# Patient Record
Sex: FEMALE | Race: WHITE | Employment: OTHER | URBAN - NONMETROPOLITAN AREA
[De-identification: names, ages, dates, MRNs, and addresses within clinical notes are randomized per-mention and may not be internally consistent; named-entity substitution may affect disease eponyms.]

---

## 2017-07-05 ENCOUNTER — DOCTOR'S OFFICE (OUTPATIENT)
Dept: URBAN - NONMETROPOLITAN AREA CLINIC 1 | Facility: CLINIC | Age: 77
Setting detail: OPHTHALMOLOGY
End: 2017-07-05
Payer: COMMERCIAL

## 2017-07-05 DIAGNOSIS — F17.200: ICD-10-CM

## 2017-07-05 DIAGNOSIS — H35.033: ICD-10-CM

## 2017-07-05 DIAGNOSIS — H25.13: ICD-10-CM

## 2017-07-05 DIAGNOSIS — E11.9: ICD-10-CM

## 2017-07-05 DIAGNOSIS — H25.042: ICD-10-CM

## 2017-07-05 DIAGNOSIS — H34.8122: ICD-10-CM

## 2017-07-05 PROCEDURE — 67028 INJECTION EYE DRUG: CPT | Performed by: OPHTHALMOLOGY

## 2017-07-05 PROCEDURE — 92134 CPTRZ OPH DX IMG PST SGM RTA: CPT | Performed by: OPHTHALMOLOGY

## 2017-07-05 PROCEDURE — 92014 COMPRE OPH EXAM EST PT 1/>: CPT | Performed by: OPHTHALMOLOGY

## 2017-07-05 ASSESSMENT — REFRACTION_MANIFEST
OS_VA2: 20/
OD_VA1: 20/
OS_VA3: 20/
OU_VA: 20/
OS_VA3: 20/
OD_VA3: 20/
OD_VA1: 20/
OD_VA3: 20/
OS_VA1: 20/
OS_VA2: 20/
OU_VA: 20/
OS_VA2: 20/
OD_VA2: 20/
OD_VA2: 20/
OS_VA3: 20/
OD_VA3: 20/
OS_VA1: 20/
OU_VA: 20/
OS_VA1: 20/
OD_VA1: 20/
OD_VA2: 20/

## 2017-07-05 ASSESSMENT — CONFRONTATIONAL VISUAL FIELD TEST (CVF)
OS_FINDINGS: FULL
OD_FINDINGS: FULL

## 2017-07-05 ASSESSMENT — LID EXAM ASSESSMENTS: OD_BLEPHARITIS: 1+

## 2017-07-05 ASSESSMENT — VISUAL ACUITY: OS_BCVA: 20/70+2

## 2017-07-05 ASSESSMENT — REFRACTION_CURRENTRX
OS_OVR_VA: 20/
OD_OVR_VA: 20/
OS_OVR_VA: 20/
OS_OVR_VA: 20/
OD_OVR_VA: 20/
OD_OVR_VA: 20/

## 2017-08-01 ENCOUNTER — DOCTOR'S OFFICE (OUTPATIENT)
Dept: URBAN - NONMETROPOLITAN AREA CLINIC 1 | Facility: CLINIC | Age: 77
Setting detail: OPHTHALMOLOGY
End: 2017-08-01
Payer: COMMERCIAL

## 2017-08-01 DIAGNOSIS — H35.033: ICD-10-CM

## 2017-08-01 DIAGNOSIS — H34.8122: ICD-10-CM

## 2017-08-01 DIAGNOSIS — F17.200: ICD-10-CM

## 2017-08-01 DIAGNOSIS — H01.002: ICD-10-CM

## 2017-08-01 DIAGNOSIS — H25.13: ICD-10-CM

## 2017-08-01 DIAGNOSIS — H01.001: ICD-10-CM

## 2017-08-01 DIAGNOSIS — H25.043: ICD-10-CM

## 2017-08-01 DIAGNOSIS — H01.004: ICD-10-CM

## 2017-08-01 DIAGNOSIS — H01.005: ICD-10-CM

## 2017-08-01 DIAGNOSIS — E11.9: ICD-10-CM

## 2017-08-01 DIAGNOSIS — H04.122: ICD-10-CM

## 2017-08-01 DIAGNOSIS — H04.121: ICD-10-CM

## 2017-08-01 PROCEDURE — 92250 FUNDUS PHOTOGRAPHY W/I&R: CPT | Performed by: OPHTHALMOLOGY

## 2017-08-01 PROCEDURE — 92014 COMPRE OPH EXAM EST PT 1/>: CPT | Performed by: OPHTHALMOLOGY

## 2017-08-01 ASSESSMENT — REFRACTION_AUTOREFRACTION
OD_CYLINDER: -1.50
OD_SPHERE: +1.75
OD_AXIS: 106

## 2017-08-01 ASSESSMENT — REFRACTION_MANIFEST
OS_VA3: 20/
OD_VA2: 20/
OU_VA: 20/
OD_VA2: 20/
OD_VA1: 20/
OD_VA1: 20/
OS_VA1: 20/
OU_VA: 20/
OD_VA2: 20/
OD_VA3: 20/
OS_VA2: 20/
OS_VA2: 20/
OS_VA3: 20/
OU_VA: 20/
OS_VA1: 20/
OS_VA3: 20/
OS_VA1: 20/
OD_VA3: 20/
OS_VA2: 20/
OD_VA3: 20/
OD_VA1: 20/

## 2017-08-01 ASSESSMENT — CONFRONTATIONAL VISUAL FIELD TEST (CVF)
OS_FINDINGS: FULL
OD_FINDINGS: FULL

## 2017-08-01 ASSESSMENT — REFRACTION_CURRENTRX
OD_OVR_VA: 20/
OD_SPHERE: +3.00
OD_AXIS: 093
OS_OVR_VA: 20/
OS_AXIS: 097
OS_VPRISM_DIRECTION: PROGS
OS_OVR_VA: 20/
OD_VPRISM_DIRECTION: PROGS
OD_OVR_VA: 20/
OS_CYLINDER: -1.75
OS_SPHERE: +2.50
OS_ADD: +2.50
OD_CYLINDER: -1.75
OD_ADD: +2.50
OD_OVR_VA: 20/
OS_OVR_VA: 20/

## 2017-08-01 ASSESSMENT — DRY EYES - PHYSICIAN NOTES
OD_GENERALCOMMENTS: SEVERE K SICCA
OS_GENERALCOMMENTS: SEVERE K SICCA

## 2017-08-01 ASSESSMENT — VISUAL ACUITY: OS_BCVA: 20/70+2

## 2017-08-01 ASSESSMENT — SPHEQUIV_DERIVED: OD_SPHEQUIV: 1

## 2017-08-01 ASSESSMENT — LID EXAM ASSESSMENTS: OD_BLEPHARITIS: 1+

## 2019-09-13 ENCOUNTER — DOCTOR'S OFFICE (OUTPATIENT)
Dept: URBAN - NONMETROPOLITAN AREA CLINIC 1 | Facility: CLINIC | Age: 79
Setting detail: OPHTHALMOLOGY
End: 2019-09-13
Payer: COMMERCIAL

## 2019-09-13 DIAGNOSIS — H35.033: ICD-10-CM

## 2019-09-13 DIAGNOSIS — H34.8120: ICD-10-CM

## 2019-09-13 DIAGNOSIS — E11.3413: ICD-10-CM

## 2019-09-13 DIAGNOSIS — H25.13: ICD-10-CM

## 2019-09-13 DIAGNOSIS — H25.043: ICD-10-CM

## 2019-09-13 DIAGNOSIS — H25.22: ICD-10-CM

## 2019-09-13 PROBLEM — H11.32 SUBCONJUNCTIVAL HEMORRHAGE ; LEFT EYE: Status: RESOLVED | Noted: 2017-07-05 | Resolved: 2019-09-13

## 2019-09-13 PROCEDURE — 92014 COMPRE OPH EXAM EST PT 1/>: CPT | Performed by: OPHTHALMOLOGY

## 2019-09-13 PROCEDURE — 92134 CPTRZ OPH DX IMG PST SGM RTA: CPT | Performed by: OPHTHALMOLOGY

## 2019-09-13 PROCEDURE — 76512 OPH US DX B-SCAN: CPT | Performed by: OPHTHALMOLOGY

## 2019-09-13 ASSESSMENT — REFRACTION_MANIFEST
OD_VA3: 20/
OS_VA1: 20/
OU_VA: 20/
OD_VA2: 20/
OS_VA3: 20/
OS_VA1: 20/
OU_VA: 20/
OD_VA3: 20/
OS_VA2: 20/
OS_VA2: 20/
OD_VA1: 20/
OD_VA2: 20/
OS_VA3: 20/
OD_VA1: 20/

## 2019-09-13 ASSESSMENT — REFRACTION_CURRENTRX
OS_AXIS: 085
OS_VPRISM_DIRECTION: PROGS
OD_VPRISM_DIRECTION: PROGS
OS_CYLINDER: -1.75
OS_OVR_VA: 20/
OS_OVR_VA: 20/
OD_SPHERE: +2.75
OS_ADD: +2.50
OD_OVR_VA: 20/
OS_OVR_VA: 20/
OD_ADD: +2.50
OD_OVR_VA: 20/
OD_OVR_VA: 20/
OD_CYLINDER: -1.50
OD_AXIS: 090
OS_SPHERE: +2.50

## 2019-09-13 ASSESSMENT — DRY EYES - PHYSICIAN NOTES
OS_GENERALCOMMENTS: SEVERE K SICCA
OD_GENERALCOMMENTS: SEVERE K SICCA

## 2019-09-13 ASSESSMENT — CONFRONTATIONAL VISUAL FIELD TEST (CVF)
OS_FINDINGS: FULL
OD_FINDINGS: FULL

## 2019-09-13 ASSESSMENT — REFRACTION_AUTOREFRACTION
OD_AXIS: 070
OS_SPHERE: ERROR
OD_CYLINDER: -2.50
OD_SPHERE: +2.00

## 2019-09-13 ASSESSMENT — SPHEQUIV_DERIVED: OD_SPHEQUIV: 0.75

## 2019-09-13 ASSESSMENT — VISUAL ACUITY: OS_BCVA: 20/400

## 2019-09-13 ASSESSMENT — LID EXAM ASSESSMENTS: OD_BLEPHARITIS: 1+

## 2019-09-17 ENCOUNTER — DOCTOR'S OFFICE (OUTPATIENT)
Dept: URBAN - NONMETROPOLITAN AREA CLINIC 1 | Facility: CLINIC | Age: 79
Setting detail: OPHTHALMOLOGY
End: 2019-09-17
Payer: COMMERCIAL

## 2019-09-17 DIAGNOSIS — E11.3411: ICD-10-CM

## 2019-09-17 PROCEDURE — 67028 INJECTION EYE DRUG: CPT | Performed by: OPHTHALMOLOGY

## 2019-09-19 ENCOUNTER — DOCTOR'S OFFICE (OUTPATIENT)
Dept: URBAN - NONMETROPOLITAN AREA CLINIC 1 | Facility: CLINIC | Age: 79
Setting detail: OPHTHALMOLOGY
End: 2019-09-19
Payer: COMMERCIAL

## 2019-09-19 DIAGNOSIS — E11.3412: ICD-10-CM

## 2019-09-19 PROCEDURE — 67028 INJECTION EYE DRUG: CPT | Performed by: OPHTHALMOLOGY

## 2019-09-20 ASSESSMENT — REFRACTION_CURRENTRX
OS_SPHERE: +2.50
OD_VPRISM_DIRECTION: PROGS
OD_ADD: +2.50
OS_OVR_VA: 20/
OS_OVR_VA: 20/
OD_SPHERE: +2.75
OS_CYLINDER: -1.75
OS_OVR_VA: 20/
OD_OVR_VA: 20/
OS_VPRISM_DIRECTION: PROGS
OD_CYLINDER: -1.50
OD_OVR_VA: 20/
OD_AXIS: 090
OD_OVR_VA: 20/
OS_ADD: +2.50
OS_AXIS: 085

## 2019-09-20 ASSESSMENT — REFRACTION_MANIFEST
OD_VA1: 20/
OU_VA: 20/
OD_VA2: 20/
OD_VA3: 20/
OS_VA2: 20/
OS_VA1: 20/
OD_VA1: 20/
OD_VA2: 20/
OS_VA1: 20/
OS_VA3: 20/
OS_VA3: 20/
OU_VA: 20/
OS_VA2: 20/
OD_VA3: 20/

## 2019-09-20 ASSESSMENT — REFRACTION_AUTOREFRACTION
OD_AXIS: 070
OD_SPHERE: +2.00
OD_CYLINDER: -2.50
OS_SPHERE: ERROR

## 2019-09-20 ASSESSMENT — VISUAL ACUITY: OS_BCVA: 20/400

## 2019-09-20 ASSESSMENT — SPHEQUIV_DERIVED: OD_SPHEQUIV: 0.75

## 2019-09-24 ENCOUNTER — RX ONLY (RX ONLY)
Age: 79
End: 2019-09-24

## 2019-09-24 ASSESSMENT — SPHEQUIV_DERIVED: OD_SPHEQUIV: 0.75

## 2019-09-24 ASSESSMENT — REFRACTION_CURRENTRX
OD_VPRISM_DIRECTION: PROGS
OS_OVR_VA: 20/
OD_OVR_VA: 20/
OS_VPRISM_DIRECTION: PROGS
OS_ADD: +2.50
OD_OVR_VA: 20/
OS_CYLINDER: -1.75
OS_AXIS: 085
OD_CYLINDER: -1.50
OD_AXIS: 090
OD_OVR_VA: 20/
OS_OVR_VA: 20/
OS_OVR_VA: 20/
OD_ADD: +2.50
OD_SPHERE: +2.75
OS_SPHERE: +2.50

## 2019-09-24 ASSESSMENT — REFRACTION_MANIFEST
OU_VA: 20/
OS_VA3: 20/
OS_VA1: 20/
OD_VA2: 20/
OD_VA2: 20/
OU_VA: 20/
OD_VA1: 20/
OD_VA1: 20/
OS_VA3: 20/
OS_VA2: 20/
OD_VA3: 20/
OS_VA2: 20/
OS_VA1: 20/
OD_VA3: 20/

## 2019-09-24 ASSESSMENT — VISUAL ACUITY: OS_BCVA: 20/400

## 2019-09-24 ASSESSMENT — REFRACTION_AUTOREFRACTION
OD_SPHERE: +2.00
OD_AXIS: 070
OD_CYLINDER: -2.50
OS_SPHERE: ERROR

## 2019-10-09 ENCOUNTER — RX ONLY (RX ONLY)
Age: 79
End: 2019-10-09

## 2019-10-09 ENCOUNTER — DOCTOR'S OFFICE (OUTPATIENT)
Dept: URBAN - NONMETROPOLITAN AREA CLINIC 1 | Facility: CLINIC | Age: 79
Setting detail: OPHTHALMOLOGY
End: 2019-10-09
Payer: COMMERCIAL

## 2019-10-09 DIAGNOSIS — H25.043: ICD-10-CM

## 2019-10-09 DIAGNOSIS — H25.13: ICD-10-CM

## 2019-10-09 DIAGNOSIS — E11.3413: ICD-10-CM

## 2019-10-09 DIAGNOSIS — H25.22: ICD-10-CM

## 2019-10-09 DIAGNOSIS — H34.8120: ICD-10-CM

## 2019-10-09 PROCEDURE — 92134 CPTRZ OPH DX IMG PST SGM RTA: CPT | Performed by: OPHTHALMOLOGY

## 2019-10-09 PROCEDURE — 92014 COMPRE OPH EXAM EST PT 1/>: CPT | Performed by: OPHTHALMOLOGY

## 2019-10-09 ASSESSMENT — REFRACTION_CURRENTRX
OD_OVR_VA: 20/
OD_OVR_VA: 20/
OD_AXIS: 090
OS_CYLINDER: -1.75
OD_CYLINDER: -1.50
OD_ADD: +2.50
OS_OVR_VA: 20/
OD_OVR_VA: 20/
OD_VPRISM_DIRECTION: PROGS
OS_OVR_VA: 20/
OS_VPRISM_DIRECTION: PROGS
OS_AXIS: 085
OS_ADD: +2.50
OS_OVR_VA: 20/
OD_SPHERE: +2.75
OS_SPHERE: +2.50

## 2019-10-09 ASSESSMENT — REFRACTION_MANIFEST
OU_VA: 20/
OS_VA1: 20/
OS_VA1: 20/
OD_VA1: 20/
OS_VA2: 20/
OS_VA2: 20/
OU_VA: 20/
OD_VA3: 20/
OS_VA3: 20/
OD_VA3: 20/
OD_VA2: 20/
OS_VA3: 20/
OD_VA2: 20/
OD_VA1: 20/

## 2019-10-09 ASSESSMENT — SPHEQUIV_DERIVED: OD_SPHEQUIV: 0.75

## 2019-10-09 ASSESSMENT — LID EXAM ASSESSMENTS: OD_BLEPHARITIS: 1+

## 2019-10-09 ASSESSMENT — REFRACTION_AUTOREFRACTION
OD_CYLINDER: -2.50
OD_AXIS: 070
OD_SPHERE: +2.00
OS_SPHERE: ERROR

## 2019-10-09 ASSESSMENT — VISUAL ACUITY: OS_BCVA: 20/400

## 2019-10-09 ASSESSMENT — CONFRONTATIONAL VISUAL FIELD TEST (CVF)
OD_FINDINGS: FULL
OS_FINDINGS: FULL

## 2019-10-09 ASSESSMENT — DRY EYES - PHYSICIAN NOTES
OS_GENERALCOMMENTS: SEVERE K SICCA
OD_GENERALCOMMENTS: SEVERE K SICCA

## 2019-11-04 ENCOUNTER — DOCTOR'S OFFICE (OUTPATIENT)
Dept: URBAN - NONMETROPOLITAN AREA CLINIC 1 | Facility: CLINIC | Age: 79
Setting detail: OPHTHALMOLOGY
End: 2019-11-04
Payer: COMMERCIAL

## 2019-11-04 DIAGNOSIS — E11.3411: ICD-10-CM

## 2019-11-04 PROCEDURE — 67028 INJECTION EYE DRUG: CPT | Performed by: OPHTHALMOLOGY

## 2019-11-05 ENCOUNTER — RX ONLY (RX ONLY)
Age: 79
End: 2019-11-05

## 2019-11-05 ASSESSMENT — VISUAL ACUITY: OS_BCVA: 20/400

## 2019-11-05 ASSESSMENT — REFRACTION_MANIFEST
OS_VA3: 20/
OS_VA1: 20/
OD_VA3: 20/
OD_VA2: 20/
OS_VA3: 20/
OU_VA: 20/
OS_VA2: 20/
OS_VA2: 20/
OU_VA: 20/
OS_VA1: 20/
OD_VA1: 20/
OD_VA2: 20/
OD_VA1: 20/
OD_VA3: 20/

## 2019-11-05 ASSESSMENT — REFRACTION_CURRENTRX
OD_ADD: +2.50
OD_OVR_VA: 20/
OS_SPHERE: +2.50
OD_OVR_VA: 20/
OD_CYLINDER: -1.50
OS_OVR_VA: 20/
OS_OVR_VA: 20/
OD_OVR_VA: 20/
OS_AXIS: 085
OS_ADD: +2.50
OD_AXIS: 090
OS_OVR_VA: 20/
OD_VPRISM_DIRECTION: PROGS
OS_CYLINDER: -1.75
OD_SPHERE: +2.75
OS_VPRISM_DIRECTION: PROGS

## 2019-11-05 ASSESSMENT — SPHEQUIV_DERIVED: OD_SPHEQUIV: 0.75

## 2019-11-05 ASSESSMENT — REFRACTION_AUTOREFRACTION
OS_SPHERE: ERROR
OD_CYLINDER: -2.50
OD_AXIS: 070
OD_SPHERE: +2.00

## 2019-11-07 ENCOUNTER — DOCTOR'S OFFICE (OUTPATIENT)
Dept: URBAN - NONMETROPOLITAN AREA CLINIC 1 | Facility: CLINIC | Age: 79
Setting detail: OPHTHALMOLOGY
End: 2019-11-07
Payer: COMMERCIAL

## 2019-11-07 DIAGNOSIS — E11.3412: ICD-10-CM

## 2019-11-07 PROCEDURE — 67028 INJECTION EYE DRUG: CPT | Performed by: OPHTHALMOLOGY

## 2019-11-08 ENCOUNTER — RX ONLY (RX ONLY)
Age: 79
End: 2019-11-08

## 2019-11-08 ASSESSMENT — REFRACTION_MANIFEST
OS_VA2: 20/
OS_VA1: 20/
OD_VA1: 20/
OS_VA2: 20/
OU_VA: 20/
OS_VA3: 20/
OD_VA2: 20/
OU_VA: 20/
OS_VA1: 20/
OD_VA2: 20/
OD_VA3: 20/
OS_VA3: 20/
OD_VA3: 20/
OD_VA1: 20/

## 2019-11-08 ASSESSMENT — REFRACTION_CURRENTRX
OS_CYLINDER: -1.75
OD_CYLINDER: -1.50
OS_SPHERE: +2.50
OD_OVR_VA: 20/
OD_VPRISM_DIRECTION: PROGS
OD_AXIS: 090
OD_ADD: +2.50
OS_OVR_VA: 20/
OS_OVR_VA: 20/
OS_AXIS: 085
OD_SPHERE: +2.75
OD_OVR_VA: 20/
OD_OVR_VA: 20/
OS_VPRISM_DIRECTION: PROGS
OS_ADD: +2.50
OS_OVR_VA: 20/

## 2019-11-08 ASSESSMENT — SPHEQUIV_DERIVED: OD_SPHEQUIV: 0.75

## 2019-11-08 ASSESSMENT — VISUAL ACUITY: OS_BCVA: 20/400

## 2019-11-08 ASSESSMENT — REFRACTION_AUTOREFRACTION
OD_AXIS: 070
OS_SPHERE: ERROR
OD_CYLINDER: -2.50
OD_SPHERE: +2.00

## 2019-11-13 ENCOUNTER — DOCTOR'S OFFICE (OUTPATIENT)
Dept: URBAN - NONMETROPOLITAN AREA CLINIC 1 | Facility: CLINIC | Age: 79
Setting detail: OPHTHALMOLOGY
End: 2019-11-13
Payer: COMMERCIAL

## 2019-11-13 DIAGNOSIS — H25.11: ICD-10-CM

## 2019-11-13 PROCEDURE — 92136 OPHTHALMIC BIOMETRY: CPT | Performed by: OPHTHALMOLOGY

## 2020-01-15 ENCOUNTER — DOCTOR'S OFFICE (OUTPATIENT)
Dept: URBAN - NONMETROPOLITAN AREA CLINIC 1 | Facility: CLINIC | Age: 80
Setting detail: OPHTHALMOLOGY
End: 2020-01-15
Payer: COMMERCIAL

## 2020-01-15 DIAGNOSIS — H34.8120: ICD-10-CM

## 2020-01-15 DIAGNOSIS — H25.13: ICD-10-CM

## 2020-01-15 DIAGNOSIS — E11.3413: ICD-10-CM

## 2020-01-15 DIAGNOSIS — H25.043: ICD-10-CM

## 2020-01-15 PROCEDURE — 92134 CPTRZ OPH DX IMG PST SGM RTA: CPT | Performed by: OPHTHALMOLOGY

## 2020-01-15 PROCEDURE — 76512 OPH US DX B-SCAN: CPT | Performed by: OPHTHALMOLOGY

## 2020-01-15 PROCEDURE — 92014 COMPRE OPH EXAM EST PT 1/>: CPT | Performed by: OPHTHALMOLOGY

## 2020-01-15 ASSESSMENT — REFRACTION_AUTOREFRACTION
OS_SPHERE: ERROR
OD_AXIS: 070
OD_CYLINDER: -2.50
OD_SPHERE: +2.00

## 2020-01-15 ASSESSMENT — SPHEQUIV_DERIVED: OD_SPHEQUIV: 0.75

## 2020-01-15 ASSESSMENT — CONFRONTATIONAL VISUAL FIELD TEST (CVF)
OS_FINDINGS: FULL
OD_FINDINGS: FULL

## 2020-01-15 ASSESSMENT — REFRACTION_CURRENTRX
OS_ADD: +2.50
OD_CYLINDER: -1.50
OS_CYLINDER: -1.75
OD_OVR_VA: 20/
OS_VPRISM_DIRECTION: PROGS
OS_SPHERE: +2.50
OS_OVR_VA: 20/
OD_SPHERE: +2.75
OD_ADD: +2.50
OS_AXIS: 085
OD_VPRISM_DIRECTION: PROGS
OD_AXIS: 090

## 2020-01-15 ASSESSMENT — VISUAL ACUITY
OD_BCVA: 20/CF@FACE
OS_BCVA: 20/400

## 2020-01-15 ASSESSMENT — DRY EYES - PHYSICIAN NOTES
OD_GENERALCOMMENTS: SEVERE K SICCA
OS_GENERALCOMMENTS: SEVERE K SICCA

## 2020-01-15 ASSESSMENT — LID EXAM ASSESSMENTS: OD_BLEPHARITIS: 1+

## 2020-01-16 ENCOUNTER — DOCTOR'S OFFICE (OUTPATIENT)
Dept: URBAN - NONMETROPOLITAN AREA CLINIC 1 | Facility: CLINIC | Age: 80
Setting detail: OPHTHALMOLOGY
End: 2020-01-16
Payer: COMMERCIAL

## 2020-01-16 DIAGNOSIS — H25.12: ICD-10-CM

## 2020-01-16 DIAGNOSIS — H25.042: ICD-10-CM

## 2020-01-16 DIAGNOSIS — H25.22: ICD-10-CM

## 2020-01-16 PROCEDURE — 92136 OPHTHALMIC BIOMETRY: CPT | Performed by: OPHTHALMOLOGY

## 2020-02-13 ENCOUNTER — DOCTOR'S OFFICE (OUTPATIENT)
Dept: URBAN - NONMETROPOLITAN AREA CLINIC 1 | Facility: CLINIC | Age: 80
Setting detail: OPHTHALMOLOGY
End: 2020-02-13
Payer: COMMERCIAL

## 2020-02-13 DIAGNOSIS — H34.8120: ICD-10-CM

## 2020-02-13 PROCEDURE — 67028 INJECTION EYE DRUG: CPT | Performed by: OPHTHALMOLOGY

## 2020-02-18 ENCOUNTER — RX ONLY (RX ONLY)
Age: 80
End: 2020-02-18

## 2020-02-18 PROBLEM — H34.8120: Status: ACTIVE | Noted: 2019-09-13

## 2020-02-18 PROBLEM — H01.001 BLEPHARITIS; RIGHT UPPER LID, RIGHT LOWER LID, LEFT UPPER LID, LEFT LOWER LID: Status: ACTIVE | Noted: 2017-08-01

## 2020-02-18 PROBLEM — H01.005 BLEPHARITIS; RIGHT UPPER LID, RIGHT LOWER LID, LEFT UPPER LID, LEFT LOWER LID: Status: ACTIVE | Noted: 2017-08-01

## 2020-02-18 PROBLEM — H35.033 HYPERTENSIVE RETINOPATHY ; BOTH EYES: Status: ACTIVE | Noted: 2017-07-05

## 2020-02-18 PROBLEM — F17.200 TOBACCO USE DISORDER: Status: ACTIVE | Noted: 2017-07-05

## 2020-02-18 PROBLEM — H01.002 BLEPHARITIS; RIGHT UPPER LID, RIGHT LOWER LID, LEFT UPPER LID, LEFT LOWER LID: Status: ACTIVE | Noted: 2017-08-01

## 2020-02-18 PROBLEM — E11.3413 DM TYPE 2; BOTH SEVERE WITH ME: Status: ACTIVE | Noted: 2019-10-09

## 2020-02-18 PROBLEM — H01.004 BLEPHARITIS; RIGHT UPPER LID, RIGHT LOWER LID, LEFT UPPER LID, LEFT LOWER LID: Status: ACTIVE | Noted: 2017-08-01

## 2020-02-18 PROBLEM — H25.043: Status: ACTIVE | Noted: 2017-08-01

## 2020-02-18 PROBLEM — H25.13 CATARACT NUCLEAR SCLEROSIS ; BOTH EYES: Status: ACTIVE | Noted: 2017-07-05

## 2020-02-18 ASSESSMENT — REFRACTION_CURRENTRX
OS_VPRISM_DIRECTION: PROGS
OS_OVR_VA: 20/
OS_CYLINDER: -1.75
OD_OVR_VA: 20/
OD_ADD: +2.50
OS_AXIS: 085
OD_CYLINDER: -1.50
OD_VPRISM_DIRECTION: PROGS
OS_SPHERE: +2.50
OD_AXIS: 090
OS_ADD: +2.50
OD_SPHERE: +2.75

## 2020-02-18 ASSESSMENT — REFRACTION_AUTOREFRACTION
OD_AXIS: 070
OS_SPHERE: ERROR
OD_SPHERE: +2.00
OD_CYLINDER: -2.50

## 2020-02-18 ASSESSMENT — VISUAL ACUITY
OS_BCVA: 20/400
OD_BCVA: 20/CF@FACE

## 2020-02-18 ASSESSMENT — SPHEQUIV_DERIVED: OD_SPHEQUIV: 0.75

## 2020-03-10 ENCOUNTER — APPOINTMENT (EMERGENCY)
Dept: CT IMAGING | Facility: HOSPITAL | Age: 80
End: 2020-03-10
Payer: COMMERCIAL

## 2020-03-10 ENCOUNTER — APPOINTMENT (EMERGENCY)
Dept: RADIOLOGY | Facility: HOSPITAL | Age: 80
End: 2020-03-10
Payer: COMMERCIAL

## 2020-03-10 ENCOUNTER — HOSPITAL ENCOUNTER (EMERGENCY)
Facility: HOSPITAL | Age: 80
Discharge: HOME | End: 2020-03-11
Attending: EMERGENCY MEDICINE | Admitting: EMERGENCY MEDICINE
Payer: COMMERCIAL

## 2020-03-10 DIAGNOSIS — R07.89 ATYPICAL CHEST PAIN: Primary | ICD-10-CM

## 2020-03-10 LAB
ALBUMIN SERPL BCP-MCNC: 3 G/DL (ref 3.5–5)
ALP SERPL-CCNC: 104 U/L (ref 46–116)
ALT SERPL W P-5'-P-CCNC: 14 U/L (ref 12–78)
ANION GAP SERPL CALCULATED.3IONS-SCNC: 4 MMOL/L (ref 4–13)
AST SERPL W P-5'-P-CCNC: 9 U/L (ref 5–45)
BASOPHILS # BLD AUTO: 0.08 THOUSANDS/ΜL (ref 0–0.1)
BASOPHILS NFR BLD AUTO: 1 % (ref 0–1)
BILIRUB SERPL-MCNC: 0.12 MG/DL (ref 0.2–1)
BUN SERPL-MCNC: 14 MG/DL (ref 5–25)
CALCIUM SERPL-MCNC: 8.1 MG/DL (ref 8.3–10.1)
CHLORIDE SERPL-SCNC: 102 MMOL/L (ref 100–108)
CO2 SERPL-SCNC: 37 MMOL/L (ref 21–32)
CREAT SERPL-MCNC: 1.39 MG/DL (ref 0.6–1.3)
D DIMER PPP FEU-MCNC: 0.54 UG/ML FEU
EOSINOPHIL # BLD AUTO: 0.33 THOUSAND/ΜL (ref 0–0.61)
EOSINOPHIL NFR BLD AUTO: 3 % (ref 0–6)
ERYTHROCYTE [DISTWIDTH] IN BLOOD BY AUTOMATED COUNT: 15.5 % (ref 11.6–15.1)
GFR SERPL CREATININE-BSD FRML MDRD: 37 ML/MIN/1.73SQ M
GLUCOSE SERPL-MCNC: 79 MG/DL (ref 65–140)
HCT VFR BLD AUTO: 39.9 % (ref 34.8–46.1)
HGB BLD-MCNC: 11.9 G/DL (ref 11.5–15.4)
IMM GRANULOCYTES # BLD AUTO: 0.06 THOUSAND/UL (ref 0–0.2)
IMM GRANULOCYTES NFR BLD AUTO: 1 % (ref 0–2)
LYMPHOCYTES # BLD AUTO: 3.87 THOUSANDS/ΜL (ref 0.6–4.47)
LYMPHOCYTES NFR BLD AUTO: 31 % (ref 14–44)
MCH RBC QN AUTO: 24.9 PG (ref 26.8–34.3)
MCHC RBC AUTO-ENTMCNC: 29.8 G/DL (ref 31.4–37.4)
MCV RBC AUTO: 84 FL (ref 82–98)
MONOCYTES # BLD AUTO: 0.71 THOUSAND/ΜL (ref 0.17–1.22)
MONOCYTES NFR BLD AUTO: 6 % (ref 4–12)
NEUTROPHILS # BLD AUTO: 7.3 THOUSANDS/ΜL (ref 1.85–7.62)
NEUTS SEG NFR BLD AUTO: 58 % (ref 43–75)
NRBC BLD AUTO-RTO: 0 /100 WBCS
PLATELET # BLD AUTO: 362 THOUSANDS/UL (ref 149–390)
PMV BLD AUTO: 9.8 FL (ref 8.9–12.7)
POTASSIUM SERPL-SCNC: 3.4 MMOL/L (ref 3.5–5.3)
PROT SERPL-MCNC: 6.3 G/DL (ref 6.4–8.2)
RBC # BLD AUTO: 4.77 MILLION/UL (ref 3.81–5.12)
SODIUM SERPL-SCNC: 143 MMOL/L (ref 136–145)
TROPONIN I SERPL-MCNC: 0.02 NG/ML
WBC # BLD AUTO: 12.35 THOUSAND/UL (ref 4.31–10.16)

## 2020-03-10 PROCEDURE — 71046 X-RAY EXAM CHEST 2 VIEWS: CPT

## 2020-03-10 PROCEDURE — 99285 EMERGENCY DEPT VISIT HI MDM: CPT

## 2020-03-10 PROCEDURE — 71275 CT ANGIOGRAPHY CHEST: CPT

## 2020-03-10 PROCEDURE — 93005 ELECTROCARDIOGRAM TRACING: CPT

## 2020-03-10 PROCEDURE — 99285 EMERGENCY DEPT VISIT HI MDM: CPT | Performed by: EMERGENCY MEDICINE

## 2020-03-10 PROCEDURE — 85025 COMPLETE CBC W/AUTO DIFF WBC: CPT

## 2020-03-10 PROCEDURE — 36415 COLL VENOUS BLD VENIPUNCTURE: CPT

## 2020-03-10 PROCEDURE — 80053 COMPREHEN METABOLIC PANEL: CPT

## 2020-03-10 PROCEDURE — C9113 INJ PANTOPRAZOLE SODIUM, VIA: HCPCS | Performed by: EMERGENCY MEDICINE

## 2020-03-10 PROCEDURE — 84484 ASSAY OF TROPONIN QUANT: CPT

## 2020-03-10 PROCEDURE — 85379 FIBRIN DEGRADATION QUANT: CPT | Performed by: EMERGENCY MEDICINE

## 2020-03-10 PROCEDURE — 96374 THER/PROPH/DIAG INJ IV PUSH: CPT

## 2020-03-10 PROCEDURE — 96375 TX/PRO/DX INJ NEW DRUG ADDON: CPT

## 2020-03-10 RX ORDER — MIRTAZAPINE 30 MG/1
30 TABLET, FILM COATED ORAL
COMMUNITY

## 2020-03-10 RX ORDER — ROPINIROLE 0.25 MG/1
0.25 TABLET, FILM COATED ORAL
COMMUNITY

## 2020-03-10 RX ORDER — FUROSEMIDE 40 MG/1
40 TABLET ORAL DAILY
COMMUNITY

## 2020-03-10 RX ORDER — INSULIN GLARGINE 100 [IU]/ML
45 INJECTION, SOLUTION SUBCUTANEOUS
COMMUNITY

## 2020-03-10 RX ORDER — LOSARTAN POTASSIUM 25 MG/1
25 TABLET ORAL DAILY
COMMUNITY

## 2020-03-10 RX ORDER — LACTULOSE 10 G/15ML
200 SOLUTION ORAL; RECTAL AS NEEDED
COMMUNITY

## 2020-03-10 RX ORDER — METOLAZONE 5 MG/1
5 TABLET ORAL AS NEEDED
COMMUNITY

## 2020-03-10 RX ORDER — DULOXETIN HYDROCHLORIDE 60 MG/1
90 CAPSULE, DELAYED RELEASE ORAL DAILY
COMMUNITY

## 2020-03-10 RX ORDER — DOCUSATE SODIUM 100 MG/1
200 CAPSULE, LIQUID FILLED ORAL DAILY
COMMUNITY

## 2020-03-10 RX ORDER — METOPROLOL TARTRATE 50 MG/1
50 TABLET, FILM COATED ORAL EVERY 12 HOURS SCHEDULED
COMMUNITY

## 2020-03-10 RX ORDER — TRAZODONE HYDROCHLORIDE 100 MG/1
100 TABLET ORAL
COMMUNITY

## 2020-03-10 RX ORDER — POTASSIUM CHLORIDE 750 MG/1
10 CAPSULE, EXTENDED RELEASE ORAL DAILY
COMMUNITY

## 2020-03-10 RX ORDER — ALBUTEROL SULFATE 0.63 MG/3ML
1 SOLUTION RESPIRATORY (INHALATION) 4 TIMES DAILY PRN
COMMUNITY

## 2020-03-10 RX ORDER — SUCRALFATE 1 G/1
1 TABLET ORAL
COMMUNITY

## 2020-03-10 RX ORDER — PANTOPRAZOLE SODIUM 40 MG/1
40 TABLET, DELAYED RELEASE ORAL DAILY
COMMUNITY

## 2020-03-10 RX ORDER — ASPIRIN 81 MG/1
81 TABLET, CHEWABLE ORAL DAILY
COMMUNITY

## 2020-03-10 RX ORDER — PANTOPRAZOLE SODIUM 40 MG/1
40 INJECTION, POWDER, FOR SOLUTION INTRAVENOUS ONCE
Status: COMPLETED | OUTPATIENT
Start: 2020-03-10 | End: 2020-03-10

## 2020-03-10 RX ORDER — ATORVASTATIN CALCIUM 10 MG/1
10 TABLET, FILM COATED ORAL DAILY
COMMUNITY

## 2020-03-10 RX ORDER — GABAPENTIN 400 MG/1
400 CAPSULE ORAL 3 TIMES DAILY
COMMUNITY

## 2020-03-10 RX ORDER — NITROGLYCERIN 0.4 MG/1
0.4 TABLET SUBLINGUAL
COMMUNITY

## 2020-03-10 RX ORDER — SACCHAROMYCES BOULARDII 250 MG
250 CAPSULE ORAL DAILY
COMMUNITY

## 2020-03-10 RX ADMIN — PANTOPRAZOLE SODIUM 40 MG: 40 INJECTION, POWDER, FOR SOLUTION INTRAVENOUS at 21:51

## 2020-03-10 RX ADMIN — MORPHINE SULFATE 2 MG: 2 INJECTION, SOLUTION INTRAMUSCULAR; INTRAVENOUS at 21:51

## 2020-03-10 RX ADMIN — IOHEXOL 85 ML: 350 INJECTION, SOLUTION INTRAVENOUS at 22:58

## 2020-03-11 VITALS
HEIGHT: 66 IN | BODY MASS INDEX: 32.53 KG/M2 | OXYGEN SATURATION: 92 % | SYSTOLIC BLOOD PRESSURE: 147 MMHG | TEMPERATURE: 98.9 F | RESPIRATION RATE: 21 BRPM | WEIGHT: 202.38 LBS | DIASTOLIC BLOOD PRESSURE: 68 MMHG | HEART RATE: 60 BPM

## 2020-03-11 LAB
ATRIAL RATE: 82 BPM
ATRIAL RATE: 83 BPM
P AXIS: 15 DEGREES
P AXIS: 18 DEGREES
PR INTERVAL: 188 MS
PR INTERVAL: 194 MS
QRS AXIS: 37 DEGREES
QRS AXIS: 41 DEGREES
QRSD INTERVAL: 88 MS
QRSD INTERVAL: 90 MS
QT INTERVAL: 462 MS
QT INTERVAL: 470 MS
QTC INTERVAL: 539 MS
QTC INTERVAL: 552 MS
T WAVE AXIS: 48 DEGREES
T WAVE AXIS: 49 DEGREES
VENTRICULAR RATE: 82 BPM
VENTRICULAR RATE: 83 BPM

## 2020-03-11 NOTE — ED NOTES
Patient complaining of a muscle cramp in her right calf, walked patient to try and relieve cramp  Patient currently sitting in a chair trying to find a comfortable position       Ronda Lesches  03/10/20 2961

## 2020-03-11 NOTE — ED PROVIDER NOTES
History  Chief Complaint   Patient presents with    Chest Pain     Patient reports chest pressure beginning around 2000 while at rest  Patient was given 1 SL nitro by family who report a brief syncopal episode after  History provided by:  Relative and patient  Chest Pain   Pain location:  Substernal area  Pain quality: aching and burning    Pain radiates to:  L shoulder  Pain radiates to the back: no    Pain severity:  Moderate  Date/time of last known well:  3/10/2020 8:00 PM  Timing:  Constant  Progression:  Waxing and waning  Chronicity:  New  Context: not breathing and no movement    Worsened by:  Nothing tried  Ineffective treatments:  Aspirin and nitroglycerin  Associated symptoms: diaphoresis    Associated symptoms: no abdominal pain, no back pain, no claudication, no cough, no dizziness, no fever, no headache, no lower extremity edema, no nausea, no palpitations, no shortness of breath, not vomiting and no weakness    Risk factors: coronary artery disease, diabetes mellitus, hypertension, obesity, prior DVT/PE and smoking    Risk factors: no high cholesterol        Prior to Admission Medications   Prescriptions Last Dose Informant Patient Reported? Taking?    DM-APAP-CPM 7 5-160-1 MG/5ML SYRP   Yes Yes   Sig: Take by mouth as needed (For cough)   DULoxetine (CYMBALTA) 60 mg delayed release capsule 3/9/2020 at Unknown time  Yes Yes   Sig: Take 90 mg by mouth daily   HYDROXYZINE HCL PO   Yes Yes   Sig: Take by mouth as needed (Itching and anxiety)   Ipratropium-Albuterol (DUONEB IN)   Yes Yes   Sig: Inhale 2 5 mg every 4 (four) hours   MECLIZINE HCL PO   Yes Yes   Sig: Take by mouth as needed (Up to TID for dizziness)   Mirabegron (MYRBETRIQ PO) 3/10/2020 at Unknown time  Yes Yes   Sig: Take 25 mg by mouth daily   NYSTATIN PO   Yes Yes   Sig: Take 5 mL by mouth as needed (THRUSH)   OXYCODONE ER PO   Yes Yes   Sig: Take 10 mg by mouth 3 (three) times a day as needed   PROCHLORPERAZINE MALEATE PO   Yes Yes Sig: Take 5 mg by mouth every 6 (six) hours as needed   Tiotropium Bromide Monohydrate (Arthlola Helms IN)   Yes Yes   Sig: Inhale 1 puff daily at bedtime as needed   albuterol (ACCUNEB) 0 63 MG/3ML nebulizer solution   Yes Yes   Sig: Take 1 ampule by nebulization 4 (four) times a day as needed for wheezing   apixaban (Eliquis) 5 mg 3/10/2020 at Unknown time  Yes Yes   Sig: Take 5 mg by mouth 2 (two) times a day   aspirin 81 mg chewable tablet 3/10/2020 at Unknown time  Yes Yes   Sig: Chew 81 mg daily   atorvastatin (LIPITOR) 10 mg tablet 3/10/2020 at Unknown time  Yes Yes   Sig: Take 10 mg by mouth daily   docusate sodium (COLACE) 100 mg capsule 3/10/2020 at Unknown time  Yes Yes   Sig: Take 200 mg by mouth daily   fluticasone-salmeterol (ADVAIR, WIXELA) 500-50 mcg/dose inhaler   Yes Yes   Sig: Inhale 1 puff 2 (two) times a day Rinse mouth after use     furosemide (LASIX) 40 mg tablet 3/10/2020 at Unknown time  Yes Yes   Sig: Take 40 mg by mouth 2 (two) times a day   gabapentin (NEURONTIN) 400 mg capsule 3/10/2020 at Unknown time  Yes Yes   Sig: Take 400 mg by mouth 3 (three) times a day   insulin aspart (NovoLOG) 100 units/mL injection   Yes Yes   Sig: Inject 24 Units under the skin 3 (three) times a day before meals   insulin glargine (LANTUS) 100 units/mL subcutaneous injection   Yes Yes   Sig: Inject 45 Units under the skin daily at bedtime   lactulose (Enulose) 10 g/15 mL   Yes Yes   Sig: Insert 200 g into the rectum as needed   losartan (COZAAR) 25 mg tablet 3/10/2020 at Unknown time  Yes Yes   Sig: Take 25 mg by mouth daily   metolazone (ZAROXOLYN) 5 mg tablet   Yes Yes   Sig: Take 5 mg by mouth as needed   metoprolol tartrate (LOPRESSOR) 50 mg tablet 3/10/2020 at Unknown time  Yes Yes   Sig: Take 50 mg by mouth every 12 (twelve) hours   mirtazapine (REMERON) 30 mg tablet 3/9/2020 at Unknown time  Yes Yes   Sig: Take 30 mg by mouth daily at bedtime   nitroglycerin (NITROSTAT) 0 4 mg SL tablet 3/10/2020 at Unknown time  Yes Yes   Sig: Place 0 4 mg under the tongue every 5 (five) minutes as needed for chest pain   pantoprazole (PROTONIX) 40 mg tablet 3/10/2020 at Unknown time  Yes Yes   Sig: Take 40 mg by mouth daily   potassium chloride (MICRO-K) 10 MEQ CR capsule 3/10/2020 at Unknown time  Yes Yes   Sig: Take 10 mEq by mouth daily   rOPINIRole (REQUIP) 0 25 mg tablet 3/9/2020 at Unknown time  Yes Yes   Sig: Take 0 25 mg by mouth daily at bedtime   saccharomyces boulardii (FLORASTOR) 250 mg capsule 3/10/2020 at Unknown time  Yes Yes   Sig: Take 250 mg by mouth daily   sucralfate (CARAFATE) 1 g tablet 3/10/2020 at Unknown time  Yes Yes   Sig: Take 1 g by mouth 2 (two) times a day before meals   traZODone (DESYREL) 100 mg tablet 3/9/2020 at Unknown time  Yes Yes   Sig: Take 100 mg by mouth daily at bedtime      Facility-Administered Medications: None       Past Medical History:   Diagnosis Date    Asthma     [facility]     [facility]     Diabetes mellitus ([facility]     DVT of lower limb, acute ([place] [address] )     Hypertension     Renal disorder        Past Surgical History:   Procedure Laterality Date     SECTION      HYSTERECTOMY         History reviewed  No pertinent family history  I have reviewed and agree with the history as documented  E-Cigarette/Vaping    E-Cigarette Use Never User      E-Cigarette/Vaping Substances     Social History     Tobacco Use    Smoking status: Current Every Day Smoker     Packs/day: 0 50     Types: Cigarettes    Smokeless tobacco: Never Used   Substance Use Topics    Alcohol use: Not Currently    Drug use: Never       Review of Systems   Constitutional: Positive for diaphoresis  Negative for fever  HENT: Negative for congestion, ear pain, rhinorrhea, sinus pressure, sinus pain, sore throat and tinnitus  Eyes: Negative for discharge, itching and visual disturbance     Respiratory: Negative for cough, chest tightness, shortness of breath and wheezing  Cardiovascular: Positive for chest pain  Negative for palpitations, claudication and leg swelling  Gastrointestinal: Negative for abdominal pain, blood in stool, constipation, diarrhea, nausea and vomiting  Endocrine: Negative for polydipsia and polyuria  Genitourinary: Negative for decreased urine volume, dysuria and flank pain  Musculoskeletal: Negative for arthralgias and back pain  Skin: Negative for pallor and rash  Neurological: Negative for dizziness, weakness and headaches  Hematological: Does not bruise/bleed easily  Psychiatric/Behavioral: Negative for sleep disturbance  The patient is not nervous/anxious  All other systems reviewed and are negative  Physical Exam  Physical Exam   Constitutional: She is oriented to person, place, and time  She appears well-developed and well-nourished  HENT:   Head: Normocephalic and atraumatic  Eyes: Pupils are equal, round, and reactive to light  EOM are normal    Neck: Normal range of motion  Neck supple  Cardiovascular: Normal rate, regular rhythm and normal heart sounds  No murmur heard  Pulmonary/Chest: Effort normal and breath sounds normal  No stridor  No respiratory distress  She has no wheezes  She has no rales  She exhibits no tenderness  Abdominal: Soft  Bowel sounds are normal  She exhibits no distension  There is no tenderness  There is no rebound and no guarding  Musculoskeletal: Normal range of motion  She exhibits no edema or deformity  Neurological: She is alert and oriented to person, place, and time  No cranial nerve deficit  Skin: Skin is warm and dry  No rash noted  No pallor  Psychiatric: She has a normal mood and affect  Her behavior is normal    Nursing note and vitals reviewed        Vital Signs  ED Triage Vitals [03/10/20 2120]   Temperature Pulse Respirations Blood Pressure SpO2   98 9 °F (37 2 °C) 80 20 134/63 95 %      Temp Source Heart Rate Source Patient Position - Orthostatic VS BP Location FiO2 (%)   Temporal Monitor Lying Right arm --      Pain Score       No Pain           Vitals:    03/10/20 2200 03/10/20 2305 03/10/20 2330 03/11/20 0000   BP: 132/63 148/61 (!) 176/73 147/68   Pulse: 61 63 60 60   Patient Position - Orthostatic VS: Sitting Lying Lying Lying         Visual Acuity      ED Medications  Medications   morphine injection 2 mg (2 mg Intravenous Given 3/10/20 2151)   pantoprazole (PROTONIX) injection 40 mg (40 mg Intravenous Given 3/10/20 2151)   iohexol (OMNIPAQUE) 350 MG/ML injection (SINGLE-DOSE) 85 mL (85 mL Intravenous Given 3/10/20 2258)       Diagnostic Studies  Results Reviewed     Procedure Component Value Units Date/Time    D-dimer, quantitative [[id number]]  (Abnormal) Collected:  03/10/20 2122    Lab Status:  Final result Specimen:  Blood from Arm, Left Updated:  03/10/20 2219     D-Dimer, Quant 0 54 ug/ml FEU     Troponin I [[id number]]  (Normal) Collected:  03/10/20 2122    Lab Status:  Final result Specimen:  Blood from Arm, Left Updated:  03/10/20 2151     Troponin I 0 02 ng/mL     Comprehensive metabolic panel [[id number]]  (Abnormal) Collected:  03/10/20 2122    Lab Status:  Final result Specimen:  Blood from Arm, Left Updated:  03/10/20 2146     Sodium 143 mmol/L      Potassium 3 4 mmol/L      Chloride 102 mmol/L      CO2 37 mmol/L      ANION GAP 4 mmol/L      BUN 14 mg/dL      Creatinine 1 39 mg/dL      Glucose 79 mg/dL      Calcium 8 1 mg/dL      AST 9 U/L      ALT 14 U/L      Alkaline Phosphatase 104 U/L      Total Protein 6 3 g/dL      Albumin 3 0 g/dL      Total Bilirubin 0 12 mg/dL      eGFR 37 ml/min/1 73sq m     Narrative:       Raman guidelines for Chronic Kidney Disease (CKD):     Stage 1 with normal or high GFR (GFR > 90 mL/min/1 73 square meters)    Stage 2 Mild CKD (GFR = 60-89 mL/min/1 73 square meters)    Stage 3A Moderate CKD (GFR = 45-59 mL/min/1 73 square meters)    Stage 3B Moderate CKD (GFR = 30-44 mL/min/1 73 square meters)    Stage 4 Severe CKD (GFR = 15-29 mL/min/1 73 square meters)    Stage 5 End Stage CKD (GFR <15 mL/min/1 73 square meters)  Note: GFR calculation is accurate only with a steady state creatinine    CBC and differential [[id number]]  (Abnormal) Collected:  03/10/20 2122    Lab Status:  Final result Specimen:  Blood from Arm, Left Updated:  03/10/20 2129     WBC 12 35 Thousand/uL      RBC 4 77 Million/uL      Hemoglobin 11 9 g/dL      Hematocrit 39 9 %      MCV 84 fL      MCH 24 9 pg      MCHC 29 8 g/dL      RDW 15 5 %      MPV 9 8 fL      Platelets 541 Thousands/uL      nRBC 0 /100 WBCs      Neutrophils Relative 58 %      Immat GRANS % 1 %      Lymphocytes Relative 31 %      Monocytes Relative 6 %      Eosinophils Relative 3 %      Basophils Relative 1 %      Neutrophils Absolute 7 30 Thousands/µL      Immature Grans Absolute 0 06 Thousand/uL      Lymphocytes Absolute 3 87 Thousands/µL      Monocytes Absolute 0 71 Thousand/µL      Eosinophils Absolute 0 33 Thousand/µL      Basophils Absolute 0 08 Thousands/µL                  CTA ED chest PE Study   Final Result by Kanwal Moseley MD (03/10 7546)         1  No evidence of acute pulmonary embolus, thoracic aortic aneurysm or dissection  2   Mild emphysematous changes  No acute cardiopulmonary process  Workstation performed: [id number]         XR chest 2 views   ED Interpretation by Sarah Hope DO (03/11 0020)   Atelectasis  No acute disease  Cardiomegaly  Chronic changes                   Procedures  ECG 12 Lead Documentation Only  Date/Time: 3/10/2020 10:19 PM  Performed by: Sarah Hope DO  Authorized by: Sarah Hope DO     ECG reviewed by me, the ED Provider: yes    Patient location:  ED  Previous ECG:     Previous ECG:  Unavailable    Comparison to cardiac monitor: Yes    Interpretation:     Interpretation: normal    Rate:     ECG rate assessment: normal    Rhythm: Rhythm: sinus rhythm    Ectopy:     Ectopy: none    QRS:     QRS axis:  Normal  ST segments:     ST segments:  Non-specific             ED Course  ED Course as of Mar 11 0020   Tue Mar 10, 2020   2217 Troponin I: 0 02 2238 D-Dimer, Quant(!): 0 54   Wed Mar 11, 2020   0001 No acute findings on CT imaging  Troponin negative and EKG negative  Atypical chest pain  Stable for discharge  0007 Blood Pressure: 147/68                         Wells' Criteria for PE      Most Recent Value   Wells' Criteria for PE   Clinical signs and symptoms of DVT  0 Filed at: 03/10/2020 2242   PE is primary diagnosis or equally likely  3 Filed at: 03/10/2020 2242   HR >100  0 Filed at: 03/10/2020 2242   Immobilization at least 3 days or Surgery in the previous 4 weeks  0 Filed at: 03/10/2020 2242   Previous, objectively diagnosed PE or DVT  1 5 Filed at: 03/10/2020 2242   Hemoptysis  0 Filed at: 03/10/2020 2242   Malignancy with treatment within 6 months or palliative  0 Filed at: 03/10/2020 2242   Wells' Criteria Total  4 5 Filed at: 03/10/2020 2242            MDM      Disposition  Final diagnoses:   Atypical chest pain     Time reflects when diagnosis was documented in both MDM as applicable and the Disposition within this note     Time User Action Codes Description Comment    3/11/2020 12:09 AM Temo Bras Add [R07 89] Atypical chest pain       ED Disposition     ED Disposition Condition Date/Time Comment    Discharge Stable Wed Mar 11, 2020 12:08 AM Heriberto Yadav discharge to home/self care  Follow-up Information     Follow up With Specialties Details Why Rosadi 89, DO Family Medicine In 3 days As needed Lily[address]franco[address] ARZOLA [facility]  [phone]            Patient's Medications   Discharge Prescriptions    No medications on file     No discharge procedures on file      PDMP Review     None          ED Provider  Electronically Signed by           Truman Ybarra DO  03/11/20 [address] [place] [address]  03/11/20 0020

## 2020-03-11 NOTE — DISCHARGE INSTRUCTIONS
Thank you for choosing the emergency department at [facility]  We appreciated the opportunity and privilege to address your healthcare needs  We remain available to you should you require additional evaluation or assistance  We value your feedback and would appreciate the opportunity to address anything you identified as an opportunity to improve or where we excelled  If there are colleagues who deserve special recognition, please let us know! We hope you are feeling better soon!

## 2020-07-11 ENCOUNTER — APPOINTMENT (EMERGENCY)
Dept: CT IMAGING | Facility: HOSPITAL | Age: 80
End: 2020-07-11
Payer: COMMERCIAL

## 2020-07-11 ENCOUNTER — HOSPITAL ENCOUNTER (EMERGENCY)
Facility: HOSPITAL | Age: 80
Discharge: HOME | End: 2020-07-11
Attending: EMERGENCY MEDICINE | Admitting: EMERGENCY MEDICINE
Payer: COMMERCIAL

## 2020-07-11 VITALS
OXYGEN SATURATION: 94 % | HEART RATE: 73 BPM | SYSTOLIC BLOOD PRESSURE: 148 MMHG | TEMPERATURE: 97.8 F | DIASTOLIC BLOOD PRESSURE: 69 MMHG | BODY MASS INDEX: 30.49 KG/M2 | WEIGHT: 188.93 LBS | RESPIRATION RATE: 23 BRPM

## 2020-07-11 DIAGNOSIS — R10.9 ABDOMINAL PAIN: Primary | ICD-10-CM

## 2020-07-11 LAB
ALBUMIN SERPL BCP-MCNC: 3.3 G/DL (ref 3.5–5)
ALP SERPL-CCNC: 125 U/L (ref 46–116)
ALT SERPL W P-5'-P-CCNC: 15 U/L (ref 12–78)
ANION GAP SERPL CALCULATED.3IONS-SCNC: 4 MMOL/L (ref 4–13)
AST SERPL W P-5'-P-CCNC: 8 U/L (ref 5–45)
BACTERIA UR QL AUTO: ABNORMAL /HPF
BASOPHILS # BLD AUTO: 0.06 THOUSANDS/ΜL (ref 0–0.1)
BASOPHILS NFR BLD AUTO: 1 % (ref 0–1)
BILIRUB SERPL-MCNC: 0.3 MG/DL (ref 0.2–1)
BILIRUB UR QL STRIP: NEGATIVE
BUN SERPL-MCNC: 15 MG/DL (ref 5–25)
CALCIUM SERPL-MCNC: 8.4 MG/DL (ref 8.3–10.1)
CHLORIDE SERPL-SCNC: 102 MMOL/L (ref 100–108)
CLARITY UR: CLEAR
CO2 SERPL-SCNC: 33 MMOL/L (ref 21–32)
COLOR UR: YELLOW
CREAT SERPL-MCNC: 1.37 MG/DL (ref 0.6–1.3)
EOSINOPHIL # BLD AUTO: 0.27 THOUSAND/ΜL (ref 0–0.61)
EOSINOPHIL NFR BLD AUTO: 3 % (ref 0–6)
ERYTHROCYTE [DISTWIDTH] IN BLOOD BY AUTOMATED COUNT: 15.5 % (ref 11.6–15.1)
GFR SERPL CREATININE-BSD FRML MDRD: 37 ML/MIN/1.73SQ M
GLUCOSE SERPL-MCNC: 249 MG/DL (ref 65–140)
GLUCOSE UR STRIP-MCNC: NEGATIVE MG/DL
HCT VFR BLD AUTO: 40 % (ref 34.8–46.1)
HGB BLD-MCNC: 12.1 G/DL (ref 11.5–15.4)
HGB UR QL STRIP.AUTO: NEGATIVE
HYALINE CASTS #/AREA URNS LPF: ABNORMAL /LPF
IMM GRANULOCYTES # BLD AUTO: 0.04 THOUSAND/UL (ref 0–0.2)
IMM GRANULOCYTES NFR BLD AUTO: 0 % (ref 0–2)
KETONES UR STRIP-MCNC: NEGATIVE MG/DL
LACTATE SERPL-SCNC: 1.7 MMOL/L (ref 0.5–2)
LEUKOCYTE ESTERASE UR QL STRIP: NEGATIVE
LYMPHOCYTES # BLD AUTO: 2.49 THOUSANDS/ΜL (ref 0.6–4.47)
LYMPHOCYTES NFR BLD AUTO: 27 % (ref 14–44)
MCH RBC QN AUTO: 24.3 PG (ref 26.8–34.3)
MCHC RBC AUTO-ENTMCNC: 30.3 G/DL (ref 31.4–37.4)
MCV RBC AUTO: 80 FL (ref 82–98)
MONOCYTES # BLD AUTO: 0.55 THOUSAND/ΜL (ref 0.17–1.22)
MONOCYTES NFR BLD AUTO: 6 % (ref 4–12)
NEUTROPHILS # BLD AUTO: 5.9 THOUSANDS/ΜL (ref 1.85–7.62)
NEUTS SEG NFR BLD AUTO: 63 % (ref 43–75)
NITRITE UR QL STRIP: NEGATIVE
NON-SQ EPI CELLS URNS QL MICRO: ABNORMAL /HPF
NRBC BLD AUTO-RTO: 0 /100 WBCS
NT-PROBNP SERPL-MCNC: 3778 PG/ML
OTHER STN SPEC: ABNORMAL
PH UR STRIP.AUTO: 7 [PH]
PLATELET # BLD AUTO: 332 THOUSANDS/UL (ref 149–390)
PMV BLD AUTO: 11.1 FL (ref 8.9–12.7)
POTASSIUM SERPL-SCNC: 3.5 MMOL/L (ref 3.5–5.3)
PROT SERPL-MCNC: 7 G/DL (ref 6.4–8.2)
PROT UR STRIP-MCNC: ABNORMAL MG/DL
RBC # BLD AUTO: 4.98 MILLION/UL (ref 3.81–5.12)
RBC #/AREA URNS AUTO: ABNORMAL /HPF
SARS-COV-2 RNA RESP QL NAA+PROBE: NEGATIVE
SODIUM SERPL-SCNC: 139 MMOL/L (ref 136–145)
SP GR UR STRIP.AUTO: 1.01 (ref 1–1.03)
TROPONIN I SERPL-MCNC: 0.02 NG/ML
URINE COMMENT: ABNORMAL
UROBILINOGEN UR QL STRIP.AUTO: 0.2 E.U./DL
WBC # BLD AUTO: 9.31 THOUSAND/UL (ref 4.31–10.16)
WBC #/AREA URNS AUTO: ABNORMAL /HPF

## 2020-07-11 PROCEDURE — 87040 BLOOD CULTURE FOR BACTERIA: CPT | Performed by: EMERGENCY MEDICINE

## 2020-07-11 PROCEDURE — 87635 SARS-COV-2 COVID-19 AMP PRB: CPT | Performed by: EMERGENCY MEDICINE

## 2020-07-11 PROCEDURE — 81001 URINALYSIS AUTO W/SCOPE: CPT | Performed by: EMERGENCY MEDICINE

## 2020-07-11 PROCEDURE — 96361 HYDRATE IV INFUSION ADD-ON: CPT

## 2020-07-11 PROCEDURE — 84484 ASSAY OF TROPONIN QUANT: CPT | Performed by: EMERGENCY MEDICINE

## 2020-07-11 PROCEDURE — 93005 ELECTROCARDIOGRAM TRACING: CPT

## 2020-07-11 PROCEDURE — 99285 EMERGENCY DEPT VISIT HI MDM: CPT

## 2020-07-11 PROCEDURE — 96375 TX/PRO/DX INJ NEW DRUG ADDON: CPT

## 2020-07-11 PROCEDURE — 96374 THER/PROPH/DIAG INJ IV PUSH: CPT

## 2020-07-11 PROCEDURE — 85025 COMPLETE CBC W/AUTO DIFF WBC: CPT | Performed by: EMERGENCY MEDICINE

## 2020-07-11 PROCEDURE — 74177 CT ABD & PELVIS W/CONTRAST: CPT

## 2020-07-11 PROCEDURE — 99285 EMERGENCY DEPT VISIT HI MDM: CPT | Performed by: EMERGENCY MEDICINE

## 2020-07-11 PROCEDURE — 83605 ASSAY OF LACTIC ACID: CPT | Performed by: EMERGENCY MEDICINE

## 2020-07-11 PROCEDURE — 71260 CT THORAX DX C+: CPT

## 2020-07-11 PROCEDURE — 80053 COMPREHEN METABOLIC PANEL: CPT | Performed by: EMERGENCY MEDICINE

## 2020-07-11 PROCEDURE — 36415 COLL VENOUS BLD VENIPUNCTURE: CPT | Performed by: EMERGENCY MEDICINE

## 2020-07-11 PROCEDURE — 83880 ASSAY OF NATRIURETIC PEPTIDE: CPT | Performed by: EMERGENCY MEDICINE

## 2020-07-11 RX ORDER — SODIUM CHLORIDE 9 MG/ML
125 INJECTION, SOLUTION INTRAVENOUS CONTINUOUS
Status: DISCONTINUED | OUTPATIENT
Start: 2020-07-11 | End: 2020-07-11 | Stop reason: HOSPADM

## 2020-07-11 RX ORDER — ONDANSETRON 2 MG/ML
4 INJECTION INTRAMUSCULAR; INTRAVENOUS ONCE
Status: COMPLETED | OUTPATIENT
Start: 2020-07-11 | End: 2020-07-11

## 2020-07-11 RX ORDER — FENTANYL CITRATE 50 UG/ML
50 INJECTION, SOLUTION INTRAMUSCULAR; INTRAVENOUS ONCE
Status: COMPLETED | OUTPATIENT
Start: 2020-07-11 | End: 2020-07-11

## 2020-07-11 RX ADMIN — FENTANYL CITRATE 50 MCG: 50 INJECTION INTRAMUSCULAR; INTRAVENOUS at 18:37

## 2020-07-11 RX ADMIN — SODIUM CHLORIDE 125 ML/HR: 0.9 INJECTION, SOLUTION INTRAVENOUS at 18:34

## 2020-07-11 RX ADMIN — IOHEXOL 100 ML: 350 INJECTION, SOLUTION INTRAVENOUS at 19:44

## 2020-07-11 RX ADMIN — ONDANSETRON 4 MG: 2 INJECTION INTRAMUSCULAR; INTRAVENOUS at 18:37

## 2020-07-11 NOTE — ED PROVIDER NOTES
History  Chief Complaint   Patient presents with    Abdominal Pain     Abdominal pain for 1 month  Patient has diarrhea and nausea worse over last 2 days     Worsening lower abdominal pain like and gnawing over the past month, notes past 2 days having nonbloody diarrhea, few times daily, daughter present and notes usually constipated, unable to smoke today, next the abdominal pain worse      Abdominal Pain   Pain location:  Periumbilical and suprapubic  Pain quality: aching    Pain radiates to:  Does not radiate  Pain severity:  Severe  Onset quality:  Gradual  Timing:  Constant  Progression:  Worsening  Chronicity:  New  Context: not recent illness and not trauma    Associated symptoms: anorexia, diarrhea and shortness of breath    Associated symptoms: no chest pain, no chills, no constipation, no dysuria, no fever, no hematuria and no vomiting    Risk factors: multiple surgeries and obesity        Prior to Admission Medications   Prescriptions Last Dose Informant Patient Reported? Taking?    DM-APAP-CPM 7 5-160-1 MG/5ML SYRP Not Taking at Unknown time  Yes No   Sig: Take by mouth as needed (For cough)   DULoxetine (CYMBALTA) 60 mg delayed release capsule 7/11/2020 at Unknown time  Yes Yes   Sig: Take 90 mg by mouth daily   HYDROXYZINE HCL PO Past Week at Unknown time  Yes Yes   Sig: Take by mouth as needed (Itching and anxiety)   Ipratropium-Albuterol (DUONEB IN) Not Taking at Unknown time  Yes No   Sig: Inhale 2 5 mg every 4 (four) hours   MECLIZINE HCL PO 7/11/2020 at Unknown time  Yes Yes   Sig: Take by mouth as needed (Up to TID for dizziness)   Mirabegron (MYRBETRIQ PO) 7/11/2020 at Unknown time  Yes Yes   Sig: Take 25 mg by mouth daily   NYSTATIN PO Past Month at Unknown time  Yes Yes   Sig: Take 5 mL by mouth as needed (THRUSH)   OXYCODONE ER PO Past Month at Unknown time  Yes Yes   Sig: Take 10 mg by mouth 3 (three) times a day as needed   PROCHLORPERAZINE MALEATE PO Past Month at Unknown time  Yes Yes Sig: Take 5 mg by mouth every 6 (six) hours as needed   Tiotropium Bromide Monohydrate ([address] [employer]) 7/10/2020 at Unknown time  Yes Yes   Sig: Inhale 1 puff daily at bedtime as needed   albuterol (ACCUNEB) 0 63 MG/3ML nebulizer solution 7/10/2020 at Unknown time  Yes Yes   Sig: Take 1 ampule by nebulization 4 (four) times a day as needed for wheezing   apixaban (Eliquis) 5 mg 7/11/2020 at Unknown time  Yes Yes   Sig: Take 5 mg by mouth 2 (two) times a day   aspirin 81 mg chewable tablet 7/11/2020 at Unknown time  Yes Yes   Sig: Chew 81 mg daily   atorvastatin (LIPITOR) 10 mg tablet 7/10/2020 at Unknown time  Yes Yes   Sig: Take 10 mg by mouth daily   docusate sodium (COLACE) 100 mg capsule 7/11/2020 at Unknown time  Yes Yes   Sig: Take 200 mg by mouth daily   fluticasone-salmeterol (ADVAIR, WIXELA) 500-50 mcg/dose inhaler Not Taking at Unknown time  Yes No   Sig: Inhale 1 puff 2 (two) times a day Rinse mouth after use     furosemide (LASIX) 40 mg tablet 7/11/2020 at Unknown time  Yes Yes   Sig: Take 40 mg by mouth daily    gabapentin (NEURONTIN) 400 mg capsule 7/11/2020 at Unknown time  Yes Yes   Sig: Take 400 mg by mouth 3 (three) times a day   insulin aspart (NovoLOG) 100 units/mL injection 7/10/2020 at Unknown time  Yes Yes   Sig: Inject 24 Units under the skin 3 (three) times a day before meals   insulin glargine (LANTUS) 100 units/mL subcutaneous injection 7/10/2020 at Unknown time  Yes Yes   Sig: Inject 45 Units under the skin daily at bedtime   lactulose (Enulose) 10 g/15 mL Past Month at Unknown time  Yes Yes   Sig: Insert 200 g into the rectum as needed   losartan (COZAAR) 25 mg tablet 7/11/2020 at Unknown time  Yes Yes   Sig: Take 25 mg by mouth daily   metolazone (ZAROXOLYN) 5 mg tablet Past Week at Unknown time  Yes Yes   Sig: Take 5 mg by mouth as needed   metoprolol tartrate (LOPRESSOR) 50 mg tablet 7/11/2020 at Unknown time  Yes Yes   Sig: Take 50 mg by mouth every 12 (twelve) hours mirtazapine (REMERON) 30 mg tablet 7/10/2020 at Unknown time  Yes Yes   Sig: Take 30 mg by mouth daily at bedtime   nitroglycerin (NITROSTAT) 0 4 mg SL tablet Past Month at Unknown time  Yes Yes   Sig: Place 0 4 mg under the tongue every 5 (five) minutes as needed for chest pain   pantoprazole (PROTONIX) 40 mg tablet 2020 at Unknown time  Yes Yes   Sig: Take 40 mg by mouth daily   potassium chloride (MICRO-K) 10 MEQ CR capsule Not Taking at Unknown time  Yes No   Sig: Take 10 mEq by mouth daily   rOPINIRole (REQUIP) 0 25 mg tablet 7/10/2020 at Unknown time  Yes Yes   Sig: Take 0 25 mg by mouth daily at bedtime   saccharomyces boulardii (FLORASTOR) 250 mg capsule Not Taking at Unknown time  Yes No   Sig: Take 250 mg by mouth daily   sucralfate (CARAFATE) 1 g tablet 2020 at Unknown time  Yes Yes   Sig: Take 1 g by mouth 2 (two) times a day before meals   traZODone (DESYREL) 100 mg tablet 2020 at Unknown time  Yes Yes   Sig: Take 100 mg by mouth daily at bedtime      Facility-Administered Medications: None       Past Medical History:   Diagnosis Date    Asthma     [facility]     COPD (chronic obstructive pulmonary disease) ([place] [address] )     Diabetes mellitus ([place] [address] )     DVT of lower limb, acute ([place] [address] )     Hypertension     Renal disorder        Past Surgical History:   Procedure Laterality Date    APPENDECTOMY       SECTION      HYSTERECTOMY         History reviewed  No pertinent family history  I have reviewed and agree with the history as documented  E-Cigarette/Vaping    E-Cigarette Use Never User      E-Cigarette/Vaping Substances     Social History     Tobacco Use    Smoking status: Current Every Day Smoker     Packs/day: 0 50     Types: Cigarettes    Smokeless tobacco: Never Used   Substance Use Topics    Alcohol use: Not Currently    Drug use: Never       Review of Systems   Constitutional: Negative for chills and fever     Respiratory: Positive for shortness of breath  Cardiovascular: Negative for chest pain  Gastrointestinal: Positive for abdominal pain, anorexia and diarrhea  Negative for constipation and vomiting  Genitourinary: Negative for dysuria and hematuria  All other systems reviewed and are negative  Physical Exam  Physical Exam   Constitutional: She is oriented to person, place, and time  Pleasant, uncomfortable-appearing, conversational, rubbing lower abdomen with both hands, daughter present   HENT:   Head: Normocephalic and atraumatic  Mouth/Throat: Oropharynx is clear and moist    Eyes: Pupils are equal, round, and reactive to light  Conjunctivae and EOM are normal    Neck: Neck supple  Cardiovascular: Normal rate, regular rhythm and normal heart sounds  Pulmonary/Chest: Effort normal  No respiratory distress  Decreased breath sounds bilaterally   Abdominal: Soft  Bowel sounds are normal  There is tenderness  Obese, distended abdomen tender symmetrically mid to lower abdomen without rebound or guarding   Musculoskeletal: Normal range of motion  Neurological: She is alert and oriented to person, place, and time  No cranial nerve deficit  Coordination normal    Skin: Skin is warm and dry  Psychiatric: She has a normal mood and affect  Her behavior is normal  Judgment and thought content normal    Nursing note and vitals reviewed        Vital Signs  ED Triage Vitals [07/11/20 1803]   Temperature Pulse Respirations Blood Pressure SpO2   97 8 °F (36 6 °C) 82 18 148/78 98 %      Temp Source Heart Rate Source Patient Position - Orthostatic VS BP Location FiO2 (%)   Temporal Monitor Lying Right arm --      Pain Score       5           Vitals:    07/11/20 1803 07/11/20 1830 07/11/20 1900 07/11/20 2025   BP: 148/78 153/72 143/88 150/70   Pulse: 82 84 84    Patient Position - Orthostatic VS: Lying   Lying         Visual Acuity      ED Medications  Medications   sodium chloride 0 9 % infusion (0 mL/hr Intravenous Hold 7/11/20 1944)   fentanyl citrate (PF) 100 MCG/2ML 50 mcg (50 mcg Intravenous Given 7/11/20 1837)   ondansetron (ZOFRAN) injection 4 mg (4 mg Intravenous Given 7/11/20 1837)   iohexol (OMNIPAQUE) 350 MG/ML injection (MULTI-DOSE) 100 mL (100 mL Intravenous Given 7/11/20 1944)       Diagnostic Studies  Results Reviewed     Procedure Component Value Units Date/Time    Urine Microscopic [[id number]]  (Abnormal) Collected:  07/11/20 2025    Lab Status:  Final result Specimen:  Urine, Clean Catch Updated:  07/11/20 2043     RBC, UA None Seen /hpf      WBC, UA 0-5 /hpf      Epithelial Cells Occasional /hpf      Bacteria, UA Occasional /hpf      Hyaline Casts, UA 1-2 /lpf      OTHER OBSERVATIONS Yeast Cells Present     URINE COMMENT Yeast 1+    UA w Reflex to Microscopic w Reflex to Culture [[id number]]  (Abnormal) Collected:  07/11/20 2025    Lab Status:  Final result Specimen:  Urine, Clean Catch Updated:  07/11/20 2033     Color, UA Yellow     Clarity, UA Clear     Specific Gravity, UA 1 015     pH, UA 7 0     Leukocytes, UA Negative     Nitrite, UA Negative     Protein,  (2+) mg/dl      Glucose, UA Negative mg/dl      Ketones, UA Negative mg/dl      Urobilinogen, UA 0 2 E U /dl      Bilirubin, UA Negative     Blood, UA Negative    Novel Coronavirus [facility] [place] [[id number]]  (Normal) Collected:  07/11/20 1815    Lab Status:  Final result Specimen:  Throat Updated:  07/11/20 1920     SARS-CoV-2 Negative    Narrative: The specimen collection materials, transport medium, and/or testing methodology utilized in the production of these test results have been proven to be reliable in a limited validation with an abbreviated program under the Emergency Utilization Authorization provided by the FDA  Testing reported as "Presumptive positive" will be confirmed with secondary testing with a reference laboratory to ensure result accuracy    Clinical caution and judgement should be used with the interpretation of these results with consideration of the clinical impression and other laboratory testing  Testing reported as "Positive" or "Negative" has been proven to be accurate according to standard laboratory validation requirements  All testing is performed with control materials showing appropriate reactivity at standard intervals  Lactic acid [[id number]]  (Normal) Collected:  07/11/20 1816    Lab Status:  Final result Specimen:  Blood from Arm, Left Updated:  07/11/20 1852     LACTIC ACID 1 7 mmol/L     Narrative:       Result may be elevated if tourniquet was used during collection      Comprehensive metabolic panel [[id number]]  (Abnormal) Collected:  07/11/20 1816    Lab Status:  Final result Specimen:  Blood from Arm, Left Updated:  07/11/20 1850     Sodium 139 mmol/L      Potassium 3 5 mmol/L      Chloride 102 mmol/L      CO2 33 mmol/L      ANION GAP 4 mmol/L      BUN 15 mg/dL      Creatinine 1 37 mg/dL      Glucose 249 mg/dL      Calcium 8 4 mg/dL      AST 8 U/L      ALT 15 U/L      Alkaline Phosphatase 125 U/L      Total Protein 7 0 g/dL      Albumin 3 3 g/dL      Total Bilirubin 0 30 mg/dL      eGFR 37 ml/min/1 73sq m     Narrative:       [place] guidelines for Chronic Kidney Disease (CKD):     Stage 1 with normal or high GFR (GFR > 90 mL/min/1 73 square meters)    Stage 2 Mild CKD (GFR = 60-89 mL/min/1 73 square meters)    Stage 3A Moderate CKD (GFR = 45-59 mL/min/1 73 square meters)    Stage 3B Moderate CKD (GFR = 30-44 mL/min/1 73 square meters)    Stage 4 Severe CKD (GFR = 15-29 mL/min/1 73 square meters)    Stage 5 End Stage CKD (GFR <15 mL/min/1 73 square meters)  Note: GFR calculation is accurate only with a steady state creatinine    NT-BNP PRO [[id number]]  (Abnormal) Collected:  07/11/20 1816    Lab Status:  Final result Specimen:  Blood from Arm, Left Updated:  07/11/20 1850     NT-proBNP 3,778 pg/mL     Troponin I [[id number]]  (Normal) Collected:  07/11/20 1816    Lab Status:  Final result Specimen:  Blood from Arm, Left Updated:  07/11/20 1845     Troponin I 0 02 ng/mL     CBC and differential [[id number]]  (Abnormal) Collected:  07/11/20 1816    Lab Status:  Final result Specimen:  Blood from Arm, Left Updated:  07/11/20 1827     WBC 9 31 Thousand/uL      RBC 4 98 Million/uL      Hemoglobin 12 1 g/dL      Hematocrit 40 0 %      MCV 80 fL      MCH 24 3 pg      MCHC 30 3 g/dL      RDW 15 5 %      MPV 11 1 fL      Platelets 686 Thousands/uL      nRBC 0 /100 WBCs      Neutrophils Relative 63 %      Immat GRANS % 0 %      Lymphocytes Relative 27 %      Monocytes Relative 6 %      Eosinophils Relative 3 %      Basophils Relative 1 %      Neutrophils Absolute 5 90 Thousands/µL      Immature Grans Absolute 0 04 Thousand/uL      Lymphocytes Absolute 2 49 Thousands/µL      Monocytes Absolute 0 55 Thousand/µL      Eosinophils Absolute 0 27 Thousand/µL      Basophils Absolute 0 06 Thousands/µL     Blood culture #2 [[id number]] Collected:  07/11/20 1820    Lab Status: In process Specimen:  Blood from Arm, Right Updated:  07/11/20 1826    Blood culture #1 [[id number]] Collected:  07/11/20 1816    Lab Status: In process Specimen:  Blood from Arm, Left Updated:  07/11/20 1826                 CT chest abdomen pelvis w contrast   Final Result by Kiara Perera MD (07/11 2013)      No acute abnormality within the chest, abdomen, or pelvis  Workstation performed: [id number]                    Procedures  Procedures         ED Course  ED Course as of Jul 11 2047   Sat Jul 11, 2020 1821 EKG 6:06 p m   Normal sinus rate 85 normal axis normal intervals T-wave inversion V1 lead 3 age indeterminate inferior wall Q-waves, also laterally interpreted by me      2045 Feeling better, abdomen benign, we discussed results, daughter bedside, states part of [facility] at home, at house yesterday and arranging gastroenterology evaluation          US AUDIT      Most Recent Value   Initial Alcohol Screen: US AUDIT-C    1  How often do you have a drink containing alcohol?  0 Filed at: 07/11/2020 1805   2  How many drinks containing alcohol do you have on a typical day you are drinking? 0 Filed at: 07/11/2020 1805   3b  FEMALE Any Age, or MALE 65+: How often do you have 4 or more drinks on one occassion? 0 Filed at: 07/11/2020 1805   Audit-C Score  0 Filed at: 07/11/2020 1805                  LUIS/DAST-10      Most Recent Value   How many times in the past year have you    Used an illegal drug or used a prescription medication for non-medical reasons? Never Filed at: 07/11/2020 1805                                MDM      Disposition  Final diagnoses:   Abdominal pain     Time reflects when diagnosis was documented in both MDM as applicable and the Disposition within this note     Time User Action Codes Description Comment    7/11/2020  8:46 PM Fly Becerra Add [R10 9] Abdominal pain       ED Disposition     ED Disposition Condition Date/Time Comment    Discharge Stable Sat Jul 11, 2020  8:46 PM Malina Yadav discharge to home/self care  Follow-up Information     Follow up With Specialties Details Why Wallie Alpers, MD Gastroenterology Schedule an appointment as soon as possible for a visit in 2 days  [facility]  [phone]            Patient's Medications   Discharge Prescriptions    No medications on file     No discharge procedures on file      PDMP Review     None          ED Provider  Electronically Signed by           Jason Bear DO  07/11/20 2049

## 2020-07-12 NOTE — DISCHARGE INSTRUCTIONS
Return immediately if worse or any new symptoms  Please call gastroenterologist Monday to arrange appointment as soon as possible

## 2020-07-13 LAB
ATRIAL RATE: 85 BPM
P AXIS: -2 DEGREES
PR INTERVAL: 174 MS
QRS AXIS: 66 DEGREES
QRSD INTERVAL: 88 MS
QT INTERVAL: 436 MS
QTC INTERVAL: 518 MS
T WAVE AXIS: 23 DEGREES
VENTRICULAR RATE: 85 BPM

## 2020-07-16 LAB
BACTERIA BLD CULT: NORMAL
BACTERIA BLD CULT: NORMAL

## 2021-09-20 ENCOUNTER — TELEPHONE (OUTPATIENT)
Dept: UROLOGY | Facility: MEDICAL CENTER | Age: 81
End: 2021-09-20

## 2022-01-28 DIAGNOSIS — R25.1 TREMOR, UNSPECIFIED: ICD-10-CM

## 2023-01-01 ENCOUNTER — HOME CARE VISIT (OUTPATIENT)
Dept: HOME HOSPICE | Facility: HOSPICE | Age: 83
End: 2023-01-01
Payer: MEDICARE

## 2023-01-01 ENCOUNTER — HOME CARE VISIT (OUTPATIENT)
Dept: HOME HEALTH SERVICES | Facility: HOME HEALTHCARE | Age: 83
End: 2023-01-01
Payer: MEDICARE

## 2023-01-01 VITALS — RESPIRATION RATE: 12 BRPM | DIASTOLIC BLOOD PRESSURE: 43 MMHG | SYSTOLIC BLOOD PRESSURE: 87 MMHG | HEART RATE: 67 BPM

## 2023-01-01 PROCEDURE — G0299 HHS/HOSPICE OF RN EA 15 MIN: HCPCS

## 2023-01-01 PROCEDURE — G0156 HHCP-SVS OF AIDE,EA 15 MIN: HCPCS

## 2023-01-24 ENCOUNTER — APPOINTMENT (EMERGENCY)
Dept: RADIOLOGY | Facility: HOSPITAL | Age: 83
End: 2023-01-24

## 2023-01-24 ENCOUNTER — HOSPITAL ENCOUNTER (INPATIENT)
Facility: HOSPITAL | Age: 83
LOS: 5 days | End: 2023-01-29
Attending: EMERGENCY MEDICINE | Admitting: FAMILY MEDICINE

## 2023-01-24 DIAGNOSIS — L03.90 CELLULITIS: Primary | ICD-10-CM

## 2023-01-24 DIAGNOSIS — S91.102A OPEN WOUND OF LEFT GREAT TOE, INITIAL ENCOUNTER: ICD-10-CM

## 2023-01-24 DIAGNOSIS — R73.9 HYPERGLYCEMIA: ICD-10-CM

## 2023-01-24 DIAGNOSIS — L08.9 WOUND INFECTION: ICD-10-CM

## 2023-01-24 DIAGNOSIS — N18.30 STAGE 3 CHRONIC KIDNEY DISEASE, UNSPECIFIED WHETHER STAGE 3A OR 3B CKD (HCC): ICD-10-CM

## 2023-01-24 DIAGNOSIS — T14.8XXA WOUND INFECTION: ICD-10-CM

## 2023-01-24 DIAGNOSIS — J44.9 COPD (CHRONIC OBSTRUCTIVE PULMONARY DISEASE) (HCC): ICD-10-CM

## 2023-01-24 PROBLEM — E11.628 TYPE 2 DIABETES MELLITUS WITH SKIN COMPLICATION, WITH LONG-TERM CURRENT USE OF INSULIN (HCC): Status: ACTIVE | Noted: 2023-01-24

## 2023-01-24 PROBLEM — Z79.4 TYPE 2 DIABETES MELLITUS WITH SKIN COMPLICATION, WITH LONG-TERM CURRENT USE OF INSULIN (HCC): Status: ACTIVE | Noted: 2023-01-24

## 2023-01-24 PROBLEM — J96.12 CHRONIC RESPIRATORY FAILURE WITH HYPOXIA AND HYPERCAPNIA (HCC): Status: ACTIVE | Noted: 2023-01-24

## 2023-01-24 PROBLEM — F11.90 CHRONIC, CONTINUOUS USE OF OPIOIDS: Status: ACTIVE | Noted: 2023-01-24

## 2023-01-24 PROBLEM — J96.11 CHRONIC RESPIRATORY FAILURE WITH HYPOXIA AND HYPERCAPNIA (HCC): Status: ACTIVE | Noted: 2023-01-24

## 2023-01-24 PROBLEM — J41.8 MIXED SIMPLE AND MUCOPURULENT CHRONIC BRONCHITIS (HCC): Status: ACTIVE | Noted: 2023-01-24

## 2023-01-24 LAB
ALBUMIN SERPL BCP-MCNC: 4 G/DL (ref 3.5–5)
ALP SERPL-CCNC: 59 U/L (ref 34–104)
ALT SERPL W P-5'-P-CCNC: 7 U/L (ref 7–52)
ANION GAP SERPL CALCULATED.3IONS-SCNC: 8 MMOL/L (ref 4–13)
APTT PPP: 36 SECONDS (ref 23–37)
AST SERPL W P-5'-P-CCNC: 9 U/L (ref 13–39)
BASOPHILS # BLD AUTO: 0.08 THOUSANDS/ÂΜL (ref 0–0.1)
BASOPHILS NFR BLD AUTO: 1 % (ref 0–1)
BILIRUB SERPL-MCNC: 0.23 MG/DL (ref 0.2–1)
BUN SERPL-MCNC: 30 MG/DL (ref 5–25)
CALCIUM SERPL-MCNC: 10.3 MG/DL (ref 8.4–10.2)
CHLORIDE SERPL-SCNC: 96 MMOL/L (ref 96–108)
CO2 SERPL-SCNC: 36 MMOL/L (ref 21–32)
CREAT SERPL-MCNC: 1.28 MG/DL (ref 0.6–1.3)
EOSINOPHIL # BLD AUTO: 0.4 THOUSAND/ÂΜL (ref 0–0.61)
EOSINOPHIL NFR BLD AUTO: 4 % (ref 0–6)
ERYTHROCYTE [DISTWIDTH] IN BLOOD BY AUTOMATED COUNT: 15.9 % (ref 11.6–15.1)
GFR SERPL CREATININE-BSD FRML MDRD: 39 ML/MIN/1.73SQ M
GLUCOSE SERPL-MCNC: 132 MG/DL (ref 65–140)
GLUCOSE SERPL-MCNC: 176 MG/DL (ref 65–140)
GLUCOSE SERPL-MCNC: 180 MG/DL (ref 65–140)
HCT VFR BLD AUTO: 35 % (ref 34.8–46.1)
HGB BLD-MCNC: 10.5 G/DL (ref 11.5–15.4)
IMM GRANULOCYTES # BLD AUTO: 0.07 THOUSAND/UL (ref 0–0.2)
IMM GRANULOCYTES NFR BLD AUTO: 1 % (ref 0–2)
INR PPP: 1.22 (ref 0.84–1.19)
LACTATE SERPL-SCNC: 1.1 MMOL/L (ref 0.5–2)
LYMPHOCYTES # BLD AUTO: 1.75 THOUSANDS/ÂΜL (ref 0.6–4.47)
LYMPHOCYTES NFR BLD AUTO: 16 % (ref 14–44)
MCH RBC QN AUTO: 25.5 PG (ref 26.8–34.3)
MCHC RBC AUTO-ENTMCNC: 30 G/DL (ref 31.4–37.4)
MCV RBC AUTO: 85 FL (ref 82–98)
MONOCYTES # BLD AUTO: 0.65 THOUSAND/ÂΜL (ref 0.17–1.22)
MONOCYTES NFR BLD AUTO: 6 % (ref 4–12)
NEUTROPHILS # BLD AUTO: 7.92 THOUSANDS/ÂΜL (ref 1.85–7.62)
NEUTS SEG NFR BLD AUTO: 72 % (ref 43–75)
NRBC BLD AUTO-RTO: 0 /100 WBCS
PLATELET # BLD AUTO: 297 THOUSANDS/UL (ref 149–390)
PMV BLD AUTO: 10.2 FL (ref 8.9–12.7)
POTASSIUM SERPL-SCNC: 4.4 MMOL/L (ref 3.5–5.3)
PROT SERPL-MCNC: 6.3 G/DL (ref 6.4–8.4)
PROTHROMBIN TIME: 15.5 SECONDS (ref 11.6–14.5)
RBC # BLD AUTO: 4.11 MILLION/UL (ref 3.81–5.12)
SODIUM SERPL-SCNC: 140 MMOL/L (ref 135–147)
WBC # BLD AUTO: 10.87 THOUSAND/UL (ref 4.31–10.16)

## 2023-01-24 RX ORDER — AMLODIPINE BESYLATE 5 MG/1
5 TABLET ORAL DAILY
Status: DISCONTINUED | OUTPATIENT
Start: 2023-01-25 | End: 2023-01-29 | Stop reason: HOSPADM

## 2023-01-24 RX ORDER — ACETAMINOPHEN 325 MG/1
650 TABLET ORAL EVERY 6 HOURS PRN
Status: DISCONTINUED | OUTPATIENT
Start: 2023-01-24 | End: 2023-01-29 | Stop reason: HOSPADM

## 2023-01-24 RX ORDER — SUCRALFATE 1 G/1
1 TABLET ORAL
Status: DISCONTINUED | OUTPATIENT
Start: 2023-01-24 | End: 2023-01-29 | Stop reason: HOSPADM

## 2023-01-24 RX ORDER — DOCUSATE SODIUM 100 MG/1
100 CAPSULE, LIQUID FILLED ORAL 2 TIMES DAILY
Status: DISCONTINUED | OUTPATIENT
Start: 2023-01-24 | End: 2023-01-29 | Stop reason: HOSPADM

## 2023-01-24 RX ORDER — CALCIUM CARBONATE 200(500)MG
1000 TABLET,CHEWABLE ORAL DAILY PRN
Status: DISCONTINUED | OUTPATIENT
Start: 2023-01-24 | End: 2023-01-29 | Stop reason: HOSPADM

## 2023-01-24 RX ORDER — SACCHAROMYCES BOULARDII 250 MG
250 CAPSULE ORAL DAILY
Status: DISCONTINUED | OUTPATIENT
Start: 2023-01-25 | End: 2023-01-29 | Stop reason: HOSPADM

## 2023-01-24 RX ORDER — CEFEPIME HYDROCHLORIDE 2 G/50ML
2000 INJECTION, SOLUTION INTRAVENOUS ONCE
Status: COMPLETED | OUTPATIENT
Start: 2023-01-24 | End: 2023-01-24

## 2023-01-24 RX ORDER — ALLOPURINOL 100 MG/1
50 TABLET ORAL DAILY
Status: DISCONTINUED | OUTPATIENT
Start: 2023-01-25 | End: 2023-01-29 | Stop reason: HOSPADM

## 2023-01-24 RX ORDER — FUROSEMIDE 40 MG/1
40 TABLET ORAL DAILY
Status: DISCONTINUED | OUTPATIENT
Start: 2023-01-25 | End: 2023-01-25

## 2023-01-24 RX ORDER — INSULIN LISPRO 100 [IU]/ML
2-12 INJECTION, SOLUTION INTRAVENOUS; SUBCUTANEOUS
Status: DISCONTINUED | OUTPATIENT
Start: 2023-01-24 | End: 2023-01-29 | Stop reason: HOSPADM

## 2023-01-24 RX ORDER — TRAZODONE HYDROCHLORIDE 50 MG/1
50 TABLET ORAL
Status: DISCONTINUED | OUTPATIENT
Start: 2023-01-24 | End: 2023-01-29 | Stop reason: HOSPADM

## 2023-01-24 RX ORDER — GABAPENTIN 300 MG/1
300 CAPSULE ORAL 3 TIMES DAILY
Status: DISCONTINUED | OUTPATIENT
Start: 2023-01-24 | End: 2023-01-29 | Stop reason: HOSPADM

## 2023-01-24 RX ORDER — PANTOPRAZOLE SODIUM 40 MG/1
40 TABLET, DELAYED RELEASE ORAL DAILY
Status: DISCONTINUED | OUTPATIENT
Start: 2023-01-25 | End: 2023-01-29 | Stop reason: HOSPADM

## 2023-01-24 RX ORDER — PREDNISONE 2.5 MG
2.5 TABLET ORAL DAILY
COMMUNITY

## 2023-01-24 RX ORDER — MIRTAZAPINE 15 MG/1
30 TABLET, FILM COATED ORAL
Status: DISCONTINUED | OUTPATIENT
Start: 2023-01-24 | End: 2023-01-29 | Stop reason: HOSPADM

## 2023-01-24 RX ORDER — INSULIN GLARGINE 100 [IU]/ML
12 INJECTION, SOLUTION SUBCUTANEOUS
Status: DISCONTINUED | OUTPATIENT
Start: 2023-01-24 | End: 2023-01-29 | Stop reason: HOSPADM

## 2023-01-24 RX ORDER — OXYCODONE HCL 10 MG/1
10 TABLET, FILM COATED, EXTENDED RELEASE ORAL EVERY 6 HOURS PRN
Status: DISCONTINUED | OUTPATIENT
Start: 2023-01-24 | End: 2023-01-27 | Stop reason: DRUGHIGH

## 2023-01-24 RX ORDER — NALOXEGOL OXALATE 25 MG/1
25 TABLET, FILM COATED ORAL DAILY
COMMUNITY
End: 2023-02-04

## 2023-01-24 RX ORDER — METOPROLOL TARTRATE 50 MG/1
50 TABLET, FILM COATED ORAL EVERY 12 HOURS SCHEDULED
Status: DISCONTINUED | OUTPATIENT
Start: 2023-01-24 | End: 2023-01-29 | Stop reason: HOSPADM

## 2023-01-24 RX ORDER — AMLODIPINE BESYLATE 5 MG/1
5 TABLET ORAL DAILY
COMMUNITY

## 2023-01-24 RX ORDER — CEFEPIME HYDROCHLORIDE 2 G/50ML
2000 INJECTION, SOLUTION INTRAVENOUS EVERY 12 HOURS
Status: DISCONTINUED | OUTPATIENT
Start: 2023-01-25 | End: 2023-01-29 | Stop reason: HOSPADM

## 2023-01-24 RX ORDER — FLUTICASONE FUROATE AND VILANTEROL 100; 25 UG/1; UG/1
1 POWDER RESPIRATORY (INHALATION) DAILY
Status: DISCONTINUED | OUTPATIENT
Start: 2023-01-25 | End: 2023-01-29 | Stop reason: HOSPADM

## 2023-01-24 RX ORDER — ASPIRIN 81 MG/1
81 TABLET, CHEWABLE ORAL DAILY
Status: DISCONTINUED | OUTPATIENT
Start: 2023-01-25 | End: 2023-01-29 | Stop reason: HOSPADM

## 2023-01-24 RX ORDER — OXYCODONE HCL 10 MG/1
10 TABLET, FILM COATED, EXTENDED RELEASE ORAL EVERY 6 HOURS PRN
COMMUNITY

## 2023-01-24 RX ORDER — INSULIN LISPRO 100 [IU]/ML
1-6 INJECTION, SOLUTION INTRAVENOUS; SUBCUTANEOUS
Status: DISCONTINUED | OUTPATIENT
Start: 2023-01-24 | End: 2023-01-29 | Stop reason: HOSPADM

## 2023-01-24 RX ORDER — POTASSIUM CHLORIDE 20MEQ/15ML
20 LIQUID (ML) ORAL DAILY
Status: DISCONTINUED | OUTPATIENT
Start: 2023-01-25 | End: 2023-01-25

## 2023-01-24 RX ORDER — DOXYCYCLINE HYCLATE 100 MG/1
100 CAPSULE ORAL EVERY 12 HOURS SCHEDULED
COMMUNITY
End: 2023-02-04

## 2023-01-24 RX ORDER — ONDANSETRON 2 MG/ML
4 INJECTION INTRAMUSCULAR; INTRAVENOUS EVERY 8 HOURS PRN
Status: DISCONTINUED | OUTPATIENT
Start: 2023-01-24 | End: 2023-01-29 | Stop reason: HOSPADM

## 2023-01-24 RX ORDER — ALLOPURINOL 100 MG/1
50 TABLET ORAL DAILY
COMMUNITY

## 2023-01-24 RX ORDER — SENNOSIDES 8.6 MG
1 TABLET ORAL DAILY
Status: DISCONTINUED | OUTPATIENT
Start: 2023-01-25 | End: 2023-01-29 | Stop reason: HOSPADM

## 2023-01-24 RX ORDER — PREDNISONE 1 MG/1
2.5 TABLET ORAL DAILY
Status: DISCONTINUED | OUTPATIENT
Start: 2023-01-25 | End: 2023-01-29 | Stop reason: HOSPADM

## 2023-01-24 RX ORDER — CEPHALEXIN 500 MG/1
500 CAPSULE ORAL EVERY 6 HOURS SCHEDULED
COMMUNITY
End: 2023-02-04

## 2023-01-24 RX ORDER — VANCOMYCIN HYDROCHLORIDE 1 G/200ML
1000 INJECTION, SOLUTION INTRAVENOUS EVERY 24 HOURS
Status: DISCONTINUED | OUTPATIENT
Start: 2023-01-25 | End: 2023-01-27 | Stop reason: DRUGHIGH

## 2023-01-24 RX ORDER — ATORVASTATIN CALCIUM 10 MG/1
10 TABLET, FILM COATED ORAL DAILY
Status: DISCONTINUED | OUTPATIENT
Start: 2023-01-25 | End: 2023-01-29 | Stop reason: HOSPADM

## 2023-01-24 RX ORDER — ALBUTEROL SULFATE 2.5 MG/3ML
0.63 SOLUTION RESPIRATORY (INHALATION) 4 TIMES DAILY PRN
Status: DISCONTINUED | OUTPATIENT
Start: 2023-01-24 | End: 2023-01-29 | Stop reason: HOSPADM

## 2023-01-24 RX ORDER — ROPINIROLE 0.25 MG/1
0.5 TABLET, FILM COATED ORAL
Status: DISCONTINUED | OUTPATIENT
Start: 2023-01-24 | End: 2023-01-29 | Stop reason: HOSPADM

## 2023-01-24 RX ADMIN — INSULIN LISPRO 2 UNITS: 100 INJECTION, SOLUTION INTRAVENOUS; SUBCUTANEOUS at 18:22

## 2023-01-24 RX ADMIN — METOPROLOL TARTRATE 50 MG: 50 TABLET, FILM COATED ORAL at 21:46

## 2023-01-24 RX ADMIN — DOCUSATE SODIUM 100 MG: 100 CAPSULE ORAL at 17:57

## 2023-01-24 RX ADMIN — ROPINIROLE 0.5 MG: 0.25 TABLET, FILM COATED ORAL at 21:46

## 2023-01-24 RX ADMIN — CEFEPIME HYDROCHLORIDE 2000 MG: 2 INJECTION, SOLUTION INTRAVENOUS at 16:43

## 2023-01-24 RX ADMIN — INSULIN GLARGINE 12 UNITS: 100 INJECTION, SOLUTION SUBCUTANEOUS at 21:57

## 2023-01-24 RX ADMIN — GABAPENTIN 300 MG: 300 CAPSULE ORAL at 21:46

## 2023-01-24 RX ADMIN — OXYCODONE HYDROCHLORIDE 10 MG: 10 TABLET, FILM COATED, EXTENDED RELEASE ORAL at 20:25

## 2023-01-24 RX ADMIN — MIRTAZAPINE 30 MG: 15 TABLET, FILM COATED ORAL at 21:46

## 2023-01-24 RX ADMIN — TRAZODONE HYDROCHLORIDE 50 MG: 50 TABLET ORAL at 21:46

## 2023-01-24 RX ADMIN — VANCOMYCIN HYDROCHLORIDE 1250 MG: 1 INJECTION, POWDER, LYOPHILIZED, FOR SOLUTION INTRAVENOUS at 17:58

## 2023-01-24 RX ADMIN — ONDANSETRON 4 MG: 2 INJECTION INTRAMUSCULAR; INTRAVENOUS at 21:56

## 2023-01-24 RX ADMIN — SERTRALINE HYDROCHLORIDE 50 MG: 50 TABLET, FILM COATED ORAL at 21:46

## 2023-01-24 RX ADMIN — GABAPENTIN 300 MG: 300 CAPSULE ORAL at 17:57

## 2023-01-24 NOTE — ASSESSMENT & PLAN NOTE
Patient noted to have blisters on the left great toe which popped open into wounds  Prescribed antibiotics outpatient with Keflex and doxycycline however wound continued to worsen and sent to the ED to be evaluated  xray left foot negative for osteo-   consult podiatry for local care  Placed on IV Vanco and cefepime as patient is diabetic  We will also order arterial Doppler lateral lower extremity to assess circulation    H/o dvt in the past hold eliquis temporarily

## 2023-01-24 NOTE — ED PROVIDER NOTES
History  Chief Complaint   Patient presents with   • Wound Check     Pt has wound to L great toe  Complaining of pain and discoloration to the toe  Patient is an 72-year-old female brought to the emergency department by EMS on recommendation from [place] [facility] home [facility] for worsening wound to her left great toe, patient reports its been worsening over the past week, she denies any fevers, no chest pain or shortness of breath, no nausea, vomiting or diarrhea, denies any active drainage from the wound, patient has a history of COPD, is on oxygen 2 L 24 hours a day, she continues to smoke about a half a pack a day, EMS reports noted hypoxia at 84% while patient was wearing approximately 40 feet of oxygen tubing at her home, when placed on a shorter tube and 2 L she is 97%          Prior to Admission Medications   Prescriptions Last Dose Informant Patient Reported? Taking?    DM-APAP-CPM 7 5-160-1 MG/5ML SYRP More than a month  Yes No   Sig: Take by mouth as needed (For cough)   HYDROXYZINE HCL PO Unknown  Yes No   Sig: Take by mouth as needed (Itching and anxiety)   Ipratropium-Albuterol (DUONEB IN) 1/24/2023  Yes Yes   Sig: Inhale 2 5 mg every 4 (four) hours   MECLIZINE HCL PO More than a month  Yes No   Sig: Take by mouth as needed (Up to TID for dizziness)   Mirabegron (MYRBETRIQ PO) 1/24/2023  Yes Yes   Sig: Take 25 mg by mouth daily   NYSTATIN PO 1/24/2023  Yes Yes   Sig: Take 5 mL by mouth as needed (THRUSH)   PROCHLORPERAZINE MALEATE PO Not Taking  Yes No   Sig: Take 5 mg by mouth every 6 (six) hours as needed   Patient not taking: Reported on 1/24/2023   Tiotropium Bromide Monohydrate (SPIRIVA HANDIHALER IN) Not Taking  Yes No   Sig: Inhale 1 puff daily at bedtime as needed   Patient not taking: Reported on 1/24/2023   albuterol (ACCUNEB) 0 63 MG/3ML nebulizer solution 1/24/2023  Yes Yes   Sig: Take 1 ampule by nebulization 4 (four) times a day as needed for wheezing   allopurinol (ZYLOPRIM) 100 mg tablet 1/24/2023  Yes Yes   Sig: Take 50 mg by mouth daily   amLODIPine (NORVASC) 5 mg tablet 1/24/2023  Yes Yes   Sig: Take 5 mg by mouth daily   apixaban (ELIQUIS) 5 mg 1/24/2023  Yes Yes   Sig: Take 5 mg by mouth 2 (two) times a day   aspirin 81 mg chewable tablet 1/24/2023  Yes Yes   Sig: Chew 81 mg daily   atorvastatin (LIPITOR) 10 mg tablet 1/24/2023  Yes Yes   Sig: Take 10 mg by mouth daily   cephalexin (KEFLEX) 500 mg capsule 1/24/2023  Yes Yes   Sig: Take 500 mg by mouth every 6 (six) hours Antibiotic for left foot   docusate sodium (COLACE) 100 mg capsule 1/24/2023  Yes Yes   Sig: Take 200 mg by mouth daily   doxycycline hyclate (VIBRAMYCIN) 100 mg capsule 1/24/2023  Yes Yes   Sig: Take 100 mg by mouth every 12 (twelve) hours Left foot   fluticasone-salmeterol (ADVAIR, WIXELA) 500-50 mcg/dose inhaler More than a month  Yes No   Sig: Inhale 1 puff 2 (two) times a day Rinse mouth after use     furosemide (LASIX) 40 mg tablet 1/24/2023  Yes Yes   Sig: Take 40 mg by mouth daily    gabapentin (NEURONTIN) 300 mg capsule 1/24/2023  Yes Yes   Sig: Take 300 mg by mouth 3 (three) times a day   insulin glargine (LANTUS) 100 units/mL subcutaneous injection 1/23/2023  Yes Yes   Sig: Inject 12 Units under the skin daily at bedtime   metoprolol tartrate (LOPRESSOR) 50 mg tablet 1/24/2023  Yes Yes   Sig: Take 50 mg by mouth every 12 (twelve) hours   mirtazapine (REMERON) 30 mg tablet 1/23/2023  Yes Yes   Sig: Take 30 mg by mouth daily at bedtime   naloxegol oxalate (Movantik) 25 MG tablet 1/24/2023  Yes Yes   Sig: Take 25 mg by mouth in the morning   nitroglycerin (NITROSTAT) 0 4 mg SL tablet More than a month  Yes No   Sig: Place 0 4 mg under the tongue every 5 (five) minutes as needed for chest pain   oxyCODONE (OxyCONTIN) 10 mg 12 hr tablet 1/24/2023  Yes Yes   Sig: Take 10 mg by mouth every 6 (six) hours as needed   pantoprazole (PROTONIX) 40 mg tablet Not Taking  Yes No   Sig: Take 40 mg by mouth daily   Patient not taking: Reported on 2023   potassium chloride (MICRO-K) 10 MEQ CR capsule 2023  Yes Yes   Sig: Take 10 mEq by mouth daily   predniSONE 2 5 mg tablet 2023  Yes Yes   Sig: Take 2 5 mg by mouth daily   rOPINIRole (REQUIP) 0 25 mg tablet 2023  Yes Yes   Sig: Take 0 5 mg by mouth daily at bedtime   saccharomyces boulardii (FLORASTOR) 250 mg capsule Not Taking  Yes No   Sig: Take 250 mg by mouth daily   Patient not taking: Reported on 2023   sertraline (ZOLOFT) 50 mg tablet 2023  Yes Yes   Sig: Take 50 mg by mouth daily at bedtime   sucralfate (CARAFATE) 1 g tablet 2023  Yes Yes   Sig: Take 1 g by mouth 2 (two) times a day before meals   traZODone (DESYREL) 50 mg tablet 2023  Yes Yes   Sig: Take 50 mg by mouth daily at bedtime      Facility-Administered Medications: None       Past Medical History:   Diagnosis Date   • Asthma    • CH[facility]congestive heart failure) ([facility])    • COPD (chronic obstructive pulmonary disease) [facility] [address] )    • [facility] )    • DVT of lower limb, acute ([facility] )    • Hypertension    • Renal disorder        Past Surgical History:   Procedure Laterality Date   • APPENDECTOMY     •  SECTION     • HYSTERECTOMY         Family History   Problem Relation Age of Onset   • Arthritis Father      I have reviewed and agree with the history as documented  E-Cigarette/Vaping   • E-Cigarette Use Never User      E-Cigarette/Vaping Substances     Social History     Tobacco Use   • Smoking status: Every Day     Packs/day: 0 50     Types: Cigarettes   • Smokeless tobacco: Never   Vaping Use   • Vaping Use: Never used   Substance Use Topics   • Alcohol use: Not Currently   • Drug use: Never       Review of Systems   Constitutional: Negative  HENT: Negative  Eyes: Negative  Respiratory: Negative  Cardiovascular: Negative  Gastrointestinal: Negative  Endocrine: Negative  Genitourinary: Negative  Musculoskeletal: Negative      Skin: Positive for wound  Allergic/Immunologic: Negative  Neurological: Negative  Hematological: Negative  Psychiatric/Behavioral: Negative  Physical Exam  Physical Exam  Constitutional:       Appearance: She is well-developed  She is ill-appearing  HENT:      Head: Normocephalic and atraumatic  Mouth/Throat:      Mouth: Mucous membranes are dry  Eyes:      Conjunctiva/sclera: Conjunctivae normal       Pupils: Pupils are equal, round, and reactive to light  Cardiovascular:      Rate and Rhythm: Normal rate  Pulmonary:      Effort: Pulmonary effort is normal    Abdominal:      Palpations: Abdomen is soft  Musculoskeletal:         General: Normal range of motion  Cervical back: Normal range of motion and neck supple  Feet:    Feet:      Comments: Wounds to left great toe, see attached photos  Skin:     General: Skin is warm and dry  Coloration: Skin is pale  Comments: Tobacco stains on fingers and lip   Neurological:      Mental Status: She is alert and oriented to person, place, and time                   Vital Signs  ED Triage Vitals   Temperature Pulse Respirations Blood Pressure SpO2   01/24/23 1524 01/24/23 1524 01/24/23 1524 01/24/23 1524 01/24/23 1524   (!) 97 3 °F (36 3 °C) 63 16 139/63 97 %      Temp Source Heart Rate Source Patient Position - Orthostatic VS BP Location FiO2 (%)   01/24/23 1524 01/24/23 1524 01/24/23 1524 01/24/23 1524 --   Temporal Monitor Sitting Right arm       Pain Score       01/24/23 1947       7           Vitals:    01/24/23 1930 01/24/23 1957 01/24/23 2146 01/24/23 2146   BP: 141/70 133/87 140/87 140/87   Pulse: 72 71 74 76   Patient Position - Orthostatic VS: Lying            Visual Acuity      ED Medications  Medications   albuterol inhalation solution 0 63 mg (has no administration in time range)   allopurinol (ZYLOPRIM) tablet 50 mg (has no administration in time range)   amLODIPine (NORVASC) tablet 5 mg (has no administration in time range) aspirin chewable tablet 81 mg (has no administration in time range)   atorvastatin (LIPITOR) tablet 10 mg (has no administration in time range)   Fluticasone Furoate-Vilanterol 100-25 mcg/actuation 1 puff (has no administration in time range)   furosemide (LASIX) tablet 40 mg (has no administration in time range)   gabapentin (NEURONTIN) capsule 300 mg (300 mg Oral Given 1/24/23 2146)   insulin glargine (LANTUS) subcutaneous injection 12 Units 0 12 mL (12 Units Subcutaneous Given 1/24/23 2157)   metoprolol tartrate (LOPRESSOR) tablet 50 mg (50 mg Oral Given 1/24/23 2146)   mirtazapine (REMERON) tablet 30 mg (30 mg Oral Given 1/24/23 2146)   Mirabegron ER TB24 25 mg (has no administration in time range)   oxyCODONE (OxyCONTIN) 12 hr tablet 10 mg (10 mg Oral Given 1/24/23 2025)   pantoprazole (PROTONIX) EC tablet 40 mg (has no administration in time range)   potassium chloride oral solution 20 mEq (has no administration in time range)   predniSONE tablet 2 5 mg (has no administration in time range)   rOPINIRole (REQUIP) tablet 0 5 mg (0 5 mg Oral Given 1/24/23 2146)   saccharomyces boulardii (FLORASTOR) capsule 250 mg (has no administration in time range)   sertraline (ZOLOFT) tablet 50 mg (50 mg Oral Given 1/24/23 2146)   sucralfate (CARAFATE) tablet 1 g (1 g Oral Not Given 1/24/23 1753)   tiotropium (SPIRIVA) capsule for inhaler 18 mcg (has no administration in time range)   traZODone (DESYREL) tablet 50 mg (50 mg Oral Given 1/24/23 2146)   acetaminophen (TYLENOL) tablet 650 mg (has no administration in time range)   senna (SENOKOT) tablet 8 6 mg (has no administration in time range)   docusate sodium (COLACE) capsule 100 mg (100 mg Oral Given 1/24/23 1757)   calcium carbonate (TUMS) chewable tablet 1,000 mg (has no administration in time range)   nicotine (NICODERM CQ) 7 mg/24hr TD 24 hr patch 1 patch (has no administration in time range)   insulin lispro (HumaLOG) 100 units/mL subcutaneous injection 2-12 Units (2 Units Subcutaneous Given 1/24/23 1822)   insulin lispro (HumaLOG) 100 units/mL subcutaneous injection 1-6 Units (1 Units Subcutaneous Not Given 1/24/23 2146)   cefepime (MAXIPIME) IVPB (premix in dextrose) 2,000 mg 50 mL (has no administration in time range)   vancomycin (VANCOCIN) IVPB (premix in dextrose) 1,000 mg 200 mL (has no administration in time range)   ondansetron (ZOFRAN) injection 4 mg (4 mg Intravenous Given 1/24/23 2156)   vancomycin (VANCOCIN) 1250 mg in sodium chloride 0 9% 250 mL IVPB (1,250 mg Intravenous New Bag 1/24/23 1758)   cefepime (MAXIPIME) IVPB (premix in dextrose) 2,000 mg 50 mL (0 mg Intravenous Stopped 1/24/23 1713)       Diagnostic Studies  Results Reviewed     Procedure Component Value Units Date/Time    Fingerstick Glucose (POCT) [[id number]]  (Abnormal) Collected: 01/24/23 1806    Lab Status: Final result Updated: 01/24/23 1807     POC Glucose 176 mg/dl     Wound culture and Gram stain [[id number]]     Lab Status: No result Specimen: Wound from Toe, Left     Blood culture #1 [[id number]] Collected: 01/24/23 1640    Lab Status: In process Specimen: Blood from Arm, Left Updated: 01/24/23 1647    Lactic acid [[id number]]  (Normal) Collected: 01/24/23 1528    Lab Status: Final result Specimen: Blood from Arm, Left Updated: 01/24/23 1558     LACTIC ACID 1 1 mmol/L     Narrative:      Result may be elevated if tourniquet was used during collection      Comprehensive metabolic panel [[id number]]  (Abnormal) Collected: 01/24/23 1528    Lab Status: Final result Specimen: Blood from Arm, Left Updated: 01/24/23 1558     Sodium 140 mmol/L      Potassium 4 4 mmol/L      Chloride 96 mmol/L      CO2 36 mmol/L      ANION GAP 8 mmol/L      BUN 30 mg/dL      Creatinine 1 28 mg/dL      Glucose 180 mg/dL      Calcium 10 3 mg/dL      AST 9 U/L      ALT 7 U/L      Alkaline Phosphatase 59 U/L      Total Protein 6 3 g/dL      Albumin 4 0 g/dL      Total Bilirubin 0 23 mg/dL      eGFR 39 ml/min/1 73sq m Narrative:      National Kid[employer] Foundation guidelines for Chronic Kidney Disease (CKD):   •  Stage 1 with normal or high GFR (GFR > 90 mL/min/1 73 square meters)  •  Stage 2 Mild CKD (GFR = 60-89 mL/min/1 73 square meters)  •  Stage 3A Moderate CKD (GFR = 45-59 mL/min/1 73 square meters)  •  Stage 3B Moderate CKD (GFR = 30-44 mL/min/1 73 square meters)  •  Stage 4 Severe CKD (GFR = 15-29 mL/min/1 73 square meters)  •  Stage 5 End Stage CKD (GFR <15 mL/min/1 73 square meters)  Note: GFR calculation is accurate only with a steady state creatinine    Protime-INR [[id number]]  (Abnormal) Collected: 01/24/23 1528    Lab Status: Final result Specimen: Blood from Arm, Left Updated: 01/24/23 1548     Protime 15 5 seconds      INR 1 22    APTT [[id number]]  (Normal) Collected: 01/24/23 1528    Lab Status: Final result Specimen: Blood from Arm, Left Updated: 01/24/23 1548     PTT 36 seconds     CBC and differential [[id number]]  (Abnormal) Collected: 01/24/23 1528    Lab Status: Final result Specimen: Blood from Arm, Left Updated: 01/24/23 1534     WBC 10 87 Thousand/uL      RBC 4 11 Million/uL      Hemoglobin 10 5 g/dL      Hematocrit 35 0 %      MCV 85 fL      MCH 25 5 pg      MCHC 30 0 g/dL      RDW 15 9 %      MPV 10 2 fL      Platelets 837 Thousands/uL      nRBC 0 /100 WBCs      Neutrophils Relative 72 %      Immat GRANS % 1 %      Lymphocytes Relative 16 %      Monocytes Relative 6 %      Eosinophils Relative 4 %      Basophils Relative 1 %      Neutrophils Absolute 7 92 Thousands/µL      Immature Grans Absolute 0 07 Thousand/uL      Lymphocytes Absolute 1 75 Thousands/µL      Monocytes Absolute 0 65 Thousand/µL      Eosinophils Absolute 0 40 Thousand/µL      Basophils Absolute 0 08 Thousands/µL     Blood culture #2 [[id number]] Collected: 01/24/23 1528    Lab Status:  In process Specimen: Blood from Arm, Left Updated: 01/24/23 1531                 XR toe great min 2 views LEFT    (Results Pending)   VAS lower limb arterial duplex, complete bilateral    (Results Pending)              Procedures  Procedures         ED Course  ED Course as of 01/25/23 0037   Tue Jan 24, 2023   1613 TT To HELEN [place], confirms that [facility] at home has been attempting to provide treatment for ulcerations to toe for several weeks now, she is on her third course of antibiotic but wound is worsening, reports that she did get an x-ray on 1/18 that was negative for osteomyelitis                                             Medical Decision Making  Patient is an 46-year-old female brought by EMS on recommendation from [facility] secondary to worsening infection to her left great toe despite 2-3 rounds of oral antibiotics at home, patient with mild leukocytosis, imaging findings reveal no obvious osteomyelitis, admission advised for nonhealing diabetic ulcers, patient in agreement, hospitalist in agreement as well    Cellulitis: acute illness or injury  COPD [facility]chronic obstructive pulmonary disease) ([facility] ): acute illness or injury  Hyperglycemia: acute illness or injury  Wound infection: acute illness or injury  Amount and/or Complexity of Data Reviewed  Independent Historian: EMS     Details: [facility] home services  External Data Reviewed: labs, radiology and notes  Labs: ordered  Radiology: ordered and independent interpretation performed  Discussion of management or test interpretation with external provider(s): Tiger text to [facility] Stella CERVANTES, confirmed patient currently on doxycycline and Keflex, has had 2-3 courses of antibiotics but symptoms worsening, seen by podiatry on Sunday who advised hospitalization, x-ray was performed on 118 which showed no osteomyelitis    Risk  Prescription drug management  Decision regarding hospitalization            Disposition  Final diagnoses:   Cellulitis   Wound infection   Hyperglycemia   COPD (chronic obstructive pulmonary disease) ([facility] )     Time reflects when diagnosis was documented in both MDM as applicable and the Disposition within this note     Time User Action Codes Description Comment    1/24/2023  4:15 PM Scot Gravel Add [L03 90] Cellulitis     1/24/2023  4:15 PM Scot Gravel Add [T14  8XXA,  L08 9] Wound infection     1/24/2023  4:15 PM AashishMiguelangel styles Nipper Add [R73 9] Hyperglycemia     1/24/2023  4:15 PM Polgabe Miguelangel Nipper Add [J44 9] COPD (chronic obstructive pulmonary disease) ([facility] [address] )     1/24/2023  4:57 PM Bárbara Stains Add [S91 102A] Open wound of left great toe, initial encounter       ED Disposition     ED Disposition   Admit    Condition   Stable    Date/Time   Tue Jan 24, 2023  4:15 PM    Comment   Case was discussed with Dr Levi Jackson and the patient's admission status was agreed to be Admission Status: inpatient status to the service of Dr Levi Jackson              Follow-up Information    None         Current Discharge Medication List      CONTINUE these medications which have NOT CHANGED    Details   albuterol (ACCUNEB) 0 63 MG/3ML nebulizer solution Take 1 ampule by nebulization 4 (four) times a day as needed for wheezing      allopurinol (ZYLOPRIM) 100 mg tablet Take 50 mg by mouth daily      amLODIPine (NORVASC) 5 mg tablet Take 5 mg by mouth daily      apixaban (ELIQUIS) 5 mg Take 5 mg by mouth 2 (two) times a day      aspirin 81 mg chewable tablet Chew 81 mg daily      atorvastatin (LIPITOR) 10 mg tablet Take 10 mg by mouth daily      cephalexin (KEFLEX) 500 mg capsule Take 500 mg by mouth every 6 (six) hours Antibiotic for left foot      docusate sodium (COLACE) 100 mg capsule Take 200 mg by mouth daily      doxycycline hyclate (VIBRAMYCIN) 100 mg capsule Take 100 mg by mouth every 12 (twelve) hours Left foot      furosemide (LASIX) 40 mg tablet Take 40 mg by mouth daily       gabapentin (NEURONTIN) 300 mg capsule Take 300 mg by mouth 3 (three) times a day      insulin glargine (LANTUS) 100 units/mL subcutaneous injection Inject 12 Units under the skin daily at bedtime      Ipratropium-Albuterol (DUONEB IN) Inhale 2 5 mg every 4 (four) hours      metoprolol tartrate (LOPRESSOR) 50 mg tablet Take 50 mg by mouth every 12 (twelve) hours      Mirabegron (MYRBETRIQ PO) Take 25 mg by mouth daily      mirtazapine (REMERON) 30 mg tablet Take 30 mg by mouth daily at bedtime      naloxegol oxalate (Movantik) 25 MG tablet Take 25 mg by mouth in the morning      NYSTATIN PO Take 5 mL by mouth as needed (THRUSH)      oxyCODONE (OxyCONTIN) 10 mg 12 hr tablet Take 10 mg by mouth every 6 (six) hours as needed      potassium chloride (MICRO-K) 10 MEQ CR capsule Take 10 mEq by mouth daily      predniSONE 2 5 mg tablet Take 2 5 mg by mouth daily      rOPINIRole (REQUIP) 0 25 mg tablet Take 0 5 mg by mouth daily at bedtime      sertraline (ZOLOFT) 50 mg tablet Take 50 mg by mouth daily at bedtime      sucralfate (CARAFATE) 1 g tablet Take 1 g by mouth 2 (two) times a day before meals      traZODone (DESYREL) 50 mg tablet Take 50 mg by mouth daily at bedtime      DM-APAP-CPM 7 5-160-1 MG/5ML SYRP Take by mouth as needed (For cough)      fluticasone-salmeterol (ADVAIR, WIXELA) 500-50 mcg/dose inhaler Inhale 1 puff 2 (two) times a day Rinse mouth after use  Comments: Substitution to a formulary equivalent within the same pharmaceutical class is authorized        HYDROXYZINE HCL PO Take by mouth as needed (Itching and anxiety)      MECLIZINE HCL PO Take by mouth as needed (Up to TID for dizziness)      nitroglycerin (NITROSTAT) 0 4 mg SL tablet Place 0 4 mg under the tongue every 5 (five) minutes as needed for chest pain      pantoprazole (PROTONIX) 40 mg tablet Take 40 mg by mouth daily      PROCHLORPERAZINE MALEATE PO Take 5 mg by mouth every 6 (six) hours as needed      saccharomyces boulardii (FLORASTOR) 250 mg capsule Take 250 mg by mouth daily      Tiotropium Bromide Monohydrate (SPIRIVA HANDIHALER IN) Inhale 1 puff daily at bedtime as needed             No discharge procedures on file     PDMP Review     None          ED Provider  Electronically Signed by           French Guillory DO  01/25/23 [id number]

## 2023-01-24 NOTE — ASSESSMENT & PLAN NOTE
History of chronic COPD with chronic respiratory failure requiring 3 L of oxygen at baseline  Currently at baseline  Counseled on smoking cessation and placed on nicotine replacement therapy    Continue home regimen of Advair substituted to Breo, Spiriva and albuterol inhaler/neb prednisone 2 5 mg daily as per home regimen

## 2023-01-24 NOTE — H&P
[address] [facility] 1940, [de-identified] y o  female MRN: [id number]  Unit/Bed#: ED 12 Encounter: [id number]  Primary Care Provider: Abran Garcia MD   Date and time admitted to hospital: 1/24/2023  3:17 PM    * Open wound of left great toe  Assessment & Plan  Patient noted to have blisters on the left great toe which popped open into wounds  Prescribed antibiotics outpatient with Keflex and doxycycline however wound continued to worsen and sent to the ED to be evaluated  xray left foot negative for osteo-   consult podiatry for local care  Placed on IV Vanco and cefepime as patient is diabetic  We will also order arterial Doppler lateral lower extremity to assess circulation  H/o dvt in the past hold eliquis temporarily        Chronic, continuous use of opioids  Assessment & Plan  Cont oxycodone and liquid morphine  also on movantik at home which is not formulary here    Chronic respiratory failure with hypoxia and hypercapnia (HCC)  Assessment & Plan  Baseline  Continue 3 L of oxygen 24/7    Mixed simple and mucopurulent chronic bronchitis (HCC)  Assessment & Plan  History of chronic COPD with chronic respiratory failure requiring 3 L of oxygen at baseline  Currently at baseline  Counseled on smoking cessation and placed on nicotine replacement therapy  Continue home regimen of Advair substituted to Breo, Spiriva and albuterol inhaler/neb prednisone 2 5 mg daily as per home regimen    Type 2 diabetes mellitus with skin complication, with long-term current use of insulin (HCC)  Assessment & Plan  Lab Results   Component Value Date    HGBA1C 7 5 (H) 01/23/2023       No results for input(s): POCGLU in the last 72 hours  Blood Sugar Average: Last 72 hrs:  cont Lantus 12 units at at bedtime and insulin sliding scale for now and observe    Patient's home regimen is Lantus 12 units at at bedtime with no mealtime coverage      VTE Prophylaxis: Apixaban (Eliquis)  / sequential compression device   Code Status: full code  POLST: There is no POLST form on file for this patient (pre-hospital)  Discussion with family: discussed with daughter    Anticipated Length of Stay:  Patient will be admitted on an Inpatient basis with an anticipated length of stay of  More than 2 midnights  Justification for Hospital Stay: left foot infected wound    Total Time for Visit, including Counseling / Coordination of Care: 60 minutes  Greater than 50% of this total time spent on direct patient counseling and coordination of care  Chief Complaint:   Left foot infected wound    History of Present Illness:    Red Rg is a [de-identified] y o  female who presents with left foot infected wounds  Patient initially had blisters that popped off and now she is noticed to have 2 wounds with some discoloration noted of the left first toe and weak pulses noted  Patient complains of some pain and discomfort in her foot  Denies any trauma or previous wounds  Will start on antibiotics outpatient with Keflex and doxycycline by [facility] however continued to worsen and sent to the ED to be evaluated  Denies any fevers or chills  Review of Systems:    Review of Systems   Constitutional: Negative for appetite change, chills, fatigue and fever  HENT: Negative for hearing loss, sore throat and trouble swallowing  Eyes: Negative for photophobia, discharge and visual disturbance  Respiratory: Negative for chest tightness and shortness of breath  Cardiovascular: Negative for chest pain and palpitations  Gastrointestinal: Negative for abdominal pain, blood in stool and vomiting  Endocrine: Negative for polydipsia and polyuria  Genitourinary: Negative for difficulty urinating, dysuria, flank pain and hematuria  Musculoskeletal: Positive for arthralgias  Negative for back pain and gait problem  Skin: Positive for wound  Negative for rash     Allergic/Immunologic: Negative for environmental allergies and food allergies  Neurological: Negative for dizziness, seizures, syncope and headaches  Hematological: Does not bruise/bleed easily  Psychiatric/Behavioral: Negative for behavioral problems  All other systems reviewed and are negative  Past Medical and Surgical History:     Past Medical History:   Diagnosis Date   • Asthma    • CHF (congestive heart failure) ([facility] )    • COPD (chronic obstructive pulmonary disease) ([facility])    • Diabetes mellitus ([facility] )    • DVT of lower limb, acute ([place] [address] )    • Hypertension    • Renal disorder        Past Surgical History:   Procedure Laterality Date   • APPENDECTOMY     •  SECTION     • HYSTERECTOMY         Meds/Allergies:    Prior to Admission medications    Medication Sig Start Date End Date Taking?  Authorizing Provider   allopurinol (ZYLOPRIM) 100 mg tablet Take 50 mg by mouth daily   Yes Historical Provider, MD   amLODIPine (NORVASC) 5 mg tablet Take 5 mg by mouth daily   Yes Historical Provider, MD   naloxegol oxalate (Movantik) 25 MG tablet Take 25 mg by mouth in the morning   Yes Historical Provider, MD   oxyCODONE (OxyCONTIN) 10 mg 12 hr tablet Take 10 mg by mouth every 6 (six) hours as needed   Yes Historical Provider, MD   predniSONE 2 5 mg tablet Take 2 5 mg by mouth daily   Yes Historical Provider, MD   sertraline (ZOLOFT) 50 mg tablet Take 50 mg by mouth daily at bedtime   Yes Historical Provider, MD   albuterol (ACCUNEB) 0 63 MG/3ML nebulizer solution Take 1 ampule by nebulization 4 (four) times a day as needed for wheezing    Historical Provider, MD   apixaban (Eliquis) 5 mg Take 5 mg by mouth 2 (two) times a day    Historical Provider, MD   aspirin 81 mg chewable tablet Chew 81 mg daily    Historical Provider, MD   atorvastatin (LIPITOR) 10 mg tablet Take 10 mg by mouth daily    Historical Provider, MD   DM-APAP-CPM 7 5-160-1 MG/5ML SYRP Take by mouth as needed (For cough)    Historical Provider, MD   docusate sodium (COLACE) 100 mg capsule Take 200 mg by mouth daily    Historical Provider, MD   fluticasone-salmeterol (ADVAIR, WIXELA) 500-50 mcg/dose inhaler Inhale 1 puff 2 (two) times a day Rinse mouth after use      Historical Provider, MD   furosemide (LASIX) 40 mg tablet Take 40 mg by mouth daily     Historical Provider, MD   gabapentin (NEURONTIN) 300 mg capsule Take 300 mg by mouth 3 (three) times a day    Historical Provider, MD   HYDROXYZINE HCL PO Take by mouth as needed (Itching and anxiety)    Historical Provider, MD   insulin glargine (LANTUS) 100 units/mL subcutaneous injection Inject 12 Units under the skin daily at bedtime    Historical Provider, MD   Ipratropium-Albuterol (DUONEB IN) Inhale 2 5 mg every 4 (four) hours    Historical Provider, MD   MECLIZINE HCL PO Take by mouth as needed (Up to TID for dizziness)    Historical Provider, MD   metoprolol tartrate (LOPRESSOR) 50 mg tablet Take 50 mg by mouth every 12 (twelve) hours    Historical Provider, MD   Mirabegron (MYRBETRIQ PO) Take 25 mg by mouth daily    Historical Provider, MD   mirtazapine (REMERON) 30 mg tablet Take 30 mg by mouth daily at bedtime    Historical Provider, MD   nitroglycerin (NITROSTAT) 0 4 mg SL tablet Place 0 4 mg under the tongue every 5 (five) minutes as needed for chest pain    Historical Provider, MD   NYSTATIN PO Take 5 mL by mouth as needed (THRUSH)    Historical Provider, MD   pantoprazole (PROTONIX) 40 mg tablet Take 40 mg by mouth daily    Historical Provider, MD   potassium chloride (MICRO-K) 10 MEQ CR capsule Take 10 mEq by mouth daily    Historical Provider, MD   PROCHLORPERAZINE MALEATE PO Take 5 mg by mouth every 6 (six) hours as needed    Historical Provider, MD   rOPINIRole (REQUIP) 0 25 mg tablet Take 0 5 mg by mouth daily at bedtime    Historical Provider, MD   saccharomyces boulardii (FLORASTOR) 250 mg capsule Take 250 mg by mouth daily    Historical Provider, MD   sucralfate (CARAFATE) 1 g tablet Take 1 g by mouth 2 (two) times a day before meals    Historical Provider, MD   Tiotropium Bromide Monohydrate (SPIRIVA HANDIHALER IN) Inhale 1 puff daily at bedtime as needed    Historical Provider, MD   traZODone (DESYREL) 50 mg tablet Take 50 mg by mouth daily at bedtime    Historical Provider, MD   DULoxetine (CYMBALTA) 60 mg delayed release capsule Take 90 mg by mouth daily  1/24/23  Historical Provider, MD   insulin aspart (NovoLOG) 100 units/mL injection Inject 24 Units under the skin 3 (three) times a day before meals  1/24/23  Historical Provider, MD   lactulose (Enulose) 10 g/15 mL Insert 200 g into the rectum as needed  1/24/23  Historical Provider, MD   losartan (COZAAR) 25 mg tablet Take 25 mg by mouth daily  1/24/23  Historical Provider, MD   metolazone (ZAROXOLYN) 5 mg tablet Take 5 mg by mouth as needed  1/24/23  Historical Provider, MD   OXYCODONE ER PO Take 10 mg by mouth 3 (three) times a day as needed  1/24/23  Historical Provider, MD     I have reviewed home medications with patient personally  Allergies: Allergies   Allergen Reactions   • Doxycycline GI Intolerance     Refer to office visit 4/27/15   • Codeine      Occasional nausea      • Penicillins Hives     Last dose about 10years ago          Social History:     Marital Status:       Social History     Substance and Sexual Activity   Alcohol Use Not Currently     Social History     Tobacco Use   Smoking Status Every Day   • Packs/day: 0 50   • Types: Cigarettes   Smokeless Tobacco Never     Social History     Substance and Sexual Activity   Drug Use Never       Family History:    Family History   Problem Relation Age of Onset   • Arthritis Father        Physical Exam:     Vitals:   Blood Pressure: 139/63 (01/24/23 1524)  Pulse: 63 (01/24/23 1524)  Temperature: (!) 97 3 °F (36 3 °C) (01/24/23 1524)  Temp Source: Temporal (01/24/23 1524)  Respirations: 16 (01/24/23 1524)  Height: 5' 6" (167 6 cm) (01/24/23 1524)  Weight - Scale: 79 kg (174 lb 2 6 oz) (01/24/23 1524)  SpO2: 97 % (01/24/23 1524)    Physical Exam  Vitals and nursing note reviewed  Constitutional:       Appearance: She is ill-appearing  HENT:      Head: Normocephalic and atraumatic  Right Ear: External ear normal       Left Ear: External ear normal       Nose: Nose normal       Mouth/Throat:      Pharynx: Oropharynx is clear  Eyes:      Pupils: Pupils are equal, round, and reactive to light  Cardiovascular:      Rate and Rhythm: Normal rate and regular rhythm  Heart sounds: Normal heart sounds  Pulmonary:      Effort: Pulmonary effort is normal       Comments: Mod Air entry bilaterally with mild decreased breath sounds bilateral bases  Abdominal:      General: Bowel sounds are normal       Palpations: Abdomen is soft  Tenderness: There is no abdominal tenderness  Musculoskeletal:         General: Normal range of motion  Cervical back: Normal range of motion and neck supple  Comments: First toe to infected ulcers noted with discoloration noted on the first and second toes  Weak pulses noted on the dorsalis pedis and posterior tibial    Skin:     General: Skin is warm and dry  Capillary Refill: Capillary refill takes less than 2 seconds  Neurological:      General: No focal deficit present  Mental Status: She is alert and oriented to person, place, and time  Psychiatric:         Mood and Affect: Mood normal            Additional Data:     Lab Results: I have personally reviewed pertinent reports        Results from last 7 days   Lab Units 01/24/23  1528   WBC Thousand/uL 10 87*   HEMOGLOBIN g/dL 10 5*   HEMATOCRIT % 35 0   PLATELETS Thousands/uL 297   NEUTROS PCT % 72   LYMPHS PCT % 16   MONOS PCT % 6   EOS PCT % 4     Results from last 7 days   Lab Units 01/24/23  1528   SODIUM mmol/L 140   POTASSIUM mmol/L 4 4   CHLORIDE mmol/L 96   CO2 mmol/L 36*   BUN mg/dL 30*   CREATININE mg/dL 1 28   ANION GAP mmol/L 8   CALCIUM mg/dL 10 3*   ALBUMIN g/dL 4 0   TOTAL BILIRUBIN mg/dL 0 23   ALK PHOS U/L 59   ALT U/L 7   AST U/L 9*   GLUCOSE RANDOM mg/dL 180*     Results from last 7 days   Lab Units 01/24/23  1528   INR  1 22*         Results from last 7 days   Lab Units 01/23/23  1133   HEMOGLOBIN A1C % 7 5*     Results from last 7 days   Lab Units 01/24/23  1528   LACTIC ACID mmol/L 1 1       Imaging: I have personally reviewed pertinent reports  XR toe great min 2 views LEFT    (Results Pending)   VAS lower limb arterial duplex, complete bilateral    (Results Pending)       EKG, Pathology, and Other Studies Reviewed on Admission:   · EKG: Reviewed    Allscripts / Epic Records Reviewed: Yes     ** Please Note: This note has been constructed using a voice recognition system   **

## 2023-01-24 NOTE — ASSESSMENT & PLAN NOTE
Lab Results   Component Value Date    HGBA1C 7 5 (H) 01/23/2023       No results for input(s): POCGLU in the last 72 hours  Blood Sugar Average: Last 72 hrs:  cont Lantus 12 units at at bedtime and insulin sliding scale for now and observe    Patient's home regimen is Lantus 12 units at at bedtime with no mealtime coverage

## 2023-01-25 ENCOUNTER — APPOINTMENT (INPATIENT)
Dept: MRI IMAGING | Facility: HOSPITAL | Age: 83
End: 2023-01-25

## 2023-01-25 ENCOUNTER — APPOINTMENT (INPATIENT)
Dept: NON INVASIVE DIAGNOSTICS | Facility: HOSPITAL | Age: 83
End: 2023-01-25

## 2023-01-25 ENCOUNTER — TELEPHONE (OUTPATIENT)
Dept: VASCULAR SURGERY | Facility: CLINIC | Age: 83
End: 2023-01-25

## 2023-01-25 LAB
ANION GAP SERPL CALCULATED.3IONS-SCNC: 5 MMOL/L (ref 4–13)
ATRIAL RATE: 73 BPM
BUN SERPL-MCNC: 33 MG/DL (ref 5–25)
CALCIUM SERPL-MCNC: 9.5 MG/DL (ref 8.4–10.2)
CHLORIDE SERPL-SCNC: 100 MMOL/L (ref 96–108)
CO2 SERPL-SCNC: 36 MMOL/L (ref 21–32)
CREAT SERPL-MCNC: 1.42 MG/DL (ref 0.6–1.3)
ERYTHROCYTE [DISTWIDTH] IN BLOOD BY AUTOMATED COUNT: 16 % (ref 11.6–15.1)
GFR SERPL CREATININE-BSD FRML MDRD: 34 ML/MIN/1.73SQ M
GLUCOSE SERPL-MCNC: 102 MG/DL (ref 65–140)
GLUCOSE SERPL-MCNC: 110 MG/DL (ref 65–140)
GLUCOSE SERPL-MCNC: 116 MG/DL (ref 65–140)
GLUCOSE SERPL-MCNC: 134 MG/DL (ref 65–140)
GLUCOSE SERPL-MCNC: 139 MG/DL (ref 65–140)
HCT VFR BLD AUTO: 30.8 % (ref 34.8–46.1)
HGB BLD-MCNC: 9.4 G/DL (ref 11.5–15.4)
MCH RBC QN AUTO: 26.3 PG (ref 26.8–34.3)
MCHC RBC AUTO-ENTMCNC: 30.5 G/DL (ref 31.4–37.4)
MCV RBC AUTO: 86 FL (ref 82–98)
P AXIS: -8 DEGREES
PLATELET # BLD AUTO: 241 THOUSANDS/UL (ref 149–390)
PMV BLD AUTO: 9.8 FL (ref 8.9–12.7)
POTASSIUM SERPL-SCNC: 4.7 MMOL/L (ref 3.5–5.3)
PR INTERVAL: 170 MS
QRS AXIS: 45 DEGREES
QRSD INTERVAL: 82 MS
QT INTERVAL: 414 MS
QTC INTERVAL: 456 MS
RBC # BLD AUTO: 3.57 MILLION/UL (ref 3.81–5.12)
SODIUM SERPL-SCNC: 141 MMOL/L (ref 135–147)
T WAVE AXIS: 26 DEGREES
TSH SERPL DL<=0.05 MIU/L-ACNC: 3.27 UIU/ML (ref 0.45–4.5)
VENTRICULAR RATE: 73 BPM
WBC # BLD AUTO: 9.18 THOUSAND/UL (ref 4.31–10.16)

## 2023-01-25 RX ORDER — SODIUM CHLORIDE 9 MG/ML
50 INJECTION, SOLUTION INTRAVENOUS CONTINUOUS
Status: DISCONTINUED | OUTPATIENT
Start: 2023-01-25 | End: 2023-01-26

## 2023-01-25 RX ADMIN — GABAPENTIN 300 MG: 300 CAPSULE ORAL at 16:31

## 2023-01-25 RX ADMIN — ATORVASTATIN CALCIUM 10 MG: 10 TABLET, FILM COATED ORAL at 09:01

## 2023-01-25 RX ADMIN — FUROSEMIDE 40 MG: 40 TABLET ORAL at 09:00

## 2023-01-25 RX ADMIN — FLUTICASONE FUROATE AND VILANTEROL TRIFENATATE 1 PUFF: 100; 25 POWDER RESPIRATORY (INHALATION) at 09:05

## 2023-01-25 RX ADMIN — Medication 250 MG: at 09:00

## 2023-01-25 RX ADMIN — GABAPENTIN 300 MG: 300 CAPSULE ORAL at 21:43

## 2023-01-25 RX ADMIN — SERTRALINE HYDROCHLORIDE 50 MG: 50 TABLET, FILM COATED ORAL at 21:42

## 2023-01-25 RX ADMIN — TIOTROPIUM BROMIDE 18 MCG: 18 CAPSULE ORAL; RESPIRATORY (INHALATION) at 09:04

## 2023-01-25 RX ADMIN — PREDNISONE 2.5 MG: 1 TABLET ORAL at 09:01

## 2023-01-25 RX ADMIN — AMLODIPINE BESYLATE 5 MG: 5 TABLET ORAL at 09:00

## 2023-01-25 RX ADMIN — METOPROLOL TARTRATE 50 MG: 50 TABLET, FILM COATED ORAL at 21:43

## 2023-01-25 RX ADMIN — OXYCODONE HYDROCHLORIDE 10 MG: 10 TABLET, FILM COATED, EXTENDED RELEASE ORAL at 09:14

## 2023-01-25 RX ADMIN — INSULIN GLARGINE 12 UNITS: 100 INJECTION, SOLUTION SUBCUTANEOUS at 21:48

## 2023-01-25 RX ADMIN — ASPIRIN 81 MG CHEWABLE TABLET 81 MG: 81 TABLET CHEWABLE at 09:01

## 2023-01-25 RX ADMIN — TRAZODONE HYDROCHLORIDE 50 MG: 50 TABLET ORAL at 21:42

## 2023-01-25 RX ADMIN — OXYCODONE HYDROCHLORIDE 10 MG: 10 TABLET, FILM COATED, EXTENDED RELEASE ORAL at 16:31

## 2023-01-25 RX ADMIN — METOPROLOL TARTRATE 50 MG: 50 TABLET, FILM COATED ORAL at 09:01

## 2023-01-25 RX ADMIN — NICOTINE 1 PATCH: 7 PATCH, EXTENDED RELEASE TRANSDERMAL at 09:00

## 2023-01-25 RX ADMIN — VANCOMYCIN HYDROCHLORIDE 1000 MG: 1 INJECTION, SOLUTION INTRAVENOUS at 17:57

## 2023-01-25 RX ADMIN — MIRTAZAPINE 30 MG: 15 TABLET, FILM COATED ORAL at 21:42

## 2023-01-25 RX ADMIN — OXYCODONE HYDROCHLORIDE 10 MG: 10 TABLET, FILM COATED, EXTENDED RELEASE ORAL at 23:05

## 2023-01-25 RX ADMIN — ALLOPURINOL 50 MG: 100 TABLET ORAL at 09:01

## 2023-01-25 RX ADMIN — GABAPENTIN 300 MG: 300 CAPSULE ORAL at 09:01

## 2023-01-25 RX ADMIN — POTASSIUM CHLORIDE 20 MEQ: 1.5 SOLUTION ORAL at 09:02

## 2023-01-25 RX ADMIN — CEFEPIME HYDROCHLORIDE 2000 MG: 2 INJECTION, SOLUTION INTRAVENOUS at 05:02

## 2023-01-25 RX ADMIN — DOCUSATE SODIUM 100 MG: 100 CAPSULE ORAL at 09:00

## 2023-01-25 RX ADMIN — DOCUSATE SODIUM 100 MG: 100 CAPSULE ORAL at 17:25

## 2023-01-25 RX ADMIN — ROPINIROLE 0.5 MG: 0.25 TABLET, FILM COATED ORAL at 21:42

## 2023-01-25 RX ADMIN — PANTOPRAZOLE SODIUM 40 MG: 40 TABLET, DELAYED RELEASE ORAL at 05:02

## 2023-01-25 RX ADMIN — CEFEPIME HYDROCHLORIDE 2000 MG: 2 INJECTION, SOLUTION INTRAVENOUS at 17:25

## 2023-01-25 RX ADMIN — SODIUM CHLORIDE 50 ML/HR: 0.9 INJECTION, SOLUTION INTRAVENOUS at 14:46

## 2023-01-25 RX ADMIN — SUCRALFATE 1 G: 1 TABLET ORAL at 16:30

## 2023-01-25 RX ADMIN — STANDARDIZED SENNA CONCENTRATE 8.6 MG: 8.6 TABLET ORAL at 09:01

## 2023-01-25 RX ADMIN — SUCRALFATE 1 G: 1 TABLET ORAL at 09:00

## 2023-01-25 NOTE — PROGRESS NOTES
Vancomycin Assessment    Red Rg is a [de-identified] y o  female who is currently receiving vancomycin 1000 mg Q24h for Soft tissue (goal -600, trough >10)     Relevant clinical data and objective history reviewed:  Creatinine   Date Value Ref Range Status   01/25/2023 1 42 (H) 0 60 - 1 30 mg/dL Final     Comment:     Standardized to IDMS reference method   01/24/2023 1 28 0 60 - 1 30 mg/dL Final     Comment:     Standardized to IDMS reference method   07/11/2020 1 37 (H) 0 60 - 1 30 mg/dL Final     Comment:     Standardized to IDMS reference method     /87   Pulse 76   Temp 98 1 °F (36 7 °C)   Resp 16   Ht 5' 6" (1 676 m)   Wt 79 kg (174 lb 2 6 oz)   SpO2 96%   BMI 28 11 kg/m²   I/O last 3 completed shifts:  In: -   Out: 500 [Urine:500]  Lab Results   Component Value Date/Time    BUN 33 (H) 01/25/2023 04:57 AM    WBC 9 18 01/25/2023 04:57 AM    HGB 9 4 (L) 01/25/2023 04:57 AM    HCT 30 8 (L) 01/25/2023 04:57 AM    MCV 86 01/25/2023 04:57 AM     01/25/2023 04:57 AM     Temp Readings from Last 3 Encounters:   01/24/23 98 1 °F (36 7 °C)   07/11/20 97 8 °F (36 6 °C) (Temporal)   03/10/20 98 9 °F (37 2 °C) (Temporal)     Vancomycin Days of Therapy: 2    Assessment/Plan  The patient is currently on vancomycin utilizing scheduled dosing  Baseline risks associated with therapy include: pre-existing renal impairment, advanced age, and dehydration  The patient is receiving 1000 mg Q24h based on a goal of -600, trough >10 ; therefore, is clinically appropriate and dose will be continued   Pharmacy will continue to follow closely for s/sx of nephrotoxicity, infusion reactions, and appropriateness of therapy  BMP and CBC will be ordered per protocol  Plan for trough as patient approaches steady state, prior to the 4th  dose at approximately 1/27/23 at 0600  Pharmacy will continue to follow the patient’s culture results and clinical progress daily      [facility]

## 2023-01-25 NOTE — UTILIZATION REVIEW
NOTIFICATION OF INPATIENT ADMISSION   AUTHORIZATION REQUEST   SERVICING FACILITY:   [address] [place] [address]  Mela Aguilar [address] [place] [address], [address] Anika Johnson  Tax ID: [id number]  NPI: [id number] ATTENDING PROVIDER:  Attending Name and NPI#: Addi Rodriguez Md [[id number]]  Address: [facility]  Mela Aguilar [address] [place] [address], [facility] Anika Johnson  Phone: [phone]   ADMISSION INFORMATION:  Place of Service: [place] [address]  Place of Service Code: 21  Inpatient Admission Date/Time: 1/24/23  4:16 PM  Discharge Date/Time: No discharge date for patient encounter  Admitting Diagnosis Code/Description:  COPD (chronic obstructive pulmonary disease) ([facility] ) [J44 9]  Cellulitis [L03 90]  Hyperglycemia [R73 9]  Wound infection [T14  8XXA, L08 9]  Visit for wound check [Z51 89]  Open wound of left great toe, initial encounter [S91 102A]     UTILIZATION REVIEW CONTACT:  Rob Beck Utilization   Network Utilization Review Department  Phone: [phone]  Fax [phone]  Email: [email or web address]  Contact for approvals/pending authorizations, clinical reviews, and discharge  PHYSICIAN ADVISORY SERVICES:  Medical Necessity Denial & Qtnz-qq-Oskh Review  Phone: [phone]  Fax: [phone]  Email: [email or web address]

## 2023-01-25 NOTE — ASSESSMENT & PLAN NOTE
Patient noted to have blisters on the left great toe which popped open into wounds  Prescribed antibiotics outpatient with Keflex and doxycycline however wound continued to worsen and sent to the ED to be evaluated  xray left foot negative for osteo-   consult podiatry for local care  Placed on IV Vanco and cefepime as patient is diabetic  arterial Doppler lower extremity shows There is a 50-75% stenosis noted in the prox and distal superficial femoral  artery and an occlusion vs high grade stenosis of the distal anterior tibial artery  Diffuse atherosclerotic disease throughout the remaining femoro-popliteal and tibio-peroneal segments    Ankle/Brachial index:  0 39  which is in the ischemic disease category  consult placed to vascular surgery  H/o dvt in the past hold eliquis temporarily is case any intervention planned with podiatry

## 2023-01-25 NOTE — PLAN OF CARE
Problem: PAIN - ADULT  Goal: Verbalizes/displays adequate comfort level or baseline comfort level  Description: Interventions:  - Encourage patient to monitor pain and request assistance  - Assess pain using appropriate pain scale  - Administer analgesics based on type and severity of pain and evaluate response  - Implement non-pharmacological measures as appropriate and evaluate response  - Consider cultural and social influences on pain and pain management  - Notify physician/advanced practitioner if interventions unsuccessful or patient reports new pain  Outcome: Progressing     Problem: INFECTION - ADULT  Goal: Absence or prevention of progression during hospitalization  Description: INTERVENTIONS:  - Assess and monitor for signs and symptoms of infection  - Monitor lab/diagnostic results  - Monitor all insertion sites, i e  indwelling lines, tubes, and drains  - Monitor endotracheal if appropriate and nasal secretions for changes in amount and color  - [place] appropriate cooling/warming therapies per order  - Administer medications as ordered  - Instruct and encourage patient and family to use good hand hygiene technique  - Identify and instruct in appropriate isolation precautions for identified infection/condition  Outcome: Progressing     Problem: SAFETY ADULT  Goal: Patient will remain free of falls  Description: INTERVENTIONS:  - Educate patient/family on patient safety including physical limitations  - Instruct patient to call for assistance with activity   - Consult OT/PT to assist with strengthening/mobility   - Keep Call bell within reach  - Keep bed low and locked with side rails adjusted as appropriate  - Keep care items and personal belongings within reach  - Initiate and maintain comfort rounds  - Make Fall Risk Sign visible to staff    - Apply yellow socks and bracelet for high fall risk patients  - Consider moving patient to room near nurses station  Outcome: Progressing  Goal: Maintain or return to baseline ADL function  Description: INTERVENTIONS:  -  Assess patient's ability to carry out ADLs; assess patient's baseline for ADL function and identify physical deficits which impact ability to perform ADLs (bathing, care of mouth/teeth, toileting, grooming, dressing, etc )  - Assess/evaluate cause of self-care deficits   - Assess range of motion  - Assess patient's mobility; develop plan if impaired  - Assess patient's need for assistive devices and provide as appropriate  - Encourage maximum independence but intervene and supervise when necessary  - Involve family in performance of ADLs  - Assess for home care needs following discharge   - Consider OT consult to assist with ADL evaluation and planning for discharge  - Provide patient education as appropriate  Outcome: Progressing  Goal: Maintains/Returns to pre admission functional level  Description: INTERVENTIONS:  - Perform BMAT or MOVE assessment daily    - Set and communicate daily mobility goal to care team and patient/family/caregiver     - Collaborate with rehabilitation services on mobility goals if consulted    - Out of bed for toileting  - Record patient progress and toleration of activity level   Outcome: Progressing     Problem: DISCHARGE PLANNING  Goal: Discharge to home or other facility with appropriate resources  Description: INTERVENTIONS:  - Identify barriers to discharge w/patient and caregiver  - Arrange for needed discharge resources and transportation as appropriate  - Identify discharge learning needs (meds, wound care, etc )  - Arrange for interpretive services to assist at discharge as needed  - Refer to Case Management Department for coordinating discharge planning if the patient needs post-hospital services based on physician/advanced practitioner order or complex needs related to functional status, cognitive ability, or social support system  Outcome: Progressing     Problem: Knowledge Deficit  Goal: Patient/family/caregiver demonstrates understanding of disease process, treatment plan, medications, and discharge instructions  Description: Complete learning assessment and assess knowledge base    Interventions:  - Provide teaching at level of understanding  - Provide teaching via preferred learning methods  Outcome: Progressing

## 2023-01-25 NOTE — PROGRESS NOTES
Eulogio Alegre is a [de-identified] y o  female who is currently ordered Vancomycin IV with management by the Pharmacy Consult service  Relevant clinical data and objective / subjective history reviewed  Vancomycin Assessment:  Indication and Goal AUC/Trough: Soft tissue (goal -600, trough >10)  Clinical Status: New Start  Micro:     Renal Function:  SCr: 1 28 mg/dL  CrCl: 36 9 mL/min  Renal replacement: Not on dialysis  Days of Therapy: 1  Current Dose: 1250 mg IV once  Vancomycin Plan:  New Dosin mg IV q 24 hrs  Estimated AUC: 483 mcg*hr/mL  Estimated Trough: 15 4 mcg/mL  Next Level:  at 0600  Renal Function Monitoring: Daily BMP and [place] will continue to follow closely for s/sx of nephrotoxicity, infusion reactions and appropriateness of therapy  BMP and CBC will be ordered per protocol  We will continue to follow the patient’s culture results and clinical progress daily      Natasha Jeffery, Pharmacist

## 2023-01-25 NOTE — UTILIZATION REVIEW
Initial Clinical Review    Admission: Date/Time/Statement:   Admission Orders (From admission, onward)     Ordered        01/24/23 1616  INPATIENT ADMISSION  Once                      Orders Placed This Encounter   Procedures   • INPATIENT ADMISSION     Standing Status:   Standing     Number of Occurrences:   1     Order Specific Question:   Level of Care     Answer:   Med Surg [16]     Order Specific Question:   Estimated length of stay     Answer:   More than 2 Midnights     Order Specific Question:   Certification     Answer:   I certify that inpatient services are medically necessary for this patient for a duration of greater than two midnights  See H&P and MD Progress Notes for additional information about the patient's course of treatment  ED Arrival Information     Expected   -    Arrival   1/24/2023 15:17    Acuity   Urgent            Means of arrival   Ambulance    Escorted by   [employer]    Admission type   Emergency            Arrival complaint   -           Chief Complaint   Patient presents with   • Wound Check     Pt has wound to L great toe  Complaining of pain and discoloration to the toe  Initial Presentation: [de-identified] y o  female  With PMH of asthma, CHF, COPD with baseline O2 3L NC use, DM, DVT, HTN who presents with left foot infected wounds  Patient initially had blisters that popped off and now she is noticed to have 2 wounds with some discoloration noted of the left first toe and weak pulses noted  Patient complains of some pain and discomfort in her foot  Denies any trauma or previous wounds  Was started on antibiotics outpatient with Keflex and doxycycline by [facility] however continued to worsen and sent to the ED to be evaluated   Plan: Inpatient admission for evaluation and treatment of open wound of L great toe, chronic resp failure, chronic bronchitis, DM: Consult Podiatry, IV vanco and cefepime, arterial doppler, hold Eliquis, continue home regimen of Advair substituted to Breo, Spiriva and albuterol inhaler/neb prednisone 2 5 mg daily, continue Lantus 12 units HS and SSI  Date: 1/25   Day 2:   Internal medicine: Placed on IV Vanco and cefepime as patient is diabetic  Arterial Doppler lower extremity shows There is a 50-75% stenosis noted in the prox and distal superficial femoral artery and an occlusion vs high grade stenosis of the distal anterior tibial artery  Diffuse atherosclerotic disease throughout the remaining femoro-popliteal and tibio-peroneal segments  Consult placed to Vascular Surgery  Hold Eliquis in case any intervention planned by Podiatry  Continue home oxygen at 3 L NC  Continue SSI and Lantus 12 untis at HS  Podiatry consult: Due to the apparent chronicity of the ulceration, we will obtain MRI to rule out underlying bony changes, if there is a positive MRI she will need amputation of the infected bone  She will need vascular consult and intervention prior to podiatry being able to intervene on her foot  Wound care written for Betadine, DSD to the left foot  Vascular surgery consult: Patient would likely benefit from LLE angiogram for arterial optimization for wound healing prior to podiatric intervention  Unfortunately this cannot be performed at [address] [place] [address], therefore patient will require transfer to another facility for vascular intervention, likely at [facility] or [facility]  Renal function will need to improve before intervention is performed  Consider nephrology consult  Continue ASA, statin therapy       ED Triage Vitals   Temperature Pulse Respirations Blood Pressure SpO2   01/24/23 1524 01/24/23 1524 01/24/23 1524 01/24/23 1524 01/24/23 1524   (!) 97 3 °F (36 3 °C) 63 16 139/63 97 %      Temp Source Heart Rate Source Patient Position - Orthostatic VS BP Location FiO2 (%)   01/24/23 1524 01/24/23 1524 01/24/23 1524 01/24/23 1524 --   Temporal Monitor Sitting Right arm       Pain Score       01/24/23 7561 7          Wt Readings from Last 1 Encounters:   01/24/23 79 kg (174 lb 2 6 oz)     Additional Vital Signs:     Date/Time Temp Pulse Resp BP MAP (mmHg) SpO2 Calculated FIO2 (%) - Nasal Cannula Nasal Cannula O2 Flow Rate (L/min) O2 Device   01/25/23 0914 -- -- -- -- -- -- 32 3 L/min Nasal cannula   01/25/23 07:40:33 97 3 °F (36 3 °C) Abnormal  73 14 155/61 92 95 % -- -- --   01/24/23 21:46:29 98 1 °F (36 7 °C) 76 16 140/87 105 96 % -- -- --   01/24/23 2146 -- 74 -- 140/87 -- -- -- -- --   01/24/23 2100 -- -- -- -- -- 97 % 32 3 L/min Nasal cannula   01/24/23 19:57:47 98 1 °F (36 7 °C) 71 17 133/87 102 97 % -- -- --   01/24/23 1930 -- 72 16 141/70 -- 98 % 28 2 L/min Nasal cannula   01/24/23 1828 -- -- -- -- -- -- -- -- None (Room air)     Pertinent Labs/Diagnostic Test Results:   XR toe great min 2 views LEFT   Final Result by Rodolfo Sanchez MD (01/25 1434)      No radiographic evidence of osteomyelitis              Workstation performed: [id number]         VAS lower limb arterial duplex, complete bilateral    (Results Pending)     1/24 EKG:  Normal sinus rhythm  Inferior infarct (cited on or before 10-MAR-2020)  Abnormal ECG  When compared with ECG of 11-JUL-2020 18:06,  T wave amplitude has increased in Anterolateral leads  QT has shortened      Results from last 7 days   Lab Units 01/25/23  0457 01/24/23  1528   WBC Thousand/uL 9 18 10 87*   HEMOGLOBIN g/dL 9 4* 10 5*   HEMATOCRIT % 30 8* 35 0   PLATELETS Thousands/uL 241 297   NEUTROS ABS Thousands/µL  --  7 92*         Results from last 7 days   Lab Units 01/25/23  0457 01/24/23  1528   SODIUM mmol/L 141 140   POTASSIUM mmol/L 4 7 4 4   CHLORIDE mmol/L 100 96   CO2 mmol/L 36* 36*   ANION GAP mmol/L 5 8   BUN mg/dL 33* 30*   CREATININE mg/dL 1 42* 1 28   EGFR ml/min/1 73sq m 34 39   CALCIUM mg/dL 9 5 10 3*     Results from last 7 days   Lab Units 01/24/23  1528   AST U/L 9*   ALT U/L 7   ALK PHOS U/L 59   TOTAL PROTEIN g/dL 6 3*   ALBUMIN g/dL 4 0 TOTAL BILIRUBIN mg/dL 0 23     Results from last 7 days   Lab Units 01/25/23  1105 01/25/23  0737 01/24/23  2058 01/24/23  1806   POC GLUCOSE mg/dl 134 116 132 176*     Results from last 7 days   Lab Units 01/25/23  0457 01/24/23  1528   GLUCOSE RANDOM mg/dL 102 180*         Results from last 7 days   Lab Units 01/23/23  1133   HEMOGLOBIN A1C % 7 5*   EAG mg/dL 169*         Results from last 7 days   Lab Units 01/24/23  1528   PROTIME seconds 15 5*   INR  1 22*   PTT seconds 36     Results from last 7 days   Lab Units 01/25/23  0457   TSH 3RD GENERATON uIU/mL 3 272         Results from last 7 days   Lab Units 01/24/23  1528   LACTIC ACID mmol/L 1 1           Results from last 7 days   Lab Units 01/24/23  1640 01/24/23  1528   BLOOD CULTURE  Received in Microbiology Lab  Culture in Progress  Received in Microbiology Lab  Culture in Progress           ED Treatment:   Medication Administration from 01/24/2023 1516 to 01/24/2023 1955       Date/Time Order Dose Route Action     01/24/2023 1758 EST vancomycin (VANCOCIN) 1250 mg in sodium chloride 0 9% 250 mL IVPB 1,250 mg Intravenous New Bag     01/24/2023 1643 EST cefepime (MAXIPIME) IVPB (premix in dextrose) 2,000 mg 50 mL 2,000 mg Intravenous New Bag     01/24/2023 1757 EST gabapentin (NEURONTIN) capsule 300 mg 300 mg Oral Given     01/24/2023 1757 EST docusate sodium (COLACE) capsule 100 mg 100 mg Oral Given     01/24/2023 1822 EST insulin lispro (HumaLOG) 100 units/mL subcutaneous injection 2-12 Units 2 Units Subcutaneous Given        Past Medical History:   Diagnosis Date   • Asthma    • CHF (congestive heart failure) ([facility])    • COPD (chronic obstructive pulmonary disease) ([facility] )    • Diabetes mellitus ([facility] )    • DVT of lower limb, acute ([facility] )    • Hypertension    • Renal disorder      Present on Admission:  **None**      Admitting Diagnosis: COPD (chronic obstructive pulmonary disease) ([facility] ) [J44 9]  Cellulitis [L03 90]  Hyperglycemia [R73 9]  Wound infection [B59 2RPB, L08 9]  Visit for wound check [Z51 89]  Open wound of left great toe, initial encounter [S91 102A]  Age/Sex: [de-identified] y o  female  Admission Orders:  Scheduled Medications:  allopurinol, 50 mg, Oral, Daily  amLODIPine, 5 mg, Oral, Daily  aspirin, 81 mg, Oral, Daily  atorvastatin, 10 mg, Oral, Daily  cefepime, 2,000 mg, Intravenous, Q12H  docusate sodium, 100 mg, Oral, BID  Fluticasone Furoate-Vilanterol, 1 puff, Inhalation, Daily  furosemide, 40 mg, Oral, Daily  gabapentin, 300 mg, Oral, TID  insulin glargine, 12 Units, Subcutaneous, HS  insulin lispro, 1-6 Units, Subcutaneous, HS  insulin lispro, 2-12 Units, Subcutaneous, TID AC  metoprolol tartrate, 50 mg, Oral, Q12H SOULEYMANE  Mirabegron ER, 25 mg, Oral, Daily  mirtazapine, 30 mg, Oral, HS  nicotine, 1 patch, Transdermal, Daily  pantoprazole, 40 mg, Oral, Daily  potassium chloride, 20 mEq, Oral, Daily  predniSONE, 2 5 mg, Oral, Daily  rOPINIRole, 0 5 mg, Oral, HS  saccharomyces boulardii, 250 mg, Oral, Daily  senna, 1 tablet, Oral, Daily  sertraline, 50 mg, Oral, HS  sucralfate, 1 g, Oral, BID AC  tiotropium, 18 mcg, Inhalation, Daily  traZODone, 50 mg, Oral, HS  vancomycin, 1,000 mg, Intravenous, Q24H      Continuous IV Infusions:     PRN Meds:  acetaminophen, 650 mg, Oral, Q6H PRN  albuterol, 0 63 mg, Nebulization, 4x Daily PRN  calcium carbonate, 1,000 mg, Oral, Daily PRN  ondansetron, 4 mg, Intravenous, Q8H PRN  oxyCODONE, 10 mg, Oral, Q6H PRN        IP CONSULT TO PODIATRY  IP CONSULT TO PHARMACY    Network Utilization Review Department  ATTENTION: Please call with any questions or concerns to [phone] and carefully listen to the prompts so that you are directed to the right person  All voicemails are confidential   Ramya Shine all requests for admission clinical reviews, approved or denied determinations and any other requests to dedicated fax number below belonging to the campus where the patient is receiving treatment   List of dedicated fax numbers for the Facilities:  FACILITY NAME UR FAX NUMBER   ADMISSION DENIALS (Administrative/Medical Necessity) [phone]   [address]  (Maternity/NICU/Pediatrics) [phone]   [address] [phone]   [id number] [place] [address] [address] [facility] [address] [phone]

## 2023-01-25 NOTE — TELEPHONE ENCOUNTER
Per phone call from Oliver Spurling patient will need to be transferred from [facility] for LLE angiogram w/possible intervention  She is waiting to hear back from Dr Emeli Byers and will let us know if this is definite  If he is agreeable pt will be transferred to [facility] for him to do on Monday 1/30/23 in [facility]

## 2023-01-25 NOTE — ASSESSMENT & PLAN NOTE
Lab Results   Component Value Date    HGBA1C 7 5 (H) 01/23/2023       Recent Labs     01/24/23  1806 01/24/23 2058 01/25/23  0737 01/25/23  1105   POCGLU 176* 132 116 134       Blood Sugar Average: Last 72 hrs:  (P) 139 5cont Lantus 12 units at at bedtime and insulin sliding scale for now and observe    Patient's home regimen is Lantus 12 units at at bedtime with no mealtime coverage  Sugars are well controlled

## 2023-01-25 NOTE — PLAN OF CARE
Problem: PAIN - ADULT  Goal: Verbalizes/displays adequate comfort level or baseline comfort level  Description: Interventions:  - Encourage patient to monitor pain and request assistance  - Assess pain using appropriate pain scale  - Administer analgesics based on type and severity of pain and evaluate response  - Implement non-pharmacological measures as appropriate and evaluate response  - Consider cultural and social influences on pain and pain management  - Notify physician/advanced practitioner if interventions unsuccessful or patient reports new pain  Outcome: Progressing     Problem: INFECTION - ADULT  Goal: Absence or prevention of progression during hospitalization  Description: INTERVENTIONS:  - Assess and monitor for signs and symptoms of infection  - Monitor lab/diagnostic results  - Monitor all insertion sites, i e  indwelling lines, tubes, and drains  - Monitor endotracheal if appropriate and nasal secretions for changes in amount and color  - Shaw appropriate cooling/warming therapies per order  - Administer medications as ordered  - Instruct and encourage patient and family to use good hand hygiene technique  - Identify and instruct in appropriate isolation precautions for identified infection/condition  Outcome: Progressing     Problem: SAFETY ADULT  Goal: Patient will remain free of falls  Description: INTERVENTIONS:  - Educate patient/family on patient safety including physical limitations  - Instruct patient to call for assistance with activity   - Consult OT/PT to assist with strengthening/mobility   - Keep Call bell within reach  - Keep bed low and locked with side rails adjusted as appropriate  - Keep care items and personal belongings within reach  - Initiate and maintain comfort rounds  - Make Fall Risk Sign visible to staff  - Offer Toileting every 2 Hours, in advance of need  - Initiate/Maintain bed alarm  - Obtain necessary fall risk management equipment: walker, BSC  - Apply yellow socks and bracelet for high fall risk patients  - Consider moving patient to room near nurses station  Outcome: Progressing  Goal: Maintain or return to baseline ADL function  Description: INTERVENTIONS:  -  Assess patient's ability to carry out ADLs; assess patient's baseline for ADL function and identify physical deficits which impact ability to perform ADLs (bathing, care of mouth/teeth, toileting, grooming, dressing, etc )  - Assess/evaluate cause of self-care deficits   - Assess range of motion  - Assess patient's mobility; develop plan if impaired  - Assess patient's need for assistive devices and provide as appropriate  - Encourage maximum independence but intervene and supervise when necessary  - Involve family in performance of ADLs  - Assess for home care needs following discharge   - Consider OT consult to assist with ADL evaluation and planning for discharge  - Provide patient education as appropriate  Outcome: Progressing  Goal: Maintains/Returns to pre admission functional level  Description: INTERVENTIONS:  - Perform BMAT or MOVE assessment daily    - Set and communicate daily mobility goal to care team and patient/family/caregiver  - Collaborate with rehabilitation services on mobility goals if consulted  - Perform Range of Motion 3 times a day    - Dangle patient 3 times a day  - Stand patient 3 times a day  - Ambulate patient 3 times a day  - Out of bed to chair 3 times a day   - Out of bed for meals 3 times a day  - Out of bed for toileting  - Record patient progress and toleration of activity level   Outcome: Progressing     Problem: DISCHARGE PLANNING  Goal: Discharge to home or other facility with appropriate resources  Description: INTERVENTIONS:  - Identify barriers to discharge w/patient and caregiver  - Arrange for needed discharge resources and transportation as appropriate  - Identify discharge learning needs (meds, wound care, etc )  - Arrange for interpretive services to assist at discharge as needed  - Refer to Case Management Department for coordinating discharge planning if the patient needs post-hospital services based on physician/advanced practitioner order or complex needs related to functional status, cognitive ability, or social support system  Outcome: Progressing     Problem: Knowledge Deficit  Goal: Patient/family/caregiver demonstrates understanding of disease process, treatment plan, medications, and discharge instructions  Description: Complete learning assessment and assess knowledge base  Interventions:  - Provide teaching at level of understanding  - Provide teaching via preferred learning methods  Outcome: Progressing     Problem: MOBILITY - ADULT  Goal: Maintain or return to baseline ADL function  Description: INTERVENTIONS:  -  Assess patient's ability to carry out ADLs; assess patient's baseline for ADL function and identify physical deficits which impact ability to perform ADLs (bathing, care of mouth/teeth, toileting, grooming, dressing, etc )  - Assess/evaluate cause of self-care deficits   - Assess range of motion  - Assess patient's mobility; develop plan if impaired  - Assess patient's need for assistive devices and provide as appropriate  - Encourage maximum independence but intervene and supervise when necessary  - Involve family in performance of ADLs  - Assess for home care needs following discharge   - Consider OT consult to assist with ADL evaluation and planning for discharge  - Provide patient education as appropriate  Outcome: Progressing  Goal: Maintains/Returns to pre admission functional level  Description: INTERVENTIONS:  - Perform BMAT or MOVE assessment daily    - Set and communicate daily mobility goal to care team and patient/family/caregiver  - Collaborate with rehabilitation services on mobility goals if consulted  - Perform Range of Motion 3 times a day  - Reposition patient every 2 hours    - Dangle patient 3 times a day  - Stand patient 3 times a day  - Ambulate patient 3 times a day  - Out of bed to chair 3 times a day   - Out of bed for meals 3 times a day  - Out of bed for toileting  - Record patient progress and toleration of activity level   Outcome: Progressing

## 2023-01-25 NOTE — CONSULTS
[address] [facility] - Consultation    Patient Information:   Randa Cunningham [de-identified] y o  female MRN: [id number]  Unit/Bed#: -01 Encounter: [id number]  PCP: Paris Prasad MD  Date of Admission:  1/24/2023  Date of Consultation: 01/25/23  Requesting Physician: Janeth Viera MD      ASSESSMENT:    Randa Cunningham is a [de-identified] y o  female with:    1  Peripheral arterial disease with b/l stenosis  2  Ulceration of the left foot with fat layer exposed  PLAN:    · X-ray of the left foot reviewed and does show no underlying Bony changes, no acute evidence of osteomyelitis  · Her arterial studies reviewed and do show stenosis bilateral lower extremities with healing potential below healing  · Due to the apparent chronicity of the ulceration, we will obtain MRI to rule out underlying bony changes, if there is a positive MRI she will need amputation of the infected bone  · She will need vascular consult and intervention prior to podiatry being able to intervene on her foot, if vascular is unavailable at [facility] I would recommend to transfer to the appropriate campus  · Wound care written for Betadine, DSD to the left foot  · Rest of care per primary team       SUBJECTIVE    History of Present Illness:    Randa Cunningham is a [de-identified] y o  female with past medical history of diabetes type II who presents with wound offirst  medial aspect of the left 1st MTPJ and hallux which she states has been present for about a week  Does have DM and hx of smoking  States that her left foot hurts very badly  Review of Systems:    Constitutional: Negative  HENT: Negative  Eyes: Negative  Respiratory: Negative  Cardiovascular: Negative  Gastrointestinal: Negative  Musculoskeletal: neg   Skin:neg   Neurological: neg   Psych: Negative       Past Medical and Surgical History:     Past Medical History:   Diagnosis Date   • Asthma    • CHF (congestive heart failure) ([facility] [address] )    • COPD (chronic obstructive pulmonary disease) (H[facility])    • Diabetes mellitus ([facility] [address] )    • DVT of lower limb, acute ([facility] [address] )    • Hypertension    • Renal disorder        Past Surgical History:   Procedure Laterality Date   • APPENDECTOMY     •  SECTION     • HYSTERECTOMY         Meds/Allergies:    Medications Prior to Admission   Medication   • albuterol (ACCUNEB) 0 63 MG/3ML nebulizer solution   • allopurinol (ZYLOPRIM) 100 mg tablet   • amLODIPine (NORVASC) 5 mg tablet   • apixaban (ELIQUIS) 5 mg   • aspirin 81 mg chewable tablet   • atorvastatin (LIPITOR) 10 mg tablet   • cephalexin (KEFLEX) 500 mg capsule   • docusate sodium (COLACE) 100 mg capsule   • doxycycline hyclate (VIBRAMYCIN) 100 mg capsule   • furosemide (LASIX) 40 mg tablet   • gabapentin (NEURONTIN) 300 mg capsule   • insulin glargine (LANTUS) 100 units/mL subcutaneous injection   • Ipratropium-Albuterol (DUONEB IN)   • metoprolol tartrate (LOPRESSOR) 50 mg tablet   • Mirabegron (MYRBETRIQ PO)   • mirtazapine (REMERON) 30 mg tablet   • naloxegol oxalate (Movantik) 25 MG tablet   • NYSTATIN PO   • oxyCODONE (OxyCONTIN) 10 mg 12 hr tablet   • potassium chloride (MICRO-K) 10 MEQ CR capsule   • predniSONE 2 5 mg tablet   • rOPINIRole (REQUIP) 0 25 mg tablet   • sertraline (ZOLOFT) 50 mg tablet   • sucralfate (CARAFATE) 1 g tablet   • traZODone (DESYREL) 50 mg tablet   • DM-APAP-CPM 7 5-160-1 MG/5ML SYRP   • fluticasone-salmeterol (ADVAIR, WIXELA) 500-50 mcg/dose inhaler   • HYDROXYZINE HCL PO   • MECLIZINE HCL PO   • nitroglycerin (NITROSTAT) 0 4 mg SL tablet   • pantoprazole (PROTONIX) 40 mg tablet   • PROCHLORPERAZINE MALEATE PO   • saccharomyces boulardii (FLORASTOR) 250 mg capsule   • Tiotropium Bromide Monohydrate (SPIRIVA HANDIHALER IN)       Allergies   Allergen Reactions   • Doxycycline GI Intolerance     Refer to office visit 4/27/15   • Codeine      Occasional nausea      • Penicillins Hives     Last dose about 10years ago          Social History:     Marital Status:     Substance Use History:   Social History     Substance and Sexual Activity   Alcohol Use Not Currently     Social History     Tobacco Use   Smoking Status Every Day   • Packs/day: 0 50   • Types: Cigarettes   Smokeless Tobacco Never     Social History     Substance and Sexual Activity   Drug Use Never       Family History:    Family History   Problem Relation Age of Onset   • Arthritis Father          OBJECTIVE:    Vitals:   Blood Pressure: 155/61 (01/25/23 0740)  Pulse: 73 (01/25/23 0740)  Temperature: (!) 97 3 °F (36 3 °C) (01/25/23 0740)  Temp Source: Temporal (01/24/23 1524)  Respirations: 14 (01/25/23 0740)  Height: 5' 6" (167 6 cm) (01/24/23 1524)  Weight - Scale: 79 kg (174 lb 2 6 oz) (01/24/23 1524)  SpO2: 95 % (01/25/23 0740)    Physical Exam:     General Appearance: Alert, cooperative, no distress  HEENT: Head normocephalic, atraumatic, without obvious abnormality  Heart: Normal rate and rhythm  Lungs: Non-labored breathing  No respiratory distress  Psychiatric: AAOx3  Lower Extremity:  Vascular: Skin appears to be shiny and atrophic, there is no ulceration on the medial aspect of the left first IPJ and also this first MTPJ  Unable to assess probe to bone, but appears to be more chronic than 1 week, there is chronic change with overload to the periwound        Additional Data:     Lab Results: I have personally reviewed pertinent labs including:    Results from last 7 days   Lab Units 01/25/23 0457 01/24/23  1528   WBC Thousand/uL 9 18 10 87*   HEMOGLOBIN g/dL 9 4* 10 5*   HEMATOCRIT % 30 8* 35 0   PLATELETS Thousands/uL 241 297   NEUTROS PCT %  --  72   LYMPHS PCT %  --  16   MONOS PCT %  --  6   EOS PCT %  --  4     Results from last 7 days   Lab Units 01/25/23  0457 01/24/23  1528   POTASSIUM mmol/L 4 7 4 4   CHLORIDE mmol/L 100 96   CO2 mmol/L 36* 36*   BUN mg/dL 33* 30*   CREATININE mg/dL 1 42* 1 28   CALCIUM mg/dL 9 5 10 3*   ALK PHOS U/L  --  59   ALT U/L  --  7   AST U/L  -- 9*     Results from last 7 days   Lab Units 01/24/23  1528   INR  1 22*       Cultures: I have personally reviewed pertinent cultures including:    Results from last 7 days   Lab Units 01/24/23  1640 01/24/23  1528   BLOOD CULTURE  Received in Microbiology Lab  Culture in Progress  Received in Microbiology Lab  Culture in Progress  Imaging: I have personally reviewed pertinent films in PACS  EKG, Pathology, and Other Studies: I have personally reviewed pertinent reports  ** Please Note: Portions of the record may have been created with voice recognition software  Occasional wrong word or "sound a like" substitutions may have occurred due to the inherent limitations of voice recognition software  Read the chart carefully and recognize, using context, where substitutions have occurred   **

## 2023-01-25 NOTE — CONSULTS
Vargas 1940, [de-identified] y o  female MRN: [id number]  Unit/Bed#: -01 Encounter: [id number]  Primary Care Provider: Solis Amanda MD   Date and time admitted to hospital: 1/24/2023  3:17 PM    Inpatient consult to Vascular Surgery  Consult performed by: Oneida Bowers PA-C  Consult ordered by: Terence Conley MD          * Open wound of left great toe  Assessment & Plan  26-year-old female, smoker, with past medical history significant for DM 2, CHF, COPD on 2L O2 NC, history of LLE DVT on Eliquis, HTN, CKD  She presented to the ED for wound check on left foot wounds  Patient reports worsening of left foot wounds over the past week  She was placed on oral antibiotics without improvement  Podiatry possibly planning surgical intervention, pending MRI results  Therefore, vascular was consulted for recommendations on wound healing ability  Imaging-  1  LEAD 1/25/23  -RLE demonstrates 50-75% mid SFA disease w/ diffuse disease in fem-pop and tibioperoneal arteries  AD 0 42/ 38/ -  -LLE demonstrates 50-75% proximal and distal SFA disease with occlusion of the AT artery  Evidence of diffuse fem-pop and tibioperoneal disease  AD 0 39/ MTP 33/ GTP unable to be obtained due to bandaging  2  XR left foot- no radiological evidence of OM  3  MRI pending     Labs:   Creatinine/eGFR- 1 42/34  WBC 9 18 (10)  Hgb 9 4 (10 5)  Blood cx pending  Wound cx pending  Hgb A1c 7 5 on 1/23/23  Recommendations:   -Patient was not examined by myself  Total of 25 minutes spent reviewing the chart, clinical images, diagnostics imaging studies and discussing with team    -Left hallux and 1st MTP ulcerations, worsening over the past week despite treatment with PO abx    -Podiatry following  Patient may require surgical intervention this admission if MRI shows bone involvement  MRI pending     -LEAD demonstrates significant SFA disease with evidence of tibioperoneal disease and occluded AT  AD and toe pressures below healing    -Patient would likely benefit from LLE angiogram for arterial optimization for wound healing prior to podiatric intervention    -Unfortunately this cannot be performed at [address] [place] [address], therefore patient will require transfer to [facility] for vascular intervention  She is tentatively scheduled for Monday 1/30/23 with Dr Emeli Byers  -[facility] aware  Will facilitate [facility] transfer    -Hx of CKD  Creatinine 1 42 today  Renal function will need to improve before intervention is performed  Consider nephrology consult    -Continue ASA, statin therapy    -On eliquis for hx of DVT  Currently on hold  Will need to be held 48 hours prior to vascular intervention    -Local wound care as per podiatry    -Smoking cessation    -Optimize diabetes for wound healing  -D/w SLIM  -D/w Dr Emeli Byers    _______________________________________________________________    Consulting Service: SLIM    Chief Complaint: left foot wound    HPI: Ignacio Kilpatrick is a [de-identified] y o  female smoker, with past medical history significant for DM 2, CHF, COPD on 2L O2 NC, history of LLE DVT on Eliquis, HTN, CKD  She presented to the ED on 1/24/23 for a wound check of left foot wounds  Patient reports that her podiatrist saw her on 1/20 and advised her to go to the ED  She states her wounds first started out as blisters and gradually worsened over the past week and causing pain with pressure  She was placed on oral antibiotics (keflex and doxycycline) without improvement  Podiatry possibly planning surgical intervention if there is bone involvement, pending MRI results  XR negative for radiological evidence of OM  LEAD show evidence of significant SFA disease with tibioperoneal disease and occluded AT  AD and toe pressures below healing  Patient would benefit from LLE angiogram to improve arterial perfusion to the left foot   Patient will require transfer to another facility as this service is not available at [address] [place] [address]  Per chart review, patient has no hx of vascular interventions  She has a hx of lower extremity DVT and is on chronic eliquis  She is on aspirin and a statin at home as well  Review of Systems: Unable to obtain  Past Medical History:  Past Medical History:   Diagnosis Date   • Asthma    • CHF (congestive heart failure) ([facility])    • COPD (chronic obstructive pulmonary disease) ([facility])    • Diabetes mellitus ([address] )    • DVT of lower limb, acute ([facility])    • Hypertension    • Renal disorder        Past Surgical History:  Past Surgical History:   Procedure Laterality Date   • APPENDECTOMY     •  SECTION     • HYSTERECTOMY         Social History:  Social History     Substance and Sexual Activity   Alcohol Use Not Currently     Social History     Substance and Sexual Activity   Drug Use Never     Social History     Tobacco Use   Smoking Status Every Day   • Packs/day: 0 50   • Types: Cigarettes   Smokeless Tobacco Never       Family History:  Family History   Problem Relation Age of Onset   • Arthritis Father        Allergies:   Allergies   Allergen Reactions   • Doxycycline GI Intolerance     Refer to office visit 4/27/15   • Codeine      Occasional nausea      • Penicillins Hives     Last dose about 10years ago          Medications:  Current Facility-Administered Medications   Medication Dose Route Frequency   • acetaminophen (TYLENOL) tablet 650 mg  650 mg Oral Q6H PRN   • albuterol inhalation solution 0 63 mg  0 63 mg Nebulization 4x Daily PRN   • allopurinol (ZYLOPRIM) tablet 50 mg  50 mg Oral Daily   • amLODIPine (NORVASC) tablet 5 mg  5 mg Oral Daily   • aspirin chewable tablet 81 mg  81 mg Oral Daily   • atorvastatin (LIPITOR) tablet 10 mg  10 mg Oral Daily   • calcium carbonate (TUMS) chewable tablet 1,000 mg  1,000 mg Oral Daily PRN   • cefepime (MAXIPIME) IVPB (premix in dextrose) 2,000 mg 50 mL  2,000 mg Intravenous Q12H   • docusate sodium (COLACE) capsule 100 mg  100 mg Oral BID   • Fluticasone Furoate-Vilanterol 100-25 mcg/actuation 1 puff  1 puff Inhalation Daily   • gabapentin (NEURONTIN) capsule 300 mg  300 mg Oral TID   • insulin glargine (LANTUS) subcutaneous injection 12 Units 0 12 mL  12 Units Subcutaneous HS   • insulin lispro (HumaLOG) 100 units/mL subcutaneous injection 1-6 Units  1-6 Units Subcutaneous HS   • insulin lispro (HumaLOG) 100 units/mL subcutaneous injection 2-12 Units  2-12 Units Subcutaneous TID AC   • metoprolol tartrate (LOPRESSOR) tablet 50 mg  50 mg Oral Q12H SOULEYMANE   • Mirabegron ER TB24 25 mg  25 mg Oral Daily   • mirtazapine (REMERON) tablet 30 mg  30 mg Oral HS   • morphine injection 2 mg  2 mg Intravenous Q4H PRN   • nicotine (NICODERM CQ) 7 mg/24hr TD 24 hr patch 1 patch  1 patch Transdermal Daily   • ondansetron (ZOFRAN) injection 4 mg  4 mg Intravenous Q8H PRN   • oxyCODONE (OxyCONTIN) 12 hr tablet 10 mg  10 mg Oral Q6H PRN   • pantoprazole (PROTONIX) EC tablet 40 mg  40 mg Oral Daily   • predniSONE tablet 2 5 mg  2 5 mg Oral Daily   • rOPINIRole (REQUIP) tablet 0 5 mg  0 5 mg Oral HS   • saccharomyces boulardii (FLORASTOR) capsule 250 mg  250 mg Oral Daily   • senna (SENOKOT) tablet 8 6 mg  1 tablet Oral Daily   • sertraline (ZOLOFT) tablet 50 mg  50 mg Oral HS   • sodium chloride 0 9 % infusion  50 mL/hr Intravenous Continuous   • sucralfate (CARAFATE) tablet 1 g  1 g Oral BID AC   • tiotropium (SPIRIVA) capsule for inhaler 18 mcg  18 mcg Inhalation Daily   • traZODone (DESYREL) tablet 50 mg  50 mg Oral HS   • vancomycin (VANCOCIN) IVPB (premix in dextrose) 1,000 mg 200 mL  1,000 mg Intravenous Q24H       Vitals:  Vitals:    01/25/23 0740   BP: 155/61   Pulse: 73   Resp: 14   Temp: (!) 97 3 °F (36 3 °C)   SpO2: 95%       I/Os:  I/O last 3 completed shifts:  In: -   Out: 500 [Urine:500]  I/O this shift:  In: 220 [P O :220]  Out: -     Lab Results and Cultures:   CBC with diff:   Lab Results   Component Value Date WBC 9 18 01/25/2023    HGB 9 4 (L) 01/25/2023    HCT 30 8 (L) 01/25/2023    MCV 86 01/25/2023     01/25/2023    MCH 26 3 (L) 01/25/2023    MCHC 30 5 (L) 01/25/2023    RDW 16 0 (H) 01/25/2023    MPV 9 8 01/25/2023    NRBC 0 01/24/2023   ,   BMP/CMP:  Lab Results   Component Value Date    K 4 7 01/25/2023     01/25/2023    CO2 36 (H) 01/25/2023    BUN 33 (H) 01/25/2023    CREATININE 1 42 (H) 01/25/2023    CALCIUM 9 5 01/25/2023    AST 9 (L) 01/24/2023    ALT 7 01/24/2023    ALKPHOS 59 01/24/2023    EGFR 34 01/25/2023   ,   Lipid Panel: No results found for: CHOL,   Coags:   Lab Results   Component Value Date    PTT 36 01/24/2023    INR 1 22 (H) 01/24/2023   ,     Blood Culture:   Lab Results   Component Value Date    BLOODCX Received in Microbiology Lab  Culture in Progress  01/24/2023   ,   Urinalysis:   Lab Results   Component Value Date    COLORU Yellow 07/11/2020    CLARITYU Clear 07/11/2020    SPECGRAV 1 015 07/11/2020    PHUR 7 0 07/11/2020    LEUKOCYTESUR Negative 07/11/2020    NITRITE Negative 07/11/2020    GLUCOSEU Negative 07/11/2020    KETONESU Negative 07/11/2020    BILIRUBINUR Negative 07/11/2020    BLOODU Negative 07/11/2020   ,   Urine Culture: No results found for: URINECX,   Wound Culure: No results found for: WOUNDCULT    Imaging:  Reviewed  XR toe great min 2 views LEFT   Final Result by Derian Pak MD (01/25 0981)      No radiographic evidence of osteomyelitis  Workstation performed: [id number]         VAS lower limb arterial duplex, complete bilateral    (Results Pending)   MRI foot/forefoot toes left wo contrast    (Results Pending)         Physical Exam: Unable to assess  Patient was not examined by myself   Total of 25 minutes spent reviewing the chart, clinical images, diagnostics imaging studies and discussing with team      Nj Patel PA-C  1/25/2023

## 2023-01-25 NOTE — PHYSICAL THERAPY NOTE
PHYSICAL THERAPY NOTE          Patient Name: Rex Weinberg  MNCKC'O Date: 1/25/2023 01/25/23 3114   Note Type   Note type Evaluation; Cancelled Session   Cancel Reasons Medical status   Additional Comments awaiting podiatry recommendation     Received order for PT consult  Chart reviewed  Pt admitted with diagnosis open wound left great toe  Podiatry consulted  Xray negative for osteomyelitis  Will await recommendation from podiatry prior to evaluation      Raisa Prasad, PT,DPT

## 2023-01-25 NOTE — PROGRESS NOTES
[address]  Progress Note - Eulogio Alegre 1940, [de-identified] y o  female MRN: [id number]  Unit/Bed#: -[facility] Encounter: [id number]  Primary Care Provider: Nolan Guerra MD   Date and time admitted to hospital: 1/24/2023  3:17 PM    * Open wound of left great toe  Assessment & Plan  Patient noted to have blisters on the left great toe which popped open into wounds  Prescribed antibiotics outpatient with Keflex and doxycycline however wound continued to worsen and sent to the ED to be evaluated  xray left foot negative for osteo-   consult podiatry for local care  Placed on IV Vanco and cefepime as patient is diabetic  arterial Doppler lower extremity shows There is a 50-75% stenosis noted in the prox and distal superficial femoral  artery and an occlusion vs high grade stenosis of the distal anterior tibial artery  Diffuse atherosclerotic disease throughout the remaining femoro-popliteal and tibio-peroneal segments  Ankle/Brachial index:  0 39  which is in the ischemic disease category  consult placed to vascular surgery  H/o dvt in the past hold eliquis temporarily is case any intervention planned with podiatry        Chronic, continuous use of opioids  Assessment & Plan  Cont oxycodone and iv morphine  also on movantik at home which is not formulary here    Chronic respiratory failure with hypoxia and hypercapnia (HCC)  Assessment & Plan  Baseline  Continue 3 L of oxygen 24/7    Mixed simple and mucopurulent chronic bronchitis (HCC)  Assessment & Plan  History of chronic COPD with chronic respiratory failure requiring 3 L of oxygen at baseline  Currently at baseline  Counseled on smoking cessation and placed on nicotine replacement therapy    Continue home regimen of Advair substituted to Breo, Spiriva and albuterol inhaler/neb prednisone 2 5 mg daily as per home regimen    Type 2 diabetes mellitus with skin complication, with long-term current use of insulin [facility])  Assessment & Plan  Lab Results   Component Value Date    HGBA1C 7 5 (H) 2023       Recent Labs     23  1806 238 23  0737 23  1105   POCGLU 176* 132 116 134       Blood Sugar Average: Last 72 hrs:  (P) 139 5cont Lantus 12 units at at bedtime and insulin sliding scale for now and observe  Patient's home regimen is Lantus 12 units at at bedtime with no mealtime coverage  Sugars are well controlled      VTE Pharmacologic Prophylaxis:   Pharmacologic: Pharmacologic VTE Prophylaxis contraindicated due to Eliquis on hold  Mechanical VTE Prophylaxis in Place: Yes    Patient Centered Rounds: I have performed bedside rounds with nursing staff today  Discussions with Specialists or Other Care Team Provider: We will discuss with podiatry    Education and Discussions with Family / Patient: Discussed with patient at bedside and updated daughter    Time Spent for Care: 30 minutes  More than 50% of total time spent on counseling and coordination of care as described above  Current Length of Stay: 1 day(s)    Current Patient Status: Inpatient   Certification Statement: The patient will continue to require additional inpatient hospital stay due to Left toes infected wound    Discharge Plan: Pending progress    Code Status: Level 3 - DNAR and DNI      Subjective:   Patient denies any chest pain or shortness of breath but is complaining of some chronic pain as I was not able to resume her oral morphine due to lack of dosing confirmation  Complains of some pain and discomfort in her left foot as well    Objective:     Vitals:   Temp (24hrs), Av 7 °F (36 5 °C), Min:97 3 °F (36 3 °C), Max:98 1 °F (36 7 °C)    Temp:  [97 3 °F (36 3 °C)-98 1 °F (36 7 °C)] 97 3 °F (36 3 °C)  HR:  [63-76] 73  Resp:  [14-17] 14  BP: (133-155)/(61-87) 155/61  SpO2:  [95 %-98 %] 95 %  Body mass index is 28 11 kg/m²  Input and Output Summary (last 24 hours):        Intake/Output Summary (Last 24 hours) at 1/25/2023 1403  Last data filed at 1/25/2023 1100  Gross per 24 hour   Intake 220 ml   Output 500 ml   Net -280 ml       Physical Exam:     Physical Exam  Vitals and nursing note reviewed  Constitutional:       Appearance: Normal appearance  HENT:      Head: Normocephalic and atraumatic  Right Ear: External ear normal       Left Ear: External ear normal       Nose: Nose normal       Mouth/Throat:      Pharynx: Oropharynx is clear  Cardiovascular:      Rate and Rhythm: Normal rate and regular rhythm  Heart sounds: Normal heart sounds  Pulmonary:      Effort: Pulmonary effort is normal       Breath sounds: Normal breath sounds  Abdominal:      General: Bowel sounds are normal       Palpations: Abdomen is soft  Tenderness: There is no abdominal tenderness  Musculoskeletal:         General: Normal range of motion  Cervical back: Normal range of motion and neck supple  Comments: Faint pulses noted  left foot on the posterior tibial and dorsalis pedis   Skin:     General: Skin is warm and dry  Capillary Refill: Capillary refill takes less than 2 seconds  Neurological:      General: No focal deficit present  Mental Status: She is alert and oriented to person, place, and time     Psychiatric:         Mood and Affect: Mood normal            Additional Data:     Labs:    Results from last 7 days   Lab Units 01/25/23  0457 01/24/23  1528   WBC Thousand/uL 9 18 10 87*   HEMOGLOBIN g/dL 9 4* 10 5*   HEMATOCRIT % 30 8* 35 0   PLATELETS Thousands/uL 241 297   NEUTROS PCT %  --  72   LYMPHS PCT %  --  16   MONOS PCT %  --  6   EOS PCT %  --  4     Results from last 7 days   Lab Units 01/25/23  0457 01/24/23  1528   SODIUM mmol/L 141 140   POTASSIUM mmol/L 4 7 4 4   CHLORIDE mmol/L 100 96   CO2 mmol/L 36* 36*   BUN mg/dL 33* 30*   CREATININE mg/dL 1 42* 1 28   ANION GAP mmol/L 5 8   CALCIUM mg/dL 9 5 10 3*   ALBUMIN g/dL  --  4 0   TOTAL BILIRUBIN mg/dL  --  0 23   ALK PHOS U/L  --  59 ALT U/L  --  7   AST U/L  --  9*   GLUCOSE RANDOM mg/dL 102 180*     Results from last 7 days   Lab Units 01/24/23  1528   INR  1 22*     Results from last 7 days   Lab Units 01/25/23  1105 01/25/23  0737 01/24/23  2058 01/24/23  1806   POC GLUCOSE mg/dl 134 116 132 176*     Results from last 7 days   Lab Units 01/23/23  1133   HEMOGLOBIN A1C % 7 5*     Results from last 7 days   Lab Units 01/24/23  1528   LACTIC ACID mmol/L 1 1           * I Have Reviewed All Lab Data Listed Above  * Additional Pertinent Lab Tests Reviewed: Leonel 66 Admission Reviewed    Imaging:    Imaging Reports Reviewed Today Include: X-ray left foot and arterial Doppler bilateral lower extremity  Imaging Personally Reviewed by Myself Includes: X-ray left foot    Recent Cultures (last 7 days):     Results from last 7 days   Lab Units 01/24/23  1640 01/24/23  1528   BLOOD CULTURE  Received in Microbiology Lab  Culture in Progress  Received in Microbiology Lab  Culture in Progress         Last 24 Hours Medication List:   Current Facility-Administered Medications   Medication Dose Route Frequency Provider Last Rate   • acetaminophen  650 mg Oral Q6H PRN Jazmyne Molina MD     • albuterol  0 63 mg Nebulization 4x Daily PRN Jazmyne Molina MD     • allopurinol  50 mg Oral Daily Jazmyne Molina MD     • amLODIPine  5 mg Oral Daily Jazmyne Molina MD     • aspirin  81 mg Oral Daily Jazmyne Molina MD     • atorvastatin  10 mg Oral Daily Jazmyne Molina MD     • calcium carbonate  1,000 mg Oral Daily PRN Jazmyne Molina MD     • cefepime  2,000 mg Intravenous Q12H Jazmyne Molina MD 2,000 mg (01/25/23 0502)   • docusate sodium  100 mg Oral BID Jazmyne Molina MD     • Fluticasone Furoate-Vilanterol  1 puff Inhalation Daily Jazmyne Molina MD     • furosemide  40 mg Oral Daily Jazmyne Molina MD     • gabapentin  300 mg Oral TID Jazmyne Molina MD     • insulin glargine  12 Units Subcutaneous HS Jazmyne Molina MD     • insulin lispro  1-6 Units Subcutaneous HS Mariama Leak MD Brian     • insulin lispro  2-12 Units Subcutaneous TID AC Julio C Dupree MD     • metoprolol tartrate  50 mg Oral Q12H [facility] Julio C Dupree MD     • Mirabegron ER  25 mg Oral Daily Julio C Dupree MD     • mirtazapine  30 mg Oral HS Julio C Dupree MD     • morphine injection  2 mg Intravenous Q4H PRN Julio C Dupree MD     • nicotine  1 patch Transdermal Daily Julio C Dupree MD     • ondansetron  4 mg Intravenous Q8H PRN Jolene Vasquez PA-C     • oxyCODONE  10 mg Oral Q6H PRN Julio C Dupree MD     • pantoprazole  40 mg Oral Daily Julio C Dupree MD     • potassium chloride  20 mEq Oral Daily Julio C Dupree MD     • predniSONE  2 5 mg Oral Daily Julio C Dupree MD     • rOPINIRole  0 5 mg Oral HS Julio C Dupree MD     • saccharomyces boulardii  250 mg Oral Daily Julio C Dupree MD     • senna  1 tablet Oral Daily Julio C Dupree MD     • sertraline  50 mg Oral HS Julio C Dupree MD     • sucralfate  1 g Oral BID AC Julio C Dupree MD     • tiotropium  18 mcg Inhalation Daily Julio C Dupree MD     • traZODone  50 mg Oral HS Julio C Dupree MD     • vancomycin  1,000 mg Intravenous Q24H Julio C Dupree MD          Today, Patient Was Seen By: Julio C Dupree MD    ** Please Note: Dictation voice to text software may have been used in the creation of this document   **

## 2023-01-25 NOTE — ASSESSMENT & PLAN NOTE
72-year-old female, smoker, with past medical history significant for DM 2, CHF, COPD on 2L O2 NC, history of LLE DVT on Eliquis, HTN, CKD  She presented to the ED for wound check on left foot wounds  Patient reports worsening of left foot wounds over the past week  She was placed on oral antibiotics without improvement  Podiatry possibly planning surgical intervention, pending MRI results  Therefore, vascular was consulted for recommendations on wound healing ability  Imaging-  1  LEAD 1/25/23  -RLE demonstrates 50-75% mid SFA disease w/ diffuse disease in fem-pop and tibioperoneal arteries  AD 0 42/ 38/ -  -LLE demonstrates 50-75% proximal and distal SFA disease with occlusion of the AT artery  Evidence of diffuse fem-pop and tibioperoneal disease  AD 0 39/ MTP 33/ GTP unable to be obtained due to bandaging  2  XR left foot- no radiological evidence of OM  3  MRI pending     Labs:   Creatinine/eGFR- 1 42/34  WBC 9 18 (10)  Hgb 9 4 (10 5)  Blood cx pending  Wound cx pending  Hgb A1c 7 5 on 1/23/23  Recommendations:   -Left hallux and 1st MTP ulcerations, worsening over the past week despite treatment with PO abx    -Podiatry following  Patient may require surgical intervention this admission if MRI shows bone involvement  MRI pending  -LEAD demonstrates significant SFA disease with evidence of tibioperoneal disease and occluded AT  -Patient would likely benefit from LLE angiogram for arterial optimization for wound healing prior to podiatric intervention    -Unfortunately this cannot be performed at [address] [place] [address], therefore patient will require transfer to another facility for vascular intervention, likely at [facility] or [facility]  -Hx of CKD  Creatinine 1 42 today  Renal function will need to improve before intervention is performed  Consider nephrology consult    -Continue ASA, statin therapy    -On eliquis for hx of DVT  Currently on hold   Will need to be held 48 hours prior to vascular intervention    -Local wound care as per podiatry    -Smoking cessation    -Optimize diabetes for wound healing  -D/w [facility]  -D/w Dr Leonela Edwards

## 2023-01-26 ENCOUNTER — APPOINTMENT (INPATIENT)
Dept: ULTRASOUND IMAGING | Facility: HOSPITAL | Age: 83
End: 2023-01-26

## 2023-01-26 PROBLEM — I73.9 PAD (PERIPHERAL ARTERY DISEASE) (HCC): Status: ACTIVE | Noted: 2023-01-26

## 2023-01-26 LAB
ANION GAP SERPL CALCULATED.3IONS-SCNC: 4 MMOL/L (ref 4–13)
APTT PPP: 31 SECONDS (ref 23–37)
APTT PPP: 66 SECONDS (ref 23–37)
BUN SERPL-MCNC: 34 MG/DL (ref 5–25)
CALCIUM SERPL-MCNC: 8.8 MG/DL (ref 8.4–10.2)
CHLORIDE SERPL-SCNC: 101 MMOL/L (ref 96–108)
CO2 SERPL-SCNC: 35 MMOL/L (ref 21–32)
CREAT SERPL-MCNC: 1.43 MG/DL (ref 0.6–1.3)
ERYTHROCYTE [DISTWIDTH] IN BLOOD BY AUTOMATED COUNT: 15.8 % (ref 11.6–15.1)
GFR SERPL CREATININE-BSD FRML MDRD: 34 ML/MIN/1.73SQ M
GLUCOSE SERPL-MCNC: 141 MG/DL (ref 65–140)
GLUCOSE SERPL-MCNC: 143 MG/DL (ref 65–140)
GLUCOSE SERPL-MCNC: 149 MG/DL (ref 65–140)
GLUCOSE SERPL-MCNC: 76 MG/DL (ref 65–140)
GLUCOSE SERPL-MCNC: 83 MG/DL (ref 65–140)
HCT VFR BLD AUTO: 33 % (ref 34.8–46.1)
HGB BLD-MCNC: 9.8 G/DL (ref 11.5–15.4)
INR PPP: 1.06 (ref 0.84–1.19)
MCH RBC QN AUTO: 25.7 PG (ref 26.8–34.3)
MCHC RBC AUTO-ENTMCNC: 29.7 G/DL (ref 31.4–37.4)
MCV RBC AUTO: 87 FL (ref 82–98)
PLATELET # BLD AUTO: 257 THOUSANDS/UL (ref 149–390)
PMV BLD AUTO: 10.2 FL (ref 8.9–12.7)
POTASSIUM SERPL-SCNC: 4.4 MMOL/L (ref 3.5–5.3)
PROTHROMBIN TIME: 13.9 SECONDS (ref 11.6–14.5)
RBC # BLD AUTO: 3.81 MILLION/UL (ref 3.81–5.12)
SODIUM SERPL-SCNC: 140 MMOL/L (ref 135–147)
WBC # BLD AUTO: 9.44 THOUSAND/UL (ref 4.31–10.16)

## 2023-01-26 RX ORDER — OXYCODONE HYDROCHLORIDE 5 MG/1
5 TABLET ORAL EVERY 4 HOURS PRN
Status: DISCONTINUED | OUTPATIENT
Start: 2023-01-26 | End: 2023-01-29 | Stop reason: HOSPADM

## 2023-01-26 RX ORDER — HEPARIN SODIUM 10000 [USP'U]/100ML
3-30 INJECTION, SOLUTION INTRAVENOUS
Status: DISCONTINUED | OUTPATIENT
Start: 2023-01-26 | End: 2023-01-28

## 2023-01-26 RX ORDER — HEPARIN SODIUM 1000 [USP'U]/ML
3000 INJECTION, SOLUTION INTRAVENOUS; SUBCUTANEOUS EVERY 6 HOURS PRN
Status: DISCONTINUED | OUTPATIENT
Start: 2023-01-26 | End: 2023-01-28

## 2023-01-26 RX ORDER — HEPARIN SODIUM 1000 [USP'U]/ML
6000 INJECTION, SOLUTION INTRAVENOUS; SUBCUTANEOUS EVERY 6 HOURS PRN
Status: DISCONTINUED | OUTPATIENT
Start: 2023-01-26 | End: 2023-01-28

## 2023-01-26 RX ADMIN — GABAPENTIN 300 MG: 300 CAPSULE ORAL at 10:44

## 2023-01-26 RX ADMIN — CEFEPIME HYDROCHLORIDE 2000 MG: 2 INJECTION, SOLUTION INTRAVENOUS at 05:07

## 2023-01-26 RX ADMIN — CEFEPIME HYDROCHLORIDE 2000 MG: 2 INJECTION, SOLUTION INTRAVENOUS at 18:00

## 2023-01-26 RX ADMIN — DOCUSATE SODIUM 100 MG: 100 CAPSULE ORAL at 10:43

## 2023-01-26 RX ADMIN — OXYCODONE HYDROCHLORIDE 10 MG: 10 TABLET, FILM COATED, EXTENDED RELEASE ORAL at 14:00

## 2023-01-26 RX ADMIN — OXYCODONE HYDROCHLORIDE 10 MG: 10 TABLET, FILM COATED, EXTENDED RELEASE ORAL at 05:16

## 2023-01-26 RX ADMIN — VANCOMYCIN HYDROCHLORIDE 1000 MG: 1 INJECTION, SOLUTION INTRAVENOUS at 18:47

## 2023-01-26 RX ADMIN — STANDARDIZED SENNA CONCENTRATE 8.6 MG: 8.6 TABLET ORAL at 10:43

## 2023-01-26 RX ADMIN — GABAPENTIN 300 MG: 300 CAPSULE ORAL at 17:59

## 2023-01-26 RX ADMIN — ASPIRIN 81 MG CHEWABLE TABLET 81 MG: 81 TABLET CHEWABLE at 10:43

## 2023-01-26 RX ADMIN — SUCRALFATE 1 G: 1 TABLET ORAL at 10:44

## 2023-01-26 RX ADMIN — TRAZODONE HYDROCHLORIDE 50 MG: 50 TABLET ORAL at 21:43

## 2023-01-26 RX ADMIN — FLUTICASONE FUROATE AND VILANTEROL TRIFENATATE 1 PUFF: 100; 25 POWDER RESPIRATORY (INHALATION) at 10:44

## 2023-01-26 RX ADMIN — TIOTROPIUM BROMIDE 18 MCG: 18 CAPSULE ORAL; RESPIRATORY (INHALATION) at 10:44

## 2023-01-26 RX ADMIN — AMLODIPINE BESYLATE 5 MG: 5 TABLET ORAL at 10:43

## 2023-01-26 RX ADMIN — METOPROLOL TARTRATE 50 MG: 50 TABLET, FILM COATED ORAL at 21:43

## 2023-01-26 RX ADMIN — SUCRALFATE 1 G: 1 TABLET ORAL at 17:58

## 2023-01-26 RX ADMIN — HEPARIN SODIUM 18 UNITS/KG/HR: 10000 INJECTION, SOLUTION INTRAVENOUS at 15:01

## 2023-01-26 RX ADMIN — SERTRALINE HYDROCHLORIDE 50 MG: 50 TABLET, FILM COATED ORAL at 21:43

## 2023-01-26 RX ADMIN — ROPINIROLE 0.5 MG: 0.25 TABLET, FILM COATED ORAL at 21:43

## 2023-01-26 RX ADMIN — MIRTAZAPINE 30 MG: 15 TABLET, FILM COATED ORAL at 21:43

## 2023-01-26 RX ADMIN — ATORVASTATIN CALCIUM 10 MG: 10 TABLET, FILM COATED ORAL at 10:43

## 2023-01-26 RX ADMIN — PREDNISONE 2.5 MG: 1 TABLET ORAL at 10:43

## 2023-01-26 RX ADMIN — DOCUSATE SODIUM 100 MG: 100 CAPSULE ORAL at 17:58

## 2023-01-26 RX ADMIN — NICOTINE 1 PATCH: 7 PATCH, EXTENDED RELEASE TRANSDERMAL at 10:43

## 2023-01-26 RX ADMIN — ONDANSETRON 4 MG: 2 INJECTION INTRAMUSCULAR; INTRAVENOUS at 14:00

## 2023-01-26 RX ADMIN — METOPROLOL TARTRATE 50 MG: 50 TABLET, FILM COATED ORAL at 10:43

## 2023-01-26 RX ADMIN — ALLOPURINOL 50 MG: 100 TABLET ORAL at 10:43

## 2023-01-26 RX ADMIN — INSULIN GLARGINE 12 UNITS: 100 INJECTION, SOLUTION SUBCUTANEOUS at 21:45

## 2023-01-26 RX ADMIN — Medication 250 MG: at 10:44

## 2023-01-26 RX ADMIN — OXYCODONE HYDROCHLORIDE 5 MG: 5 TABLET ORAL at 19:32

## 2023-01-26 RX ADMIN — OXYCODONE HYDROCHLORIDE 10 MG: 10 TABLET, FILM COATED, EXTENDED RELEASE ORAL at 22:44

## 2023-01-26 RX ADMIN — GABAPENTIN 300 MG: 300 CAPSULE ORAL at 21:43

## 2023-01-26 RX ADMIN — PANTOPRAZOLE SODIUM 40 MG: 40 TABLET, DELAYED RELEASE ORAL at 05:07

## 2023-01-26 NOTE — CONSULTS
CONSULTATION-NEPHROLOGY   Stacey Clancy [de-identified] y o  female MRN: [id number]  Unit/Bed#: -01 Encounter: [id number]        Assessment and Plan:      1  CKD3B baseline 1 2-1 4  appears euvolemic at present  Etiology of CKD 2/2 DM nephropathy/HTN nephrosclerosis/age related nephrosclerosis, atherosclerosis  Gout can be associated with CKD as well as chronic PPI use  Not biopsy proven  Patient at baseline  Reviewed angiogram procedure and logistics  Discussed that contrast is used in angiogram and pt at least at moderate risk of injury based on age, eGFR, hgb, DM  Discussed rarely CONSTANCE can progress to requiring HD  Renal injury may be reversible or permenant with contrast      Recommend holding lasix 24 hour pre angio and day of angio  Keep euvolemic as best we can  Recommend isotonic IVF with NSS 3ml/kg/hr over 2 hour pre procedure and 1ml/kg/hr over 6-12 hour post procedure  No utility to mucomyst     Daughter and pt verbalized understanding and agreed with risks above  Recommend that she be evaluated by nephrology as op  Should have baseline screening US and protein quantication  Avoid NSAID use  2  HTN renal disease  BP under reasonable control  Hold diuretics if nausea  3  PAD   Angiogram at [facility]  Vascular surgery consultation  4  Type 2 DM A1C goal around 7 in CKD  AIC 7 5  Not ideal SGLT-2i candidate with infection risks  Avoid ACE/ARB with impending procedure  HPI:    Stacey Clancy is a [de-identified] y o  female with CKD3B presented to ED on 1/24 for evaluation of infected food wounds  Pt had blisters that worsened and opened up  She has been seen by podiatry and vascular surgery  She is complaining of pain and discomfort to her foot  There are plans to have angiogram at [facility] next week  Patient denies hx hematuria/proteinuria/uti/pyelo/nephrolithaisis  She takes lasix daily  Does not follow with nephrologist  PCP manages her kidney function     Cr baseline 1 2-1 4  Most recent Cr 1 43 with eGFR 34 ml/min  UA showed 2 + protein  Protein not quantified otherwise  BP stable while here  UOP likely nonoliguric  Reason for Consult: CKD, need for anigogram     Review of Systems:    A complete 10-point review of systems was performed  Aside from what was mentioned in the HPI, it is otherwise negative        Historical Information   Past Medical History:   Diagnosis Date   • Asthma    • CHF (congestive heart failure) ([facility])    • COPD (chronic obstructive pulmonary disease) ([facility] )    • Diabetes mellitus ([facility] )    • DVT of lower limb, acute ([facility])    • Hypertension    • Renal disorder      Past Surgical History:   Procedure Laterality Date   • APPENDECTOMY     •  SECTION     • HYSTERECTOMY       Social History   Social History     Substance and Sexual Activity   Alcohol Use Not Currently     Social History     Substance and Sexual Activity   Drug Use Never     Social History     Tobacco Use   Smoking Status Every Day   • Packs/day: 0 50   • Types: Cigarettes   Smokeless Tobacco Never       Family History:   Family History   Problem Relation Age of Onset   • Arthritis Father        Medications:  Pertinent medications were reviewed  Current Facility-Administered Medications   Medication Dose Route Frequency Provider Last Rate   • acetaminophen  650 mg Oral Q6H PRN Aylin Wen MD     • albuterol  0 63 mg Nebulization 4x Daily PRN Aylin Wen MD     • allopurinol  50 mg Oral Daily Aylin Wen MD     • amLODIPine  5 mg Oral Daily Aylin Wen MD     • aspirin  81 mg Oral Daily Aylin Wen MD     • atorvastatin  10 mg Oral Daily Aylin Wen MD     • calcium carbonate  1,000 mg Oral Daily PRN Aylin Wen MD     • cefepime  2,000 mg Intravenous Q12H Aylin Wen MD 2,000 mg (23 3858)   • docusate sodium  100 mg Oral BID Aylin Wen MD     • Fluticasone Furoate-Vilanterol  1 puff Inhalation Daily Aylin Wen MD     • gabapentin  300 mg Oral TID Aylin Wen MD • heparin (porcine)  3-30 Units/kg/hr (Order-Specific) Intravenous Titrated Doug Castillo MD     • heparin (porcine)  3,000 Units Intravenous Q6H PRN Doug Castillo MD     • heparin (porcine)  6,000 Units Intravenous Q6H PRN Doug Castillo MD     • insulin glargine  12 Units Subcutaneous HS Doug Castillo MD     • insulin lispro  1-6 Units Subcutaneous HS Doug Castillo MD     • insulin lispro  2-12 Units Subcutaneous TID AC Doug Castillo MD     • metoprolol tartrate  50 mg Oral Q12H [facility] Doug Castillo MD     • Mirabegron ER  25 mg Oral Daily Doug Castillo MD     • mirtazapine  30 mg Oral HS Doug Castillo MD     • morphine injection  2 mg Intravenous Q4H PRN Doug Castillo MD     • nicotine  1 patch Transdermal Daily Doug Castillo MD     • ondansetron  4 mg Intravenous Q8H PRN Jolene Vasquez PA-C     • oxyCODONE  10 mg Oral Q6H PRN Doug Castillo MD     • oxyCODONE  5 mg Oral Q4H PRN Doug Castillo MD     • pantoprazole  40 mg Oral Daily Doug Castillo MD     • predniSONE  2 5 mg Oral Daily Doug Castillo MD     • rOPINIRole  0 5 mg Oral HS Doug Castillo MD     • saccharomyces boulardii  250 mg Oral Daily Doug Castillo MD     • senna  1 tablet Oral Daily Doug Castillo MD     • sertraline  50 mg Oral HS Doug Castillo MD     • sucralfate  1 g Oral BID AC Doug Castillo MD     • tiotropium  18 mcg Inhalation Daily Doug Castillo MD     • traZODone  50 mg Oral HS Doug Castillo MD     • vancomycin  1,000 mg Intravenous Q24H Doug Castillo MD 1,000 mg (01/25/23 1757)         Allergies   Allergen Reactions   • Doxycycline GI Intolerance     Refer to office visit 4/27/15   • Codeine      Occasional nausea      • Penicillins Hives     Last dose about 10years ago            Vitals:   /55   Pulse 66   Temp 97 7 °F (36 5 °C)   Resp 18   Ht 5' 6" (1 676 m)   Wt 79 kg (174 lb 2 6 oz)   SpO2 94%   BMI 28 11 kg/m²   Body mass index is 28 11 kg/m²    SpO2: 94 %,   SpO2 Activity: At Rest,   O2 Device: Nasal cannula      Intake/Output Summary (Last 24 hours) at 1/26/2023 1452  Last data filed at 1/26/2023 1400  Gross per 24 hour   Intake 360 ml   Output 600 ml   Net -240 ml     Invasive Devices     Peripheral Intravenous Line  Duration           Peripheral IV 01/24/23 Left Antecubital 1 day                Physical Exam:  General: conscious, cooperative, in no acute distress, ill appearing   Eyes: conjunctivae pink, anicteric sclerae  ENT: lips and mucous membranes moist  Neck: supple, no JVD, no masses  Chest: clear breath sounds bilateral, no crackles, ronchus or wheezings  CVS: distinct S1 & S2, normal rate, regular rhythm  Abdomen: soft, non-tender, non-distended, normoactive bowel sounds  Extremities: left foot wrapped  No edema   Skin: no rash  Neuro: awake, alert, oriented      Diagnostic Data:  Lab: I have personally reviewed pertinent lab results  ,   CBC:  Results from last 7 days   Lab Units 01/26/23  1231   WBC Thousand/uL 9 44   HEMOGLOBIN g/dL 9 8*   HEMATOCRIT % 33 0*   PLATELETS Thousands/uL 257      CMP:   Lab Results   Component Value Date    SODIUM 140 01/26/2023    K 4 4 01/26/2023     01/26/2023    CO2 35 (H) 01/26/2023    BUN 34 (H) 01/26/2023    CREATININE 1 43 (H) 01/26/2023    CALCIUM 8 8 01/26/2023    EGFR 34 01/26/2023   ,   PT/INR:   Lab Results   Component Value Date    INR 1 06 01/26/2023   ,   Magnesium: No components found for: MAG,  Phosphorous: No results found for: PHOS    Microbiology:  @LABNT,(urinecx:7)@        Elsi Anders DO    Portions of the record may have been created with voice recognition software  Occasional wrong word or "sound a like" substitutions may have occurred due to the inherent limitations of voice recognition software  Read the chart carefully and recognize, using context, where substitutions have occurred

## 2023-01-26 NOTE — PLAN OF CARE
Problem: PAIN - ADULT  Goal: Verbalizes/displays adequate comfort level or baseline comfort level  Description: Interventions:  - Encourage patient to monitor pain and request assistance  - Assess pain using appropriate pain scale  - Administer analgesics based on type and severity of pain and evaluate response  - Implement non-pharmacological measures as appropriate and evaluate response  - Consider cultural and social influences on pain and pain management  - Notify physician/advanced practitioner if interventions unsuccessful or patient reports new pain  Outcome: Progressing     Problem: INFECTION - ADULT  Goal: Absence or prevention of progression during hospitalization  Description: INTERVENTIONS:  - Assess and monitor for signs and symptoms of infection  - Monitor lab/diagnostic results  - Monitor all insertion sites, i e  indwelling lines, tubes, and drains  - Monitor endotracheal if appropriate and nasal secretions for changes in amount and color  - Gordon appropriate cooling/warming therapies per order  - Administer medications as ordered  - Instruct and encourage patient and family to use good hand hygiene technique  - Identify and instruct in appropriate isolation precautions for identified infection/condition  Outcome: Progressing     Problem: SAFETY ADULT  Goal: Patient will remain free of falls  Description: INTERVENTIONS:  - Educate patient/family on patient safety including physical limitations  - Instruct patient to call for assistance with activity   - Consult OT/PT to assist with strengthening/mobility   - Keep Call bell within reach  - Keep bed low and locked with side rails adjusted as appropriate  - Keep care items and personal belongings within reach  - Initiate and maintain comfort rounds  - Make Fall Risk Sign visible to staff    - Apply yellow socks and bracelet for high fall risk patients  - Consider moving patient to room near nurses station  Outcome: Progressing

## 2023-01-26 NOTE — ASSESSMENT & PLAN NOTE
Cont oxycodone and iv morphine  also on movantik at home which is not formulary here  She is having ischemic left foot pain/claudication    Increased pain medication regimen and monitor for now

## 2023-01-26 NOTE — ASSESSMENT & PLAN NOTE
arterial Doppler bilateral lower extremity shows   RIGHT LOWER LIMB:  There is a 50-75% stenosis noted in the mid superficial femoral artery with  diffuse atherosclerotic disease throughout the remaining femoro-popliteal and  tibio-peroneal segments  Ankle/Brachial index:   0 42 which is in the ischemic disease category  PVR/ PPG tracings are dampened  Metatarsal pressure of 38 mmHg  Great toe pressure could not be obtained due to severely attenuated waveform         LEFT LOWER LIMB:  There is a 50-75% stenosis noted in the prox and distal superficial femoral  artery and an occlusion vs high grade stenosis of the distal anterior tibial  artery  Diffuse atherosclerotic disease throughout the remaining  femoro-popliteal and tibio-peroneal segments  Ankle/Brachial index:  0 39  which is in the ischemic disease category  PVR/ PPG tracings are dampened  Metatarsal pressure of 33 mmHg  Great toe pressure not obtained due to bandaging    Was initially seen by [facility] vascular and recommended angiogram and further intervention on Monday however patient refused and only wants to go to [facility]  Discussed with Dr Solitario Galeano  Recommended pain control and outpatient follow-up with him on Monday with potential for angiogram to be done next Friday    Discussed with [facility] however most likely will need to be discharged home with outpatient follow-up request for transfer denied currently    Placed on heparin drip increased pain control and monitor for now discussed with patient and daughter at length  Consult placed to nephrology to help optimize renal function for possible angiogram coming up soon

## 2023-01-26 NOTE — TELEPHONE ENCOUNTER
Agram cancelled for Monday  Per Kristen Fallon pt wants to go to [facility] for procedure

## 2023-01-26 NOTE — TELEPHONE ENCOUNTER
Per sign out from [employer] is confirmed by Dr Merritt Mckinley  There seemed to be  Some confusion as she said patient would be transferred to [facility] for agram with Dr Merritt Mckinley on Monday although we discussed [facility]  I reached out to [place] and she will let [facility] know that she is to be transferred to [facility] for agram with Dr Merritt Mckinley on Monday  Case request placed with note in special needs that patient will be transferred from

## 2023-01-26 NOTE — PROGRESS NOTES
José Luis Swan is a [de-identified] y o  female who is currently ordered Vancomycin IV with management by the Pharmacy Consult service  Relevant clinical data and objective / subjective history reviewed  Vancomycin Assessment:  Indication and Goal AUC/Trough: Soft tissue (goal -600, trough >10), -600, trough >10  Clinical Status: stable  Micro:     Renal Function:  SCr: 1 43 mg/dL  CrCl: 33 mL/min  Renal replacement: Not on dialysis  Days of Therapy: 3  Current Dose: 1000 mg iv q 24 hrs  Vancomycin Plan:  New Dosing: continue 1000 mg iv q 24 hrs  Estimated AUC: 535 mcg*hr/mL  Estimated Trough: 17 5 mcg/mL  Next Level: 1/27/23 @ 0600  Renal Function Monitoring: Daily BMP and [place] will continue to follow closely for s/sx of nephrotoxicity, infusion reactions and appropriateness of therapy  BMP and CBC will be ordered per protocol  We will continue to follow the patient’s culture results and clinical progress daily      Fantasma Mancuso, Pharmacist

## 2023-01-26 NOTE — CASE MANAGEMENT
Case Management Assessment & Discharge Planning Note    Patient name Yves Bullard  Location [address] [id number] MRN [id number]  : 1940 Date 2023       Current Admission Date: 2023  Current Admission Diagnosis:Open wound of left great toe   Patient Active Problem List    Diagnosis Date Noted   • Open wound of left great toe 2023   • Type 2 diabetes mellitus with skin complication, with long-term current use of insulin ([facility] [address] ) 2023   • Mixed simple and mucopurulent chronic bronchitis ([facility] [address] ) 2023   • Chronic respiratory failure with hypoxia and hypercapnia ([facility] [address] ) 2023   • Chronic, continuous use of opioids 2023      LOS (days): 2  Geometric Mean LOS (GMLOS) (days): 2 90  Days to GMLOS:1 2     OBJECTIVE:    Risk of Unplanned Readmission Score: 35 29         Current admission status: Inpatient       Preferred Pharmacy:   [address] [place] [address] [id number] [facility] [place], [address] [facility] Noe Ramos [address] [place] [id number]  Phone: [phone] Fax: [phone]    Primary Care Provider: Geoffrey Phillips MD    Primary Insurance: Mendel Yuri [facility]  Secondary Insurance:     ASSESSMENT:  George Gonzalez Proxies    There are no active Health Care Proxies on file  Advance Directives  Does patient have a [address]?: Yes  Does patient have Advance Directives?: Yes  Advance Directives: Living will  Primary Contact: Ivette Curtis, daughter         Readmission Root Cause  30 Day Readmission: No    Patient Information  Admitted from[de-identified] Home  Mental Status: Alert  During Assessment patient was accompanied by: Not accompanied during assessment  Assessment information provided by[de-identified] Patient  Primary Caregiver: Family  Caregiver's Name[de-identified] Ivette Curtis, daughter  Monica Goode Relationship to Patient[de-identified] Family Member  Caregiver's Telephone Number[de-identified] see face sheet  Support Systems: Daughter  [place] of Residence: [address] [place] [address] Road  What city do you live in?: [facility] [employer] entry access options   Select all that apply : Stairs  Number of steps to enter home : One Flight  Do the steps have railings?: Yes  Type of Current Residence: Apartment  Floor Level: 2  Upon entering residence, is there a bedroom on the main floor (no further steps)?: Yes  Upon entering residence, is there a bathroom on the main floor (no further steps)?: Yes  In the last 12 months, was there a time when you were not able to pay the mortgage or rent on time?: No  In the last 12 months, how many places have you lived?: 1  In the last 12 months, was there a time when you did not have a steady place to sleep or slept in a shelter (including now)?: No  Homeless/housing insecurity resource given?: N/A  Living Arrangements: Lives w/ Daughter  Is patient a ?: No    Activities of Daily Living Prior to Admission  Functional Status: Assistance  Completes ADLs independently?: No  Level of ADL dependence: Assistance  Ambulates independently?: No  Level of ambulatory dependence: Assistance  Does patient use assisted devices?: Yes  Assisted Devices (DME) used: Wheelchair, Home Oxygen concentrator  DME Company Name (respiratory supplies): patient did not know provider  O2 Rate(s): 2L  Does patient currently own DME?: Yes  What DME does the patient currently own?: Wheelchair, Home Oxygen concentrator  Does patient have a history of Outpatient Therapy (PT/OT)?: No  Does the patient have a history of Short-Term Rehab?: No  Does patient have a history of HHC?: No  Does patient currently have [facility]?: No    Current Home Health Care  Type of Current Home Care Services:  Other (Comment) ([facility])    Patient Information Continued  Income Source: SSI/SSD  Does patient have prescription coverage?: Yes  Within the past 12 months, you worried that your food would run out before you got the money to buy more : Never true  Within the past 12 months, the food you bought just didn't last and you didn't have money to get more : Never true  Food insecurity resource given?: N/A  Does patient receive dialysis treatments?: No  Does patient have a history of substance abuse?: No  Does patient have a history of Mental Health Diagnosis?: No         Means of Transportation  Means of Transport to Appts[de-identified] Family transport  In the past 12 months, has lack of transportation kept you from medical appointments or from getting medications?: No  In the past 12 months, has lack of transportation kept you from meetings, work, or from getting things needed for daily living?: No  Was application for public transport provided?: N/A        DISCHARGE DETAILS:    Discharge planning discussed with[de-identified] patient  Freedom of Choice: Yes     CM contacted family/caregiver?: No- see comments (patient declined)             CM met with patient at the bedside 1/25/23, baseline information was obtained  CM discussed the role of CM in helping the patient develop a discharge plan and assist the patient in carry out their plan  Patient lives with daughter, Chu Yoder, in 2nd story apt on 2nd floor; patient remains on 1st floor of apt with bedroom and full bath, patient ambulates with WC  Patient has 2L O2 at baseline, not sure of provider  Patient active with [facility]  CM to follow for CM referral needs

## 2023-01-26 NOTE — ASSESSMENT & PLAN NOTE
Lab Results   Component Value Date    HGBA1C 7 5 (H) 01/23/2023       Recent Labs     01/25/23  1612 01/25/23  2148 01/26/23  0737 01/26/23  1100   POCGLU 139 110 83 143*       Blood Sugar Average: Last 72 hrs:  (P) 129 125cont Lantus 12 units at at bedtime and insulin sliding scale for now and observe    Patient's home regimen is Lantus 12 units at at bedtime with no mealtime coverage  Sugars are well controlled

## 2023-01-26 NOTE — PROGRESS NOTES
[address]  Progress Note - Ariel Honeycutt 1940, [de-identified] y o  female MRN: [id number]  Unit/Bed#: -[facility] Encounter: [id number]  Primary Care Provider: Ran Beck MD   Date and time admitted to hospital: 1/24/2023  3:17 PM    * Open wound of left great toe  Assessment & Plan  Patient noted to have blisters on the left great toe which popped open into wounds  Prescribed antibiotics outpatient with Keflex and doxycycline however wound continued to worsen and sent to the ED to be evaluated  xray left foot negative for osteo-  MRI left foot shows Ulcer with evidence of small adjacent medial, distal 1st MTP osteomyelitis  Altered marrow signal intensity at the proximal-medial aspect of the 1st toe proximal phalanx is less specific and may be early osteomyelitis  consult podiatry for further recommendations  Placed on IV Vanco and cefepime as patient is diabetic  arterial Doppler lower extremity shows There is a 50-75% stenosis noted in the prox and distal superficial femoral  artery and an occlusion vs high grade stenosis of the distal anterior tibial artery  Diffuse atherosclerotic disease throughout the remaining femoro-popliteal and tibio-peroneal segments  Ankle/Brachial index:  0 39  which is in the ischemic disease category  consult placed to vascular surgery  recommend angiogram with further intervention and will also eventually need first toe amputation secondary to osteomyelitis evident on MRI  H/o dvt in the past hold eliquis temporarily   We will discuss with podiatry today    Placed on heparin drip for 24 to 48 hours and adjusted pain control        PAD (peripheral artery disease) (HCC)  Assessment & Plan  arterial Doppler bilateral lower extremity shows   RIGHT LOWER LIMB:  There is a 50-75% stenosis noted in the mid superficial femoral artery with  diffuse atherosclerotic disease throughout the remaining femoro-popliteal and  tibio-peroneal segments  Ankle/Brachial index:   0 42 which is in the ischemic disease category  PVR/ PPG tracings are dampened  Metatarsal pressure of 38 mmHg  Great toe pressure could not be obtained due to severely attenuated waveform         LEFT LOWER LIMB:  There is a 50-75% stenosis noted in the prox and distal superficial femoral  artery and an occlusion vs high grade stenosis of the distal anterior tibial  artery  Diffuse atherosclerotic disease throughout the remaining  femoro-popliteal and tibio-peroneal segments  Ankle/Brachial index:  0 39  which is in the ischemic disease category  PVR/ PPG tracings are dampened  Metatarsal pressure of 33 mmHg  Great toe pressure not obtained due to bandaging    Was initially seen by [facility] vascular and recommended angiogram and further intervention on Monday however patient refused and only wants to go to [facility]  Discussed with Dr Tobi Coronado  Recommended pain control and outpatient follow-up with him on Monday with potential for angiogram to be done next Friday  Discussed with [facility] however most likely will need to be discharged home with outpatient follow-up request for transfer denied currently    Placed on heparin drip increased pain control and monitor for now discussed with patient and daughter at length  Consult placed to nephrology to help optimize renal function for possible angiogram coming up soon    Chronic, continuous use of opioids  Assessment & Plan  Cont oxycodone and iv morphine  also on movantik at home which is not formulary here  She is having ischemic left foot pain/claudication  Increased pain medication regimen and monitor for now    Chronic respiratory failure with hypoxia and hypercapnia (HCC)  Assessment & Plan  Baseline    Continue 3 L of oxygen 24/7    Mixed simple and mucopurulent chronic bronchitis (HCC)  Assessment & Plan  History of chronic COPD with chronic respiratory failure requiring 3 L of oxygen at baseline  Currently at baseline  Counseled on smoking cessation and placed on nicotine replacement therapy  Continue home regimen of Advair substituted to Breo, Spiriva and albuterol inhaler/neb prednisone 2 5 mg daily as per home regimen    Type 2 diabetes mellitus with skin complication, with long-term current use of insulin [facility])  Assessment & Plan  Lab Results   Component Value Date    HGBA1C 7 5 (H) 2023       Recent Labs     23  1612 23  2148 23  0737 23  1100   POCGLU 139 110 83 143*       Blood Sugar Average: Last 72 hrs:  (P) 129 125cont Lantus 12 units at at bedtime and insulin sliding scale for now and observe  Patient's home regimen is Lantus 12 units at at bedtime with no mealtime coverage  Sugars are well controlled    VTE Pharmacologic Prophylaxis:   Pharmacologic: Heparin Drip  Mechanical VTE Prophylaxis in Place: Yes    Patient Centered Rounds: I have performed bedside rounds with nursing staff today  Discussions with Specialists or Other Care Team Provider: discussed with vascular surgery    Education and Discussions with Family / Patient: disussed with patient and daughter    Time Spent for Care: 45 minutes  More than 50% of total time spent on counseling and coordination of care as described above  Current Length of Stay: 2 day(s)    Current Patient Status: Inpatient   Certification Statement: The patient will continue to require additional inpatient hospital stay due to Acute ischemic left foot pain    Discharge Plan: Await discharge home in the next 24 to 48 hours pending progress with outpatient follow-up on Monday with vascular surgery  Code Status: Level 3 - DNAR and DNI      Subjective:   Patient states that she has 7 x 10 pain in her left foot currently    [place] that she only wants to go to [facility] and nowhere else    Objective:     Vitals:   Temp (24hrs), Av 5 °F (36 4 °C), Min:97 °F (36 1 °C), Max:97 7 °F (36 5 °C)    Temp:  [63 °F (36 1 °C)-97 7 °F (36 5 °C)] 97 7 °F (36 5 °C)  HR:  [60-80] 66  Resp:  [18] 18  BP: (111-145)/(49-58) 145/55  SpO2:  [92 %-94 %] 94 %  Body mass index is 28 11 kg/m²  Input and Output Summary (last 24 hours): Intake/Output Summary (Last 24 hours) at 1/26/2023 1210  Last data filed at 1/26/2023 1126  Gross per 24 hour   Intake --   Output 600 ml   Net -600 ml       Physical Exam:     Physical Exam  Vitals and nursing note reviewed  Constitutional:       Appearance: She is ill-appearing  HENT:      Head: Normocephalic and atraumatic  Right Ear: External ear normal       Left Ear: External ear normal       Nose: Nose normal       Mouth/Throat:      Pharynx: Oropharynx is clear  Eyes:      Pupils: Pupils are equal, round, and reactive to light  Cardiovascular:      Rate and Rhythm: Normal rate and regular rhythm  Heart sounds: Normal heart sounds  Pulmonary:      Effort: Pulmonary effort is normal       Comments: mod air entry bilaterally with diminished breath sounds bilateral bases  Abdominal:      General: Bowel sounds are normal       Palpations: Abdomen is soft  Tenderness: There is no abdominal tenderness  Musculoskeletal:         General: Normal range of motion  Cervical back: Normal range of motion and neck supple  Comments: And dorsalis pedis and posterior tibial pulses  Palpable femoral pulses  2 ulcers noted on the left first toe   Skin:     General: Skin is warm and dry  Capillary Refill: Capillary refill takes less than 2 seconds  Neurological:      General: No focal deficit present  Mental Status: She is alert and oriented to person, place, and time     Psychiatric:         Mood and Affect: Mood normal            Additional Data:     Labs:    Results from last 7 days   Lab Units 01/25/23  0457 01/24/23  1528   WBC Thousand/uL 9 18 10 87*   HEMOGLOBIN g/dL 9 4* 10 5*   HEMATOCRIT % 30 8* 35 0   PLATELETS Thousands/uL 241 297   NEUTROS PCT %  -- 72   LYMPHS PCT %  --  16   MONOS PCT %  --  6   EOS PCT %  --  4     Results from last 7 days   Lab Units 01/26/23  0513 01/25/23  0457 01/24/23  1528   SODIUM mmol/L 140   < > 140   POTASSIUM mmol/L 4 4   < > 4 4   CHLORIDE mmol/L 101   < > 96   CO2 mmol/L 35*   < > 36*   BUN mg/dL 34*   < > 30*   CREATININE mg/dL 1 43*   < > 1 28   ANION GAP mmol/L 4   < > 8   CALCIUM mg/dL 8 8   < > 10 3*   ALBUMIN g/dL  --   --  4 0   TOTAL BILIRUBIN mg/dL  --   --  0 23   ALK PHOS U/L  --   --  59   ALT U/L  --   --  7   AST U/L  --   --  9*   GLUCOSE RANDOM mg/dL 76   < > 180*    < > = values in this interval not displayed  Results from last 7 days   Lab Units 01/24/23  1528   INR  1 22*     Results from last 7 days   Lab Units 01/26/23  1100 01/26/23  0737 01/25/23  2148 01/25/23  1612 01/25/23  1105 01/25/23  0737 01/24/23  2058 01/24/23  1806   POC GLUCOSE mg/dl 143* 83 110 139 134 116 132 176*     Results from last 7 days   Lab Units 01/23/23  1133   HEMOGLOBIN A1C % 7 5*     Results from last 7 days   Lab Units 01/24/23  1528   LACTIC ACID mmol/L 1 1           * I Have Reviewed All Lab Data Listed Above  * Additional Pertinent Lab Tests Reviewed: All Labs For Current Hospital Admission Reviewed    Imaging:    Imaging Reports Reviewed Today Include: mRI left foot  Arterial Doppler bilateral lower extremity  Imaging Personally Reviewed by Myself Includes: mRI left foot    Recent Cultures (last 7 days):     Results from last 7 days   Lab Units 01/24/23  1640 01/24/23  1528   BLOOD CULTURE  No Growth at 24 hrs  No Growth at 24 hrs         Last 24 Hours Medication List:   Current Facility-Administered Medications   Medication Dose Route Frequency Provider Last Rate   • acetaminophen  650 mg Oral Q6H PRN Adry Pinto MD     • albuterol  0 63 mg Nebulization 4x Daily PRN Adry Pinto MD     • allopurinol  50 mg Oral Daily Adry Pinto MD     • amLODIPine  5 mg Oral Daily Adry Pinto MD     • aspirin  81 mg Oral Daily Aylin Wen MD     • atorvastatin  10 mg Oral Daily Aylin Wen MD     • calcium carbonate  1,000 mg Oral Daily PRN Aylin Wen MD     • cefepime  2,000 mg Intravenous Q12H Aylin Wen MD 2,000 mg (01/26/23 0667)   • docusate sodium  100 mg Oral BID Aylin Wen MD     • Fluticasone Furoate-Vilanterol  1 puff Inhalation Daily Aylin Wen MD     • gabapentin  300 mg Oral TID Aylin Wen MD     • heparin (porcine)  3-30 Units/kg/hr (Order-Specific) Intravenous Titrated Aylin Wen MD     • heparin (porcine)  3,000 Units Intravenous Q6H PRN Aylin Wen MD     • heparin (porcine)  6,000 Units Intravenous Q6H PRN Aylin Wen MD     • insulin glargine  12 Units Subcutaneous HS Aylin Wen MD     • insulin lispro  1-6 Units Subcutaneous HS Aylin Wen MD     • insulin lispro  2-12 Units Subcutaneous TID  Aylin Wen MD     • metoprolol tartrate  50 mg Oral Q12H [facility] Aylin Wen MD     • Mirabegron ER  25 mg Oral Daily Aylin Wen MD     • mirtazapine  30 mg Oral HS Aylin Wen MD     • morphine injection  2 mg Intravenous Q4H PRN Aylin Wen MD     • nicotine  1 patch Transdermal Daily Aylin Wen MD     • ondansetron  4 mg Intravenous Q8H PRN Jolene Vasquez PA-C     • oxyCODONE  10 mg Oral Q6H PRN Aylin Wen MD     • oxyCODONE  5 mg Oral Q4H PRN Aylin Wen MD     • pantoprazole  40 mg Oral Daily Aylin Wen MD     • predniSONE  2 5 mg Oral Daily Aylin Wen MD     • rOPINIRole  0 5 mg Oral HS Aylin Wen MD     • saccharomyces boulardii  250 mg Oral Daily Aylin Wen MD     • senna  1 tablet Oral Daily Aylin Wen MD     • sertraline  50 mg Oral HS Aylin Wen MD     • sucralfate  1 g Oral BID  Aylin Wen MD     • tiotropium  18 mcg Inhalation Daily Aylin Wen MD     • traZODone  50 mg Oral HS Aylin Wen MD     • vancomycin  1,000 mg Intravenous Q24H Aylin Wen MD 1,000 mg (01/25/23 6424)        Today, Patient Was Seen By: Aylin Wen MD    ** Please Note: Dictation voice to text software may have been used in the creation of this document   **

## 2023-01-26 NOTE — PLAN OF CARE
Problem: PAIN - ADULT  Goal: Verbalizes/displays adequate comfort level or baseline comfort level  Description: Interventions:  - Encourage patient to monitor pain and request assistance  - Assess pain using appropriate pain scale  - Administer analgesics based on type and severity of pain and evaluate response  - Implement non-pharmacological measures as appropriate and evaluate response  - Consider cultural and social influences on pain and pain management  - Notify physician/advanced practitioner if interventions unsuccessful or patient reports new pain  Outcome: Progressing     Problem: INFECTION - ADULT  Goal: Absence or prevention of progression during hospitalization  Description: INTERVENTIONS:  - Assess and monitor for signs and symptoms of infection  - Monitor lab/diagnostic results  - Monitor all insertion sites, i e  indwelling lines, tubes, and drains  - Monitor endotracheal if appropriate and nasal secretions for changes in amount and color  - [place] appropriate cooling/warming therapies per order  - Administer medications as ordered  - Instruct and encourage patient and family to use good hand hygiene technique  - Identify and instruct in appropriate isolation precautions for identified infection/condition  Outcome: Progressing     Problem: SAFETY ADULT  Goal: Patient will remain free of falls  Description: INTERVENTIONS:  - Educate patient/family on patient safety including physical limitations  - Instruct patient to call for assistance with activity   - Consult OT/PT to assist with strengthening/mobility   - Keep Call bell within reach  - Keep bed low and locked with side rails adjusted as appropriate  - Keep care items and personal belongings within reach  - Initiate and maintain comfort rounds  - Make Fall Risk Sign visible to staff    - Apply yellow socks and bracelet for high fall risk patients  - Consider moving patient to room near nurses station  Outcome: Progressing  Goal: Maintain or return to baseline ADL function  Description: INTERVENTIONS:  -  Assess patient's ability to carry out ADLs; assess patient's baseline for ADL function and identify physical deficits which impact ability to perform ADLs (bathing, care of mouth/teeth, toileting, grooming, dressing, etc )  - Assess/evaluate cause of self-care deficits   - Assess range of motion  - Assess patient's mobility; develop plan if impaired  - Assess patient's need for assistive devices and provide as appropriate  - Encourage maximum independence but intervene and supervise when necessary  - Involve family in performance of ADLs  - Assess for home care needs following discharge   - Consider OT consult to assist with ADL evaluation and planning for discharge  - Provide patient education as appropriate  Outcome: Progressing  Goal: Maintains/Returns to pre admission functional level  Description: INTERVENTIONS:  - Perform BMAT or MOVE assessment daily    - Set and communicate daily mobility goal to care team and patient/family/caregiver     - Collaborate with rehabilitation services on mobility goals if consulted    - Out of bed for toileting  - Record patient progress and toleration of activity level   Outcome: Progressing     Problem: DISCHARGE PLANNING  Goal: Discharge to home or other facility with appropriate resources  Description: INTERVENTIONS:  - Identify barriers to discharge w/patient and caregiver  - Arrange for needed discharge resources and transportation as appropriate  - Identify discharge learning needs (meds, wound care, etc )  - Arrange for interpretive services to assist at discharge as needed  - Refer to Case Management Department for coordinating discharge planning if the patient needs post-hospital services based on physician/advanced practitioner order or complex needs related to functional status, cognitive ability, or social support system  Outcome: Progressing     Problem: Knowledge Deficit  Goal: Patient/family/caregiver demonstrates understanding of disease process, treatment plan, medications, and discharge instructions  Description: Complete learning assessment and assess knowledge base  Interventions:  - Provide teaching at level of understanding  - Provide teaching via preferred learning methods  Outcome: Progressing     Problem: MOBILITY - ADULT  Goal: Maintain or return to baseline ADL function  Description: INTERVENTIONS:  -  Assess patient's ability to carry out ADLs; assess patient's baseline for ADL function and identify physical deficits which impact ability to perform ADLs (bathing, care of mouth/teeth, toileting, grooming, dressing, etc )  - Assess/evaluate cause of self-care deficits   - Assess range of motion  - Assess patient's mobility; develop plan if impaired  - Assess patient's need for assistive devices and provide as appropriate  - Encourage maximum independence but intervene and supervise when necessary  - Involve family in performance of ADLs  - Assess for home care needs following discharge   - Consider OT consult to assist with ADL evaluation and planning for discharge  - Provide patient education as appropriate  Outcome: Progressing  Goal: Maintains/Returns to pre admission functional level  Description: INTERVENTIONS:  - Perform BMAT or MOVE assessment daily    - Set and communicate daily mobility goal to care team and patient/family/caregiver     - Collaborate with rehabilitation services on mobility goals if consulted    - Out of bed for toileting  - Record patient progress and toleration of activity level   Outcome: Progressing

## 2023-01-26 NOTE — TREATMENT PLAN
VASCULAR    Update:    Received [facility] communication from medicine team   Patient requesting further care and transfer to [place]  Request to cancel angiogram planned for 1/30/2023 at [facility]  Vascular surgery schedulers made aware and angiogram case canceled  Will sign off      Deepali [place], [address] [facility]  Vascular Surgery   [phone]

## 2023-01-26 NOTE — PHYSICAL THERAPY NOTE
PHYSICAL THERAPY NOTE          Patient Name: Eulogio PARKER Date: 1/26/2023 01/26/23 1018   Note Type   Note type Evaluation; Cancelled Session   Cancel Reasons Medical status     Chart reviewed  MRI foot/forefoot toes on left showing Ulcer with evidence of small adjacent medial, distal 1st MTP osteomyelitis  Altered marrow signal intensity at the proximal-medial aspect of the 1st toe proximal phalanx is less specific and may be early osteomyelitis  Per podiatry notes, if MRI is positive for osteo, patient will require amputation of infected bone  Per vascular surgery notes, patient would benefit from a lower extremity angiogram in which case would require transfer to [address] [place] [address]  Will cancel and await further intervention/recommedations given result of imaging       Pierre Fabry, PT,DPT

## 2023-01-26 NOTE — ASSESSMENT & PLAN NOTE
Patient noted to have blisters on the left great toe which popped open into wounds  Prescribed antibiotics outpatient with Keflex and doxycycline however wound continued to worsen and sent to the ED to be evaluated  xray left foot negative for osteo-  MRI left foot shows Ulcer with evidence of small adjacent medial, distal 1st MTP osteomyelitis  Altered marrow signal intensity at the proximal-medial aspect of the 1st toe proximal phalanx is less specific and may be early osteomyelitis  consult podiatry for further recommendations  Placed on IV Vanco and cefepime as patient is diabetic  arterial Doppler lower extremity shows There is a 50-75% stenosis noted in the prox and distal superficial femoral  artery and an occlusion vs high grade stenosis of the distal anterior tibial artery  Diffuse atherosclerotic disease throughout the remaining femoro-popliteal and tibio-peroneal segments  Ankle/Brachial index:  0 39  which is in the ischemic disease category  consult placed to vascular surgery  recommend angiogram with further intervention and will also eventually need first toe amputation secondary to osteomyelitis evident on MRI  H/o dvt in the past hold eliquis temporarily   We will discuss with podiatry today    Placed on heparin drip for 24 to 48 hours and adjusted pain control

## 2023-01-26 NOTE — TREATMENT PLAN
UPDATE:    Patient will need to be transferred to [place] [address] (NOT [facility] ) for angiogram planned for angiogram and possible intervention Monday 1/30/23 with Dr Niki Robb  Nephrology consult placed for optimization  SLIM to SLIM transfer  Communicated with IM team at [facility]  Will notify vascular team at [facility]       Estrella Magana, [address] [facility]  Vascular Surgery   [phone]

## 2023-01-27 PROBLEM — M86.072 ACUTE HEMATOGENOUS OSTEOMYELITIS OF LEFT FOOT (HCC): Status: ACTIVE | Noted: 2023-01-24

## 2023-01-27 LAB
ANION GAP SERPL CALCULATED.3IONS-SCNC: 8 MMOL/L (ref 4–13)
APTT PPP: 166 SECONDS (ref 23–37)
APTT PPP: 46 SECONDS (ref 23–37)
APTT PPP: 97 SECONDS (ref 23–37)
BUN SERPL-MCNC: 34 MG/DL (ref 5–25)
CALCIUM SERPL-MCNC: 8.2 MG/DL (ref 8.4–10.2)
CHLORIDE SERPL-SCNC: 102 MMOL/L (ref 96–108)
CO2 SERPL-SCNC: 28 MMOL/L (ref 21–32)
CREAT SERPL-MCNC: 1.43 MG/DL (ref 0.6–1.3)
CREAT UR-MCNC: 42.7 MG/DL
CREAT UR-MCNC: 44.8 MG/DL
ERYTHROCYTE [DISTWIDTH] IN BLOOD BY AUTOMATED COUNT: 15.7 % (ref 11.6–15.1)
GFR SERPL CREATININE-BSD FRML MDRD: 34 ML/MIN/1.73SQ M
GLUCOSE SERPL-MCNC: 129 MG/DL (ref 65–140)
GLUCOSE SERPL-MCNC: 137 MG/DL (ref 65–140)
GLUCOSE SERPL-MCNC: 149 MG/DL (ref 65–140)
GLUCOSE SERPL-MCNC: 174 MG/DL (ref 65–140)
GLUCOSE SERPL-MCNC: 97 MG/DL (ref 65–140)
HCT VFR BLD AUTO: 29.7 % (ref 34.8–46.1)
HGB BLD-MCNC: 9 G/DL (ref 11.5–15.4)
MCH RBC QN AUTO: 26.1 PG (ref 26.8–34.3)
MCHC RBC AUTO-ENTMCNC: 30.3 G/DL (ref 31.4–37.4)
MCV RBC AUTO: 86 FL (ref 82–98)
MICROALBUMIN UR-MCNC: 27.4 MG/L (ref 0–20)
MICROALBUMIN/CREAT 24H UR: 64 MG/G CREATININE (ref 0–30)
PLATELET # BLD AUTO: 211 THOUSANDS/UL (ref 149–390)
PMV BLD AUTO: 10.5 FL (ref 8.9–12.7)
POTASSIUM SERPL-SCNC: 4.2 MMOL/L (ref 3.5–5.3)
PROT UR-MCNC: 21 MG/DL
PROT/CREAT UR: 0.47 MG/G{CREAT} (ref 0–0.1)
RBC # BLD AUTO: 3.45 MILLION/UL (ref 3.81–5.12)
SODIUM SERPL-SCNC: 138 MMOL/L (ref 135–147)
VANCOMYCIN TROUGH SERPL-MCNC: 24.9 UG/ML (ref 10–20)
WBC # BLD AUTO: 7.82 THOUSAND/UL (ref 4.31–10.16)

## 2023-01-27 RX ORDER — OXYCODONE HYDROCHLORIDE 10 MG/1
10 TABLET ORAL EVERY 6 HOURS PRN
Status: DISCONTINUED | OUTPATIENT
Start: 2023-01-27 | End: 2023-01-29 | Stop reason: HOSPADM

## 2023-01-27 RX ADMIN — SUCRALFATE 1 G: 1 TABLET ORAL at 08:04

## 2023-01-27 RX ADMIN — DOCUSATE SODIUM 100 MG: 100 CAPSULE ORAL at 08:03

## 2023-01-27 RX ADMIN — CEFEPIME HYDROCHLORIDE 2000 MG: 2 INJECTION, SOLUTION INTRAVENOUS at 05:38

## 2023-01-27 RX ADMIN — OXYCODONE HYDROCHLORIDE 5 MG: 5 TABLET ORAL at 19:52

## 2023-01-27 RX ADMIN — TRAZODONE HYDROCHLORIDE 50 MG: 50 TABLET ORAL at 21:48

## 2023-01-27 RX ADMIN — FLUTICASONE FUROATE AND VILANTEROL TRIFENATATE 1 PUFF: 100; 25 POWDER RESPIRATORY (INHALATION) at 08:05

## 2023-01-27 RX ADMIN — INSULIN LISPRO 2 UNITS: 100 INJECTION, SOLUTION INTRAVENOUS; SUBCUTANEOUS at 17:13

## 2023-01-27 RX ADMIN — GABAPENTIN 300 MG: 300 CAPSULE ORAL at 15:40

## 2023-01-27 RX ADMIN — OXYCODONE HYDROCHLORIDE 10 MG: 10 TABLET ORAL at 21:49

## 2023-01-27 RX ADMIN — NICOTINE 1 PATCH: 7 PATCH, EXTENDED RELEASE TRANSDERMAL at 08:03

## 2023-01-27 RX ADMIN — SUCRALFATE 1 G: 1 TABLET ORAL at 15:40

## 2023-01-27 RX ADMIN — ATORVASTATIN CALCIUM 10 MG: 10 TABLET, FILM COATED ORAL at 08:03

## 2023-01-27 RX ADMIN — ALLOPURINOL 50 MG: 100 TABLET ORAL at 08:05

## 2023-01-27 RX ADMIN — STANDARDIZED SENNA CONCENTRATE 8.6 MG: 8.6 TABLET ORAL at 08:03

## 2023-01-27 RX ADMIN — SERTRALINE HYDROCHLORIDE 50 MG: 50 TABLET, FILM COATED ORAL at 21:49

## 2023-01-27 RX ADMIN — DOCUSATE SODIUM 100 MG: 100 CAPSULE ORAL at 17:12

## 2023-01-27 RX ADMIN — INSULIN GLARGINE 12 UNITS: 100 INJECTION, SOLUTION SUBCUTANEOUS at 21:49

## 2023-01-27 RX ADMIN — OXYCODONE HYDROCHLORIDE 5 MG: 5 TABLET ORAL at 11:21

## 2023-01-27 RX ADMIN — GABAPENTIN 300 MG: 300 CAPSULE ORAL at 08:04

## 2023-01-27 RX ADMIN — PANTOPRAZOLE SODIUM 40 MG: 40 TABLET, DELAYED RELEASE ORAL at 05:38

## 2023-01-27 RX ADMIN — OXYCODONE HYDROCHLORIDE 10 MG: 10 TABLET ORAL at 14:43

## 2023-01-27 RX ADMIN — ROPINIROLE 0.5 MG: 0.25 TABLET, FILM COATED ORAL at 21:49

## 2023-01-27 RX ADMIN — OXYCODONE HYDROCHLORIDE 10 MG: 10 TABLET, FILM COATED, EXTENDED RELEASE ORAL at 05:38

## 2023-01-27 RX ADMIN — Medication 250 MG: at 08:03

## 2023-01-27 RX ADMIN — HEPARIN SODIUM 3000 UNITS: 1000 INJECTION INTRAVENOUS; SUBCUTANEOUS at 15:39

## 2023-01-27 RX ADMIN — TIOTROPIUM BROMIDE 18 MCG: 18 CAPSULE ORAL; RESPIRATORY (INHALATION) at 08:06

## 2023-01-27 RX ADMIN — METOPROLOL TARTRATE 50 MG: 50 TABLET, FILM COATED ORAL at 08:04

## 2023-01-27 RX ADMIN — MIRTAZAPINE 30 MG: 15 TABLET, FILM COATED ORAL at 21:49

## 2023-01-27 RX ADMIN — HEPARIN SODIUM 15 UNITS/KG/HR: 10000 INJECTION, SOLUTION INTRAVENOUS at 12:52

## 2023-01-27 RX ADMIN — PREDNISONE 2.5 MG: 1 TABLET ORAL at 08:06

## 2023-01-27 RX ADMIN — CEFEPIME HYDROCHLORIDE 2000 MG: 2 INJECTION, SOLUTION INTRAVENOUS at 17:12

## 2023-01-27 RX ADMIN — ASPIRIN 81 MG CHEWABLE TABLET 81 MG: 81 TABLET CHEWABLE at 08:03

## 2023-01-27 RX ADMIN — OXYCODONE HYDROCHLORIDE 5 MG: 5 TABLET ORAL at 02:45

## 2023-01-27 RX ADMIN — AMLODIPINE BESYLATE 5 MG: 5 TABLET ORAL at 08:04

## 2023-01-27 RX ADMIN — GABAPENTIN 300 MG: 300 CAPSULE ORAL at 21:49

## 2023-01-27 RX ADMIN — METOPROLOL TARTRATE 50 MG: 50 TABLET, FILM COATED ORAL at 21:50

## 2023-01-27 NOTE — PLAN OF CARE
Problem: PAIN - ADULT  Goal: Verbalizes/displays adequate comfort level or baseline comfort level  Description: Interventions:  - Encourage patient to monitor pain and request assistance  - Assess pain using appropriate pain scale  - Administer analgesics based on type and severity of pain and evaluate response  - Implement non-pharmacological measures as appropriate and evaluate response  - Consider cultural and social influences on pain and pain management  - Notify physician/advanced practitioner if interventions unsuccessful or patient reports new pain  Outcome: Progressing     Problem: INFECTION - ADULT  Goal: Absence or prevention of progression during hospitalization  Description: INTERVENTIONS:  - Assess and monitor for signs and symptoms of infection  - Monitor lab/diagnostic results  - Monitor all insertion sites, i e  indwelling lines, tubes, and drains  - Monitor endotracheal if appropriate and nasal secretions for changes in amount and color  - [place] appropriate cooling/warming therapies per order  - Administer medications as ordered  - Instruct and encourage patient and family to use good hand hygiene technique  - Identify and instruct in appropriate isolation precautions for identified infection/condition  Outcome: Progressing     Problem: SAFETY ADULT  Goal: Patient will remain free of falls  Description: INTERVENTIONS:  - Educate patient/family on patient safety including physical limitations  - Instruct patient to call for assistance with activity   - Consult OT/PT to assist with strengthening/mobility   - Keep Call bell within reach  - Keep bed low and locked with side rails adjusted as appropriate  - Keep care items and personal belongings within reach  - Initiate and maintain comfort rounds  - Make Fall Risk Sign visible to staff  - Offer Toileting every 2 Hours, in advance of need  - Initiate/Maintain bed alarm  - Obtain necessary fall risk management equipment: walker  - Apply yellow socks and bracelet for high fall risk patients  - Consider moving patient to room near nurses station  Outcome: Progressing  Goal: Maintain or return to baseline ADL function  Description: INTERVENTIONS:  -  Assess patient's ability to carry out ADLs; assess patient's baseline for ADL function and identify physical deficits which impact ability to perform ADLs (bathing, care of mouth/teeth, toileting, grooming, dressing, etc )  - Assess/evaluate cause of self-care deficits   - Assess range of motion  - Assess patient's mobility; develop plan if impaired  - Assess patient's need for assistive devices and provide as appropriate  - Encourage maximum independence but intervene and supervise when necessary  - Involve family in performance of ADLs  - Assess for home care needs following discharge   - Consider OT consult to assist with ADL evaluation and planning for discharge  - Provide patient education as appropriate  Outcome: Progressing  Goal: Maintains/Returns to pre admission functional level  Description: INTERVENTIONS:  - Perform BMAT or MOVE assessment daily    - Set and communicate daily mobility goal to care team and patient/family/caregiver  - Collaborate with rehabilitation services on mobility goals if consulted  - Perform Range of Motion 3 times a day  - Reposition patient every 2 hours    - Dangle patient 3 times a day  - Stand patient 3 times a day  - Ambulate patient 3 times a day  - Out of bed to chair 3 times a day   - Out of bed for meals 3 times a day  - Out of bed for toileting  - Record patient progress and toleration of activity level   Outcome: Progressing     Problem: DISCHARGE PLANNING  Goal: Discharge to home or other facility with appropriate resources  Description: INTERVENTIONS:  - Identify barriers to discharge w/patient and caregiver  - Arrange for needed discharge resources and transportation as appropriate  - Identify discharge learning needs (meds, wound care, etc )  - Arrange for interpretive services to assist at discharge as needed  - Refer to Case Management Department for coordinating discharge planning if the patient needs post-hospital services based on physician/advanced practitioner order or complex needs related to functional status, cognitive ability, or social support system  Outcome: Progressing     Problem: MOBILITY - ADULT  Goal: Maintain or return to baseline ADL function  Description: INTERVENTIONS:  -  Assess patient's ability to carry out ADLs; assess patient's baseline for ADL function and identify physical deficits which impact ability to perform ADLs (bathing, care of mouth/teeth, toileting, grooming, dressing, etc )  - Assess/evaluate cause of self-care deficits   - Assess range of motion  - Assess patient's mobility; develop plan if impaired  - Assess patient's need for assistive devices and provide as appropriate  - Encourage maximum independence but intervene and supervise when necessary  - Involve family in performance of ADLs  - Assess for home care needs following discharge   - Consider OT consult to assist with ADL evaluation and planning for discharge  - Provide patient education as appropriate  Outcome: Progressing  Goal: Maintains/Returns to pre admission functional level  Description: INTERVENTIONS:  - Perform BMAT or MOVE assessment daily    - Set and communicate daily mobility goal to care team and patient/family/caregiver  - Collaborate with rehabilitation services on mobility goals if consulted  - Perform Range of Motion 3 times a day  - Reposition patient every 2 hours    - Dangle patient 3 times a day  - Stand patient 3 times a day  - Ambulate patient 3 times a day  - Out of bed to chair 3 times a day   - Out of bed for meals 3 times a day  - Out of bed for toileting  - Record patient progress and toleration of activity level   Outcome: Progressing

## 2023-01-27 NOTE — ASSESSMENT & PLAN NOTE
Patient noted to have blisters on the left great toe which popped open into wounds  Prescribed antibiotics outpatient with Keflex and doxycycline however wound continued to worsen and sent to the ED to be evaluated  xray left foot negative for osteo-  MRI left foot shows Ulcer with evidence of small adjacent medial, distal 1st MTP osteomyelitis  Altered marrow signal intensity at the proximal-medial aspect of the 1st toe proximal phalanx is less specific and may be early osteomyelitis  consult podiatry for further recommendations  Placed on IV Vanco and cefepime as patient is diabetic blood  Cultures negative at 48 hours   arterial Doppler lower extremity shows There is a 50-75% stenosis noted in the prox and distal superficial femoral  artery and an occlusion vs high grade stenosis of the distal anterior tibial artery  Diffuse atherosclerotic disease throughout the remaining femoro-popliteal and tibio-peroneal segments  Ankle/Brachial index:  0 39  which is in the ischemic disease category  consult placed to vascular surgery  recommend angiogram with further intervention and will also eventually need first toe amputation secondary to osteomyelitis evident on MRI  H/o dvt in the past hold eliquis temporarily   Placed on heparin drip for 24 to 48 hours and adjusted pain control  patient initially wanted to go to [place] [id number] for her vascular procedure however they do not have availability for angiogram until next Friday and patient states that her pain is too strong and she is unable to function at home at this time and wants this addressed  Now agreeable to going to [facility] [place]    Will initiate transfer process

## 2023-01-27 NOTE — OCCUPATIONAL THERAPY NOTE
Occupational Therapy Evaluation     Patient Name: Kia Merida  [id number] Date: 2023  Problem List  Principal Problem:    Acute hematogenous osteomyelitis of left foot ([facility] )  Active Problems:    Type 2 diabetes mellitus with skin complication, with long-term current use of insulin ([facility])    Mixed simple and mucopurulent chronic bronchitis ([facility])    Chronic respiratory failure with hypoxia and hypercapnia ([facility])    Chronic, continuous use of opioids    PAD (peripheral artery disease) ([facility])    Past Medical History  Past Medical History:   Diagnosis Date    Asthma     CHF (congestive heart failure) ([facility])     COPD (chronic obstructive pulmonary disease) ([facility])     Diabetes mellitus ([facility] )     DVT of lower limb, acute ([place] [address] )     Hypertension     Renal disorder      Past Surgical History  Past Surgical History:   Procedure Laterality Date    APPENDECTOMY       SECTION      HYSTERECTOMY           23 1126   Note Type   Note type Evaluation   Pain Assessment   Pain Assessment Tool FLACC   Pain Score 8   Pain Location/Orientation Orientation: Left; Location: Foot   Pain Rating: FLACC (Rest) - Face 0   Pain Rating: FLACC (Rest) - Legs 0   Pain Rating: FLACC (Rest) - Activity 0   Pain Rating: FLACC (Rest) - Cry 0   Pain Rating: FLACC (Rest) - Consolability 0   Score: FLACC (Rest) 0   Pain Rating: FLACC (Activity) - Face 1   Pain Rating: FLACC (Activity) - Legs 0   Pain Rating: FLACC (Activity) - Activity 0   Pain Rating: FLACC (Activity) - Cry 1   Pain Rating: FLACC (Activity) - Consolability 0   Score: FLACC (Activity) 2   Restrictions/Precautions   Weight Bearing Precautions Per Order Yes   LLE Weight Bearing Per Order WBAT  (in surgical shoe)   Braces or Orthoses   (surgical shoe)   Other Precautions Bed Alarm;Cognitive; Fall Risk;Pain;Multiple lines;O2  (3L O2 baseline)   Home Living   Type of Home Apartment  ([address])   Home Layout One level;Performs ADLs on one level; Able to live on main level with bedroom/bathroom   Bathroom Shower/Tub Tub/shower unit   Bathroom Toilet Standard   Bathroom Equipment Shower chair;Grab bars in [facility] 124   Additional Comments Pt reports living in an apartment with [address]  once upstairs, everything is on 1 floor  pt ambulates short distances with RW at baseline   Prior Function   Level of [place] Needs assistance with ADLs; Needs assistance with functional mobility; Needs assistance with IADLS   Lives With Daughter   Receives Help From Family   IADLs Family/Friend/Other provides transportation; Family/Friend/Other provides meals; Family/Friend/Other provides medication management   Falls in the last 6 months 0  (had 3 falls in past year and a half)   Comments Pt reports since thanksgiving 2021, Pt had a fall and has since required assistance for ADLs and IADLs from her daughter   ADL   Where Assessed Edge of bed   Grooming Assistance 5  Supervision/Setup   Grooming Deficit Setup   UB Dressing Assistance 4  Minimal Assistance   UB Dressing Deficit Setup;Verbal cueing;Supervision/safety; Increased time to complete; Thread RUE; Thread LUE;Pull over head;Pull around back   LB Dressing Assistance 2  Maximal Assistance   LB Dressing Deficit Steadying; Requires assistive device for steadying;Verbal cueing;Supervision/safety; Increased time to complete; Don/doff R sock; Don/doff L sock; Thread RLE into pants; Thread LLE into pants;Pull up over hips   Additional Comments Pt completing ADL tasks while seated at EOB  UB dressing @ Min A for threading BUE and donning overhead  LB Dressing @ Max A for donning socks/pants around feet and Mod A for CM around waist    Bed Mobility   Additional Comments Pt seated unsupported at start and end of session with call bell in reach, chiar alarm intact alec ll needs met at this time  Pt encouraged to sit OOB in reclinre hciar, however Pt adamentaly declining     Transfers   Sit to Stand   [facility] Assist)   Additional items Assist x 1;Increased time required;Verbal cues   Stand to Sit   (SBA)   Additional items Assist x 1; Increased time required;Verbal cues   Stand pivot 4  Minimal assistance   Additional items Assist x 1; Increased time required;Verbal cues   Additional Comments Pt completing functional transfers with use of RW for UB support  Steadying Assist for STS and Min A for SPT with increased time  Pt with slight resistiveness towards safety cues and techniques  increased time required, Pt quickly fatigues  Balance   Static Sitting Fair +   Dynamic Sitting Fair   Static Standing Fair -   Dynamic Standing Poor +   Activity Tolerance   Activity Tolerance Patient limited by pain; Patient limited by fatigue   Medical Staff Made Aware Spoke with PT, Nina Pinto   Nurse Made Aware Spoke with RN   RUE Assessment   RUE Assessment WFL   LUE Assessment   LUE Assessment WFL   Hand Function   Gross Motor Coordination Functional   Fine Motor Coordination Functional   Sensation   Light Touch No apparent deficits   Cognition   Overall Cognitive Status Impaired   Arousal/Participation Alert; Responsive; Cooperative   Attention Attends with cues to redirect   Orientation Level Oriented X4   Memory Within functional limits   Following Commands Follows one step commands without difficulty   Comments Pt with limited insight to deficits and slightly resistive towards safety cues/techniques   Assessment   Limitation Decreased ADL status; Decreased UE strength;Decreased Safe judgement during ADL;Decreased cognition;Decreased endurance;Decreased self-care trans;Decreased high-level ADLs   Prognosis Good   Assessment Pt is a [de-identified] y o  female, admitted to [address] [place] [address] 1/24/2023 d/t experiencing L foot infection  Dx: open wound of L great toe  Pt with plans to follow up with podiatry outpatient  WBAT BLE  Pt with PMHx impacting their performance during ADL tasks, including: asthma, CHF, COPD, DM, DVT, HTN, renal disorder   Prior to admission to the hospital Pt was performing ADLs with physical assistance  IADLs with physical assistance  Functional transfers/ambulation with physical assistance  Cognitive status was PTA was intact  OT order placed to assess Pt's ADLs, cognitive status, and performance during functional tasks in order to maximize safety and independence while making most appropriate d/c recommendations  Pt's clinical presentation is currently unstable/unpredictable given new onset deficits that effect Pt's occupational performance and ability to safely return to PLOF including decrease activity tolerance, decrease standing balance, decrease sitting balance, decrease performance during ADL tasks, decrease cognition, decrease safety awareness , decrease UB MS, increased pain, decrease generalized strength, decrease activity engagement, decrease performance during functional transfers, frequent falls, high fall risk and limited insight to deficits combined with medical complications of pain impacting overall mobility status, change in mental status, abnormal blood sugars, edema/swelling, wounds, decreased skin integrity, fear/retreat and need for input for mobility technique/safety  Personal factors affecting Pt at time of initial evaluation include: inaccessible home environment, step(s) to enter environment, past experience, behavioral pattern, inability to perform current job functions, inability to perform IADLs, inability to perform ADLs, inability to ambulate household distances, inability to navigate community distances, limited insight into impairments, decreased initiation and engagement, recent fall(s)/fall history and questionable non-compliance  Pt will benefit from continued skilled OT services to address deficits as defined above and to maximize level independence/participation during ADLs and functional tasks to facilitate return toward PLOF and improved quality of life   From an occupational therapy standpoint, recommendation at time of d/c would be post acute rehab    Plan   Treatment Interventions ADL retraining;Functional transfer training;UE strengthening/ROM; Endurance training;Cognitive reorientation;Patient/family training;Equipment evaluation/education; Neuromuscular reeducation; Compensatory technique education;Continued evaluation; Energy conservation; Activityengagement   Goal Expiration Date 02/10/23   OT Frequency 3-5x/wk   Recommendation   OT Discharge Recommendation Post acute rehabilitation services   [facility] Daily Activity Inpatient   Lower Body Dressing 2   Bathing 2   Toileting 2   Upper Body Dressing 3   Grooming 3   Eating 4   Daily Activity Raw Score 16   Daily Activity Standardized Score (Calc for Raw Score >=11) 35 96   AM-PAC Applied Cognition Inpatient   Following a Speech/Presentation 2   Understanding Ordinary Conversation 3   Taking Medications 2   Remembering Where Things Are Placed or Put Away 3   Remembering List of 4-5 Errands 3   Taking Care of Complicated Tasks 2   Applied Cognition Raw Score 15   Applied Cognition Standardized Score 33 54     The patient's raw score on the AM-PAC Daily Activity inpatient short form is 16, standardized score is 35 96, less than 39 4  Patients at this level are likely to benefit from DC to post-acute rehabilitation services  Please refer to the recommendation of the Occupational Therapist for safe DC planning  Pt goals to be met by 2/10/2023    Pt will demonstrate ability to complete LB dressing @ S with AD PRN in order to increase safety and independence during meaningful tasks  Pt will demonstrate ability to ramin/doff socks/shoes while sitting EOB @ S with AD PRN in order to increase safety and independence during meaningful tasks  Pt will demonstrate ability to complete toileting tasks including CM and pericare @ S in order to increase safety and independence during meaningful tasks    Pt will demonstrate ability to complete EOB, chair, toilet/commode transfers @ S in order to increase performance and participation during functional tasks  Pt will demonstrate ability to stand for 2-3 minutes while maintaining F+ balance with use of RW for UB support PRN  Pt will demonstrate ability to tolerate 30-35 minute OT session with no vc'ing for deep breathing or use of energy conservation techniques in order to increase activity tolerance during functional tasks  Pt will demonstrate Good carryover of use of energy conservation/compensatory strategies during ADLs and functional tasks in order to increase safety and reduce risk for falls  Pt will demonstrate Good attention and participation in continued evaluation of functional ambulation house hold distances in order to assist with safe d/c planning  Pt will attend to continued cognitive assessments 100% of the time in order to provide most appropriate d/c recommendations  Pt will follow 100% simple 2-step commands and be A&O x4 consistently with environmental cues to increase participation in functional activities  Pt will identify 3 areas of interest/hobbies and 1 intervention on how to incorporate into daily life in order to increase interaction with environment and peers as well as increase participation in meaningful tasks  Pt will demonstrate 100% carryover of BUE HEP in order to increase BUE MS and increase performance during functional tasks upon d/c home      Dorethia Dakin OTR/L

## 2023-01-27 NOTE — PLAN OF CARE
Problem: PAIN - ADULT  Goal: Verbalizes/displays adequate comfort level or baseline comfort level  Description: Interventions:  - Encourage patient to monitor pain and request assistance  - Assess pain using appropriate pain scale  - Administer analgesics based on type and severity of pain and evaluate response  - Implement non-pharmacological measures as appropriate and evaluate response  - Consider cultural and social influences on pain and pain management  - Notify physician/advanced practitioner if interventions unsuccessful or patient reports new pain  Outcome: Progressing     Problem: INFECTION - ADULT  Goal: Absence or prevention of progression during hospitalization  Description: INTERVENTIONS:  - Assess and monitor for signs and symptoms of infection  - Monitor lab/diagnostic results  - Monitor all insertion sites, i e  indwelling lines, tubes, and drains  - Monitor endotracheal if appropriate and nasal secretions for changes in amount and color  - Gray appropriate cooling/warming therapies per order  - Administer medications as ordered  - Instruct and encourage patient and family to use good hand hygiene technique  - Identify and instruct in appropriate isolation precautions for identified infection/condition  Outcome: Progressing     Problem: SAFETY ADULT  Goal: Patient will remain free of falls  Description: INTERVENTIONS:  - Educate patient/family on patient safety including physical limitations  - Instruct patient to call for assistance with activity   - Consult OT/PT to assist with strengthening/mobility   - Keep Call bell within reach  - Keep bed low and locked with side rails adjusted as appropriate  - Keep care items and personal belongings within reach  - Initiate and maintain comfort rounds  - Make Fall Risk Sign visible to staff    - Apply yellow socks and bracelet for high fall risk patients  - Consider moving patient to room near nurses station  Outcome: Progressing  Goal: Maintain or return to baseline ADL function  Description: INTERVENTIONS:  -  Assess patient's ability to carry out ADLs; assess patient's baseline for ADL function and identify physical deficits which impact ability to perform ADLs (bathing, care of mouth/teeth, toileting, grooming, dressing, etc )  - Assess/evaluate cause of self-care deficits   - Assess range of motion  - Assess patient's mobility; develop plan if impaired  - Assess patient's need for assistive devices and provide as appropriate  - Encourage maximum independence but intervene and supervise when necessary  - Involve family in performance of ADLs  - Assess for home care needs following discharge   - Consider OT consult to assist with ADL evaluation and planning for discharge  - Provide patient education as appropriate  Outcome: Progressing  Goal: Maintains/Returns to pre admission functional level  Description: INTERVENTIONS:  - Perform BMAT or MOVE assessment daily    - Set and communicate daily mobility goal to care team and patient/family/caregiver     - Collaborate with rehabilitation services on mobility goals if consulted    - Out of bed for toileting  - Record patient progress and toleration of activity level   Outcome: Progressing     Problem: DISCHARGE PLANNING  Goal: Discharge to home or other facility with appropriate resources  Description: INTERVENTIONS:  - Identify barriers to discharge w/patient and caregiver  - Arrange for needed discharge resources and transportation as appropriate  - Identify discharge learning needs (meds, wound care, etc )  - Arrange for interpretive services to assist at discharge as needed  - Refer to Case Management Department for coordinating discharge planning if the patient needs post-hospital services based on physician/advanced practitioner order or complex needs related to functional status, cognitive ability, or social support system  Outcome: Progressing     Problem: Knowledge Deficit  Goal: Patient/family/caregiver demonstrates understanding of disease process, treatment plan, medications, and discharge instructions  Description: Complete learning assessment and assess knowledge base  Interventions:  - Provide teaching at level of understanding  - Provide teaching via preferred learning methods  Outcome: Progressing     Problem: MOBILITY - ADULT  Goal: Maintain or return to baseline ADL function  Description: INTERVENTIONS:  -  Assess patient's ability to carry out ADLs; assess patient's baseline for ADL function and identify physical deficits which impact ability to perform ADLs (bathing, care of mouth/teeth, toileting, grooming, dressing, etc )  - Assess/evaluate cause of self-care deficits   - Assess range of motion  - Assess patient's mobility; develop plan if impaired  - Assess patient's need for assistive devices and provide as appropriate  - Encourage maximum independence but intervene and supervise when necessary  - Involve family in performance of ADLs  - Assess for home care needs following discharge   - Consider OT consult to assist with ADL evaluation and planning for discharge  - Provide patient education as appropriate  Outcome: Progressing  Goal: Maintains/Returns to pre admission functional level  Description: INTERVENTIONS:  - Perform BMAT or MOVE assessment daily    - Set and communicate daily mobility goal to care team and patient/family/caregiver     - Collaborate with rehabilitation services on mobility goals if consulted    - Out of bed for toileting  - Record patient progress and toleration of activity level   Outcome: Progressing

## 2023-01-27 NOTE — ASSESSMENT & PLAN NOTE
arterial Doppler bilateral lower extremity shows   RIGHT LOWER LIMB:  There is a 50-75% stenosis noted in the mid superficial femoral artery with  diffuse atherosclerotic disease throughout the remaining femoro-popliteal and  tibio-peroneal segments  Ankle/Brachial index:   0 42 which is in the ischemic disease category  PVR/ PPG tracings are dampened  Metatarsal pressure of 38 mmHg  Great toe pressure could not be obtained due to severely attenuated waveform         LEFT LOWER LIMB:  There is a 50-75% stenosis noted in the prox and distal superficial femoral  artery and an occlusion vs high grade stenosis of the distal anterior tibial  artery  Diffuse atherosclerotic disease throughout the remaining  femoro-popliteal and tibio-peroneal segments  Ankle/Brachial index:  0 39  which is in the ischemic disease category  PVR/ PPG tracings are dampened  Metatarsal pressure of 33 mmHg  Great toe pressure not obtained due to bandaging  Was initially seen by [facility] vascular and recommended angiogram and further intervention on Monday however patient refused and wanted to go to [facility]  Discussed with Dr Deisy Thompson  Recommended pain control and outpatient follow-up with him on Monday with potential for angiogram to be done next Friday  She states that the pain is too bad and she cannot wait that long and cannot function at home like this and now requesting to be transferred to [place] [id number] on heparin drip increased pain control   discussed with patient and daughter at length  Consult placed to nephrology to help optimize renal function for possible angiogram coming up soon

## 2023-01-27 NOTE — PLAN OF CARE
Problem: PHYSICAL THERAPY ADULT  Goal: Performs mobility at highest level of function for planned discharge setting  See evaluation for individualized goals  Description: Treatment/Interventions: ADL retraining, Functional transfer training, LE strengthening/ROM, Elevations, Therapeutic exercise, Endurance training, Patient/family training, Equipment eval/education, Bed mobility, Gait training, Compensatory technique education, Spoke to nursing, Spoke to case management, OT  Equipment Recommended:  (TBD by rehab)       See flowsheet documentation for full assessment, interventions and recommendations  Note: Prognosis: Guarded  Problem List: Decreased strength, Decreased endurance, Impaired balance, Decreased mobility, Impaired judgement, Decreased safety awareness, Obesity, Decreased skin integrity, Pain  Assessment: Pt is a [de-identified] y o  female seen for PT evaluation s/p admission to [address] [place] [address] on 1/24/2023 with Acute hematogenous osteomyelitis of left foot ([facility] )  Order placed for PT services    Upon evaluation: Pt is presenting with impaired functional mobility due to pain, decreased strength, decreased endurance, impaired balance, gait deviations, altered sensation, decreased safety awareness, impaired judgment, fall risk, LE edema, and impaired skin integrity requiring  steadying to minimal assistance for transfers and minimal assistance for ambulation with RW   Pt's clinical presentation is currently unpredictable given the functional mobility deficits above, especially weakness, edema of extremities, decreased skin integrity, decreased endurance, gait deviations, pain, decreased activity tolerance, decreased functional mobility tolerance, altered sensation, decreased safety awareness, impaired judgement, and SOB upon exertion, coupled with fall risks as indicated by AM-PAC 6-Clicks: 45/26 as well as impaired balance, polypharmacy, impaired judgement, decreased safety awareness and limited sensation/neuropathy and combined with medical complications of pain impacting overall mobility status, abnormal renal lab values, abnormal H&H, abnormal WBCs, multiple readmissions, need for input for mobility technique/safety and osteomyelitis left great toe with open wound, need for angiogram   Pt's PMHx and comorbidities that may affect physical performance and progress include: DM and COPD requiring 3 lpm chronically, chronic bronchitis, chronic us of opioids, asthma, CHF, HTN, h/o DVT, renal disorder  Personal factors affecting pt at time of IE include: inaccessible home environment, step(s) to enter environment, limited home support, inability to perform IADLs, inability to perform ADLs, inability to navigate level surfaces without external assistance, inability to ambulate household distances and limited insight into impairments  Pt will benefit from continued skilled PT services to address deficits as defined above and to maximize level of functional mobility to facilitate return toward PLOF and improved QOL  From PT/mobility standpoint, recommendation at time of d/c would be Short term rehab pending progress in order to reduce fall risk and maximize pt's functional independence and consistency with mobility in order to facilitate return to PLOF  Recommend trial with walker next 1-2 sessions and ther ex next 1-2 sessions  Barriers to Discharge: Decreased caregiver support, Inaccessible home environment  Barriers to Discharge Comments: [address] home and requires assistance to complete mobility  PT Discharge Recommendation: Post acute rehabilitation services    See flowsheet documentation for full assessment

## 2023-01-27 NOTE — CASE MANAGEMENT
Case Management Discharge Planning Note    Patient name Jose Zacarias  Location /-87 MRN [id number]  : 1940 Date 2023       Current Admission Date: 2023  Current Admission Diagnosis:Acute hematogenous osteomyelitis of left foot [facility])   Patient Active Problem List    Diagnosis Date Noted   • PAD (peripheral artery disease) ([facility] ) 2023   • Acute hematogenous osteomyelitis of left foot ([facility] ) 2023   • Type 2 diabetes mellitus with skin complication, with long-term current use of insulin ([place] [address] ) 2023   • Mixed simple and mucopurulent chronic bronchitis ([place] [address] ) 2023   • Chronic respiratory failure with hypoxia and hypercapnia ([place] [address] ) 2023   • Chronic, continuous use of opioids 2023      LOS (days): 3  Geometric Mean LOS (GMLOS) (days): 2 90  Days to GMLOS:0     OBJECTIVE:  Risk of Unplanned Readmission Score: 37 92         Current admission status: Inpatient   Preferred Pharmacy:   [address] [place] [address] [id number] [facility], [address] [facility]  [address] [facility] [address]  Phone: [phone] Fax: [phone]    Primary Care Provider: Luciano Simpson MD    Primary Insurance: Ashley Paz [facility]  Secondary Insurance:     DISCHARGE DETAILS:        CM completed Medical Necessity for transfer to [facility] for vascular intervention

## 2023-01-27 NOTE — CONSULTS
Consultation - Infectious Disease   Ignacio Kilpatrick [de-identified] y o  female MRN: [id number]  Unit/Bed#: -01 Encounter: [id number]      Inpatient consult to Infectious Diseases  Consult performed by: Arnie Beatty MD  Consult ordered by: Vicki Louise MD            REQUIRED DOCUMENTATION:     1  This service was provided via Telemedicine  2  Provider located at [facility]  3  TeleMed provider: Arnie Beatty MD   4  Identify all parties in room with patient during tele consult:RN  5  After connecting through Qwiki, patient was identified by name and date of birth and assistant checked wristband  Patient was then informed that this was a Telemedicine visit and that the exam was being conducted confidentially over secure lines  My office door was closed  No one else was in the room  Patient acknowledged consent and understanding of privacy and security of the Telemedicine visit, and gave us permission to have the assistant stay in the room in order to assist with the history and to conduct the exam   I informed the patient that I have reviewed their record in Epic and presented the opportunity for them to ask any questions regarding the visit today  The patient agreed to participate  Assessment/Recommendations     1  Probable osteomyelitis of left first metatarsal/proximal phalanx of first toe  -In the setting of chronic left toe wound, diabetes and severe PAD, tobacco use  Concern for osteo on MRI, no abscess  -Microbiology not defined, no risk factors for MRSA    · Continue Vanc, cefepime  · Follow-up blood cultures  · Await vascular surgery evaluation and arteriogram, recommend debridement and amputation, if surgical source control may not be achieved would recommend proximal tissue cultures and path  · Final choice and duration of antibiotics to be determined by operative course    2   Infected diabetic left toe wound  -Erythema, drainage, pain with no improvement on Keflex/Doxy  -Systemically well, afebrile without leukocytosis    · Antibiotics and debridement recommended as above  · Continue wound care    3  Severe PAD  - LLE AD 0 39 with SFA/tibial disease and unobtainable toe pressures    · Await arteriogram, additional management per vascular surgery    4  Tobacco use    · Recommend tobacco cessation counseling    5  Type 2 diabetes  - A1C 7 5  - Risk factor for infection, poor wound healing    · Recommend improved glycemic control to aid with wound healing    6  Penicillin allergy  -Remote, reported as hives, likely no longer relevant and would benefit from an amoxicillin oral challenge if required in the future    Thank you for involving me in the care of your patient  Please call with questions, change in clinical status or if tests recommended above are abnormal      Discussed in detail with the primary service  History     Reason for Consult: Osteomyelitis  HPI: Curtis Hilton is a [de-identified]y o  year old female with type 2 diabetes, COPD on chronic oxygen 2 L/day, tobacco use, chronic kidney disease, peripheral arterial disease and chronic left toe wound  Patient states that 2 weeks ago she developed a blister over her bunion that has since progressively worsened with increased redness and drainage and pain, she was empirically started on Keflex and doxycycline over a week ago  Due to worsening symptoms she was hospitalized on 1/24  Initial vitals were stable and white count was normal   X-ray was negative for osteo  She was admitted on Vanco, cefepime  An MRI was obtained that was concerning for osteo of the distal first metatarsal head and possible early osteo of the proximal great toe  Arterial Dopplers noted bilateral SFA and tibial disease with LLE AD of 0 39 and unobtainable toe pressures  Blood cultures are negative  She is currently awaiting a transfer to [facility] for arteriogram  She continues to note severe foot pain     Denies nausea, vomiting, diarrhea or rash and is tolerating antibiotics well  Infectious disease is being consulted for diagnostic work up and antibiotic management  Review of Systems  Pertinent positives and negatives as noted in HPI  Rest complete 12 point system-based review of systems is otherwise negative  PAST MEDICAL HISTORY:  Past Medical History:   Diagnosis Date   • Asthma    • CHF (congestive heart failure) ([facility])    • COPD (chronic obstructive pulmonary disease) ([facility] [address] )    • Diabetes mellitus ([facility])    • DVT of lower limb, acute ([facility])    • Hypertension    • Renal disorder      Past Surgical History:   Procedure Laterality Date   • APPENDECTOMY     •  SECTION     • HYSTERECTOMY         FAMILY HISTORY:  Non-contributory    SOCIAL HISTORY:  Social History     Social History     Substance and Sexual Activity   Alcohol Use Not Currently     Social History     Substance and Sexual Activity   Drug Use Never     Social History     Tobacco Use   Smoking Status Every Day   • Packs/day: 0 50   • Types: Cigarettes   Smokeless Tobacco Never       ALLERGIES:  Allergies   Allergen Reactions   • Doxycycline GI Intolerance     Refer to office visit 4/27/15   • Codeine      Occasional nausea      • Penicillins Hives     Last dose about 10years ago          MEDICATIONS:  All current active medications have been reviewed      Physical Exam     Temp:  [97 5 °F (36 4 °C)-98 8 °F (37 1 °C)] 97 9 °F (36 6 °C)  HR:  [66-79] 75  Resp:  [18-20] 18  BP: (138-148)/(52-54) 148/54  SpO2:  [94 %-97 %] 94 %  Temp (24hrs), Av 1 °F (36 7 °C), Min:97 5 °F (36 4 °C), Max:98 8 °F (37 1 °C)  Current: Temperature: 97 9 °F (36 6 °C)    Intake/Output Summary (Last 24 hours) at 2023 0920  Last data filed at 2023 8734  Gross per 24 hour   Intake 840 ml   Output 1350 ml   Net -510 ml         Physical exam findings reported by bedside and primary medical team staff    General Appearance:  Appearing chronically ill, nontoxic, and in some distress from pain, appears stated age   Head:  Normocephalic, without obvious abnormality, atraumatic   Eyes:  PERRL, conjunctiva pink and sclera anicteric, both eyes   Nose: Nares normal, mucosa normal, no drainage   Throat: Oropharynx moist without lesions; lips, mucosa, and tongue normal; teeth and gums normal   Neck: Supple, symmetrical, trachea midline, no adenopathy, no tenderness/mass/nodules   Back:   Symmetric, no curvature, ROM normal, no CVA tenderness   Lungs:    Diminished to auscultation bilaterally, no audible wheezes, rhonchi and rales, respirations unlabored   Chest Wall:  No tenderness or deformity   Heart:  Regular rate and rhythm, S1, S2 normal, no murmur, rub or gallop   Abdomen:   Soft, non-tender, non-distended, positive bowel sounds, no masses, no organomegaly    No CVA tenderness   Extremities: Extremities normal, atraumatic, trace edema left lower extremity with decreased distal pulses bilaterally   Skin:  Ulcer over medial aspect of left first toe centered around the MTPJ with mild periwound erythema and hypertrophic changes   Lymph nodes: Cervical, supraclavicular, and axillary nodes normal   Neurologic: Alert and oriented times 3       Invasive Devices:   Peripheral IV 01/24/23 Left Antecubital (Active)   Site Assessment [facility] 01/27/23 0827   Dressing Type Transparent 01/27/23 0827   Line Status Flushed 01/27/23 0827   Dressing Status Changed 01/27/23 0827   Dressing Intervention Dressing changed 01/27/23 0827   Dressing Change Due 01/28/23 01/27/23 0827   Reason Not Rotated Not due 01/27/23 0827       Peripheral IV 01/26/23 Dorsal (posterior); Right Hand (Active)   Site Assessment [facility] 01/26/23 2200   Dressing Type Transparent 01/26/23 2200   Line Status Blood return noted; Flushed; Infusing 01/26/23 2200   Dressing Status Clean;Dry; Intact 01/26/23 2200       Labs, Imaging, & Other Studies     Lab Results:    I have personally reviewed pertinent labs      Results from last 7 days   Lab Units 01/26/23  1231 01/25/23  0457 01/24/23  1528   WBC Thousand/uL 9 44 9 18 10 87*   HEMOGLOBIN g/dL 9 8* 9 4* 10 5*   PLATELETS Thousands/uL 257 241 297     Results from last 7 days   Lab Units 01/27/23  0449 01/25/23  0457 01/24/23  1528   POTASSIUM mmol/L 4 2   < > 4 4   CHLORIDE mmol/L 102   < > 96   CO2 mmol/L 28   < > 36*   BUN mg/dL 34*   < > 30*   CREATININE mg/dL 1 43*   < > 1 28   EGFR ml/min/1 73sq m 34   < > 39   CALCIUM mg/dL 8 2*   < > 10 3*   AST U/L  --   --  9*   ALT U/L  --   --  7   ALK PHOS U/L  --   --  59    < > = values in this interval not displayed  Results from last 7 days   Lab Units 01/24/23  1640 01/24/23  1528   BLOOD CULTURE  No Growth at 48 hrs  No Growth at 48 hrs  Imaging Studies:   I have personally reviewed pertinent imaging study reports and images in PACS  EKG, Pathology, and Other Studies:   I have personally reviewed pertinent reports and reviewed external records  Counseling/Coordination of care: Total 70 minutes communication with the patient via telehealth  Labs, medical tests and imaging studies were independently and extensively reviewed by me as noted above in HPI and old records were obtained and summarized as noted above in HPI  My recommendations were discussed with the patient in detail who verbalized understanding

## 2023-01-27 NOTE — PLAN OF CARE
Problem: OCCUPATIONAL THERAPY ADULT  Goal: Performs self-care activities at highest level of function for planned discharge setting  See evaluation for individualized goals  Description: Treatment Interventions: ADL retraining, Functional transfer training, UE strengthening/ROM, Endurance training, Cognitive reorientation, Patient/family training, Equipment evaluation/education, Neuromuscular reeducation, Compensatory technique education, Continued evaluation, Energy conservation, Activityengagement          See flowsheet documentation for full assessment, interventions and recommendations  Note: Limitation: Decreased ADL status, Decreased UE strength, Decreased Safe judgement during ADL, Decreased cognition, Decreased endurance, Decreased self-care trans, Decreased high-level ADLs  Prognosis: Good  Assessment: Pt is a [de-identified] y o  female, admitted to [address] [place] [address] 1/24/2023 d/t experiencing L foot infection  Dx: open wound of L great toe  Pt with plans to follow up with podiatry outpatient  WBAT BLE  Pt with PMHx impacting their performance during ADL tasks, including: asthma, CHF, COPD, DM, DVT, HTN, renal disorder  Prior to admission to the hospital Pt was performing ADLs with physical assistance  IADLs with physical assistance  Functional transfers/ambulation with physical assistance  Cognitive status was PTA was intact  OT order placed to assess Pt's ADLs, cognitive status, and performance during functional tasks in order to maximize safety and independence while making most appropriate d/c recommendations   Pt's clinical presentation is currently unstable/unpredictable given new onset deficits that effect Pt's occupational performance and ability to safely return to OF including decrease activity tolerance, decrease standing balance, decrease sitting balance, decrease performance during ADL tasks, decrease cognition, decrease safety awareness , decrease UB MS, increased pain, decrease generalized strength, decrease activity engagement, decrease performance during functional transfers, frequent falls, high fall risk and limited insight to deficits combined with medical complications of pain impacting overall mobility status, change in mental status, abnormal blood sugars, edema/swelling, wounds, decreased skin integrity, fear/retreat and need for input for mobility technique/safety  Personal factors affecting Pt at time of initial evaluation include: inaccessible home environment, step(s) to enter environment, past experience, behavioral pattern, inability to perform current job functions, inability to perform IADLs, inability to perform ADLs, inability to ambulate household distances, inability to navigate community distances, limited insight into impairments, decreased initiation and engagement, recent fall(s)/fall history and questionable non-compliance  Pt will benefit from continued skilled OT services to address deficits as defined above and to maximize level independence/participation during ADLs and functional tasks to facilitate return toward PLOF and improved quality of life  From an occupational therapy standpoint, recommendation at time of d/c would be post acute rehab       OT Discharge Recommendation: Post acute rehabilitation services

## 2023-01-27 NOTE — PROGRESS NOTES
[address]  Progress Note - Roseann Foy 1940, [de-identified] y o  female MRN: [id number]  Unit/Bed#: -[facility] Encounter: [id number]  Primary Care Provider: Mega Harvey MD   Date and time admitted to hospital: 1/24/2023  3:17 PM    * Acute hematogenous osteomyelitis of left foot [facility])  Assessment & Plan  Patient noted to have blisters on the left great toe which popped open into wounds  Prescribed antibiotics outpatient with Keflex and doxycycline however wound continued to worsen and sent to the ED to be evaluated  xray left foot negative for osteo-  MRI left foot shows Ulcer with evidence of small adjacent medial, distal 1st MTP osteomyelitis  Altered marrow signal intensity at the proximal-medial aspect of the 1st toe proximal phalanx is less specific and may be early osteomyelitis  consult podiatry for further recommendations  Placed on IV Vanco and cefepime as patient is diabetic blood  Cultures negative at 48 hours   arterial Doppler lower extremity shows There is a 50-75% stenosis noted in the prox and distal superficial femoral  artery and an occlusion vs high grade stenosis of the distal anterior tibial artery  Diffuse atherosclerotic disease throughout the remaining femoro-popliteal and tibio-peroneal segments  Ankle/Brachial index:  0 39  which is in the ischemic disease category  consult placed to vascular surgery  recommend angiogram with further intervention and will also eventually need first toe amputation secondary to osteomyelitis evident on MRI  H/o dvt in the past hold eliquis temporarily   Placed on heparin drip for 24 to 48 hours and adjusted pain control  patient initially wanted to go to [facility] for her vascular procedure however they do not have availability for angiogram until next Friday and patient states that her pain is too strong and she is unable to function at home at this time and wants this addressed    Now agreeable to going to [facility]  Will initiate transfer process        PAD (peripheral artery disease) ([facility])  Assessment & Plan  arterial Doppler bilateral lower extremity shows   RIGHT LOWER LIMB:  There is a 50-75% stenosis noted in the mid superficial femoral artery with  diffuse atherosclerotic disease throughout the remaining femoro-popliteal and  tibio-peroneal segments  Ankle/Brachial index:   0 42 which is in the ischemic disease category  PVR/ PPG tracings are dampened  Metatarsal pressure of 38 mmHg  Great toe pressure could not be obtained due to severely attenuated waveform         LEFT LOWER LIMB:  There is a 50-75% stenosis noted in the prox and distal superficial femoral  artery and an occlusion vs high grade stenosis of the distal anterior tibial  artery  Diffuse atherosclerotic disease throughout the remaining  femoro-popliteal and tibio-peroneal segments  Ankle/Brachial index:  0 39  which is in the ischemic disease category  PVR/ PPG tracings are dampened  Metatarsal pressure of 33 mmHg  Great toe pressure not obtained due to bandaging  Was initially seen by [facility] vascular and recommended angiogram and further intervention on Monday however patient refused and wanted to go to [facility]  Discussed with Dr Teo Rob  Recommended pain control and outpatient follow-up with him on Monday with potential for angiogram to be done next Friday  She states that the pain is too bad and she cannot wait that long and cannot function at home like this and now requesting to be transferred to [facility] on heparin drip increased pain control   discussed with patient and daughter at length  Consult placed to nephrology to help optimize renal function for possible angiogram coming up soon    Chronic, continuous use of opioids  Assessment & Plan  Cont oxycodone and iv morphine  also on movantik at home which is not formulary here  She is having ischemic left foot pain/claudication  Increased pain medication regimen and monitor for now    Chronic respiratory failure with hypoxia and hypercapnia (HCC)  Assessment & Plan  Baseline  Continue 3 L of oxygen 24/7    Mixed simple and mucopurulent chronic bronchitis (HCC)  Assessment & Plan  History of chronic COPD with chronic respiratory failure requiring 3 L of oxygen at baseline  Currently at baseline  Counseled on smoking cessation and placed on nicotine replacement therapy  Continue home regimen of Advair substituted to Breo, Spiriva and albuterol inhaler/neb prednisone 2 5 mg daily as per home regimen    Type 2 diabetes mellitus with skin complication, with long-term current use of insulin [facility])  Assessment & Plan  Lab Results   Component Value Date    HGBA1C 7 5 (H) 01/23/2023       Recent Labs     01/26/23  1100 01/26/23  1629 01/26/23  2142 01/27/23  0747   POCGLU 143* 141* 149* 129       Blood Sugar Average: Last 72 hrs:  (P) 132cont Lantus 12 units at at bedtime and insulin sliding scale for now and observe  Patient's home regimen is Lantus 12 units at at bedtime with no mealtime coverage  Sugars are well controlled      VTE Pharmacologic Prophylaxis:   Pharmacologic: Heparin Drip  Mechanical VTE Prophylaxis in Place: Yes    Patient Centered Rounds: I have performed bedside rounds with nursing staff today  Discussions with Specialists or Other Care Team Provider: discussed with vascular surgery and infectious disease    Education and Discussions with Family / Patient: discussed with patient and daughter at bedside    Time Spent for Care: 45 minutes  More than 50% of total time spent on counseling and coordination of care as described above      Current Length of Stay: 3 day(s)    Current Patient Status: Inpatient   Certification Statement: The patient will continue to require additional inpatient hospital stay due to ischemic foot pain    Discharge Plan: Await transfer to [facility]    Code Status: Level 3 - DNAR and DNI      Subjective:   Patient complaining of 6 x 10 pain in the left foot  States that she cannot go home like this and has a lot of pain still and now agreeable to going to [facility]    Objective:     Vitals:   Temp (24hrs), Av 1 °F (36 7 °C), Min:97 5 °F (36 4 °C), Max:98 8 °F (37 1 °C)    Temp:  [97 5 °F (36 4 °C)-98 8 °F (37 1 °C)] 97 9 °F (36 6 °C)  HR:  [66-79] 75  Resp:  [18-20] 18  BP: (138-148)/(52-54) 148/54  SpO2:  [94 %-97 %] 94 %  Body mass index is 26 99 kg/m²  Input and Output Summary (last 24 hours): Intake/Output Summary (Last 24 hours) at 2023 1302  Last data filed at 2023 1119  Gross per 24 hour   Intake 840 ml   Output 1500 ml   Net -660 ml       Physical Exam:     Physical Exam  Vitals and nursing note reviewed  Constitutional:       Appearance: Normal appearance  HENT:      Head: Normocephalic and atraumatic  Right Ear: External ear normal       Left Ear: External ear normal       Nose: Nose normal       Mouth/Throat:      Pharynx: Oropharynx is clear  Eyes:      Pupils: Pupils are equal, round, and reactive to light  Cardiovascular:      Rate and Rhythm: Normal rate and regular rhythm  Heart sounds: Normal heart sounds  Pulmonary:      Effort: Pulmonary effort is normal       Breath sounds: Normal breath sounds  Abdominal:      General: Bowel sounds are normal       Palpations: Abdomen is soft  Tenderness: There is no abdominal tenderness  Musculoskeletal:      Cervical back: Normal range of motion and neck supple  Comments: Patient have a wound on the left first toe and absent dorsalis pedis and posterior tibial pulse   Skin:     General: Skin is warm and dry  Capillary Refill: Capillary refill takes less than 2 seconds  Neurological:      General: No focal deficit present  Mental Status: She is alert and oriented to person, place, and time     Psychiatric:         Mood and Affect: Mood normal  Additional Data:     Labs:    Results from last 7 days   Lab Units 01/26/23  1231 01/25/23  0457 01/24/23  1528   WBC Thousand/uL 9 44   < > 10 87*   HEMOGLOBIN g/dL 9 8*   < > 10 5*   HEMATOCRIT % 33 0*   < > 35 0   PLATELETS Thousands/uL 257   < > 297   NEUTROS PCT %  --   --  72   LYMPHS PCT %  --   --  16   MONOS PCT %  --   --  6   EOS PCT %  --   --  4    < > = values in this interval not displayed  Results from last 7 days   Lab Units 01/27/23  0449 01/25/23  0457 01/24/23  1528   SODIUM mmol/L 138   < > 140   POTASSIUM mmol/L 4 2   < > 4 4   CHLORIDE mmol/L 102   < > 96   CO2 mmol/L 28   < > 36*   BUN mg/dL 34*   < > 30*   CREATININE mg/dL 1 43*   < > 1 28   ANION GAP mmol/L 8   < > 8   CALCIUM mg/dL 8 2*   < > 10 3*   ALBUMIN g/dL  --   --  4 0   TOTAL BILIRUBIN mg/dL  --   --  0 23   ALK PHOS U/L  --   --  59   ALT U/L  --   --  7   AST U/L  --   --  9*   GLUCOSE RANDOM mg/dL 97   < > 180*    < > = values in this interval not displayed  Results from last 7 days   Lab Units 01/26/23  1231   INR  1 06     Results from last 7 days   Lab Units 01/27/23  0747 01/26/23  2142 01/26/23  1629 01/26/23  1100 01/26/23  0737 01/25/23  2148 01/25/23  1612 01/25/23  1105 01/25/23  0737 01/24/23  2058 01/24/23  1806   POC GLUCOSE mg/dl 129 149* 141* 143* 83 110 139 134 116 132 176*     Results from last 7 days   Lab Units 01/23/23  1133   HEMOGLOBIN A1C % 7 5*     Results from last 7 days   Lab Units 01/24/23  1528   LACTIC ACID mmol/L 1 1           * I Have Reviewed All Lab Data Listed Above  * Additional Pertinent Lab Tests Reviewed: Leonel 68 Admission Reviewed    Imaging:    Imaging Reports Reviewed Today Include: mri left foot  Imaging Personally Reviewed by Myself Includes:  none    Recent Cultures (last 7 days):     Results from last 7 days   Lab Units 01/24/23  1640 01/24/23  1528   BLOOD CULTURE  No Growth at 48 hrs  No Growth at 48 hrs         Last 24 Hours Medication List: Current Facility-Administered Medications   Medication Dose Route Frequency Provider Last Rate   • acetaminophen  650 mg Oral Q6H PRN Jose Armando Crowley MD     • albuterol  0 63 mg Nebulization 4x Daily PRN Jose Armando Crowley MD     • allopurinol  50 mg Oral Daily Jose Armando Crowley MD     • amLODIPine  5 mg Oral Daily Jose Armando Crowley MD     • aspirin  81 mg Oral Daily Jose Armando Crowley MD     • atorvastatin  10 mg Oral Daily Jose Armando Crowley MD     • calcium carbonate  1,000 mg Oral Daily PRN Jose Armando Crowley MD     • cefepime  2,000 mg Intravenous Q12H Jose Armando Crowley MD 2,000 mg (01/27/23 0882)   • docusate sodium  100 mg Oral BID Jose Armando Crowley MD     • Fluticasone Furoate-Vilanterol  1 puff Inhalation Daily Jose Armando Crowley MD     • gabapentin  300 mg Oral TID Jose Armando Crowley MD     • heparin (porcine)  3-30 Units/kg/hr (Order-Specific) Intravenous Titrated Jose Armando Crowley MD 15 Units/kg/hr (01/27/23 1252)   • heparin (porcine)  3,000 Units Intravenous Q6H PRN Jose Armando Crowley MD     • heparin (porcine)  6,000 Units Intravenous Q6H PRN Jose Armando Crowley MD     • insulin glargine  12 Units Subcutaneous HS Jose Armando Crowley MD     • insulin lispro  1-6 Units Subcutaneous HS Jose Armando Crowley MD     • insulin lispro  2-12 Units Subcutaneous TID AC Jose Armando Crowley MD     • metoprolol tartrate  50 mg Oral Q12H [address] Jose Armando Crowley MD     • Mirabegron ER  25 mg Oral Daily Jose Armando Crowley MD     • mirtazapine  30 mg Oral HS Jose Armando Crowley MD     • morphine injection  2 mg Intravenous Q4H PRN Jose Armando Crowley MD     • nicotine  1 patch Transdermal Daily Jose Armando Crowley MD     • ondansetron  4 mg Intravenous Q8H PRN Jolene Vasquez PA-C     • oxyCODONE  10 mg Oral Q6H PRN Jose Armando Crowley MD     • oxyCODONE  5 mg Oral Q4H PRN Jose Armando Crowley MD     • pantoprazole  40 mg Oral Daily Jose Armando Crowley MD     • predniSONE  2 5 mg Oral Daily Jose Armando Crowley MD     • rOPINIRole  0 5 mg Oral HS Jose Armando Crowley MD     • saccharomyces boulardii  250 mg Oral Daily Jose Armando Crowley MD     • senna  1 tablet Oral Daily Jose Armando Crowley MD • sertraline  50 mg Oral HS Bárbara Damon MD     • sucralfate  1 g Oral BID AC Bárbara Damon MD     • tiotropium  18 mcg Inhalation Daily Bárbara Damon MD     • traZODone  50 mg Oral HS Bárbara Damon MD     • [START ON 1/28/2023] vancomycin  750 mg Intravenous Q24H Bárbara Damon MD          Today, Patient Was Seen By: Bárbara Damon MD    ** Please Note: Dictation voice to text software may have been used in the creation of this document   ** Alert and oriented to person, place and time

## 2023-01-27 NOTE — PROGRESS NOTES
Progress Note - Nephrology   Randa Cunningham [de-identified] y o  female MRN: [id number]  Unit/Bed#: -01 Encounter: [id number]    A/P:  1  Chronic kidney disease stage IIIb with baseline creatinine between 1 2-1 4 mg/deciliter   Continue with his nephrotoxins and optimize the patient's overall care  As mentioned in consultation, she will be at low-moderate risk for contrast-induced nephropathy if she has exposure  Placing the patient on pre and post IV volume expansion will be important, avoid all RAAS inhibition as well as loop diuretic and other diuretics 24 hours prior to and 24 hours after contrast exposure unless otherwise clinically indicated  2   Proteinuria   Most likely due to diabetic nephropathy, however, the patient should be worked up and ruled out for monoclonal gammopathy with serum electrophoresis in the outpatient setting  Depending on clinical circumstances, she will either benefit from the use of an SGLT-2 inhibitor versus RAAS inhibition with an angiotensin receptor blocker  Further evaluation and care from this perspective in the outpatient setting  3   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   Patient most likely has at least some component of his underlying chronic process in the kidneys due to previous use of medications  Continue to optimize care from the medical standpoint, and avoid medications that are known to cause a specific process if possible  4   Peripheral arterial disease   Patient for potential transfer to [facility] for further evaluation and care by vascular surgery colleagues at 1 of those [place]  As mentioned above, if the patient has IV contrast exposure she will benefit from pre and post IV volume expansion at the time of the exposure, and avoidance of specific medications known to increase the risk for contrast-induced nephropathy    5  open wound to the left toe   Continue with antibiotics according to hospitalist/vascular surgeon recommendations, local care according to their recommendations as well  Follow up reason for today's visit: Chronic kidney disease stage III/proteinuria/chronic tubular/nephritis    Open wound of left great toe    Patient Active Problem List   Diagnosis   • Open wound of left great toe   • Type 2 diabetes mellitus with skin complication, with long-term current use of insulin (HCC)   • Mixed simple and mucopurulent chronic bronchitis ([facility])   • Chronic respiratory failure with hypoxia and hypercapnia (HCC)   • Chronic, continuous use of opioids   • PAD (peripheral artery disease) ([facility])         Subjective:   Patient is complaining of pain in the area of the wound, otherwise is eating and drinking well with no nausea or vomiting at this time  Objective:     Vitals: Blood pressure 148/54, pulse 75, temperature 97 9 °F (36 6 °C), resp  rate 18, height 5' 6" (1 676 m), weight 75 8 kg (167 lb 3 2 oz), SpO2 94 %  ,Body mass index is 26 99 kg/m²  Weight (last 2 days)     Date/Time Weight    01/27/23 0300 75 8 (167 2)            Intake/Output Summary (Last 24 hours) at 1/27/2023 0847  Last data filed at 1/27/2023 0827  Gross per 24 hour   Intake 720 ml   Output 1350 ml   Net -630 ml     I/O last 3 completed shifts:   In: 5 [P O :720]  Out: 1500 [Urine:1500]         Physical Exam: /54   Pulse 75   Temp 97 9 °F (36 6 °C)   Resp 18   Ht 5' 6" (1 676 m)   Wt 75 8 kg (167 lb 3 2 oz)   SpO2 94%   BMI 26 99 kg/m²     General Appearance:    Alert, cooperative, no distress, appears stated age   Head:    Normocephalic, without obvious abnormality, atraumatic   Eyes:    Conjunctiva/corneas clear   Ears:    Normal external ears   Nose:   Nares normal, septum midline, mucosa normal, no drainage    or sinus tenderness   Throat:   Lips, mucosa, and tongue normal; teeth and gums normal   Neck:   Supple   Back:     Symmetric, no curvature, ROM normal, no CVA tenderness   Lungs:     Clear to auscultation bilaterally, respirations unlabored   Chest wall:    No tenderness or deformity   Heart:    Regular rate and rhythm, S1 and S2 normal, no murmur, rub   or gallop   Abdomen:     Soft, non-tender, bowel sounds active   Extremities:   Extremities normal, atraumatic, no cyanosis or edema   Skin:   Skin color, texture, turgor normal, no rashes or lesions   Lymph nodes:   Cervical normal   Neurologic:   CNII-XII intact            Lab, Imaging and other studies: I have personally reviewed pertinent labs  CBC:   Lab Results   Component Value Date    WBC 9 44 01/26/2023    HGB 9 8 (L) 01/26/2023    HCT 33 0 (L) 01/26/2023    MCV 87 01/26/2023     01/26/2023    MCH 25 7 (L) 01/26/2023    MCHC 29 7 (L) 01/26/2023    RDW 15 8 (H) 01/26/2023    MPV 10 2 01/26/2023     CMP:   Lab Results   Component Value Date    K 4 2 01/27/2023     01/27/2023    CO2 28 01/27/2023    BUN 34 (H) 01/27/2023    CREATININE 1 43 (H) 01/27/2023    CALCIUM 8 2 (L) 01/27/2023    EGFR 34 01/27/2023           Results from last 7 days   Lab Units 01/27/23  0449 01/26/23  0513 01/25/23  0457 01/24/23  1528   POTASSIUM mmol/L 4 2 4 4 4 7 4 4   CHLORIDE mmol/L 102 101 100 96   CO2 mmol/L 28 35* 36* 36*   BUN mg/dL 34* 34* 33* 30*   CREATININE mg/dL 1 43* 1 43* 1 42* 1 28   CALCIUM mg/dL 8 2* 8 8 9 5 10 3*   ALK PHOS U/L  --   --   --  59   ALT U/L  --   --   --  7   AST U/L  --   --   --  9*         Phosphorus: No results found for: PHOS  Magnesium: No results found for: MG  Urinalysis: No results found for: COLORU, CLARITYU, SPECGRAV, PHUR, LEUKOCYTESUR, NITRITE, PROTEINUA, GLUCOSEU, KETONESU, BILIRUBINUR, BLOODU  Ionized Calcium: No results found for: CAION  Coagulation:   Lab Results   Component Value Date    INR 1 06 01/26/2023     Troponin: No results found for: TROPONINI  ABG: No results found for: PHART, AKH5ABT, PO2ART, JJT0FDE, J0FYCFNU, BEART, SOURCE  Radiology review:     IMAGING  Procedure: MRI foot/forefoot toes left wo contrast    Result Date: 1/25/2023  Narrative: MRI LEFT FOOT INDICATION:   r/o OM left hallux and 1st met  Negative radiographs  Chronic ulcer  Peripheral arterial disease, smoking and type II diabetes  Ulcer at the medial aspect of left MTP joint and hallux present for approximately one week  COMPARISON: 1/24/2023 radiographs TECHNIQUE:  Multiplanar/multisequence MR of the left foot was performed  FINDINGS: SUBCUTANEOUS TISSUES: Diffuse edema, most severe at the medial forefoot  Evidence of ulceration at the medial 1st MTP joint  No sinus tract identified  No evidence of foreign body  Approximately 2 7 x 2 3 x 1 7 length by width by depth fluid collection between the 1st and 2nd MTP joints, extending to the dorsal 1st MTP joint (5/14)  Appears contiguous with small 1st MTP joint effusion  Evidence of synovial thickening  BONES:  Patchy marrow edema at the 1st MTP, IP and hallux sesamoid joints appears centered in the subchondral bone with accompanying cystic change and small osteophytes  IP and MTP joint space narrowing  Marrow edema in the distal, medial aspect of the 1st metatarsal head, directly adjacent to the ulcer, with 1 1 x 0 5 cm confluent loss of normal fatty T1-weighted signal (6/25) is typical of osteomyelitis  Marrow edema in the proximal-medial corner of the 1st toe proximal phalanx adjacent to the ulcer is less specific, has some patchy loss of T1 weighted signal intensity, less confluent, may be early osteomyelitis  Extends approximately 0 3 cm from the medial cortical surface (6/28)  Approximately 2 1 x 0 9 x 1 8 cm height by depth by width anterior talar process AVN  No articular surface collapse  Reciprocal subchondral marrow edema or cyst in the navicular without AVN  No acute fracture or malalignment  Talar dome is within normal limits  PLANTAR FASCIA:  Intact  LISFRANC LIGAMENT:  Intact  FOREFOOT TENDONS: Tendons are intact  Flexor hallucis and digitorum longus tendon sheath fluid   INTERMETATARSAL REGIONS: No bursitis or Merritt's neuroma  MUSCULATURE: Diffuse edema and fatty infiltration  No focal findings  Impression: Ulcer with evidence of small adjacent medial, distal 1st MTP osteomyelitis  Altered marrow signal intensity at the proximal-medial aspect of the 1st toe proximal phalanx is less specific and may be early osteomyelitis  Superimposed significant 1st toe degenerative changes  Fluid collection contiguous with the 1st MTP joint as detailed above is more compatible with joint effusion and synovial cyst than abscess  Anterior talus AVN  Flexor hallucis and digitorum longus tenosynovitis  Other nonemergent findings above  Workstation performed: [id number]     Procedure: XR toe great min 2 views LEFT    Result Date: 1/25/2023  Narrative: LEFT TOES INDICATION:   wound  COMPARISON:  None VIEWS:  XR TOE LEFT GREAT MIN 2 VIEWS FINDINGS: There is no acute fracture or dislocation  Mild polyarticular joint space narrowing  Diffuse osteopenia  No lytic or blastic osseous lesion  Soft tissue ulcer medial to the IP and first MTP joints  Impression: No radiographic evidence of osteomyelitis  Workstation performed: [id number]     Procedure: VAS lower limb arterial duplex, complete bilateral    Result Date: 1/25/2023  Narrative:  [facility] REPORT CLINICAL: Indications: Patient presents with non-healing ulcer on their left foot x 1 week with pain  Operative History: No cardiovascular surgeries noted Risk Factors The patient has history of HTN, Diabetes (NIDDM (oral meds)) and smoking (current) 0 5 ppd  Clinical Right Pressure:  138/ mm Hg, Left Pressure:  144/ mm Hg    FINDINGS:  Segment                Right                   Left                                          Impression  PSV (cm/s)  Impression  PSV (cm/s)  Common Femoral Artery                     134                     151  Prox Profunda                             214                      97  Prox SFA 116  50-75%             270  Mid SFA                50-75%             219                      46  Dist SFA                                   48  50-75%             208  Proximal Pop                               57                      81  Distal Pop                                 44                      46  Tibioperoneal                              38                      43  Dist Post Tibial                           21                      33  Dist  Ant  Tibial                          19  Occluded             0  Dist Peroneal                              27                      25     CONCLUSION:  Impression: RIGHT LOWER LIMB: There is a 50-75% stenosis noted in the mid superficial femoral artery with diffuse atherosclerotic disease throughout the remaining femoro-popliteal and tibio-peroneal segments  Ankle/Brachial index:   0 42 which is in the ischemic disease category PVR/ PPG tracings are dampened  Metatarsal pressure of 38 mmHg Great toe pressure could not be obtained due to severely attenuated waveform  LEFT LOWER LIMB: There is a 50-75% stenosis noted in the prox and distal superficial femoral artery and an occlusion vs high grade stenosis of the distal anterior tibial artery  Diffuse atherosclerotic disease throughout the remaining femoro-popliteal and tibio-peroneal segments  Ankle/Brachial index:  0 39  which is in the ischemic disease category PVR/ PPG tracings are dampened   Metatarsal pressure of 33 mmHg Great toe pressure not obtained due to bandaging  SIGNATURE: Electronically Signed by: Lj Bautista on 2023-01-25 04:35:28 PM      Current Facility-Administered Medications   Medication Dose Route Frequency   • acetaminophen (TYLENOL) tablet 650 mg  650 mg Oral Q6H PRN   • albuterol inhalation solution 0 63 mg  0 63 mg Nebulization 4x Daily PRN   • allopurinol (ZYLOPRIM) tablet 50 mg  50 mg Oral Daily   • amLODIPine (NORVASC) tablet 5 mg  5 mg Oral Daily   • aspirin chewable tablet 81 mg  81 mg Oral Daily   • atorvastatin (LIPITOR) tablet 10 mg  10 mg Oral Daily   • calcium carbonate (TUMS) chewable tablet 1,000 mg  1,000 mg Oral Daily PRN   • cefepime (MAXIPIME) IVPB (premix in dextrose) 2,000 mg 50 mL  2,000 mg Intravenous Q12H   • docusate sodium (COLACE) capsule 100 mg  100 mg Oral BID   • Fluticasone Furoate-Vilanterol 100-25 mcg/actuation 1 puff  1 puff Inhalation Daily   • gabapentin (NEURONTIN) capsule 300 mg  300 mg Oral TID   • heparin (porcine) 25,000 units in 0 45% NaCl 250 mL infusion (premix)  3-30 Units/kg/hr (Order-Specific) Intravenous Titrated   • heparin (porcine) injection 3,000 Units  3,000 Units Intravenous Q6H PRN   • heparin (porcine) injection 6,000 Units  6,000 Units Intravenous Q6H PRN   • insulin glargine (LANTUS) subcutaneous injection 12 Units 0 12 mL  12 Units Subcutaneous HS   • insulin lispro (HumaLOG) 100 units/mL subcutaneous injection 1-6 Units  1-6 Units Subcutaneous HS   • insulin lispro (HumaLOG) 100 units/mL subcutaneous injection 2-12 Units  2-12 Units Subcutaneous TID AC   • metoprolol tartrate (LOPRESSOR) tablet 50 mg  50 mg Oral Q12H SOULEYMANE   • Mirabegron ER TB24 25 mg  25 mg Oral Daily   • mirtazapine (REMERON) tablet 30 mg  30 mg Oral HS   • morphine injection 2 mg  2 mg Intravenous Q4H PRN   • nicotine (NICODERM CQ) 7 mg/24hr TD 24 hr patch 1 patch  1 patch Transdermal Daily   • ondansetron (ZOFRAN) injection 4 mg  4 mg Intravenous Q8H PRN   • oxyCODONE (ROXICODONE) immediate release tablet 10 mg  10 mg Oral Q6H PRN   • oxyCODONE (ROXICODONE) IR tablet 5 mg  5 mg Oral Q4H PRN   • pantoprazole (PROTONIX) EC tablet 40 mg  40 mg Oral Daily   • predniSONE tablet 2 5 mg  2 5 mg Oral Daily   • rOPINIRole (REQUIP) tablet 0 5 mg  0 5 mg Oral HS   • saccharomyces boulardii (FLORASTOR) capsule 250 mg  250 mg Oral Daily   • senna (SENOKOT) tablet 8 6 mg  1 tablet Oral Daily   • sertraline (ZOLOFT) tablet 50 mg  50 mg Oral HS   • sucralfate (CARAFATE) tablet 1 g 1 g Oral BID AC   • tiotropium (SPIRIVA) capsule for inhaler 18 mcg  18 mcg Inhalation Daily   • traZODone (DESYREL) tablet 50 mg  50 mg Oral HS   • [START ON 1/28/2023] vancomycin (VANCOCIN) IVPB (premix in dextrose) 750 mg 150 mL  750 mg Intravenous Q24H     Medications Discontinued During This Encounter   Medication Reason   • OXYCODONE ER PO Error   • losartan (COZAAR) 25 mg tablet Error   • metolazone (ZAROXOLYN) 5 mg tablet Error   • lactulose (Enulose) 10 g/15 mL Error   • DULoxetine (CYMBALTA) 60 mg delayed release capsule Error   • insulin aspart (NovoLOG) 100 units/mL injection Error   • furosemide (LASIX) tablet 40 mg    • potassium chloride oral solution 20 mEq    • sodium chloride 0 9 % infusion    • heparin (VTE/PE) high    • vancomycin (VANCOCIN) IVPB (premix in dextrose) 1,000 mg 200 mL Dose adjustment   • oxyCODONE (OxyCONTIN) 12 hr tablet 10 mg Dose adjustment       Cheryle Soda, DO      This progress note was produced in part using a dictation device which may document imprecise wording from author's original intent

## 2023-01-27 NOTE — PROGRESS NOTES
August Mosquera is a [de-identified] y o  female who is currently ordered Vancomycin IV with management by the Pharmacy Consult service  Relevant clinical data and objective / subjective history reviewed  Vancomycin Assessment:  Indication and Goal AUC/Trough: Soft tissue (goal -600, trough >10), -600, trough >10  Clinical Status: stable  Micro:     Renal Function:  SCr: 1 43 mg/dL  CrCl: 32 4 mL/min  Renal replacement: Not on dialysis  Days of Therapy: 4  Current Dose: 1000 mg iv q 24 hrs  Vancomycin Plan:  New Dosin mg iv q 24 hrs  Estimated AUC: 509 mcg*hr/mL  Estimated Trough: 16 5 mcg/mL  Next Level: 2/3/23 @ 0600  Renal Function Monitoring: Daily BMP and [place] will continue to follow closely for s/sx of nephrotoxicity, infusion reactions and appropriateness of therapy  BMP and CBC will be ordered per protocol  We will continue to follow the patient’s culture results and clinical progress daily      Cristina Crawford, Pharmacist

## 2023-01-27 NOTE — PHYSICAL THERAPY NOTE
PHYSICAL THERAPY EVALUATION  NAME:  Stephan Schmitt  DATE: 01/27/23    AGE:   [de-identified] y o  Mrn:   [id number]  ADMIT DX:  COPD (chronic obstructive pulmonary disease) ([facility] ) [J44 9]  Cellulitis [L03 90]  Hyperglycemia [R73 9]  Wound infection [T14  8XXA, L08 9]  Visit for wound check [Z51 89]  Open wound of left great toe, initial encounter [S91 102A]    Past Medical History:   Diagnosis Date   • Asthma    • CHF (congestive heart failure) ([facility] )    • COPD (chronic obstructive pulmonary disease) ([place] [address] )    • Diabetes mellitus ([place] [address] )    • DVT of lower limb, acute ([place] [address] )    • Hypertension    • Renal disorder      Length Of Stay: 3  Performed at least 2 patient identifiers during session: Name and Birthday  PHYSICAL THERAPY EVALUATION :    01/27/23 1127   PT Last Visit   PT Visit Date 01/27/23   Note Type   Note type Evaluation   Pain Assessment   Pain Assessment Tool 0-10   Pain Score 8   Pain Location/Orientation Orientation: Left; Location: Foot   Pain Rating: FLACC (Rest) - Face 0   Pain Rating: FLACC (Rest) - Legs 0   Pain Rating: FLACC (Rest) - Activity 0   Pain Rating: FLACC (Rest) - Cry 0   Pain Rating: FLACC (Rest) - Consolability 0   Score: FLACC (Rest) 0   Pain Rating: FLACC (Activity) - Face 1   Pain Rating: FLACC (Activity) - Legs 0   Pain Rating: FLACC (Activity) - Activity 0   Pain Rating: FLACC (Activity) - Cry 1   Pain Rating: FLACC (Activity) - Consolability 0   Score: FLACC (Activity) 2   Restrictions/Precautions   Weight Bearing Precautions Per Order Yes   LLE Weight Bearing Per Order WBAT  (in surgical shoe)   Braces or Orthoses   (surgical shoe)   Other Precautions Bed Alarm;Cognitive;Multiple lines; Fall Risk;Pain;O2  (3 lpm O2 at baseline)   Home Living   Type of Home Apartment  ([address] to her 1st floor apartment )   Home Layout Performs ADLs on one level; Able to live on main level with bedroom/bathroom   Bathroom Shower/Tub Tub/shower unit   Bathroom Toilet Standard   Bathroom Equipment Shower chair;Grab bars in [facility] 124   Additional Comments Pt reports living in an apartment with [address]  once upstairs, everything is on 1 floor  pt ambulates short distances with RW at baseline   Prior Function   Level of [place] Needs assistance with ADLs; Needs assistance with functional mobility; Needs assistance with IADLS   Lives With Daughter   IADLs Family/Friend/Other provides transportation; Family/Friend/Other provides meals; Family/Friend/Other provides medication management   Falls in the last 6 months 0  (had 3 falls in past year and a half)   Comments Reports needing increased assistance wiht mobility since a year and a half ago  General   Additional Pertinent History MRI left foot/forefoot toes: Ulcer with evidence of small adjacent medial, distal 1st MTP osteomyelitis  Altered marrow signal intensity at the proximal-medial aspect of the 1st toe proximal phalanx is less specific and may be early osteomyelitis  Superimposed significant 1st toe degenerative changes  Fluid collection contiguous with the 1st MTP joint as detailed above is more compatible with joint effusion and synovial cyst than abscess  Anterior talus AVN  Flexor hallucis and digitorum longus tenosynovitis  Arterial duplex reading: left limb: There is a 50-75% stenosis noted in the prox and distal superficial femoral artery and an occlusion vs high grade stenosis of the distal anterior tibial artery  Diffuse atherosclerotic disease throughout the remaining femoro-popliteal and tibio-peroneal segments  Ankle/Brachial index:  0 39  which is in the ischemic disease category PVR/ PPG tracings are dampened  Metatarsal pressure of 33 mmHg  Right limb: There is a 50-75% stenosis noted in the mid superficial femoral artery with diffuse atherosclerotic disease throughout the remaining femoro-popliteal and tibio-peroneal segments   Ankle/Brachial index:   0 42 which is in the ischemic disease category PVR/ PPG tracings are dampened  Metatarsal pressure of 38 mmHg  Pt with need for amputation due to osteomyelitis left great toe, also in need for vascular procedure and was pending transfer to hospitals, but patient declined as she preferred to go to [facility]  Plan for outpatient f/u  Pt is WBAT in post op shoe  Cognition   Orientation Level Oriented X4   Following Commands Follows one step commands with increased time or repetition   Comments decreased insight to deficits and resistive toward education, rehab  Subjective   Subjective "I have been through enough  I want to go home "   RLE Assessment   RLE Assessment WFL  (strength 3+/5)   LLE Assessment   LLE Assessment WFL  (except limited left knee extension -10 degrees  strength 3-/5)   Coordination   Rapid Alternating Movements Intact   Light Touch   RLE Light Touch Impaired   RLE Light Touch Comments diminished distally   LLE Light Touch Impaired   LLE Light Touch Comments diminished distally and decreased > right LE   Bed Mobility   Additional Comments Pt sitting at EOB upon arrival to room with all needs within reach  post op shoe applied to left foot  pt with impaired skin integrity left great toe  bandage in place  adamantly refusing to sit in recliner chair  Transfers   Sit to Stand   (steadying assistance)   Additional items Assist x 1; Increased time required;Verbal cues   Stand to Sit   (steadying assistance)   Additional items Assist x 1; Increased time required;Verbal cues   Stand pivot 4  Minimal assistance   Additional items Assist x 1; Increased time required;Verbal cues   Additional Comments use of RW  sit<>stand with steadying assistance wiht inc time with mod verbal cues for hand placement  spt with RW with minAx1 with manual cues for wt shifting and balance  decreased stance L LE  cues for turning completely prior to sitting   Ambulation/Elevation   Gait pattern Wide MATY; Antalgic;Decreased L stance; Short stride; Excessively slow; Step to   Gait Assistance 4  Minimal assist   Additional items Assist x 1;Verbal cues   Assistive Device Rolling walker   Distance ambulated 18'x1 with RW with miNAx1 with wide MATY, decreased stance L LE, decreased step length and foot clearance  cues for increased foot clearance and sequence and technique  pt limited by pain and weakness for further ambulation   Balance   Static Sitting Fair +   Dynamic Sitting Fair   Static Standing Fair -   Dynamic Standing Poor +   Ambulatory Poor +   Endurance Deficit   Endurance Deficit Yes   Endurance Deficit Description fatigue, mod HOLLIS  Pt on 3 lpm  Spo2 >/= 90%  Activity Tolerance   Activity Tolerance Patient limited by fatigue;Patient limited by pain   Medical Staff Made Aware OT, [facility]   Nurse Made Aware RN   Assessment   Prognosis Guarded   Problem List Decreased strength;Decreased endurance; Impaired balance;Decreased mobility; Impaired judgement;Decreased safety awareness; Obesity; Decreased skin integrity;Pain   Barriers to Discharge Decreased caregiver support; Inaccessible home environment   Barriers to Discharge Comments [address] home and requires assistance to complete mobility   Goals   Patient Goals "Go home"   STG Expiration Date 02/10/23   PT Treatment Day 0   Plan   Treatment/Interventions ADL retraining;Functional transfer training;LE strengthening/ROM; Elevations; Therapeutic exercise; Endurance training;Patient/family training;Equipment eval/education; Bed mobility;Gait training; Compensatory technique education;Spoke to nursing;Spoke to case management;OT   PT Frequency 3-5x/wk   Recommendation   PT Discharge Recommendation Post acute rehabilitation services   Equipment Recommended   (TBD by rehab)   Additional Comments pt currently declining post acute rehab, would require BLS home to access apartment and asisstance wiht all mobility, wheelchair to access home safely     AM-PAC Basic Mobility Inpatient   Turning in Flat Bed Without Bedrails 3   Lying on Back to Sitting on Edge of Flat Bed Without Bedrails 2   Moving Bed to Chair 3   Standing Up From Chair Using Arms 3   Walk in Room 3   Climb 3-5 Stairs With Railing 1   Basic Mobility Inpatient Raw Score 15   Basic Mobility Standardized Score 36 97   Highest Level Of Mobility   -[facility] Goal 4: Move to chair/commode   JH-HLM Achieved 6: Walk 10 steps or more   End of Consult   Patient Position at End of Consult Seated edge of bed;Bed/Chair alarm activated; All needs within reach     (Please find full objective findings from PT assessment regarding body systems outlined above)  Assessment: Pt is a [de-identified] y o  female seen for PT evaluation s/p admission to [address] [place] [address] on 1/24/2023 with Acute hematogenous osteomyelitis of left foot ([facility] )  Order placed for PT services    Upon evaluation: Pt is presenting with impaired functional mobility due to pain, decreased strength, decreased endurance, impaired balance, gait deviations, altered sensation, decreased safety awareness, impaired judgment, fall risk, LE edema, and impaired skin integrity requiring  steadying to minimal assistance for transfers and minimal assistance for ambulation with RW   Pt's clinical presentation is currently unpredictable given the functional mobility deficits above, especially weakness, edema of extremities, decreased skin integrity, decreased endurance, gait deviations, pain, decreased activity tolerance, decreased functional mobility tolerance, altered sensation, decreased safety awareness, impaired judgement, and SOB upon exertion, coupled with fall risks as indicated by AM-PAC 6-Clicks: 29/11 as well as impaired balance, polypharmacy, impaired judgement, decreased safety awareness and limited sensation/neuropathy and combined with medical complications of pain impacting overall mobility status, abnormal renal lab values, abnormal H&H, abnormal WBCs, multiple readmissions, need for input for mobility technique/safety and osteomyelitis left great toe with open wound, need for angiogram   Pt's PMHx and comorbidities that may affect physical performance and progress include: DM and COPD requiring 3 lpm chronically, chronic bronchitis, chronic us of opioids, asthma, CHF, HTN, h/o DVT, renal disorder  Personal factors affecting pt at time of IE include: inaccessible home environment, step(s) to enter environment, limited home support, inability to perform IADLs, inability to perform ADLs, inability to navigate level surfaces without external assistance, inability to ambulate household distances and limited insight into impairments  Pt will benefit from continued skilled PT services to address deficits as defined above and to maximize level of functional mobility to facilitate return toward PLOF and improved QOL  From PT/mobility standpoint, recommendation at time of d/c would be Short term rehab pending progress in order to reduce fall risk and maximize pt's functional independence and consistency with mobility in order to facilitate return to PLOF  Recommend trial with walker next 1-2 sessions and ther ex next 1-2 sessions  The patient's AM-PAC Basic Mobility Inpatient Short Form Raw Score is 15  A Raw score of less than or equal to 16 suggests the patient may benefit from discharge to post-acute rehabilitation services  Please also refer to the recommendation of the Physical Therapist for safe discharge planning  Goals: Pt will: Perform bed mobility tasks with modified I to reposition in bed and prepare for transfers  Pt will perform transfers with modified I to decrease burden of care, decrease risk for falls and improve activity tolerance and prepare for ambulation  Pt will ambulate with RW for >/= 76' with  Supervision  to decrease burden of care, decrease risk for falls and improve activity tolerance and to access home environment   Pt will complete >/= 8 steps with with unilateral handrail with steadying assistance to decrease burden of care, return to home with MANJULA, decrease risk for falls and improve activity tolerance  Pt will participate in objective balance assessment to determine baseline fall risk  Pt will increase B LE strength >/= 1/2 MMT grade to facilitate functional mobility        Niya Pavon, PT,DPT

## 2023-01-27 NOTE — ASSESSMENT & PLAN NOTE
Lab Results   Component Value Date    HGBA1C 7 5 (H) 01/23/2023       Recent Labs     01/26/23  1100 01/26/23  1629 01/26/23  2142 01/27/23  0747   POCGLU 143* 141* 149* 129       Blood Sugar Average: Last 72 hrs:  (P) 132cont Lantus 12 units at at bedtime and insulin sliding scale for now and observe    Patient's home regimen is Lantus 12 units at at bedtime with no mealtime coverage  Sugars are well controlled

## 2023-01-27 NOTE — TRANSPORTATION MEDICAL NECESSITY
Section I - General Information    Name of Patient: Yves Bullard                 : 1940    Medicare #: [id number]  Transport Date: 23 (PCS is valid for round trips on this date and for all repetitive trips in the 60-day range as noted below )  [facility]: [facility]                                                         Destination: [address]  Is the pt's stay covered under Medicare Part A (PPS/DRG)   [x]     Closest appropriate facility? If no, why is transport to more distant facility required? Yes     If hospice pt, is this transport related to pt's terminal illness? NA       Section II - Medical Necessity Questionnaire  Ambulance transportation is medically necessary only if other means of transport are contraindicated or would be potentially harmful to the patient  To meet this requirement, the patient must either be "bed confined" or suffer from a condition such that transport by means other than ambulance is contraindicated by the patient's condition  The following questions must be answered by the medical professional signing below for this form to be valid:    1)  Describe the MEDICAL CONDITION (physical and/or mental) of this patient AT [address] [place] [address] that requires the patient to be transported in an ambulance and why transport by other means is contraindicated by the patient's condition:  patient will be transferred for vascular intervention     2) Is the patient "bed confined" as defined below? NO  To be "be confined" the patient must satisfy all three of the following conditions: (1) unable to get up from bed without Assistance; AND (2) unable to ambulate; AND (3) unable to sit in a chair or wheelchair  3) Can this patient safely be transported by car or wheelchair van (i e , seated during transport without a medical attendant or monitoring)?    No    4) In addition to completing questions 1-3 above, please check any of the following conditions that apply*:   *Note: supporting documentation for any boxes checked must be maintained in the patient's medical records  If hosp-hosp transfer, describe services needed at 2nd facility not available at 1st facility? Medical attendant required       Section III - Signature of Physician or Healthcare Professional  I certify that the above information is true and correct based on my evaluation of this patient, and represent that the patient requires transport by ambulance and that other forms of transport are contraindicated  I understand that this information will be used by the Centers for Medicare and Medicaid Services (CMS) to support the determination of medical necessity for ambulance services, and I represent that I have personal knowledge of the patient's condition at time of transport  []  If this box is checked, I also certify that the patient is physically or mentally incapable of signing the ambulance service's claim and that the institution with which I am affiliated has furnished care, services, or assistance to the patient  My signature below is made on behalf of the patient pursuant to 42 CFR §424 36(b)(4)  In accordance with 42 CFR §424 37, the specific reason(s) that the patient is physically or mentally incapable of signing the claim form is as follows: Jaclyn Love of Physician* or Healthcare Professional______________________________________________________________  Signature Date 01/27/23 (For scheduled repetitive transports, this form is not valid for transports performed more than 60 days after this date)    Printed Name & Credentials of Physician or Healthcare Professional (MD, DO, RN, etc )________________________________  *Form must be signed by patient's attending physician for scheduled, repetitive transports   For non-repetitive, unscheduled ambulance transports, if unable to obtain the signature of the attending physician, any of the following may sign (choose appropriate option below)  [] Physician Assistant []  Clinical Nurse Specialist []  Registered Nurse  []  Nurse Practitioner  [x] Discharge Planner

## 2023-01-28 LAB
ANION GAP SERPL CALCULATED.3IONS-SCNC: 5 MMOL/L (ref 4–13)
APTT PPP: 165 SECONDS (ref 23–37)
APTT PPP: 36 SECONDS (ref 23–37)
APTT PPP: 38 SECONDS (ref 23–37)
BUN SERPL-MCNC: 33 MG/DL (ref 5–25)
CALCIUM SERPL-MCNC: 8.5 MG/DL (ref 8.4–10.2)
CHLORIDE SERPL-SCNC: 103 MMOL/L (ref 96–108)
CO2 SERPL-SCNC: 33 MMOL/L (ref 21–32)
CREAT SERPL-MCNC: 1.4 MG/DL (ref 0.6–1.3)
ERYTHROCYTE [DISTWIDTH] IN BLOOD BY AUTOMATED COUNT: 15.6 % (ref 11.6–15.1)
GFR SERPL CREATININE-BSD FRML MDRD: 35 ML/MIN/1.73SQ M
GLUCOSE SERPL-MCNC: 104 MG/DL (ref 65–140)
GLUCOSE SERPL-MCNC: 182 MG/DL (ref 65–140)
GLUCOSE SERPL-MCNC: 205 MG/DL (ref 65–140)
GLUCOSE SERPL-MCNC: 211 MG/DL (ref 65–140)
GLUCOSE SERPL-MCNC: 83 MG/DL (ref 65–140)
HCT VFR BLD AUTO: 31 % (ref 34.8–46.1)
HGB BLD-MCNC: 9.4 G/DL (ref 11.5–15.4)
INR PPP: 1.02 (ref 0.84–1.19)
MCH RBC QN AUTO: 26.5 PG (ref 26.8–34.3)
MCHC RBC AUTO-ENTMCNC: 30.3 G/DL (ref 31.4–37.4)
MCV RBC AUTO: 87 FL (ref 82–98)
PLATELET # BLD AUTO: 213 THOUSANDS/UL (ref 149–390)
PMV BLD AUTO: 10.1 FL (ref 8.9–12.7)
POTASSIUM SERPL-SCNC: 4.3 MMOL/L (ref 3.5–5.3)
PROTHROMBIN TIME: 13.5 SECONDS (ref 11.6–14.5)
RBC # BLD AUTO: 3.55 MILLION/UL (ref 3.81–5.12)
SODIUM SERPL-SCNC: 141 MMOL/L (ref 135–147)
WBC # BLD AUTO: 8.54 THOUSAND/UL (ref 4.31–10.16)

## 2023-01-28 RX ORDER — CALCIUM CARBONATE 200(500)MG
1000 TABLET,CHEWABLE ORAL DAILY PRN
Status: CANCELLED | OUTPATIENT
Start: 2023-01-28

## 2023-01-28 RX ORDER — HEPARIN SODIUM 10000 [USP'U]/100ML
3-20 INJECTION, SOLUTION INTRAVENOUS
Status: DISCONTINUED | OUTPATIENT
Start: 2023-01-28 | End: 2023-01-29 | Stop reason: HOSPADM

## 2023-01-28 RX ORDER — SACCHAROMYCES BOULARDII 250 MG
250 CAPSULE ORAL DAILY
Status: CANCELLED | OUTPATIENT
Start: 2023-01-29

## 2023-01-28 RX ORDER — AMLODIPINE BESYLATE 5 MG/1
5 TABLET ORAL DAILY
Status: CANCELLED | OUTPATIENT
Start: 2023-01-29

## 2023-01-28 RX ORDER — PREDNISONE 1 MG/1
2.5 TABLET ORAL DAILY
Status: CANCELLED | OUTPATIENT
Start: 2023-01-29

## 2023-01-28 RX ORDER — ATORVASTATIN CALCIUM 10 MG/1
10 TABLET, FILM COATED ORAL DAILY
Status: CANCELLED | OUTPATIENT
Start: 2023-01-29

## 2023-01-28 RX ORDER — HEPARIN SODIUM 1000 [USP'U]/ML
4000 INJECTION, SOLUTION INTRAVENOUS; SUBCUTANEOUS EVERY 6 HOURS PRN
Status: CANCELLED | OUTPATIENT
Start: 2023-01-28

## 2023-01-28 RX ORDER — ACETAMINOPHEN 325 MG/1
650 TABLET ORAL EVERY 6 HOURS PRN
Status: CANCELLED | OUTPATIENT
Start: 2023-01-28

## 2023-01-28 RX ORDER — INSULIN LISPRO 100 [IU]/ML
2-12 INJECTION, SOLUTION INTRAVENOUS; SUBCUTANEOUS
Status: CANCELLED | OUTPATIENT
Start: 2023-01-28

## 2023-01-28 RX ORDER — HEPARIN SODIUM 1000 [USP'U]/ML
2000 INJECTION, SOLUTION INTRAVENOUS; SUBCUTANEOUS EVERY 6 HOURS PRN
Status: CANCELLED | OUTPATIENT
Start: 2023-01-28

## 2023-01-28 RX ORDER — MIRTAZAPINE 15 MG/1
30 TABLET, FILM COATED ORAL
Status: CANCELLED | OUTPATIENT
Start: 2023-01-28

## 2023-01-28 RX ORDER — TRAZODONE HYDROCHLORIDE 50 MG/1
50 TABLET ORAL
Status: CANCELLED | OUTPATIENT
Start: 2023-01-28

## 2023-01-28 RX ORDER — CEFEPIME HYDROCHLORIDE 2 G/50ML
2000 INJECTION, SOLUTION INTRAVENOUS EVERY 12 HOURS
Status: CANCELLED | OUTPATIENT
Start: 2023-01-28

## 2023-01-28 RX ORDER — POLYETHYLENE GLYCOL 3350 17 G/17G
17 POWDER, FOR SOLUTION ORAL DAILY PRN
Status: CANCELLED | OUTPATIENT
Start: 2023-01-28

## 2023-01-28 RX ORDER — GABAPENTIN 300 MG/1
300 CAPSULE ORAL 3 TIMES DAILY
Status: CANCELLED | OUTPATIENT
Start: 2023-01-28

## 2023-01-28 RX ORDER — ROPINIROLE 0.25 MG/1
0.5 TABLET, FILM COATED ORAL
Status: CANCELLED | OUTPATIENT
Start: 2023-01-28

## 2023-01-28 RX ORDER — SENNOSIDES 8.6 MG
1 TABLET ORAL DAILY
Status: CANCELLED | OUTPATIENT
Start: 2023-01-29

## 2023-01-28 RX ORDER — INSULIN GLARGINE 100 [IU]/ML
12 INJECTION, SOLUTION SUBCUTANEOUS
Status: CANCELLED | OUTPATIENT
Start: 2023-01-28

## 2023-01-28 RX ORDER — SUCRALFATE 1 G/1
1 TABLET ORAL
Status: CANCELLED | OUTPATIENT
Start: 2023-01-28

## 2023-01-28 RX ORDER — ASPIRIN 81 MG/1
81 TABLET, CHEWABLE ORAL DAILY
Status: CANCELLED | OUTPATIENT
Start: 2023-01-29

## 2023-01-28 RX ORDER — PANTOPRAZOLE SODIUM 40 MG/1
40 TABLET, DELAYED RELEASE ORAL DAILY
Status: CANCELLED | OUTPATIENT
Start: 2023-01-29

## 2023-01-28 RX ORDER — HEPARIN SODIUM 10000 [USP'U]/100ML
3-20 INJECTION, SOLUTION INTRAVENOUS
Status: CANCELLED | OUTPATIENT
Start: 2023-01-28

## 2023-01-28 RX ORDER — OXYCODONE HYDROCHLORIDE 5 MG/1
5 TABLET ORAL EVERY 4 HOURS PRN
Status: CANCELLED | OUTPATIENT
Start: 2023-01-28

## 2023-01-28 RX ORDER — ALBUTEROL SULFATE 2.5 MG/3ML
0.63 SOLUTION RESPIRATORY (INHALATION) 4 TIMES DAILY PRN
Status: CANCELLED | OUTPATIENT
Start: 2023-01-28

## 2023-01-28 RX ORDER — POLYETHYLENE GLYCOL 3350 17 G/17G
17 POWDER, FOR SOLUTION ORAL DAILY PRN
Status: DISCONTINUED | OUTPATIENT
Start: 2023-01-28 | End: 2023-01-29 | Stop reason: HOSPADM

## 2023-01-28 RX ORDER — ONDANSETRON 2 MG/ML
4 INJECTION INTRAMUSCULAR; INTRAVENOUS EVERY 8 HOURS PRN
Status: CANCELLED | OUTPATIENT
Start: 2023-01-28

## 2023-01-28 RX ORDER — OXYCODONE HYDROCHLORIDE 10 MG/1
10 TABLET ORAL EVERY 6 HOURS PRN
Status: CANCELLED | OUTPATIENT
Start: 2023-01-28

## 2023-01-28 RX ORDER — INSULIN LISPRO 100 [IU]/ML
1-6 INJECTION, SOLUTION INTRAVENOUS; SUBCUTANEOUS
Status: CANCELLED | OUTPATIENT
Start: 2023-01-28

## 2023-01-28 RX ORDER — HEPARIN SODIUM 1000 [USP'U]/ML
4000 INJECTION, SOLUTION INTRAVENOUS; SUBCUTANEOUS EVERY 6 HOURS PRN
Status: DISCONTINUED | OUTPATIENT
Start: 2023-01-28 | End: 2023-01-29 | Stop reason: HOSPADM

## 2023-01-28 RX ORDER — HEPARIN SODIUM 1000 [USP'U]/ML
2000 INJECTION, SOLUTION INTRAVENOUS; SUBCUTANEOUS EVERY 6 HOURS PRN
Status: DISCONTINUED | OUTPATIENT
Start: 2023-01-28 | End: 2023-01-29 | Stop reason: HOSPADM

## 2023-01-28 RX ORDER — ALLOPURINOL 100 MG/1
50 TABLET ORAL DAILY
Status: CANCELLED | OUTPATIENT
Start: 2023-01-29

## 2023-01-28 RX ORDER — DOCUSATE SODIUM 100 MG/1
100 CAPSULE, LIQUID FILLED ORAL 2 TIMES DAILY
Status: CANCELLED | OUTPATIENT
Start: 2023-01-28

## 2023-01-28 RX ORDER — FLUTICASONE FUROATE AND VILANTEROL 100; 25 UG/1; UG/1
1 POWDER RESPIRATORY (INHALATION) DAILY
Status: CANCELLED | OUTPATIENT
Start: 2023-01-29

## 2023-01-28 RX ORDER — METOPROLOL TARTRATE 50 MG/1
50 TABLET, FILM COATED ORAL EVERY 12 HOURS SCHEDULED
Status: CANCELLED | OUTPATIENT
Start: 2023-01-28

## 2023-01-28 RX ORDER — OXYBUTYNIN CHLORIDE 5 MG/1
5 TABLET, EXTENDED RELEASE ORAL DAILY
Status: CANCELLED | OUTPATIENT
Start: 2023-01-29

## 2023-01-28 RX ADMIN — OXYCODONE HYDROCHLORIDE 10 MG: 10 TABLET ORAL at 06:09

## 2023-01-28 RX ADMIN — OXYCODONE HYDROCHLORIDE 5 MG: 5 TABLET ORAL at 10:14

## 2023-01-28 RX ADMIN — POLYETHYLENE GLYCOL 3350 17 G: 17 POWDER, FOR SOLUTION ORAL at 07:35

## 2023-01-28 RX ADMIN — PREDNISONE 2.5 MG: 1 TABLET ORAL at 09:58

## 2023-01-28 RX ADMIN — OXYCODONE HYDROCHLORIDE 10 MG: 10 TABLET ORAL at 16:58

## 2023-01-28 RX ADMIN — MIRTAZAPINE 30 MG: 15 TABLET, FILM COATED ORAL at 22:12

## 2023-01-28 RX ADMIN — STANDARDIZED SENNA CONCENTRATE 8.6 MG: 8.6 TABLET ORAL at 09:58

## 2023-01-28 RX ADMIN — ATORVASTATIN CALCIUM 10 MG: 10 TABLET, FILM COATED ORAL at 09:58

## 2023-01-28 RX ADMIN — SERTRALINE HYDROCHLORIDE 50 MG: 50 TABLET, FILM COATED ORAL at 22:12

## 2023-01-28 RX ADMIN — HEPARIN SODIUM 6000 UNITS: 1000 INJECTION INTRAVENOUS; SUBCUTANEOUS at 07:26

## 2023-01-28 RX ADMIN — MORPHINE SULFATE 2 MG: 2 INJECTION, SOLUTION INTRAMUSCULAR; INTRAVENOUS at 22:13

## 2023-01-28 RX ADMIN — GABAPENTIN 300 MG: 300 CAPSULE ORAL at 10:00

## 2023-01-28 RX ADMIN — DOCUSATE SODIUM 100 MG: 100 CAPSULE ORAL at 16:55

## 2023-01-28 RX ADMIN — AMLODIPINE BESYLATE 5 MG: 5 TABLET ORAL at 09:59

## 2023-01-28 RX ADMIN — SUCRALFATE 1 G: 1 TABLET ORAL at 06:04

## 2023-01-28 RX ADMIN — MORPHINE SULFATE 2 MG: 2 INJECTION, SOLUTION INTRAMUSCULAR; INTRAVENOUS at 06:45

## 2023-01-28 RX ADMIN — HEPARIN SODIUM 12 UNITS/KG/HR: 10000 INJECTION, SOLUTION INTRAVENOUS at 10:00

## 2023-01-28 RX ADMIN — METOPROLOL TARTRATE 50 MG: 50 TABLET, FILM COATED ORAL at 22:12

## 2023-01-28 RX ADMIN — HEPARIN SODIUM 15 UNITS/KG/HR: 10000 INJECTION, SOLUTION INTRAVENOUS at 06:50

## 2023-01-28 RX ADMIN — MORPHINE SULFATE 2 MG: 2 INJECTION, SOLUTION INTRAMUSCULAR; INTRAVENOUS at 11:54

## 2023-01-28 RX ADMIN — CEFEPIME HYDROCHLORIDE 2000 MG: 2 INJECTION, SOLUTION INTRAVENOUS at 06:04

## 2023-01-28 RX ADMIN — TIOTROPIUM BROMIDE 18 MCG: 18 CAPSULE ORAL; RESPIRATORY (INHALATION) at 10:10

## 2023-01-28 RX ADMIN — ASPIRIN 81 MG CHEWABLE TABLET 81 MG: 81 TABLET CHEWABLE at 09:59

## 2023-01-28 RX ADMIN — TRAZODONE HYDROCHLORIDE 50 MG: 50 TABLET ORAL at 22:13

## 2023-01-28 RX ADMIN — FLUTICASONE FUROATE AND VILANTEROL TRIFENATATE 1 PUFF: 100; 25 POWDER RESPIRATORY (INHALATION) at 10:10

## 2023-01-28 RX ADMIN — PANTOPRAZOLE SODIUM 40 MG: 40 TABLET, DELAYED RELEASE ORAL at 06:04

## 2023-01-28 RX ADMIN — NICOTINE 1 PATCH: 7 PATCH, EXTENDED RELEASE TRANSDERMAL at 10:07

## 2023-01-28 RX ADMIN — CEFEPIME HYDROCHLORIDE 2000 MG: 2 INJECTION, SOLUTION INTRAVENOUS at 17:02

## 2023-01-28 RX ADMIN — GABAPENTIN 300 MG: 300 CAPSULE ORAL at 22:13

## 2023-01-28 RX ADMIN — GABAPENTIN 300 MG: 300 CAPSULE ORAL at 16:55

## 2023-01-28 RX ADMIN — Medication 250 MG: at 09:59

## 2023-01-28 RX ADMIN — INSULIN LISPRO 4 UNITS: 100 INJECTION, SOLUTION INTRAVENOUS; SUBCUTANEOUS at 16:59

## 2023-01-28 RX ADMIN — SUCRALFATE 1 G: 1 TABLET ORAL at 16:55

## 2023-01-28 RX ADMIN — DOCUSATE SODIUM 100 MG: 100 CAPSULE ORAL at 09:59

## 2023-01-28 RX ADMIN — INSULIN LISPRO 2 UNITS: 100 INJECTION, SOLUTION INTRAVENOUS; SUBCUTANEOUS at 22:13

## 2023-01-28 RX ADMIN — INSULIN GLARGINE 12 UNITS: 100 INJECTION, SOLUTION SUBCUTANEOUS at 22:11

## 2023-01-28 RX ADMIN — INSULIN LISPRO 2 UNITS: 100 INJECTION, SOLUTION INTRAVENOUS; SUBCUTANEOUS at 11:55

## 2023-01-28 RX ADMIN — METOPROLOL TARTRATE 50 MG: 50 TABLET, FILM COATED ORAL at 10:00

## 2023-01-28 RX ADMIN — VANCOMYCIN HYDROCHLORIDE 750 MG: 750 INJECTION, SOLUTION INTRAVENOUS at 07:35

## 2023-01-28 RX ADMIN — HEPARIN SODIUM 4000 UNITS: 1000 INJECTION INTRAVENOUS; SUBCUTANEOUS at 19:44

## 2023-01-28 RX ADMIN — ALLOPURINOL 50 MG: 100 TABLET ORAL at 09:58

## 2023-01-28 RX ADMIN — ROPINIROLE 0.5 MG: 0.25 TABLET, FILM COATED ORAL at 22:13

## 2023-01-28 NOTE — EMTALA/ACUTE CARE TRANSFER
[address] [facility] [address]  [address]  [facility] [address]  Dept: [phone]      ACUTE CARE TRANSFER CONSENT    NAME Patrick HAYWOOD 1940                              MRN [id number]    I have been informed of my rights regarding examination, treatment, and transfer   by Dr Renato Chavira MD    Benefits:  severe ischemic pain in the left foot needs vascular intervention    Risks:  worsening renal function      Transfer Request:  I acknowledge that my medical condition has been evaluated and explained to me by the treating physician or other qualified medical person and/or my attending physician who has recommended and offered to me further medical examination and treatment  I understand the Hospital's obligation with respect to the treatment and stabilization of my medical condition  I nevertheless request to be transferred  I release the Hospital, the doctor, and any other persons caring for me from all responsibility or liability for any injury or ill effects that may result from my transfer and agree to accept all responsibility for the consequences of my choice to transfer, rather than receive stabilizing treatment at the Hospital  I understand that because the transfer is my request, my insurance may not provide reimbursement for the services  The Hospital will assist and direct me and my family in how to make arrangements for transfer, but the hospital is not liable for any fees charged by the transport service  In spite of this understanding, I refuse to consent to further medical examination and treatment which has been offered to me, and request transfer to    I authorize the performance of emergency medical procedures and treatments upon me in both transit and upon arrival at the receiving facility    Additionally, I authorize the release of any and all medical records to the receiving facility and request they be transported with me, if possible  I authorize the performance of emergency medical procedures and treatments upon me in both transit and upon arrival at the receiving facility  Additionally, I authorize the release of any and all medical records to the receiving facility and request they be transported with me, if possible  I understand that the safest mode of transportation during a medical emergency is an ambulance and that the Hospital advocates the use of this mode of transport  Risks of traveling to the receiving facility by car, including absence of medical control, life sustaining equipment, such as oxygen, and medical personnel has been explained to me and I fully understand them  (JUANA CORRECT BOX BELOW)  [  ]  I consent to the stated transfer and to be transported by ambulance/helicopter  [  ]  I consent to the stated transfer, but refuse transportation by ambulance and accept full responsibility for my transportation by car  I understand the risks of non-ambulance transfers and I exonerate the Hospital and its staff from any deterioration in my condition that results from this refusal     X___________________________________________    DATE  23  TIME________  Signature of patient or legally responsible individual signing on patient behalf           RELATIONSHIP TO PATIENT_________________________          Provider Certification    NAME [address] [place] [address] 1940                              MRN [id number]    A medical screening exam was performed on the above named patient    Based on the examination:    Condition Necessitating Transfer acute ischemia of the left foot    Patient Condition:  sTable    Reason for Transfer:  Angiogram    Transfer Requirements: Facility     · Space available and qualified personnel available for treatment as acknowledged by    · Agreed to accept transfer and to provide appropriate medical treatment as acknowledged by · Appropriate medical records of the examination and treatment of the patient are provided at the time of transfer   [facility], [address] _______  · Transfer will be performed by qualified personnel from    and appropriate transfer equipment as required, including the use of necessary and appropriate life support measures  Provider Certification: I have examined the patient and explained the following risks and benefits of being transferred/refusing transfer to the patient/family:         Based on these reasonable risks and benefits to the patient and/or the unborn child(dario), and based upon the information available at the time of the patient’s examination, I certify that the medical benefits reasonably to be expected from the provision of appropriate medical treatments at another medical facility outweigh the increasing risks, if any, to the individual’s medical condition, and in the case of labor to the unborn child, from effecting the transfer      X____________________________________________ DATE 01/28/23        TIME_______      ORIGINAL - SEND TO MEDICAL RECORDS   COPY - SEND WITH PATIENT DURING TRANSFER

## 2023-01-28 NOTE — ASSESSMENT & PLAN NOTE
arterial Doppler bilateral lower extremity shows   RIGHT LOWER LIMB:  There is a 50-75% stenosis noted in the mid superficial femoral artery with  diffuse atherosclerotic disease throughout the remaining femoro-popliteal and  tibio-peroneal segments  Ankle/Brachial index:   0 42 which is in the ischemic disease category  PVR/ PPG tracings are dampened  Metatarsal pressure of 38 mmHg  Great toe pressure could not be obtained due to severely attenuated waveform         LEFT LOWER LIMB:  There is a 50-75% stenosis noted in the prox and distal superficial femoral  artery and an occlusion vs high grade stenosis of the distal anterior tibial  artery  Diffuse atherosclerotic disease throughout the remaining  femoro-popliteal and tibio-peroneal segments  Ankle/Brachial index:  0 39  which is in the ischemic disease category  PVR/ PPG tracings are dampened  Metatarsal pressure of 33 mmHg  Great toe pressure not obtained due to bandaging  Was initially seen by [facility] vascular and recommended angiogram and further intervention on Monday however patient refused and wanted to go to [facility]  Discussed with Dr Verónica Negron  Recommended pain control and outpatient follow-up with him on Monday with potential for angiogram to be done next Friday  She states that the pain is too bad and she cannot wait that long and cannot function at home like this and now requesting to be transferred to [facility] [place] [place] [facility]  Placed on heparin drip increased pain control   discussed with patient and daughter at length  Consult placed to nephrology to help optimize renal function for possible angiogram coming up soon

## 2023-01-28 NOTE — TRANSPORTATION MEDICAL NECESSITY
Section I - General Information    Name of Patient: Patrick Ochoa                 : 1940    Medicare #: [id number]  Transport Date: 23 (PCS is valid for round trips on this date and for all repetitive trips in the 60-day range as noted below )  [facility]: [facility]                                                         Destination: [facility]  Is the pt's stay covered under Medicare Part A (PPS/DRG)   [x]     Closest appropriate facility? If no, why is transport to more distant facility required? Yes  If hospice pt, is this transport related to pt's terminal illness? No       Section II - Medical Necessity Questionnaire  Ambulance transportation is medically necessary only if other means of transport are contraindicated or would be potentially harmful to the patient  To meet this requirement, the patient must either be "bed confined" or suffer from a condition such that transport by means other than ambulance is contraindicated by the patient's condition  The following questions must be answered by the medical professional signing below for this form to be valid:    1)  Describe the MEDICAL CONDITION (physical and/or mental) of this patient AT [address] [place] [address] that requires the patient to be transported in an ambulance and why transport by other means is contraindicated by the patient's condition:       2) Is the patient "bed confined" as defined below? Yes  To be "be confined" the patient must satisfy all three of the following conditions: (1) unable to get up from bed without Assistance; AND (2) unable to ambulate; AND (3) unable to sit in a chair or wheelchair  3) Can this patient safely be transported by car or wheelchair van (i e , seated during transport without a medical attendant or monitoring)?    No    4) In addition to completing questions 1-3 above, please check any of the following conditions that apply*:   *Note: supporting documentation for any boxes checked must be maintained in the patient's medical records  If hosp-hosp transfer, describe services needed at 2nd facility not available at 1st facility? Requires oxygen-unable to self administer  Cardiac monitoring required en route    Heparin infusion    f hosp-hosp transfer, describe services needed at 2nd facility not available at 1st facility? Vascular evaluation, angiogram      Section III - Signature of Physician or Healthcare Professional  I certify that the above information is true and correct based on my evaluation of this patient, and represent that the patient requires transport by ambulance and that other forms of transport are contraindicated  I understand that this information will be used by the Centers for Medicare and Medicaid Services (CMS) to support the determination of medical necessity for ambulance services, and I represent that I have personal knowledge of the patient's condition at time of transport  []  If this box is checked, I also certify that the patient is physically or mentally incapable of signing the ambulance service's claim and that the institution with which I am affiliated has furnished care, services, or assistance to the patient  My signature below is made on behalf of the patient pursuant to 42 CFR §424 36(b)(4)  In accordance with 42 CFR §424 37, the specific reason(s) that the patient is physically or mentally incapable of signing the claim form is as follows:  Jordan Magana of Physician* or Healthcare Professional______________________________________________________________  Signature Date 01/28/23 (For scheduled repetitive transports, this form is not valid for transports performed more than 60 days after this date)    Printed Name & Credentials of Physician or Healthcare Professional (MD, DO, RN, etc )___Zoey Lawson RN BSN_____________________________  *Form must be signed by patient's attending physician for scheduled, repetitive transports   For non-repetitive, unscheduled ambulance transports, if unable to obtain the signature of the attending physician, any of the following may sign (choose appropriate option below)  [] Physician Assistant []  Clinical Nurse Specialist []  Registered Nurse  []  Nurse Practitioner  [x] Discharge Planner

## 2023-01-28 NOTE — PROGRESS NOTES
Randa Cunningham is a [de-identified] y o  female who is currently ordered Vancomycin IV with management by the Pharmacy Consult service  Relevant clinical data and objective / subjective history reviewed  Vancomycin Assessment:  Indication and Goal AUC/Trough: Soft tissue (goal -600, trough >10), -600, trough >10  Clinical Status: stable  Micro:     Renal Function:  SCr: 1 4 mg/dL  CrCl: 33 3 mL/min  Renal replacement: Not on dialysis  Days of Therapy: 5  Current Dose: 750 mg IV every 24 hours  Vancomycin Plan:  New Dosing: continue regimen  Estimated AUC: 504 mcg*hr/mL  Estimated Trough: 16 3 mcg/mL  Next Level: 2/3/23 @ 0600  Renal Function Monitoring: Daily BMP and [place] will continue to follow closely for s/sx of nephrotoxicity, infusion reactions and appropriateness of therapy  BMP and CBC will be ordered per protocol  We will continue to follow the patient’s culture results and clinical progress daily      Rogerio Archer, Pharmacist

## 2023-01-28 NOTE — DISCHARGE SUMMARY
[address]  Discharge- Louie Angeles 1940, [de-identified] y o  female MRN: [id number]  Unit/Bed#: -[facility] Encounter: [id number]  Primary Care Provider: Javan Guthrie MD   Date and time admitted to hospital: 1/24/2023  3:17 PM    * Acute hematogenous osteomyelitis of left foot [facility])  Assessment & Plan  Patient noted to have blisters on the left great toe which popped open into wounds  Prescribed antibiotics outpatient with Keflex and doxycycline however wound continued to worsen and sent to the ED to be evaluated  xray left foot negative for osteo-  MRI left foot shows Ulcer with evidence of small adjacent medial, distal 1st MTP osteomyelitis  Altered marrow signal intensity at the proximal-medial aspect of the 1st toe proximal phalanx is less specific and may be early osteomyelitis  consult podiatry for further recommendations  Placed on IV Vanco and cefepime as patient is diabetic blood  Cultures negative at 48 hours  ulcers appear to be getting bigger compared to admission on the first left toe due to her peripheral arterial disease  arterial Doppler lower extremity shows There is a 50-75% stenosis noted in the prox and distal superficial femoral  artery and an occlusion vs high grade stenosis of the distal anterior tibial artery  Diffuse atherosclerotic disease throughout the remaining femoro-popliteal and tibio-peroneal segments  Ankle/Brachial index:  0 39  which is in the ischemic disease category  consult placed to vascular surgery    recommend angiogram with further intervention and will also eventually need first toe amputation secondary to osteomyelitis evident on MRI  H/o dvt in the past hold eliquis temporarily   Placed on heparin drip for 24 to 48 hours and adjusted pain control  patient initially wanted to go to [facility] for her vascular procedure however they do not have availability for angiogram until next Friday and patient states that her pain is too strong and she is unable to function at home at this time and wants this addressed  Now agreeable to going to [facility]  Transfer process initiated and accepted at [facility] under Dr Luis Maier (sl)  And for angiogram on Monday with Dr Doctor HALEY (peripheral artery disease) ([facility])  Assessment & Plan  arterial Doppler bilateral lower extremity shows   RIGHT LOWER LIMB:  There is a 50-75% stenosis noted in the mid superficial femoral artery with  diffuse atherosclerotic disease throughout the remaining femoro-popliteal and  tibio-peroneal segments  Ankle/Brachial index:   0 42 which is in the ischemic disease category  PVR/ PPG tracings are dampened  Metatarsal pressure of 38 mmHg  Great toe pressure could not be obtained due to severely attenuated waveform         LEFT LOWER LIMB:  There is a 50-75% stenosis noted in the prox and distal superficial femoral  artery and an occlusion vs high grade stenosis of the distal anterior tibial  artery  Diffuse atherosclerotic disease throughout the remaining  femoro-popliteal and tibio-peroneal segments  Ankle/Brachial index:  0 39  which is in the ischemic disease category  PVR/ PPG tracings are dampened  Metatarsal pressure of 33 mmHg  Great toe pressure not obtained due to bandaging  Was initially seen by [facility] vascular and recommended angiogram and further intervention on Monday however patient refused and wanted to go to [facility]  Discussed with Dr Ena Cobos  Recommended pain control and outpatient follow-up with him on Monday with potential for angiogram to be done next Friday  She states that the pain is too bad and she cannot wait that long and cannot function at home like this and now requesting to be transferred to [facility]  Placed on heparin drip increased pain control   discussed with patient and daughter at length  Consult placed to nephrology to help optimize renal function for possible angiogram coming up soon    Chronic, continuous use of opioids  Assessment & Plan  Cont oxycodone and iv morphine  also on movantik at home which is not formulary here  She is having ischemic left foot pain/claudication  Increased pain medication regimen and monitor for now    Chronic respiratory failure with hypoxia and hypercapnia (HCC)  Assessment & Plan  Baseline  Continue 3 L of oxygen 24/7    Mixed simple and mucopurulent chronic bronchitis (HCC)  Assessment & Plan  History of chronic COPD with chronic respiratory failure requiring 3 L of oxygen at baseline  Currently at baseline  Counseled on smoking cessation and placed on nicotine replacement therapy  Continue home regimen of Advair substituted to Breo, Spiriva and albuterol inhaler/neb prednisone 2 5 mg daily as per home regimen    Type 2 diabetes mellitus with skin complication, with long-term current use of insulin [facility])  Assessment & Plan  Lab Results   Component Value Date    HGBA1C 7 5 (H) 01/23/2023       Recent Labs     01/27/23  1117 01/27/23  1607 01/27/23  2042 01/28/23  0725   POCGLU 149* 174* 137 104       Blood Sugar Average: Last 72 hrs:  (P) 746 [id number] Lantus 12 units at at bedtime and insulin sliding scale for now and observe    Patient's home regimen is Lantus 12 units  at bedtime with no mealtime coverage  Sugars are well controlled      Discharging Physician / Practitioner: Neo Yao MD  PCP: Luciano Simpson MD  Admission Date:   Admission Orders (From admission, onward)     Ordered        01/24/23 [address]  Once                      Discharge Date: 01/28/23    Medical Problems     Resolved Problems  Date Reviewed: 1/28/2023   None         Consultations During Hospital Stay:  · Vascular,podiatry,nephrology and inf disease    Procedures Performed:   · none    Significant Findings / Test Results:   MRI foot/forefoot toes left wo contrast    Result Date: 1/25/2023  Impression: Ulcer with evidence of small adjacent medial, distal 1st MTP osteomyelitis  Altered marrow signal intensity at the proximal-medial aspect of the 1st toe proximal phalanx is less specific and may be early osteomyelitis  Superimposed significant 1st toe degenerative changes  Fluid collection contiguous with the 1st MTP joint as detailed above is more compatible with joint effusion and synovial cyst than abscess  Anterior talus AVN  Flexor hallucis and digitorum longus tenosynovitis  Other nonemergent findings above  Workstation performed: [id number]     XR toe great min 2 views LEFT    Result Date: 1/25/2023  Impression: No radiographic evidence of osteomyelitis  Workstation performed: [id number]     US kidney and bladder    Result Date: 1/27/2023  Impression: No hydronephrosis  Atrophic left kidney  More complex 1 4 cm lesion at the upper pole of the left kidney may represent a complex cyst  Nonemergent follow-up with CT or MRI is advised  Given patient's renal insufficiency a noncontrast MRI may be preferred  Alternatively ultrasound follow-up in 3-6 months time could be utilized  The study was marked in EPIC for significant notification  Workstation performed: [id number]     Incidental Findings:   · none    Test Results Pending at Discharge (will require follow up):   · none     Outpatient Tests Requested:  · none    Complications: none    Reason for Admission: Left foot ischemic pain    Hospital Course:     Cailin Frost is a [de-identified] y o  female patient who originally presented to the hospital on 1/24/2023 due to left foot ischemic pain that started 3 weeks ago and is now getting worse over the last 1 week and she also noticed 2 blisters form on her left first toe which popped and she came to the ED to be evaluated  Patient lives with her daughter however has 16 steps to get into her room  She barely was leaving the house because of worsening pain in her leg    She initially had x-ray of the foot which was negative for [employer] MRI foot showed proximal-medial aspect of the 1st toe proximal phalanx is less specific and may be early osteomyelitis  He did to have a lot of ischemic rest pain in her left foot  Arterial Dopplers were performed which showed significant disease discussed with vascular surgery and recommended angiogram   Plan for angiogram on Monday following which she will continue to need podiatry follow-up  Continue IV antibiotics for now broad-spectrum until surgical cure is achieved of early osteomyelitis  Patient was placed on heparin drip high for 48 hours an hour transition to heparin drip low protocol  Her Eliquis has been on hold for the last 4 days since admission  reStart eliquis after surgical procedures has been completed        Please see above list of diagnoses and related plan for additional information  Condition at Discharge: fair     Discharge Day Visit / Exam:     Subjective: Patient still complains of 5 x 10 pain in her left foot denies any other complaints at this time  Agreeable to going to [facility] [facility]   Vitals: Blood Pressure: 132/64 (01/28/23 0727)  Pulse: 78 (01/28/23 0727)  Temperature: 98 4 °F (36 9 °C) (01/28/23 0727)  Temp Source: Temporal (01/27/23 2157)  Respirations: 16 (01/28/23 0727)  Height: 5' 6" (167 6 cm) (01/24/23 1524)  Weight - Scale: 75 8 kg (167 lb 3 2 oz) (01/27/23 0300)  SpO2: 97 % (01/28/23 0727)  Exam:   Physical Exam  Vitals and nursing note reviewed  Constitutional:       Appearance: Normal appearance  HENT:      Head: Normocephalic and atraumatic  Right Ear: External ear normal       Left Ear: External ear normal       Ears:      Comments: Hard of hearing     Nose: Nose normal       Mouth/Throat:      Pharynx: Oropharynx is clear  Eyes:      Pupils: Pupils are equal, round, and reactive to light  Cardiovascular:      Rate and Rhythm: Normal rate and regular rhythm  Heart sounds: Normal heart sounds     Pulmonary:      Effort: Pulmonary effort is normal       Breath sounds: Normal breath sounds  Abdominal:      General: Bowel sounds are normal       Palpations: Abdomen is soft  Tenderness: There is no abdominal tenderness  Musculoskeletal:         General: Normal range of motion  Cervical back: Normal range of motion and neck supple  Comments: Present dorsalis pedis and posterior tibial pulse on the left foot  2 ulcers noted on the left first toe which appear to be a little bit larger compared to admission  Skin:     General: Skin is warm and dry  Capillary Refill: Capillary refill takes less than 2 seconds  Neurological:      General: No focal deficit present  Mental Status: She is alert and oriented to person, place, and time  Psychiatric:         Mood and Affect: Mood normal          Discussion with Family: Discussed with daughter    Discharge instructions/Information to patient and family:   See after visit summary for information provided to patient and family  Provisions for Follow-Up Care:  See after visit summary for information related to follow-up care and any pertinent home health orders  Disposition:     [facility]  [address] Transfer to [address] [place] [address] to [facility] SNF:   · Not Applicable to this Patient - Not Applicable to this Patient    Planned Readmission: none     Discharge Statement:  I spent 35 minutes discharging the patient  This time was spent on the day of discharge  I had direct contact with the patient on the day of discharge  Greater than 50% of the total time was spent examining patient, answering all patient questions, arranging and discussing plan of care with patient as well as directly providing post-discharge instructions  Additional time then spent on discharge activities  Discharge Medications:  See after visit summary for reconciled discharge medications provided to patient and family        ** Please Note: This note has been constructed using a voice recognition system **

## 2023-01-28 NOTE — ASSESSMENT & PLAN NOTE
Patient noted to have blisters on the left great toe which popped open into wounds  Prescribed antibiotics outpatient with Keflex and doxycycline however wound continued to worsen and sent to the ED to be evaluated  xray left foot negative for osteo-  MRI left foot shows Ulcer with evidence of small adjacent medial, distal 1st MTP osteomyelitis  Altered marrow signal intensity at the proximal-medial aspect of the 1st toe proximal phalanx is less specific and may be early osteomyelitis  consult podiatry for further recommendations  Placed on IV Vanco and cefepime as patient is diabetic blood  Cultures negative at 48 hours  ulcers appear to be getting bigger compared to admission on the first left toe due to her peripheral arterial disease  arterial Doppler lower extremity shows There is a 50-75% stenosis noted in the prox and distal superficial femoral  artery and an occlusion vs high grade stenosis of the distal anterior tibial artery  Diffuse atherosclerotic disease throughout the remaining femoro-popliteal and tibio-peroneal segments  Ankle/Brachial index:  0 39  which is in the ischemic disease category  consult placed to vascular surgery  recommend angiogram with further intervention and will also eventually need first toe amputation secondary to osteomyelitis evident on MRI  H/o dvt in the past hold eliquis temporarily   Placed on heparin drip for 24 to 48 hours and adjusted pain control  patient initially wanted to go to [facility] for her vascular procedure however they do not have availability for angiogram until next Friday and patient states that her pain is too strong and she is unable to function at home at this time and wants this addressed  Now agreeable to going to [facility]  Transfer process initiated and accepted at [address] [place] [address]  under Dr Maurice Cespedes (slim)  And for angiogram on Monday with Dr Guzman

## 2023-01-28 NOTE — ASSESSMENT & PLAN NOTE
Lab Results   Component Value Date    HGBA1C 7 5 (H) 01/23/2023       Recent Labs     01/27/23  1117 01/27/23  1607 01/27/23 2042 01/28/23  0725   POCGLU 149* 174* 137 104       Blood Sugar Average: Last 72 hrs:  (P) 276 [id number] Lantus 12 units at at bedtime and insulin sliding scale for now and observe    Patient's home regimen is Lantus 12 units  at bedtime with no mealtime coverage  Sugars are well controlled

## 2023-01-28 NOTE — CASE MANAGEMENT
Case Management Discharge Planning Note    Patient name Ariel Honeycutt  Location /-19 MRN [id number]  : 1940 Date 2023       Current Admission Date: 2023  Current Admission Diagnosis:Acute hematogenous osteomyelitis of left foot [facility])   Patient Active Problem List    Diagnosis Date Noted   • PAD (peripheral artery disease) ([facility] ) 2023   • Acute hematogenous osteomyelitis of left foot ([facility] ) 2023   • Type 2 diabetes mellitus with skin complication, with long-term current use of insulin ([place] [address] ) 2023   • Mixed simple and mucopurulent chronic bronchitis ([place] [address] ) 2023   • Chronic respiratory failure with hypoxia and hypercapnia ([place] [address] ) 2023   • Chronic, continuous use of opioids 2023      LOS (days): 4  Geometric Mean LOS (GMLOS) (days): 2 90  Days to GMLOS:-0 8     OBJECTIVE:  Risk of Unplanned Readmission Score: 33 92         Current admission status: Inpatient   Preferred Pharmacy:   [address] [place] [address] [id number]  DARIUSZ Noe Ramos [address] [place] [id number]  Phone: [phone] Fax: [phone]    Primary Care Provider: aRn Beck MD    [facility]  Secondary Insurance:     DISCHARGE DETAILS:  Informed patient will be transfer to [facility] for vascular services  Transportation Auth form completed for [employer] and faxed to [facility] completed and placed in patient file  Nursing updated  [facility] [place] number is [phone] for transport

## 2023-01-29 ENCOUNTER — HOSPITAL ENCOUNTER (INPATIENT)
Facility: HOSPITAL | Age: 83
LOS: 6 days | Discharge: HOME HEALTH | End: 2023-02-04
Attending: INTERNAL MEDICINE | Admitting: INTERNAL MEDICINE

## 2023-01-29 ENCOUNTER — ANESTHESIA EVENT (INPATIENT)
Dept: PERIOP | Facility: HOSPITAL | Age: 83
End: 2023-01-29

## 2023-01-29 VITALS
TEMPERATURE: 98.6 F | HEIGHT: 66 IN | RESPIRATION RATE: 18 BRPM | OXYGEN SATURATION: 95 % | WEIGHT: 167.2 LBS | DIASTOLIC BLOOD PRESSURE: 100 MMHG | BODY MASS INDEX: 26.87 KG/M2 | SYSTOLIC BLOOD PRESSURE: 169 MMHG | HEART RATE: 76 BPM

## 2023-01-29 DIAGNOSIS — N18.32 STAGE 3B CHRONIC KIDNEY DISEASE (HCC): ICD-10-CM

## 2023-01-29 DIAGNOSIS — F17.200 SMOKING: ICD-10-CM

## 2023-01-29 DIAGNOSIS — M86.072 ACUTE HEMATOGENOUS OSTEOMYELITIS OF LEFT FOOT (HCC): Primary | ICD-10-CM

## 2023-01-29 DIAGNOSIS — I73.9 PAD (PERIPHERAL ARTERY DISEASE) (HCC): ICD-10-CM

## 2023-01-29 DIAGNOSIS — L08.9 WOUND INFECTION: ICD-10-CM

## 2023-01-29 DIAGNOSIS — S91.102A OPEN WOUND OF LEFT GREAT TOE, INITIAL ENCOUNTER: ICD-10-CM

## 2023-01-29 DIAGNOSIS — T14.8XXA WOUND INFECTION: ICD-10-CM

## 2023-01-29 PROBLEM — Z99.81 OXYGEN DEPENDENT: Status: ACTIVE | Noted: 2023-01-29

## 2023-01-29 PROBLEM — I50.9 CHF (CONGESTIVE HEART FAILURE) (HCC): Status: ACTIVE | Noted: 2023-01-29

## 2023-01-29 LAB
ANION GAP SERPL CALCULATED.3IONS-SCNC: 6 MMOL/L (ref 4–13)
APTT PPP: 48 SECONDS (ref 23–37)
APTT PPP: 88 SECONDS (ref 23–37)
APTT PPP: 94 SECONDS (ref 23–37)
BASOPHILS # BLD AUTO: 0.08 THOUSANDS/ÂΜL (ref 0–0.1)
BASOPHILS NFR BLD AUTO: 1 % (ref 0–1)
BUN SERPL-MCNC: 30 MG/DL (ref 5–25)
CALCIUM SERPL-MCNC: 8.5 MG/DL (ref 8.4–10.2)
CHLORIDE SERPL-SCNC: 102 MMOL/L (ref 96–108)
CO2 SERPL-SCNC: 31 MMOL/L (ref 21–32)
CREAT SERPL-MCNC: 1.35 MG/DL (ref 0.6–1.3)
EOSINOPHIL # BLD AUTO: 0.6 THOUSAND/ÂΜL (ref 0–0.61)
EOSINOPHIL NFR BLD AUTO: 6 % (ref 0–6)
ERYTHROCYTE [DISTWIDTH] IN BLOOD BY AUTOMATED COUNT: 15.7 % (ref 11.6–15.1)
FLUAV RNA RESP QL NAA+PROBE: NEGATIVE
FLUBV RNA RESP QL NAA+PROBE: NEGATIVE
GFR SERPL CREATININE-BSD FRML MDRD: 37 ML/MIN/1.73SQ M
GLUCOSE SERPL-MCNC: 103 MG/DL (ref 65–140)
GLUCOSE SERPL-MCNC: 103 MG/DL (ref 65–140)
GLUCOSE SERPL-MCNC: 163 MG/DL (ref 65–140)
GLUCOSE SERPL-MCNC: 167 MG/DL (ref 65–140)
GLUCOSE SERPL-MCNC: 185 MG/DL (ref 65–140)
HCT VFR BLD AUTO: 30.9 % (ref 34.8–46.1)
HGB BLD-MCNC: 9.3 G/DL (ref 11.5–15.4)
IMM GRANULOCYTES # BLD AUTO: 0.07 THOUSAND/UL (ref 0–0.2)
IMM GRANULOCYTES NFR BLD AUTO: 1 % (ref 0–2)
INR PPP: 1.09 (ref 0.84–1.19)
LYMPHOCYTES # BLD AUTO: 3.16 THOUSANDS/ÂΜL (ref 0.6–4.47)
LYMPHOCYTES NFR BLD AUTO: 30 % (ref 14–44)
MCH RBC QN AUTO: 26.3 PG (ref 26.8–34.3)
MCHC RBC AUTO-ENTMCNC: 30.1 G/DL (ref 31.4–37.4)
MCV RBC AUTO: 87 FL (ref 82–98)
MONOCYTES # BLD AUTO: 0.76 THOUSAND/ÂΜL (ref 0.17–1.22)
MONOCYTES NFR BLD AUTO: 7 % (ref 4–12)
NEUTROPHILS # BLD AUTO: 5.89 THOUSANDS/ÂΜL (ref 1.85–7.62)
NEUTS SEG NFR BLD AUTO: 55 % (ref 43–75)
NRBC BLD AUTO-RTO: 0 /100 WBCS
PLATELET # BLD AUTO: 265 THOUSANDS/UL (ref 149–390)
PMV BLD AUTO: 11.9 FL (ref 8.9–12.7)
POTASSIUM SERPL-SCNC: 4.1 MMOL/L (ref 3.5–5.3)
PROTHROMBIN TIME: 14.1 SECONDS (ref 11.6–14.5)
RBC # BLD AUTO: 3.54 MILLION/UL (ref 3.81–5.12)
RSV RNA RESP QL NAA+PROBE: NEGATIVE
SARS-COV-2 RNA RESP QL NAA+PROBE: NEGATIVE
SODIUM SERPL-SCNC: 139 MMOL/L (ref 135–147)
WBC # BLD AUTO: 10.56 THOUSAND/UL (ref 4.31–10.16)

## 2023-01-29 RX ORDER — ACETAMINOPHEN 325 MG/1
650 TABLET ORAL EVERY 6 HOURS PRN
Status: DISCONTINUED | OUTPATIENT
Start: 2023-01-29 | End: 2023-02-04 | Stop reason: HOSPADM

## 2023-01-29 RX ORDER — OXYBUTYNIN CHLORIDE 5 MG/1
5 TABLET, EXTENDED RELEASE ORAL DAILY
Status: DISCONTINUED | OUTPATIENT
Start: 2023-01-29 | End: 2023-02-04 | Stop reason: HOSPADM

## 2023-01-29 RX ORDER — HEPARIN SODIUM 1000 [USP'U]/ML
2000 INJECTION, SOLUTION INTRAVENOUS; SUBCUTANEOUS EVERY 6 HOURS PRN
Status: DISCONTINUED | OUTPATIENT
Start: 2023-01-29 | End: 2023-01-30 | Stop reason: DRUGHIGH

## 2023-01-29 RX ORDER — INSULIN LISPRO 100 [IU]/ML
2-12 INJECTION, SOLUTION INTRAVENOUS; SUBCUTANEOUS
Status: DISCONTINUED | OUTPATIENT
Start: 2023-01-29 | End: 2023-02-04 | Stop reason: HOSPADM

## 2023-01-29 RX ORDER — AMLODIPINE BESYLATE 5 MG/1
5 TABLET ORAL DAILY
Status: DISCONTINUED | OUTPATIENT
Start: 2023-01-29 | End: 2023-02-03

## 2023-01-29 RX ORDER — SENNOSIDES 8.6 MG
1 TABLET ORAL DAILY
Status: DISCONTINUED | OUTPATIENT
Start: 2023-01-29 | End: 2023-02-04 | Stop reason: HOSPADM

## 2023-01-29 RX ORDER — INSULIN GLARGINE 100 [IU]/ML
12 INJECTION, SOLUTION SUBCUTANEOUS
Status: DISCONTINUED | OUTPATIENT
Start: 2023-01-29 | End: 2023-01-29

## 2023-01-29 RX ORDER — GABAPENTIN 300 MG/1
300 CAPSULE ORAL 3 TIMES DAILY
Status: DISCONTINUED | OUTPATIENT
Start: 2023-01-29 | End: 2023-02-04 | Stop reason: HOSPADM

## 2023-01-29 RX ORDER — OXYCODONE HYDROCHLORIDE 5 MG/1
5 TABLET ORAL EVERY 4 HOURS PRN
Status: DISCONTINUED | OUTPATIENT
Start: 2023-01-29 | End: 2023-02-04 | Stop reason: HOSPADM

## 2023-01-29 RX ORDER — CALCIUM CARBONATE 200(500)MG
1000 TABLET,CHEWABLE ORAL DAILY PRN
Status: DISCONTINUED | OUTPATIENT
Start: 2023-01-29 | End: 2023-02-04 | Stop reason: HOSPADM

## 2023-01-29 RX ORDER — ATORVASTATIN CALCIUM 10 MG/1
10 TABLET, FILM COATED ORAL
Status: DISCONTINUED | OUTPATIENT
Start: 2023-01-29 | End: 2023-02-04 | Stop reason: HOSPADM

## 2023-01-29 RX ORDER — INSULIN LISPRO 100 [IU]/ML
1-6 INJECTION, SOLUTION INTRAVENOUS; SUBCUTANEOUS
Status: DISCONTINUED | OUTPATIENT
Start: 2023-01-29 | End: 2023-02-04 | Stop reason: HOSPADM

## 2023-01-29 RX ORDER — HEPARIN SODIUM 1000 [USP'U]/ML
4000 INJECTION, SOLUTION INTRAVENOUS; SUBCUTANEOUS EVERY 6 HOURS PRN
Status: DISCONTINUED | OUTPATIENT
Start: 2023-01-29 | End: 2023-01-30 | Stop reason: DRUGHIGH

## 2023-01-29 RX ORDER — PANTOPRAZOLE SODIUM 40 MG/1
40 TABLET, DELAYED RELEASE ORAL DAILY
Status: DISCONTINUED | OUTPATIENT
Start: 2023-01-29 | End: 2023-02-04 | Stop reason: HOSPADM

## 2023-01-29 RX ORDER — ONDANSETRON 2 MG/ML
4 INJECTION INTRAMUSCULAR; INTRAVENOUS EVERY 8 HOURS PRN
Status: DISCONTINUED | OUTPATIENT
Start: 2023-01-29 | End: 2023-02-04 | Stop reason: HOSPADM

## 2023-01-29 RX ORDER — INSULIN GLARGINE 100 [IU]/ML
8 INJECTION, SOLUTION SUBCUTANEOUS
Status: DISCONTINUED | OUTPATIENT
Start: 2023-01-29 | End: 2023-02-04 | Stop reason: HOSPADM

## 2023-01-29 RX ORDER — SACCHAROMYCES BOULARDII 250 MG
250 CAPSULE ORAL DAILY
Status: DISCONTINUED | OUTPATIENT
Start: 2023-01-29 | End: 2023-02-04 | Stop reason: HOSPADM

## 2023-01-29 RX ORDER — HEPARIN SODIUM 10000 [USP'U]/100ML
3-20 INJECTION, SOLUTION INTRAVENOUS
Status: DISCONTINUED | OUTPATIENT
Start: 2023-01-29 | End: 2023-01-30

## 2023-01-29 RX ORDER — DOCUSATE SODIUM 100 MG/1
100 CAPSULE, LIQUID FILLED ORAL 2 TIMES DAILY
Status: DISCONTINUED | OUTPATIENT
Start: 2023-01-29 | End: 2023-02-04 | Stop reason: HOSPADM

## 2023-01-29 RX ORDER — MIRTAZAPINE 15 MG/1
30 TABLET, FILM COATED ORAL
Status: DISCONTINUED | OUTPATIENT
Start: 2023-01-29 | End: 2023-02-04 | Stop reason: HOSPADM

## 2023-01-29 RX ORDER — METOPROLOL TARTRATE 50 MG/1
50 TABLET, FILM COATED ORAL EVERY 12 HOURS SCHEDULED
Status: DISCONTINUED | OUTPATIENT
Start: 2023-01-29 | End: 2023-02-04 | Stop reason: HOSPADM

## 2023-01-29 RX ORDER — ALBUTEROL SULFATE 2.5 MG/3ML
0.63 SOLUTION RESPIRATORY (INHALATION) 4 TIMES DAILY PRN
Status: DISCONTINUED | OUTPATIENT
Start: 2023-01-29 | End: 2023-02-04 | Stop reason: HOSPADM

## 2023-01-29 RX ORDER — ASPIRIN 81 MG/1
81 TABLET, CHEWABLE ORAL DAILY
Status: DISCONTINUED | OUTPATIENT
Start: 2023-01-29 | End: 2023-01-30

## 2023-01-29 RX ORDER — PREDNISONE 1 MG/1
2.5 TABLET ORAL DAILY
Status: DISCONTINUED | OUTPATIENT
Start: 2023-01-29 | End: 2023-02-04 | Stop reason: HOSPADM

## 2023-01-29 RX ORDER — SODIUM CHLORIDE, SODIUM GLUCONATE, SODIUM ACETATE, POTASSIUM CHLORIDE, MAGNESIUM CHLORIDE, SODIUM PHOSPHATE, DIBASIC, AND POTASSIUM PHOSPHATE .53; .5; .37; .037; .03; .012; .00082 G/100ML; G/100ML; G/100ML; G/100ML; G/100ML; G/100ML; G/100ML
50 INJECTION, SOLUTION INTRAVENOUS CONTINUOUS
Status: DISPENSED | OUTPATIENT
Start: 2023-01-30 | End: 2023-01-30

## 2023-01-29 RX ORDER — AMLODIPINE BESYLATE 5 MG/1
5 TABLET ORAL DAILY
Status: DISCONTINUED | OUTPATIENT
Start: 2023-01-29 | End: 2023-01-29

## 2023-01-29 RX ORDER — TRAZODONE HYDROCHLORIDE 50 MG/1
50 TABLET ORAL
Status: DISCONTINUED | OUTPATIENT
Start: 2023-01-29 | End: 2023-02-04 | Stop reason: HOSPADM

## 2023-01-29 RX ORDER — OXYCODONE HYDROCHLORIDE 10 MG/1
10 TABLET ORAL EVERY 6 HOURS PRN
Status: DISCONTINUED | OUTPATIENT
Start: 2023-01-29 | End: 2023-02-04 | Stop reason: HOSPADM

## 2023-01-29 RX ORDER — ROPINIROLE 1 MG/1
0.5 TABLET, FILM COATED ORAL
Status: DISCONTINUED | OUTPATIENT
Start: 2023-01-29 | End: 2023-02-04 | Stop reason: HOSPADM

## 2023-01-29 RX ORDER — ALLOPURINOL 100 MG/1
50 TABLET ORAL DAILY
Status: DISCONTINUED | OUTPATIENT
Start: 2023-01-29 | End: 2023-02-04 | Stop reason: HOSPADM

## 2023-01-29 RX ORDER — POLYETHYLENE GLYCOL 3350 17 G/17G
17 POWDER, FOR SOLUTION ORAL DAILY PRN
Status: DISCONTINUED | OUTPATIENT
Start: 2023-01-29 | End: 2023-02-02

## 2023-01-29 RX ORDER — FLUTICASONE FUROATE AND VILANTEROL 100; 25 UG/1; UG/1
1 POWDER RESPIRATORY (INHALATION) DAILY
Status: DISCONTINUED | OUTPATIENT
Start: 2023-01-29 | End: 2023-02-04 | Stop reason: HOSPADM

## 2023-01-29 RX ORDER — SUCRALFATE 1 G/1
1 TABLET ORAL
Status: DISCONTINUED | OUTPATIENT
Start: 2023-01-29 | End: 2023-02-04 | Stop reason: HOSPADM

## 2023-01-29 RX ADMIN — ALLOPURINOL 50 MG: 100 TABLET ORAL at 08:16

## 2023-01-29 RX ADMIN — VANCOMYCIN HYDROCHLORIDE 750 MG: 750 INJECTION, SOLUTION INTRAVENOUS at 07:44

## 2023-01-29 RX ADMIN — MIRTAZAPINE 30 MG: 15 TABLET, FILM COATED ORAL at 21:19

## 2023-01-29 RX ADMIN — DOCUSATE SODIUM 100 MG: 100 CAPSULE, LIQUID FILLED ORAL at 08:17

## 2023-01-29 RX ADMIN — SUCRALFATE 1 G: 1 TABLET ORAL at 15:25

## 2023-01-29 RX ADMIN — GABAPENTIN 300 MG: 300 CAPSULE ORAL at 21:19

## 2023-01-29 RX ADMIN — CEFEPIME HYDROCHLORIDE 2000 MG: 2 INJECTION, POWDER, FOR SOLUTION INTRAVENOUS at 16:55

## 2023-01-29 RX ADMIN — GABAPENTIN 300 MG: 300 CAPSULE ORAL at 15:25

## 2023-01-29 RX ADMIN — PANTOPRAZOLE SODIUM 40 MG: 40 TABLET, DELAYED RELEASE ORAL at 06:07

## 2023-01-29 RX ADMIN — TRAZODONE HYDROCHLORIDE 50 MG: 50 TABLET ORAL at 21:19

## 2023-01-29 RX ADMIN — INSULIN LISPRO 1 UNITS: 100 INJECTION, SOLUTION INTRAVENOUS; SUBCUTANEOUS at 21:19

## 2023-01-29 RX ADMIN — GABAPENTIN 300 MG: 300 CAPSULE ORAL at 08:16

## 2023-01-29 RX ADMIN — ATORVASTATIN CALCIUM 10 MG: 10 TABLET, FILM COATED ORAL at 15:38

## 2023-01-29 RX ADMIN — OXYCODONE HYDROCHLORIDE 10 MG: 10 TABLET ORAL at 04:24

## 2023-01-29 RX ADMIN — HEPARIN SODIUM 2000 UNITS: 1000 INJECTION INTRAVENOUS; SUBCUTANEOUS at 04:26

## 2023-01-29 RX ADMIN — OXYCODONE HYDROCHLORIDE 10 MG: 10 TABLET ORAL at 01:21

## 2023-01-29 RX ADMIN — OXYBUTYNIN CHLORIDE 5 MG: 5 TABLET, EXTENDED RELEASE ORAL at 08:17

## 2023-01-29 RX ADMIN — UMECLIDINIUM 1 PUFF: 62.5 AEROSOL, POWDER ORAL at 08:16

## 2023-01-29 RX ADMIN — SUCRALFATE 1 G: 1 TABLET ORAL at 06:07

## 2023-01-29 RX ADMIN — HEPARIN SODIUM 12 UNITS/KG/HR: 10000 INJECTION, SOLUTION INTRAVENOUS at 02:57

## 2023-01-29 RX ADMIN — ASPIRIN 81 MG: 81 TABLET, CHEWABLE ORAL at 08:17

## 2023-01-29 RX ADMIN — Medication 250 MG: at 08:17

## 2023-01-29 RX ADMIN — PREDNISONE 2.5 MG: 1 TABLET ORAL at 08:17

## 2023-01-29 RX ADMIN — MORPHINE SULFATE 2 MG: 2 INJECTION, SOLUTION INTRAMUSCULAR; INTRAVENOUS at 20:56

## 2023-01-29 RX ADMIN — DOCUSATE SODIUM 100 MG: 100 CAPSULE, LIQUID FILLED ORAL at 17:52

## 2023-01-29 RX ADMIN — MORPHINE SULFATE 2 MG: 2 INJECTION, SOLUTION INTRAMUSCULAR; INTRAVENOUS at 14:41

## 2023-01-29 RX ADMIN — METOPROLOL TARTRATE 50 MG: 50 TABLET, FILM COATED ORAL at 21:19

## 2023-01-29 RX ADMIN — SERTRALINE HYDROCHLORIDE 50 MG: 50 TABLET ORAL at 21:19

## 2023-01-29 RX ADMIN — FLUTICASONE FUROATE AND VILANTEROL TRIFENATATE 1 PUFF: 100; 25 POWDER RESPIRATORY (INHALATION) at 08:21

## 2023-01-29 RX ADMIN — INSULIN LISPRO 2 UNITS: 100 INJECTION, SOLUTION INTRAVENOUS; SUBCUTANEOUS at 09:40

## 2023-01-29 RX ADMIN — OXYCODONE HYDROCHLORIDE 10 MG: 10 TABLET ORAL at 17:52

## 2023-01-29 RX ADMIN — ROPINIROLE 0.5 MG: 1 TABLET, FILM COATED ORAL at 21:19

## 2023-01-29 RX ADMIN — SENNOSIDES 8.6 MG: 8.6 TABLET ORAL at 08:16

## 2023-01-29 RX ADMIN — CEFEPIME HYDROCHLORIDE 2000 MG: 2 INJECTION, POWDER, FOR SOLUTION INTRAVENOUS at 04:31

## 2023-01-29 RX ADMIN — OXYCODONE HYDROCHLORIDE 10 MG: 10 TABLET ORAL at 10:29

## 2023-01-29 RX ADMIN — NICOTINE 7 MG/24 HR DAILY TRANSDERMAL PATCH 1 PATCH: at 08:17

## 2023-01-29 RX ADMIN — INSULIN LISPRO 2 UNITS: 100 INJECTION, SOLUTION INTRAVENOUS; SUBCUTANEOUS at 16:54

## 2023-01-29 RX ADMIN — MORPHINE SULFATE 2 MG: 2 INJECTION, SOLUTION INTRAMUSCULAR; INTRAVENOUS at 06:10

## 2023-01-29 RX ADMIN — INSULIN GLARGINE 8 UNITS: 100 INJECTION, SOLUTION SUBCUTANEOUS at 21:19

## 2023-01-29 NOTE — NURSING NOTE
Rec'd telephonic report from  at [address] re: pt transfer  Pt to be admitted for angio  Pt is on heparin gtt  Pt w/ last PTT drawn 1/28/23 at 1820, PTT 38  Pt required rebolus/dose adjustment  Next scheduled PTT should have been drawn 1/29/23 00:20 am  Pt departed [facility] per verbal report on 01/29 at 1:45 am  Per report, no PTT drawn  Will notify admitting regarding PTT out of compliance with current written order

## 2023-01-29 NOTE — ASSESSMENT & PLAN NOTE
· Patient initially presented to the [facility] ED with blisters on the left great toe that progressed to open wounds   She was prescribed keflex and doxycycline outpatient with no resolution leading her to seek emergent care  · Xray of left foot 1/24 negative for osteomyelitis   · MRI left foot 1/25 shows "Ulcer with evidence of small adjacent medial, distal 1st MTP osteomyelitis   Altered marrow signal intensity at the proximal-medial aspect of the 1st toe proximal phalanx is less specific and may be early osteomyelitis consult podiatry for further recommendations "  · Patient currently on day 6 of IV cefepime and vancomycin  · Recommended by vascular surgery team that the patient would benefit by undergo angiogram prior to podiatric intervention leading to her transfer to [facility]  · Vascular surgery, podiatry, and nephrology teams consulted

## 2023-01-29 NOTE — H&P
[address] [facility] 1940, [de-identified] y o  female MRN: [id number]  Unit/Bed#: E5 -02 Encounter: [id number]  Primary Care Provider: Samantha Forde MD   Date and time admitted to hospital: 1/29/2023  2:19 AM    * Acute hematogenous osteomyelitis of left foot ([facility] [place] [facility] )  Assessment & Plan  · Patient initially presented to the [address] [place] [address] ED with blisters on the left great toe that progressed to open wounds  She was prescribed keflex and doxycycline outpatient with no resolution leading her to seek emergent care  · Xray of left foot 1/24 negative for osteomyelitis   · MRI left foot 1/25 shows "Ulcer with evidence of small adjacent medial, distal 1st MTP osteomyelitis   Altered marrow signal intensity at the proximal-medial aspect of the 1st toe proximal phalanx is less specific and may be early osteomyelitis consult podiatry for further recommendations "  · Patient currently on day 6 of IV cefepime and vancomycin  · Recommended by vascular surgery team that the patient would benefit by undergo angiogram prior to podiatric intervention leading to her transfer to [address] [place] [address]  · Vascular surgery, podiatry, and nephrology teams consulted    PAD (peripheral artery disease) ([place] [address] )  Assessment & Plan  · Patient had arterial doppler studies of bilateral extremities while at [address] [place] [address]  · RLL: There is a 50-75% stenosis noted in the mid superficial femoral artery with diffuse atherosclerotic disease throughout the remaining femoro-popliteal andtibio-peroneal segments  AD 0 42  · LLL: There is a 50-75% stenosis noted in the prox and distal superficial femoral artery and an occlusion vs high grade stenosis of the distal anterior tibial artery  Diffuse atherosclerotic disease throughout the remaining femoro-popliteal and tibio-peroneal segments   AD 0 39  · Patient with remote history of DVT previously anticoagulated on Eliquis  · Currently on heparin gtt for 24-48 hours, continue   · Patient transferred to [facility] for angiogram  · Vascular surgery pending  · Renal consulted for prep for angio in the setting of CKD3b    Stage 3b chronic kidney disease [facility])  Assessment & Plan  Lab Results   Component Value Date    EGFR 35 01/28/2023    EGFR 34 01/27/2023    EGFR 34 01/26/2023    CREATININE 1 40 (H) 01/28/2023    CREATININE 1 43 (H) 01/27/2023    CREATININE 1 43 (H) 01/26/2023   · Most recent creatinine 1 40  · Appears to be patients baseline   · Continue to monitor BMP  · Nephrology consulted for prep for angiogram    Chronic, continuous use of opioids  Assessment & Plan  · Patient chronically on oxycodone, morphine, and movantik at home   · PDMP reviewed  · Pain medications increased in the setting of ischemic left foot pain    Chronic respiratory failure with hypoxia and hypercapnia (HCC)  Assessment & Plan  · Continue baseline 3L NC     Mixed simple and mucopurulent chronic bronchitis ([address] )  Assessment & Plan  · No acute exacerbation  · Patient is currently on baseline oxygen requirements of 3L NC  · Continue to encourage cessation  · Nicotine patch supplementation  · Continue home regimen of Advair substituted to Breo, Spiriva and albuterol inhaler/neb prednisone 2 5 mg daily as per home regimen    Type 2 diabetes mellitus with skin complication, with long-term current use of insulin [facility])  Assessment & Plan  Lab Results   Component Value Date    HGBA1C 7 5 (H) 01/23/2023       Recent Labs     01/28/23  0725 01/28/23  1135 01/28/23  1604 01/28/23  2100   POCGLU 104 182* 211* 205*       Blood Sugar Average: Last 72 hrs:  · 12 units Lantus h s  · SSI with accucheks    VTE Pharmacologic Prophylaxis: VTE Score: 9 High Risk (Score >/= 5) - Pharmacological DVT Prophylaxis Ordered: heparin drip  Sequential Compression Devices Ordered  Code Status: Level 3 - DNAR and DNI   Discussion with family: Updated  (daughter) via phone      Anticipated Length of Stay: Patient will be admitted on an inpatient basis with an anticipated length of stay of greater than 2 midnights secondary to angiogram     Total Time for Visit, including Counseling / Coordination of Care: 70 minutes Greater than 50% of this total time spent on direct patient counseling and coordination of care  Chief Complaint: "I had open blisters on my left big toe"    History of Present Illness:  Ariel Honeycutt is a [de-identified] y o  female with a PMH of DM2, CKD3, PAD who presents with osteomyelitis of the left great toe  Patient originally presented to the ED after having ischemic pain x3 weeks and then she noticed 2 blisters develop on her left first toe  She saw her PCP who prescribed doxycycline and keflex with no relief  She reports that since she was first admitted her pain has been well controlled using the morphine and oxycodone  She denies any other symptoms such as fevers, chills, diaphoresis, or headache  She states that she is chronically on 3 L of oxygen and ambulates with a walker but has recently found it difficult to ambulate secondary to her left toe pain  Request that her daughter be updated daily as she gets worried about her  Review of Systems:  Review of Systems    Past Medical and Surgical History:   Past Medical History:   Diagnosis Date   • Asthma    • CHF (congestive heart failure) ([facility] )    • COPD [facility]chronic obstructive pulmonary disease) ([facility] )    • Diabetes mellitus ([facility] )    • DVT of lower limb, acute ([facility] )    • Hypertension    • Renal disorder        Past Surgical History:   Procedure Laterality Date   • APPENDECTOMY     •  SECTION     • HYSTERECTOMY         Meds/Allergies:  Prior to Admission medications    Medication Sig Start Date End Date Taking?  Authorizing Provider   albuterol (ACCUNEB) 0 63 MG/3ML nebulizer solution Take 1 ampule by nebulization 4 (four) times a day as needed for wheezing    Historical Provider, MD   allopurinol (ZYLOPRIM) 100 mg tablet Take 50 mg by mouth daily    Historical Provider, MD   amLODIPine (NORVASC) 5 mg tablet Take 5 mg by mouth daily    Historical Provider, MD   apixaban (ELIQUIS) 5 mg Take 5 mg by mouth 2 (two) times a day    Historical Provider, MD   aspirin 81 mg chewable tablet Chew 81 mg daily    Historical Provider, MD   atorvastatin (LIPITOR) 10 mg tablet Take 10 mg by mouth daily    Historical Provider, MD   cephalexin (KEFLEX) 500 mg capsule Take 500 mg by mouth every 6 (six) hours Antibiotic for left foot    Historical Provider, MD   DM-APAP-CPM 7 5-160-1 MG/5ML SYRP Take by mouth as needed (For cough)    Historical Provider, MD   docusate sodium (COLACE) 100 mg capsule Take 200 mg by mouth daily    Historical Provider, MD   doxycycline hyclate (VIBRAMYCIN) 100 mg capsule Take 100 mg by mouth every 12 (twelve) hours Left foot    Historical Provider, MD   fluticasone-salmeterol (ADVAIR, WIXELA) 500-50 mcg/dose inhaler Inhale 1 puff 2 (two) times a day Rinse mouth after use      Historical Provider, MD   furosemide (LASIX) 40 mg tablet Take 40 mg by mouth daily     Historical Provider, MD   gabapentin (NEURONTIN) 300 mg capsule Take 300 mg by mouth 3 (three) times a day    Historical Provider, MD   HYDROXYZINE HCL PO Take by mouth as needed (Itching and anxiety)    Historical Provider, MD   insulin glargine (LANTUS) 100 units/mL subcutaneous injection Inject 12 Units under the skin daily at bedtime    Historical Provider, MD   Ipratropium-Albuterol (DUONEB IN) Inhale 2 5 mg every 4 (four) hours    Historical Provider, MD   MECLIZINE HCL PO Take by mouth as needed (Up to TID for dizziness)    Historical Provider, MD   metoprolol tartrate (LOPRESSOR) 50 mg tablet Take 50 mg by mouth every 12 (twelve) hours    Historical Provider, MD   Mirabegron (MYRBETRIQ PO) Take 25 mg by mouth daily    Historical Provider, MD   mirtazapine (REMERON) 30 mg tablet Take 30 mg by mouth daily at bedtime    Historical Provider, MD   naloxegol oxalate (Movantik) 25 MG tablet Take 25 mg by mouth in the morning Historical Provider, MD   nitroglycerin (NITROSTAT) 0 4 mg SL tablet Place 0 4 mg under the tongue every 5 (five) minutes as needed for chest pain    Historical Provider, MD   NYSTATIN PO Take 5 mL by mouth as needed (THRUSH)    Historical Provider, MD   oxyCODONE (OxyCONTIN) 10 mg 12 hr tablet Take 10 mg by mouth every 6 (six) hours as needed    Historical Provider, MD   pantoprazole (PROTONIX) 40 mg tablet Take 40 mg by mouth daily  Patient not taking: Reported on 1/24/2023    Historical Provider, MD   potassium chloride (MICRO-K) 10 MEQ CR capsule Take 10 mEq by mouth daily    Historical Provider, MD   predniSONE 2 5 mg tablet Take 2 5 mg by mouth daily    Historical Provider, MD   PROCHLORPERAZINE MALEATE PO Take 5 mg by mouth every 6 (six) hours as needed  Patient not taking: Reported on 1/24/2023    Historical Provider, MD   rOPINIRole (REQUIP) 0 25 mg tablet Take 0 5 mg by mouth daily at bedtime    Historical Provider, MD   saccharomyces boulardii (FLORASTOR) 250 mg capsule Take 250 mg by mouth daily  Patient not taking: Reported on 1/24/2023    Historical Provider, MD   sertraline (ZOLOFT) 50 mg tablet Take 50 mg by mouth daily at bedtime    Historical Provider, MD   sucralfate (CARAFATE) 1 g tablet Take 1 g by mouth 2 (two) times a day before meals    Historical Provider, MD   Tiotropium Bromide Monohydrate (SPIRIVA HANDIHALER IN) Inhale 1 puff daily at bedtime as needed  Patient not taking: Reported on 1/24/2023    Historical Provider, MD   traZODone (DESYREL) 50 mg tablet Take 50 mg by mouth daily at bedtime    Historical Provider, MD EPPS have reviewed home medications using recent Epic encounter  Allergies: Allergies   Allergen Reactions   • Doxycycline GI Intolerance     Refer to office visit 4/27/15   • Codeine      Occasional nausea      • Penicillins Hives     Last dose about 10years ago          Social History:  Marital Status:     Occupation: Retired  Patient Pre-hospital Living Situation: Home  Patient Pre-hospital Level of Mobility: walks with walker  Patient Pre-hospital Diet Restrictions: diabetic  Substance Use History:   Social History     Substance and Sexual Activity   Alcohol Use Not Currently     Social History     Tobacco Use   Smoking Status Every Day   • Packs/day: 0 50   • Types: Cigarettes   • Passive exposure: Past   Smokeless Tobacco Never   Tobacco Comments    Pt currently wearing nicotine patch      Social History     Substance and Sexual Activity   Drug Use Never       Family History:  Family History   Problem Relation Age of Onset   • Arthritis Father        Physical Exam:     Vitals:   Blood Pressure: 160/57 (01/29/23 0232)  Pulse: 70 (01/29/23 0232)  SpO2: 97 % (01/29/23 0232)    Physical Exam     Additional Data:     Lab Results:  Results from last 7 days   Lab Units 01/28/23  0554 01/25/23  0457 01/24/23  1528   WBC Thousand/uL 8 54   < > 10 87*   HEMOGLOBIN g/dL 9 4*   < > 10 5*   HEMATOCRIT % 31 0*   < > 35 0   PLATELETS Thousands/uL 213   < > 297   NEUTROS PCT %  --   --  72   LYMPHS PCT %  --   --  16   MONOS PCT %  --   --  6   EOS PCT %  --   --  4    < > = values in this interval not displayed  Results from last 7 days   Lab Units 01/28/23  0554 01/25/23  0457 01/24/23  1528   SODIUM mmol/L 141   < > 140   POTASSIUM mmol/L 4 3   < > 4 4   CHLORIDE mmol/L 103   < > 96   CO2 mmol/L 33*   < > 36*   BUN mg/dL 33*   < > 30*   CREATININE mg/dL 1 40*   < > 1 28   ANION GAP mmol/L 5   < > 8   CALCIUM mg/dL 8 5   < > 10 3*   ALBUMIN g/dL  --   --  4 0   TOTAL BILIRUBIN mg/dL  --   --  0 23   ALK PHOS U/L  --   --  59   ALT U/L  --   --  7   AST U/L  --   --  9*   GLUCOSE RANDOM mg/dL 83   < > 180*    < > = values in this interval not displayed       Results from last 7 days   Lab Units 01/29/23  0307   INR  1 09     Results from last 7 days   Lab Units 01/28/23  2100 01/28/23  1604 01/28/23  1135 01/28/23  0725 01/27/23  2042 01/27/23  1607 01/27/23  1117 01/27/23  0747 01/26/23  2142 01/26/23  1629 01/26/23  1100 01/26/23  0737   POC GLUCOSE mg/dl 205* 211* 182* 104 137 174* 149* 129 149* 141* 143* 83     Results from last 7 days   Lab Units 01/23/23  1133   HEMOGLOBIN A1C % 7 5*     Results from last 7 days   Lab Units 01/24/23  1528   LACTIC ACID mmol/L 1 1       Lines/Drains:  Invasive Devices     Peripheral Intravenous Line  Duration           Peripheral IV 01/28/23 Left;Proximal;Upper;Ventral (anterior) Arm <1 day                    Imaging: Reviewed radiology reports from this admission including: xray(s) and MRI left foot, b/l LE duplex  No orders to display       EKG and Other Studies Reviewed on Admission:   · EKG: No EKG obtained  ** Please Note: This note has been constructed using a voice recognition system   **

## 2023-01-29 NOTE — ANESTHESIA PREPROCEDURE EVALUATION
Procedure:  Lower extremity angiogram w/ psb intervention (Left: Abdomen)    Relevant Problems   ANESTHESIA (within normal limits)      CARDIO  no record of echo    (+) CHF (congestive heart failure) ([facility])   (+) PAD (peripheral artery disease) ([facility])      ENDO   (+) Type 2 diabetes mellitus with skin complication, with long-term current use of insulin ([facility])      /RENAL   (+) Stage 3b chronic kidney disease ([facility])      NEURO/PSYCH   (+) Chronic, continuous use of opioids      PULMONARY   (+) Chronic respiratory failure with hypoxia and hypercapnia ([facility])   (+) Mixed simple and mucopurulent chronic bronchitis ([facility])   (+) Oxygen dependent   (+) Smoking      Other   (+) Acute hematogenous osteomyelitis of left foot ([facility])        Physical Exam    Airway    Mallampati score: II         Dental   upper dentures and lower dentures,     Cardiovascular  Rhythm: regular, Rate: normal, Murmur,     Pulmonary  Pulmonary exam normal     Other Findings        Anesthesia Plan  ASA Score- 4     Anesthesia Type- IV sedation with anesthesia with ASA Monitors  Additional Monitors:   Airway Plan:     Comment: Would rec sedation only   Plan Factors-Exercise tolerance (METS): <4 METS  Chart reviewed  Existing labs reviewed  Patient summary reviewed  Patient is a current smoker  Patient did not smoke on day of surgery  Induction- intravenous  Postoperative Plan-     Informed Consent- Anesthetic plan and risks discussed with patient  Quality 111:Pneumonia Vaccination Status For Older Adults: Pneumococcal Vaccination Previously Received Detail Level: Detailed Quality 110: Preventive Care And Screening: Influenza Immunization: Influenza Immunization Administered during Influenza season

## 2023-01-29 NOTE — PLAN OF CARE
Problem: Knowledge Deficit  Goal: Patient/family/caregiver demonstrates understanding of disease process, treatment plan, medications, and discharge instructions  Description: Complete learning assessment and assess knowledge base    Interventions:  - Provide teaching at level of understanding  - Provide teaching via preferred learning methods  Outcome:   Problem: PAIN - ADULT  Goal: Verbalizes/displays adequate comfort level or baseline comfort level  Description: Interventions:  - Encourage patient to monitor pain and request assistance  - Assess pain using appropriate pain scale  - Administer analgesics based on type and severity of pain and evaluate response  - Implement non-pharmacological measures as appropriate and evaluate response  - Consider cultural and social influences on pain and pain management  - Notify physician/advanced practitioner if interventions unsuccessful or patient reports new pain  Outcome: Not Progressing   Progressing

## 2023-01-29 NOTE — PLAN OF CARE
Problem: PAIN - ADULT  Goal: Verbalizes/displays adequate comfort level or baseline comfort level  Description: Interventions:  - Encourage patient to monitor pain and request assistance  - Assess pain using appropriate pain scale0-10  - Administer analgesics based on type and severity of pain and evaluate response  - Implement non-pharmacological measures as appropriate and evaluate response  - Consider cultural and social influences on pain and pain management  - Notify physician/advanced practitioner if interventions unsuccessful or patient reports new pain  Outcome: Progressing     Problem: INFECTION - ADULT  Goal: Absence or prevention of progression during hospitalization  Description: INTERVENTIONS:  - Assess and monitor for signs and symptoms of infection  - Monitor lab/diagnostic results  - Monitor all insertion sites, i e  indwelling lines, tubes, and drains  - Monitor endotracheal if appropriate and nasal secretions for changes in amount and color  - [place] appropriate cooling/warming therapies per order  - Administer medications as ordered  - Instruct and encourage patient and family to use good hand hygiene technique  - Identify and instruct in appropriate isolation precautions for identified infection/condition  Outcome: Progressing     Problem: SAFETY ADULT  Goal: Patient will remain free of falls  Description: INTERVENTIONS:  - Educate patient/family on patient safety including physical limitations  - Instruct patient to call for assistance with activity   - Consult OT/PT to assist with strengthening/mobility   - Keep Call bell within reach  - Keep bed low and locked with side rails adjusted as appropriate  - Keep care items and personal belongings within reach  - Initiate and maintain comfort rounds  - Make Fall Risk Sign visible to staff  - Offer Toileting every 2 Hours, in advance of need  - Initiate/Maintain alarm  - Obtain necessary fall risk management equipment  - Apply yellow socks and bracelet for high fall risk patients  - Consider moving patient to room near nurses station  Outcome: Progressing  Goal: Maintain or return to baseline ADL function  Description: INTERVENTIONS:  -  Assess patient's ability to carry out ADLs; assess patient's baseline for ADL function and identify physical deficits which impact ability to perform ADLs (bathing, care of mouth/teeth, toileting, grooming, dressing, etc )  - Assess/evaluate cause of self-care deficits   - Assess range of motion  - Assess patient's mobility; develop plan if impaired  - Assess patient's need for assistive devices and provide as appropriate  - Encourage maximum independence but intervene and supervise when necessary  - Involve family in performance of ADLs  - Assess for home care needs following discharge   - Consider OT consult to assist with ADL evaluation and planning for discharge  - Provide patient education as appropriate  Outcome: Progressing  Goal: Maintains/Returns to pre admission functional level  Description: INTERVENTIONS:  - Perform BMAT or MOVE assessment daily    - Set and communicate daily mobility goal to care team and patient/family/caregiver  - Collaborate with rehabilitation services on mobility goals if consulted  - Perform Range of Motion - times a day  - Reposition patient every - hours    - Dangle patient - times a day  - Stand patient - times a day  - Ambulate patient - times a day  - Out of bed to chair - times a day   - Out of bed for meals - times a day  - Out of bed for toileting  - Record patient progress and toleration of activity level   Outcome: Progressing     Problem: DISCHARGE PLANNING  Goal: Discharge to home or other facility with appropriate resources  Description: INTERVENTIONS:  - Identify barriers to discharge w/patient and caregiver  - Arrange for needed discharge resources and transportation as appropriate  - Identify discharge learning needs (meds, wound care, etc )  - Arrange for interpretive services to assist at discharge as needed  - Refer to Case Management Department for coordinating discharge planning if the patient needs post-hospital services based on physician/advanced practitioner order or complex needs related to functional status, cognitive ability, or social support system  Outcome: Progressing     Problem: Knowledge Deficit  Goal: Patient/family/caregiver demonstrates understanding of disease process, treatment plan, medications, and discharge instructions  Description: Complete learning assessment and assess knowledge base  Interventions:  - Provide teaching at level of understanding  - Provide teaching via preferred learning methods  Outcome: Progressing     Problem: MOBILITY - ADULT  Goal: Maintain or return to baseline ADL function  Description: INTERVENTIONS:  -  Assess patient's ability to carry out ADLs; assess patient's baseline for ADL function and identify physical deficits which impact ability to perform ADLs (bathing, care of mouth/teeth, toileting, grooming, dressing, etc )  - Assess/evaluate cause of self-care deficits   - Assess range of motion  - Assess patient's mobility; develop plan if impaired  - Assess patient's need for assistive devices and provide as appropriate  - Encourage maximum independence but intervene and supervise when necessary  - Involve family in performance of ADLs  - Assess for home care needs following discharge   - Consider OT consult to assist with ADL evaluation and planning for discharge  - Provide patient education as appropriate  Outcome: Progressing  Goal: Maintains/Returns to pre admission functional level  Description: INTERVENTIONS:  - Perform BMAT or MOVE assessment daily    - Set and communicate daily mobility goal to care team and patient/family/caregiver  - Collaborate with rehabilitation services on mobility goals if consulted  - Perform Range of Motion - times a day  - Reposition patient every - hours    - Dangle patient - times a day  - Stand patient - times a day  - Ambulate patient - times a day  - Out of bed to chair - times a day   - Out of bed for meals - times a day  - Out of bed for toileting  - Record patient progress and toleration of activity level   Outcome: Progressing

## 2023-01-29 NOTE — PROGRESS NOTES
NEPHROLOGY PROGRESS NOTE   Stephan Schmitt [de-identified] y o  female MRN: [id number]  Unit/Bed#: E5 -02 Encounter: [id number]      HPI/24hr EVENTS:    -43-year-old female past medical history of CKD 3, chronic hypoxic/hypercapnic respiratory failure, DM2, peripheral artery disease    Presented to outside hospital with left foot pain and wound, transferred to [address] [facility] for vascular surgery and angiogram   Nephrology consulted for management of CONSTANCE risk reduction in setting of CKD 3    -No acute events overnight    ASSESSMENT/PLAN:    CKD 3  -Baseline creatinine: 1 2-1 4  -most recent creatinine 1 35 stable  -Etiology: Age associated nephron loss, diabetic nephropathy, hypertensive nephrosclerosis  -Renal imaging: Renal ultrasound from 1/26/2023, atrophic left kidney, no hydronephrosis, normal echogenicity and contour right kidney, possible complex left renal cyst  -Avoid hypotension, avoid nephrotoxins, avoid NSAIDS  -Trend BMP  -0 47 g UPCR, suspect second to diabetic nephropathy, could consider outpatient paraproteinemia work-up  -CONSTANCE risk reduction-> plan for angiogram 1/30, orders placed for pre and post IV dye exposure IV fluid, repeat BMP tomorrow to ensure renal function is at baseline, holding home Lasix, on no additional nephrotoxins, limit contrast use as possible consider CO2  -Discussed dye exposure in setting of CKD and low/moderate risk of CONSTANCE, discussed that sometimes CONSTANCE will require dialysis  -Holding home Lasix for now    Blood pressure/hypertension  -Outpatient regimen: Norvasc 5 mg daily, Lasix 40 mg daily, Lopressor 50 mg twice daily  -Current regimen: Norvasc 5 mg daily, Lopressor 50 mg twice daily, adjust all parameters for Norvasc  -Recent blood pressure trends acceptable    Peripheral artery disease  -Pending angiogram 1/30  -Currently on heparin infusion  -Care per vascular/primary team    Osteomyelitis of left foot  -MRI with possible early signs of osteomyelitis  -Currently on cefepime and vancomycin  -Vascular surgery and podiatry consulted  -Pending angiogram study 1/30    Chronic hypoxic and hypercapnic respiratory failure  -On 3 L nasal cannula baseline  -Home Lasix 40 mg daily is on hold for pending procedure  -Monitor volume status    Left renal cyst  -1 4 cm lesion may represent complex cyst per renal ultrasound, nonemergent follow-up CT/MRI versus repeat ultrasound 3-6 months    Additional medical problems: DM2 last A1c 7 5, chronic pain on opioids, DVT on Eliquis    SUBJECTIVE:  Reports left foot/toe pain, reports breathing feels stable and at baseline, reports appetite is stable    ROS:  Review of Systems   Constitutional:        Hard of hearing   Respiratory: Negative  Cardiovascular: Negative  Gastrointestinal: Negative  Genitourinary: Negative  Musculoskeletal:        Left foot pain      A complete 10 point review of systems was performed and found to be negative unless otherwise noted above or in the HPI  OBJECTIVE:  Current Weight: Weight - Scale: 75 kg (165 lb 5 5 oz)  Vitals:    01/29/23 0232 01/29/23 0619 01/29/23 0814   BP: 160/57 160/57 (!) 135/36   BP Location:  Right arm Right arm   Pulse: 70 70 76   Resp:  20 17   Temp:  98 °F (36 7 °C) 98 9 °F (37 2 °C)   TempSrc:  Oral Oral   SpO2: 97%  97%   Weight:  75 kg (165 lb 5 5 oz)    Height:  5' 6" (1 676 m)      No intake or output data in the 24 hours ending 01/29/23 1053  Physical Exam  Vitals and nursing note reviewed  Constitutional:       General: She is not in acute distress  Appearance: Normal appearance  She is obese  She is not toxic-appearing or diaphoretic  Comments: Awake lying in bed on supplemental oxygen   HENT:      Head: Normocephalic and atraumatic  Nose: Nose normal       Mouth/Throat:      Mouth: Mucous membranes are moist    Eyes:      General: No scleral icterus  Cardiovascular:      Rate and Rhythm: Normal rate and regular rhythm  Pulses: Normal pulses     Pulmonary: Effort: Pulmonary effort is normal  No respiratory distress  Breath sounds: Normal breath sounds  No stridor  No wheezing or rales  Abdominal:      General: Abdomen is flat  There is no distension  Palpations: Abdomen is soft  Musculoskeletal:      Cervical back: Neck supple  Right lower leg: No edema  Left lower leg: No edema  Comments: Left foot dressing   Skin:     General: Skin is warm and dry  Capillary Refill: Capillary refill takes less than 2 seconds  Neurological:      General: No focal deficit present  Mental Status: She is alert and oriented to person, place, and time     Psychiatric:         Mood and Affect: Mood normal           Medications:    Current Facility-Administered Medications:   •  acetaminophen (TYLENOL) tablet 650 mg, 650 mg, Oral, Q6H PRN, Sherry Mchugh PA-C  •  albuterol inhalation solution 0 63 mg, 0 63 mg, Nebulization, 4x Daily PRN, Betcayetano Mchugh PA-C  •  allopurinol (ZYLOPRIM) tablet 50 mg, 50 mg, Oral, Daily, Sherry Mchugh PA-C, 50 mg at 01/29/23 3078  •  amLODIPine (NORVASC) tablet 5 mg, 5 mg, Oral, Daily, AIYANA Sinha  •  aspirin chewable tablet 81 mg, 81 mg, Oral, Daily, Sherry Mchugh PA-C, 81 mg at 01/29/23 3984  •  atorvastatin (LIPITOR) tablet 10 mg, 10 mg, Oral, Daily With Blessing Soares PA-C  •  calcium carbonate (TUMS) chewable tablet 1,000 mg, 1,000 mg, Oral, Daily PRN, HELEN Winters-C  •  cefepime (MAXIPIME) 2,000 mg in dextrose 5 % 50 mL IVPB, 2,000 mg, Intravenous, Q12H, Sherry Mchugh PA-C, Last Rate: 100 mL/hr at 01/29/23 0431, 2,000 mg at 01/29/23 0431  •  docusate sodium (COLACE) capsule 100 mg, 100 mg, Oral, BID, Betcayetano Mchugh PA-C, 100 mg at 01/29/23 8009  •  Fluticasone Furoate-Vilanterol 100-25 mcg/actuation 1 puff, 1 puff, Inhalation, Daily, HELEN Winters-C, 1 puff at 01/29/23 5092  •  gabapentin (NEURONTIN) capsule 300 mg, 300 mg, Oral, TID, Sherry Mchugh PA-C, 300 mg at 01/29/23 0816  •  heparin (porcine) 25,000 units in 0 45% NaCl 250 mL infusion (premix), 3-20 Units/kg/hr (Order-Specific), Intravenous, Titrated, Sophy Maloney PA-C, Last Rate: 10 5 mL/hr at 01/29/23 0430, 14 Units/kg/hr at 01/29/23 0430  •  heparin (porcine) injection 2,000 Units, 2,000 Units, Intravenous, Q6H PRN, Sophy Maloney PA-C, 2,000 Units at 01/29/23 6701  •  heparin (porcine) injection 4,000 Units, 4,000 Units, Intravenous, Q6H PRN, Sophy Maloney PA-C  •  insulin glargine (LANTUS) subcutaneous injection 12 Units 0 12 mL, 12 Units, Subcutaneous, HS, Sophy Maloney PA-C  •  insulin lispro (HumaLOG) 100 units/mL subcutaneous injection 1-6 Units, 1-6 Units, Subcutaneous, HS, Sophy Maloney PA-C  •  insulin lispro (HumaLOG) 100 units/mL subcutaneous injection 2-12 Units, 2-12 Units, Subcutaneous, TID AC, 2 Units at 01/29/23 0940 **AND** Fingerstick Glucose (POCT), , , TID AC, Sophy Maloney PA-C  •  metoprolol tartrate (LOPRESSOR) tablet 50 mg, 50 mg, Oral, Q12H [facility], Sophy Maloney PA-C  •  mirtazapine (REMERON) tablet 30 mg, 30 mg, Oral, HS, Sophy Maloney PA-C  •  morphine injection 2 mg, 2 mg, Intravenous, Q4H PRN, Sophy Maloney PA-C, 2 mg at 01/29/23 4321  •  [START ON 1/30/2023] multi-electrolyte (PLASMALYTE-A/ISOLYTE-S PH 7 4) IV solution, 50 mL/hr, Intravenous, Continuous, Richardine Line, CRNP  •  nicotine (NICODERM CQ) 7 mg/24hr TD 24 hr patch 1 patch, 1 patch, Transdermal, Daily, Sophy Maloney PA-C, 1 patch at 01/29/23 0817  •  ondansetron (ZOFRAN) injection 4 mg, 4 mg, Intravenous, Q8H PRN, Sophy Maloney PA-C  •  oxybutynin (DITROPAN-XL) 24 hr tablet 5 mg, 5 mg, Oral, Daily, Sophy Maloney PA-C, 5 mg at 01/29/23 8180  •  oxyCODONE (ROXICODONE) immediate release tablet 10 mg, 10 mg, Oral, Q6H PRN, Sophy Maloney PA-C, 10 mg at 01/29/23 1029  •  oxyCODONE (ROXICODONE) IR tablet 5 mg, 5 mg, Oral, Q4H PRN, Sophy Maloney PA-C  •  pantoprazole (PROTONIX) EC tablet 40 mg, 40 mg, Oral, Daily, Nancye Tyler, PA-C, 40 mg at 01/29/23 6140  •  polyethylene glycol (MIRALAX) packet 17 g, 17 g, Oral, Daily PRN, Nancye Tyler, PA-C  •  predniSONE tablet 2 5 mg, 2 5 mg, Oral, Daily, Nancye Tyler, PA-C, 2 5 mg at 01/29/23 0521  •  rOPINIRole (REQUIP) tablet 0 5 mg, 0 5 mg, Oral, HS, Nancye Tyler, PA-C  •  saccharomyces boulardii [facility]) capsule 250 mg, 250 mg, Oral, Daily, Nancye Tyler, PA-C, 250 mg at 01/29/23 9633  •  senna (SENOKOT) tablet 8 6 mg, 1 tablet, Oral, Daily, Nancye Tyler, PA-C, 8 6 mg at 01/29/23 0142  •  sertraline (ZOLOFT) tablet 50 mg, 50 mg, Oral, HS, Nancye Tyler, PA-C  •  sucralfate (CARAFATE) tablet 1 g, 1 g, Oral, BID AC, Nancye Tyler, PA-C, 1 g at 01/29/23 6116  •  traZODone (DESYREL) tablet 50 mg, 50 mg, Oral, HS, Nancye Tyler, PA-C  •  umeclidinium 62 5 mcg/actuation inhaler AEPB 1 puff, 1 puff, Inhalation, Daily, Nancye Tyler, PA-C, 1 puff at 01/29/23 0816  •  vancomycin (VANCOCIN) IVPB (premix in dextrose) 750 mg 150 mL, 750 mg, Intravenous, Q24H, Nancye Tyler, PA-C, Last Rate: 150 mL/hr at 01/29/23 0744, 750 mg at 01/29/23 0744    Laboratory Results:  Results from last 7 days   Lab Units 01/29/23  0517 01/28/23  0554 01/27/23  1438 01/27/23  0449 01/26/23  1231 01/26/23  0513 01/25/23  0457 01/24/23  1528   WBC Thousand/uL 10 56* 8 54 7 82  --  9 44  --  9 18 10 87*   HEMOGLOBIN g/dL 9 3* 9 4* 9 0*  --  9 8*  --  9 4* 10 5*   HEMATOCRIT % 30 9* 31 0* 29 7*  --  33 0*  --  30 8* 35 0   PLATELETS Thousands/uL 265 213 211  --  257  --  241 297   POTASSIUM mmol/L 4 1 4 3  --  4 2  --  4 4 4 7 4 4   CHLORIDE mmol/L 102 103  --  102  --  101 100 96   CO2 mmol/L 31 33*  --  28  --  35* 36* 36*   BUN mg/dL 30* 33*  --  34*  --  34* 33* 30*   CREATININE mg/dL 1 35* 1 40*  --  1 43*  --  1 43* 1 42* 1 28   CALCIUM mg/dL 8 5 8 5  --  8 2*  --  8 8 9 5 10 3*       I have personally reviewed the blood work as stated above and in my note  I have personally reviewed renal US imaging studies  I have personally reviewed internal medicine note

## 2023-01-29 NOTE — ASSESSMENT & PLAN NOTE
Lab Results   Component Value Date    EGFR 35 01/28/2023    EGFR 34 01/27/2023    EGFR 34 01/26/2023    CREATININE 1 40 (H) 01/28/2023    CREATININE 1 43 (H) 01/27/2023    CREATININE 1 43 (H) 01/26/2023   · Most recent creatinine 1 40  · Appears to be patients baseline   · Continue to monitor BMP  · Nephrology consulted for prep for angiogram

## 2023-01-29 NOTE — PLAN OF CARE
Problem: Prexisting or High Potential for Compromised Skin Integrity  Goal: Skin integrity is maintained or improved  Description: INTERVENTIONS:  - Identify patients at risk for skin breakdown  - Assess and monitor skin integrity  - Assess and monitor nutrition and hydration status  - Monitor labs   - Assess for incontinence   - Turn and reposition patient  - Assist with mobility/ambulation  - Relieve pressure over bony prominences  - Avoid friction and shearing  - Provide appropriate hygiene as needed including keeping skin clean and dry  - Evaluate need for skin moisturizer/barrier cream  - Collaborate with interdisciplinary team   - Patient/family teaching  - Consider wound care consult   Outcome: Progressing     Problem: PAIN - ADULT  Goal: Verbalizes/displays adequate comfort level or baseline comfort level  Description: Interventions:  - Encourage patient to monitor pain and request assistance  - Assess pain using appropriate pain scale  - Administer analgesics based on type and severity of pain and evaluate response  - Implement non-pharmacological measures as appropriate and evaluate response  - Consider cultural and social influences on pain and pain management  - Notify physician/advanced practitioner if interventions unsuccessful or patient reports new pain  Outcome: Progressing     Problem: INFECTION - ADULT  Goal: Absence or prevention of progression during hospitalization  Description: INTERVENTIONS:  - Assess and monitor for signs and symptoms of infection  - Monitor lab/diagnostic results  - Monitor all insertion sites, i e  indwelling lines, tubes, and drains  - Monitor endotracheal if appropriate and nasal secretions for changes in amount and color  - [place] appropriate cooling/warming therapies per order  - Administer medications as ordered  - Instruct and encourage patient and family to use good hand hygiene technique  - Identify and instruct in appropriate isolation precautions for identified infection/condition  Outcome: Progressing     Problem: SAFETY ADULT  Goal: Patient will remain free of falls  Description: INTERVENTIONS:  - Educate patient/family on patient safety including physical limitations  - Instruct patient to call for assistance with activity   - Consult OT/PT to assist with strengthening/mobility   - Keep Call bell within reach  - Keep bed low and locked with side rails adjusted as appropriate  - Keep care items and personal belongings within reach  - Initiate and maintain comfort rounds  - Make Fall Risk Sign visible to staff  - Apply yellow socks and bracelet for high fall risk patients  - Consider moving patient to room near nurses station  Outcome: Progressing  Goal: Maintain or return to baseline ADL function  Description: INTERVENTIONS:  -  Assess patient's ability to carry out ADLs; assess patient's baseline for ADL function and identify physical deficits which impact ability to perform ADLs (bathing, care of mouth/teeth, toileting, grooming, dressing, etc )  - Assess/evaluate cause of self-care deficits   - Assess range of motion  - Assess patient's mobility; develop plan if impaired  - Assess patient's need for assistive devices and provide as appropriate  - Encourage maximum independence but intervene and supervise when necessary  - Involve family in performance of ADLs  - Assess for home care needs following discharge   - Consider OT consult to assist with ADL evaluation and planning for discharge  - Provide patient education as appropriate  Outcome: Progressing     Problem: DISCHARGE PLANNING  Goal: Discharge to home or other facility with appropriate resources  Description: INTERVENTIONS:  - Identify barriers to discharge w/patient and caregiver  - Arrange for needed discharge resources and transportation as appropriate  - Identify discharge learning needs (meds, wound care, etc )  - Arrange for interpretive services to assist at discharge as needed  - Refer to Case Management Department for coordinating discharge planning if the patient needs post-hospital services based on physician/advanced practitioner order or complex needs related to functional status, cognitive ability, or social support system  Outcome: Progressing     Problem: Knowledge Deficit  Goal: Patient/family/caregiver demonstrates understanding of disease process, treatment plan, medications, and discharge instructions  Description: Complete learning assessment and assess knowledge base    Interventions:  - Provide teaching at level of understanding  - Provide teaching via preferred learning methods  Outcome: Progressing     Problem: MOBILITY - ADULT  Goal: Maintain or return to baseline ADL function  Description: INTERVENTIONS:  -  Assess patient's ability to carry out ADLs; assess patient's baseline for ADL function and identify physical deficits which impact ability to perform ADLs (bathing, care of mouth/teeth, toileting, grooming, dressing, etc )  - Assess/evaluate cause of self-care deficits   - Assess range of motion  - Assess patient's mobility; develop plan if impaired  - Assess patient's need for assistive devices and provide as appropriate  - Encourage maximum independence but intervene and supervise when necessary  - Involve family in performance of ADLs  - Assess for home care needs following discharge   - Consider OT consult to assist with ADL evaluation and planning for discharge  - Provide patient education as appropriate  Outcome: Progressing

## 2023-01-29 NOTE — CONSULTS
Podiatry - Consultation    Patient Information:   Penny Jc [de-identified] y o  female MRN: [id number]  Unit/Bed#: E5 -02 Encounter: [id number]  PCP: Noe Acevedo MD  Date of Admission:  1/29/2023  Date of Consultation: 01/29/23  Requesting Physician: Lauren Persaud MD      ASSESSMENT:    Penny Jc is a [de-identified] y o  female with:    1  Left Hallux diabetic ulcer  2  Type 2 diabetes mellitis  3  PAD  4  Stage 3b CKD    PLAN:    · Plan local wound care to left hallux diabetic ulcer  Wound does not probe to bone, however does extend to MTPJ capsule, with minimal purulent drainage on palpation  · Significant co-morbidities, and therefor patient is high risk for any surgical procedures  Per vascular surgery, patient may benefit from lower extremity angiogram with angioplasty and stenting  · Lower extremity angiogram with intervention planned for tomorrow 11/30  Follow up vascular recommendations  · IV Antibiotics: Vancomycin/Cefepime  · X-ray left foot 1/24 reviewed: No evidence of soft tissue emphysema or osteomyelitis  · Acute Leukocytosis WBC 10 56  Will continue to trend labs/vitals  · Local wound care consisting of Betadine DSD  Wound care instructions placed  · Elevation and offloading on green foam wedges or pillows when non-ambulatory  · Rest of care per primary team   · Will discuss this plan with my attending and update as needed  Weightbearing status: Weightbearing as tolerated    SUBJECTIVE:    History of Present Illness:    Penny Jc is a [de-identified] y o  female who is originally admitted 1/29/2023 due to acute hematogenous OM of left foot  Patient has a past medical history of type 2 diabetes, chronic respiratory failure, PAD, Stage 3b CKD  We are consulted for left foot wound, patient states has been present for approximately 1 week   She states that she was transferred from another hospital for this wound, and believes that she may need an amputation as she was told that there is infection in the bone  She admits to significant pain on palpation of the toe  She denies any systemic symptoms such as fever, chills, nausea, vomiting, or chest pain  Review of Systems:    Constitutional: Negative  HENT: Negative  Eyes: Negative  Respiratory: Negative  Cardiovascular: Negative  Gastrointestinal: Negative  Musculoskeletal:  Left foot pain   Skin: left hallux wound    Neurological: numbness/tingling in b/l lower extremities   Psych: Negative       Past Medical and Surgical History:     Past Medical History:   Diagnosis Date   • Asthma    • CHF (congestive heart failure) ([facility] )    • COPD (chronic obstructive pulmonary disease) ([facility]    • Diabetes mellitus ([facility] )    • DVT of lower limb, acute ([facility])    • Hypertension    • Renal disorder        Past Surgical History:   Procedure Laterality Date   • APPENDECTOMY     •  SECTION     • HYSTERECTOMY         Meds/Allergies:    Medications Prior to Admission   Medication   • albuterol (ACCUNEB) 0 63 MG/3ML nebulizer solution   • allopurinol (ZYLOPRIM) 100 mg tablet   • amLODIPine (NORVASC) 5 mg tablet   • apixaban (ELIQUIS) 5 mg   • aspirin 81 mg chewable tablet   • atorvastatin (LIPITOR) 10 mg tablet   • cephalexin (KEFLEX) 500 mg capsule   • DM-APAP-CPM 7 5-160-1 MG/5ML SYRP   • docusate sodium (COLACE) 100 mg capsule   • doxycycline hyclate (VIBRAMYCIN) 100 mg capsule   • fluticasone-salmeterol (ADVAIR, WIXELA) 500-50 mcg/dose inhaler   • furosemide (LASIX) 40 mg tablet   • gabapentin (NEURONTIN) 300 mg capsule   • HYDROXYZINE HCL PO   • insulin glargine (LANTUS) 100 units/mL subcutaneous injection   • Ipratropium-Albuterol (DUONEB IN)   • MECLIZINE HCL PO   • metoprolol tartrate (LOPRESSOR) 50 mg tablet   • Mirabegron (MYRBETRIQ PO)   • mirtazapine (REMERON) 30 mg tablet   • naloxegol oxalate (Movantik) 25 MG tablet   • nitroglycerin (NITROSTAT) 0 4 mg SL tablet   • NYSTATIN PO   • oxyCODONE (OxyCONTIN) 10 mg 12 hr tablet   • pantoprazole (PROTONIX) 40 mg tablet   • potassium chloride (MICRO-K) 10 MEQ CR capsule   • predniSONE 2 5 mg tablet   • PROCHLORPERAZINE MALEATE PO   • rOPINIRole (REQUIP) 0 25 mg tablet   • saccharomyces boulardii (FLORASTOR) 250 mg capsule   • sertraline (ZOLOFT) 50 mg tablet   • sucralfate (CARAFATE) 1 g tablet   • Tiotropium Bromide Monohydrate (SPIRIVA HANDIHALER IN)   • traZODone (DESYREL) 50 mg tablet       Allergies   Allergen Reactions   • Doxycycline GI Intolerance     Refer to office visit 4/27/15   • Codeine      Occasional nausea      • Penicillins Hives     Last dose about 10years ago          Social History:     Marital Status:     Substance Use History:   Social History     Substance and Sexual Activity   Alcohol Use Not Currently     Social History     Tobacco Use   Smoking Status Every Day   • Packs/day: 0 50   • Types: Cigarettes   • Passive exposure: Past   Smokeless Tobacco Never   Tobacco Comments    Pt currently wearing nicotine patch      Social History     Substance and Sexual Activity   Drug Use Never       Family History:    Family History   Problem Relation Age of Onset   • Arthritis Father          OBJECTIVE:    Vitals:   Blood Pressure: (!) 135/36 (01/29/23 0814)  Pulse: 76 (01/29/23 0814)  Temperature: 98 9 °F (37 2 °C) (01/29/23 0814)  Temp Source: Oral (01/29/23 0814)  Respirations: 17 (01/29/23 0814)  Height: 5' 6" (167 6 cm) (01/29/23 8079)  Weight - Scale: 75 kg (165 lb 5 5 oz) (01/29/23 0619)  SpO2: 97 % (01/29/23 0814)    Physical Exam:    General Appearance: Alert, cooperative, no distress  HEENT: Head normocephalic, atraumatic, without obvious abnormality  Heart: Normal rate and rhythm  Lungs: Non-labored breathing  No respiratory distress  Abdomen: Without distension  Psychiatric: AAOx3  Lower Extremity:    Vascular:   DP: Right: 1+ Left: non-palpable  PT: Right: 1+ Left: 1+  CRT < 3 seconds at the digits   +0/4 edema noted at bilateral lower extremities  Pedal hair is absent  Skin temperature is cool bilaterally  Musculoskeletal:  MMT is 5/5 in all muscle compartments bilaterally  ROM at the 1st MPJ and ankle joint are WNL bilaterally with the leg extended  Pain on palpation of left hallux and forefoot  No gross deformities noted  Dermatological:  Lower extremity wound(s) as noted below:    Wound #: 1  Location: Left medial hallux   Length 2 0cm: Width 1 2cm: Depth 0 4cm:   Deepest Tissue Noted in Base: joint capsule  Probe to Bone: No  Peripheral Skin Description: Attached  Granulation: 10% Fibrotic Tissue: 90% Necrotic Tissue: 0%   Drainage Amount: minimal, purulent  Signs of Infection: Yes      Neurological:  Gross sensation is diminished  Light touch is diminished  Protective sensation is diminished  Clinical Images 01/29/23:      Additional data:     Lab Results: I have personally reviewed pertinent labs including:    Results from last 7 days   Lab Units 01/29/23  0517   WBC Thousand/uL 10 56*   HEMOGLOBIN g/dL 9 3*   HEMATOCRIT % 30 9*   PLATELETS Thousands/uL 265   NEUTROS PCT % 55   LYMPHS PCT % 30   MONOS PCT % 7   EOS PCT % 6     Results from last 7 days   Lab Units 01/29/23  0517 01/25/23  0457 01/24/23  1528   POTASSIUM mmol/L 4 1   < > 4 4   CHLORIDE mmol/L 102   < > 96   CO2 mmol/L 31   < > 36*   BUN mg/dL 30*   < > 30*   CREATININE mg/dL 1 35*   < > 1 28   CALCIUM mg/dL 8 5   < > 10 3*   ALK PHOS U/L  --   --  59   ALT U/L  --   --  7   AST U/L  --   --  9*    < > = values in this interval not displayed  Results from last 7 days   Lab Units 01/29/23  0307   INR  1 09       Cultures: I have personally reviewed pertinent cultures including:    Results from last 7 days   Lab Units 01/24/23  1640 01/24/23  1528   BLOOD CULTURE  No Growth After 4 Days  No Growth After 4 Days  Imaging: I have personally reviewed pertinent reports in PACS    EKG, Pathology, and Other Studies: I have personally reviewed pertinent reports  Time Spent for Care: 30 minutes  More than 50% of total time spent on counseling and coordination of care as described above  ** Please Note: Portions of the record may have been created with voice recognition software  Occasional wrong word or "sound a like" substitutions may have occurred due to the inherent limitations of voice recognition software  Read the chart carefully and recognize, using context, where substitutions have occurred   **

## 2023-01-29 NOTE — CONSULTS
Consultation - Vascular Surgery   Alecia Vera [de-identified] y o  female MRN: [id number]  Unit/Bed#: E5 -02 Encounter: [id number]      Assessment/Plan      Assessment:  [de-identified] y/o F w PAD c/b LLE tissue loss and rest pain, distal L MTP osteo  Vss  Afebrile  R palpable fem  Non palp L fem  LLE w dry gangrene ulcer of medial forefoot  doppl L PT signal        1/25 LLE MRI: Ulcer with evidence of small adjacent medial, distal 1st MTP osteomyelitis  Altered marrow signal intensity at the proximal-medial aspect of the 1st toe proximal phalanx is less specific and may be early osteomyelitis  1/25 LEADS: RLE: 50-75% SFA stenosis  Fem-pop and tibio-peroneal disease  AD 0 42  MP: 38  LLE: 50-75% stenosis SFA, occlusion vs high grade stenosis distal AT  AD: 0 39  MP: 33       Labs:   Wbc: 8-10  Cr: 1 4->1 35    Plan: To OR tomorrow for LLE agram, possible intervention  NPO @ [facility]  Continue asa, statin  Continue heparin gtt  Appreciate nephro clearance  Continue iv abx for osteo  Appreciate podiatry recs for local wound care    History of Present Illness   Physician Requesting Consult: Corena Osgood, MD  Reason for Consult / Principal Problem: PAD c/b rest pain, tissue loss, L MTP osteo  History, ROS and PFSH unobtainable from any source due to none  HPI: Alecia Vera is a [de-identified]y o  year old female w PMH of CKD 3b (1 2-1 4), h/o LLE DVT (eliquis), current smoker, HTN, DM, CHF, COPD (3L NC baseline), chronic opoid abuse who presents with worsening rest pain of LLE for the last week  Also associated with LLE wounds  She denied any fever chills or night sweats  MRI shows L MTP osteo  She was transferred for LLE agram possible intervention  Pt takes eliquis for LLE DVT, which is now held and receiving heparin gtt  Podiatry eval for amputation pending       Inpatient consult to Vascular Surgery  Consult performed by: Wendy Chandler MD  Consult ordered by: Daniel Pelayo PA-C          Review of Systems Constitutional: Negative for chills and fever  HENT: Negative  Eyes: Negative  Respiratory: Negative  Cardiovascular: Negative  Gastrointestinal: Negative  Endocrine: Negative  Genitourinary: Negative  Musculoskeletal: Negative  Skin: Positive for wound  L medial forefoot   Allergic/Immunologic: Negative  Neurological: Negative  Hematological: Negative  Psychiatric/Behavioral: Negative  Historical Information   Past Medical History:   Diagnosis Date   • Asthma    • CHF (congestive heart failure) ([facility])    • COPD (chronic obstructive pulmonary disease) ([facility] )    • Diabetes mellitus ([facility] )    • DVT of lower limb, acute ([facility])    • Hypertension    • Renal disorder      Past Surgical History:   Procedure Laterality Date   • APPENDECTOMY     •  SECTION     • HYSTERECTOMY       Social History   Social History     Substance and Sexual Activity   Alcohol Use Not Currently     Social History     Substance and Sexual Activity   Drug Use Never     E-Cigarette/Vaping   • E-Cigarette Use Never User      E-Cigarette/Vaping Substances   • Nicotine No    • THC No    • CBD No    • Flavoring No    • Other No    • Unknown No      Social History     Tobacco Use   Smoking Status Every Day   • Packs/day: 0 50   • Types: Cigarettes   • Passive exposure: Past   Smokeless Tobacco Never   Tobacco Comments    Pt currently wearing nicotine patch      Family History: non-contributory}    Meds/Allergies   all current active meds have been reviewed  Allergies   Allergen Reactions   • Doxycycline GI Intolerance     Refer to office visit 4/27/15   • Codeine      Occasional nausea      • Penicillins Hives     Last dose about 10years ago          Objective   Vitals: Blood pressure 160/57, pulse 70, SpO2 97 %  ,There is no height or weight on file to calculate BMI    No intake or output data in the 24 hours ending 23 0515  Invasive Devices     Peripheral Intravenous Line  Duration Peripheral IV 01/28/23 Left;Proximal;Upper;Ventral (anterior) Arm <1 day                Physical Exam  Vitals reviewed  Constitutional:       General: She is not in acute distress  Appearance: She is not toxic-appearing  HENT:      Head: Normocephalic  Nose: Nose normal       Mouth/Throat:      Mouth: Mucous membranes are moist    Eyes:      Pupils: Pupils are equal, round, and reactive to light  Cardiovascular:      Rate and Rhythm: Normal rate  Comments: R palp fem  L non palp fem  L doppl PT  R doppl PT  Pulmonary:      Effort: Pulmonary effort is normal    Abdominal:      General: There is no distension  Palpations: Abdomen is soft  Tenderness: There is no abdominal tenderness  Genitourinary:     Comments: deferred  Musculoskeletal:         General: Normal range of motion  Cervical back: Normal range of motion  Skin:     General: Skin is warm  Capillary Refill: Capillary refill takes 2 to 3 seconds  Neurological:      General: No focal deficit present  Mental Status: She is alert  Psychiatric:         Mood and Affect: Mood normal          Lab Results: I have personally reviewed pertinent reports  Imaging Studies: I have personally reviewed pertinent reports  EKG, Pathology, and Other Studies: I have personally reviewed pertinent reports  VTE Prophylaxis: Sequential compression device Michaelle Dumas)      Code Status: Level 3 - DNAR and DNI  Advance Directive and Living Will:      Power of :    POLST:      Counseling / Coordination of Care  Counseling/Coordination of Care: Total floor / unit time spent today 30 minutes  Greater than 50% of total time was spent with the patient and / or family counseling and / or coordination of care   A description of the counseling / coordination of care: 30

## 2023-01-29 NOTE — NURSING NOTE
Assessed patient's right AC IV site for patency  Lump noted close to insertion site  Patient states that 'lump' has been there with which she showed me a similar lump on left arm in same spot  Right AC IV patent for blood return, flushes with mild resistance  Heparin continues to infuse through right AC

## 2023-01-29 NOTE — CONSULTS
Original consult completed 1/26 at outside hospital, please see today's progress note for recent updates

## 2023-01-29 NOTE — ASSESSMENT & PLAN NOTE
· Patient had arterial doppler studies of bilateral extremities while at [address] [place] [address]  · RLL: There is a 50-75% stenosis noted in the mid superficial femoral artery with diffuse atherosclerotic disease throughout the remaining femoro-popliteal andtibio-peroneal segments  AD 0 42  · LLL: There is a 50-75% stenosis noted in the prox and distal superficial femoral artery and an occlusion vs high grade stenosis of the distal anterior tibial artery  Diffuse atherosclerotic disease throughout the remaining femoro-popliteal and tibio-peroneal segments   AD 0 39  · Patient with remote history of DVT previously anticoagulated on Eliquis  · Currently on heparin gtt for 24-48 hours, continue   · Patient transferred to [facility] for angiogram  · Vascular surgery pending  · Renal consulted for prep for angio in the setting of CKD3b

## 2023-01-29 NOTE — ASSESSMENT & PLAN NOTE
· Patient chronically on oxycodone, morphine, and movantik at home   · PDMP reviewed  · Pain medications increased in the setting of ischemic left foot pain

## 2023-01-29 NOTE — ASSESSMENT & PLAN NOTE
Lab Results   Component Value Date    HGBA1C 7 5 (H) 01/23/2023       Recent Labs     01/28/23  0725 01/28/23  1135 01/28/23  1604 01/28/23  2100   POCGLU 104 182* 211* 205*       Blood Sugar Average: Last 72 hrs:  · 12 units Lantus h s  · SSI with accucheks

## 2023-01-29 NOTE — PROGRESS NOTES
Mary Alice Conde is a [de-identified] y o  female who is currently ordered Vancomycin IV with management by the Pharmacy Consult service  Relevant clinical data and objective / subjective history reviewed  Vancomycin Assessment:  Indication and Goal AUC/Trough: Soft tissue (goal -600, trough >10), -600, trough >10  Clinical Status: stable  Micro:     Renal Function:  SCr: 1 4 mg/dL  CrCl: 33 mL/min  Renal replacement: Not on dialysis  Days of Therapy: 5  Current Dose: 750 mg IV every 24 hours  Vancomycin Plan:  New Dosing: continue regimen  Estimated AUC: 494 mcg*hr/mL  Estimated Trough: 16 mcg/mL  Next Level: 1/30 am draw  Renal Function Monitoring: Daily BMP and UOP  Pharmacy will continue to follow closely for s/sx of nephrotoxicity, infusion reactions and appropriateness of therapy  BMP and CBC will be ordered per protocol  We will continue to follow the patient’s culture results and clinical progress daily      Evita Quintero, Pharmacist

## 2023-01-29 NOTE — ASSESSMENT & PLAN NOTE
· No acute exacerbation  · Patient is currently on baseline oxygen requirements of 3L NC  · Continue to encourage cessation  · Nicotine patch supplementation  · Continue home regimen of Advair substituted to Breo, Spiriva and albuterol inhaler/neb prednisone 2 5 mg daily as per home regimen

## 2023-01-30 ENCOUNTER — APPOINTMENT (INPATIENT)
Dept: RADIOLOGY | Facility: HOSPITAL | Age: 83
End: 2023-01-30

## 2023-01-30 ENCOUNTER — ANESTHESIA (INPATIENT)
Dept: PERIOP | Facility: HOSPITAL | Age: 83
End: 2023-01-30

## 2023-01-30 PROBLEM — D64.9 ANEMIA: Status: ACTIVE | Noted: 2023-01-30

## 2023-01-30 LAB
ANION GAP SERPL CALCULATED.3IONS-SCNC: 4 MMOL/L (ref 4–13)
APTT PPP: 120 SECONDS (ref 23–37)
APTT PPP: 23 SECONDS (ref 23–37)
APTT PPP: 57 SECONDS (ref 23–37)
BACTERIA BLD CULT: NORMAL
BACTERIA BLD CULT: NORMAL
BASOPHILS # BLD AUTO: 0.05 THOUSANDS/ÂΜL (ref 0–0.1)
BASOPHILS NFR BLD AUTO: 1 % (ref 0–1)
BUN SERPL-MCNC: 28 MG/DL (ref 5–25)
CALCIUM SERPL-MCNC: 7.9 MG/DL (ref 8.4–10.2)
CHLORIDE SERPL-SCNC: 103 MMOL/L (ref 96–108)
CO2 SERPL-SCNC: 31 MMOL/L (ref 21–32)
CREAT SERPL-MCNC: 1.25 MG/DL (ref 0.6–1.3)
EOSINOPHIL # BLD AUTO: 0.39 THOUSAND/ÂΜL (ref 0–0.61)
EOSINOPHIL NFR BLD AUTO: 5 % (ref 0–6)
ERYTHROCYTE [DISTWIDTH] IN BLOOD BY AUTOMATED COUNT: 15.9 % (ref 11.6–15.1)
FERRITIN SERPL-MCNC: 90 NG/ML (ref 8–388)
GFR SERPL CREATININE-BSD FRML MDRD: 40 ML/MIN/1.73SQ M
GLUCOSE SERPL-MCNC: 105 MG/DL (ref 65–140)
GLUCOSE SERPL-MCNC: 112 MG/DL (ref 65–140)
GLUCOSE SERPL-MCNC: 130 MG/DL (ref 65–140)
GLUCOSE SERPL-MCNC: 165 MG/DL (ref 65–140)
GLUCOSE SERPL-MCNC: 84 MG/DL (ref 65–140)
HCT VFR BLD AUTO: 25.6 % (ref 34.8–46.1)
HCT VFR BLD AUTO: 28.6 % (ref 34.8–46.1)
HGB BLD-MCNC: 7.7 G/DL (ref 11.5–15.4)
HGB BLD-MCNC: 8.5 G/DL (ref 11.5–15.4)
IMM GRANULOCYTES # BLD AUTO: 0.04 THOUSAND/UL (ref 0–0.2)
IMM GRANULOCYTES NFR BLD AUTO: 1 % (ref 0–2)
INR PPP: 1.02 (ref 0.84–1.19)
IRON SATN MFR SERPL: 21 % (ref 15–50)
IRON SERPL-MCNC: 47 UG/DL (ref 50–170)
LYMPHOCYTES # BLD AUTO: 2.24 THOUSANDS/ÂΜL (ref 0.6–4.47)
LYMPHOCYTES NFR BLD AUTO: 30 % (ref 14–44)
MCH RBC QN AUTO: 26.3 PG (ref 26.8–34.3)
MCHC RBC AUTO-ENTMCNC: 30.1 G/DL (ref 31.4–37.4)
MCV RBC AUTO: 87 FL (ref 82–98)
MONOCYTES # BLD AUTO: 0.68 THOUSAND/ÂΜL (ref 0.17–1.22)
MONOCYTES NFR BLD AUTO: 9 % (ref 4–12)
NEUTROPHILS # BLD AUTO: 3.96 THOUSANDS/ÂΜL (ref 1.85–7.62)
NEUTS SEG NFR BLD AUTO: 54 % (ref 43–75)
NRBC BLD AUTO-RTO: 0 /100 WBCS
PLATELET # BLD AUTO: 186 THOUSANDS/UL (ref 149–390)
PMV BLD AUTO: 10.6 FL (ref 8.9–12.7)
POTASSIUM SERPL-SCNC: 4.1 MMOL/L (ref 3.5–5.3)
PROTHROMBIN TIME: 13.4 SECONDS (ref 11.6–14.5)
RBC # BLD AUTO: 2.93 MILLION/UL (ref 3.81–5.12)
SODIUM SERPL-SCNC: 138 MMOL/L (ref 135–147)
TIBC SERPL-MCNC: 220 UG/DL (ref 250–450)
VANCOMYCIN TROUGH SERPL-MCNC: 17.3 UG/ML (ref 10–20)
WBC # BLD AUTO: 7.36 THOUSAND/UL (ref 4.31–10.16)

## 2023-01-30 PROCEDURE — 047H3ZZ DILATION OF RIGHT EXTERNAL ILIAC ARTERY, PERCUTANEOUS APPROACH: ICD-10-PCS | Performed by: INTERNAL MEDICINE

## 2023-01-30 PROCEDURE — 047L3DZ DILATION OF LEFT FEMORAL ARTERY WITH INTRALUMINAL DEVICE, PERCUTANEOUS APPROACH: ICD-10-PCS | Performed by: INTERNAL MEDICINE

## 2023-01-30 PROCEDURE — 047J3ZZ DILATION OF LEFT EXTERNAL ILIAC ARTERY, PERCUTANEOUS APPROACH: ICD-10-PCS | Performed by: INTERNAL MEDICINE

## 2023-01-30 DEVICE — SELF-EXPANDING STENT SYSTEM
Type: IMPLANTABLE DEVICE | Site: ARTERIAL | Status: FUNCTIONAL
Brand: INNOVA™ VASCULAR

## 2023-01-30 DEVICE — PREMOUNTED STENT SYSTEM
Type: IMPLANTABLE DEVICE | Site: ARTERIAL | Status: FUNCTIONAL
Brand: EXPRESS® LD BILIARY

## 2023-01-30 DEVICE — MYNX CONTROL VCD 6F 7F
Type: IMPLANTABLE DEVICE | Site: ARTERIAL | Status: FUNCTIONAL
Brand: MYNX CONTROL

## 2023-01-30 RX ORDER — HEPARIN SODIUM 10000 [USP'U]/100ML
3-20 INJECTION, SOLUTION INTRAVENOUS
Status: DISCONTINUED | OUTPATIENT
Start: 2023-01-30 | End: 2023-01-31

## 2023-01-30 RX ORDER — CLOPIDOGREL BISULFATE 75 MG/1
75 TABLET ORAL DAILY
Status: DISCONTINUED | OUTPATIENT
Start: 2023-01-31 | End: 2023-02-04 | Stop reason: HOSPADM

## 2023-01-30 RX ORDER — IODIXANOL 320 MG/ML
INJECTION, SOLUTION INTRAVASCULAR AS NEEDED
Status: DISCONTINUED | OUTPATIENT
Start: 2023-01-30 | End: 2023-01-30 | Stop reason: HOSPADM

## 2023-01-30 RX ORDER — HEPARIN SODIUM 200 [USP'U]/100ML
INJECTION, SOLUTION INTRAVENOUS
Status: COMPLETED | OUTPATIENT
Start: 2023-01-30 | End: 2023-01-30

## 2023-01-30 RX ORDER — CLOPIDOGREL BISULFATE 75 MG/1
150 TABLET ORAL ONCE
Status: COMPLETED | OUTPATIENT
Start: 2023-01-30 | End: 2023-01-30

## 2023-01-30 RX ORDER — ONDANSETRON 2 MG/ML
4 INJECTION INTRAMUSCULAR; INTRAVENOUS EVERY 6 HOURS PRN
Status: DISCONTINUED | OUTPATIENT
Start: 2023-01-30 | End: 2023-01-30 | Stop reason: HOSPADM

## 2023-01-30 RX ORDER — SODIUM CHLORIDE 9 MG/ML
INJECTION, SOLUTION INTRAVENOUS CONTINUOUS PRN
Status: DISCONTINUED | OUTPATIENT
Start: 2023-01-30 | End: 2023-01-30

## 2023-01-30 RX ORDER — FENTANYL CITRATE 50 UG/ML
INJECTION, SOLUTION INTRAMUSCULAR; INTRAVENOUS AS NEEDED
Status: DISCONTINUED | OUTPATIENT
Start: 2023-01-30 | End: 2023-01-30

## 2023-01-30 RX ORDER — ONDANSETRON 2 MG/ML
INJECTION INTRAMUSCULAR; INTRAVENOUS AS NEEDED
Status: DISCONTINUED | OUTPATIENT
Start: 2023-01-30 | End: 2023-01-30

## 2023-01-30 RX ORDER — MIDAZOLAM HYDROCHLORIDE 2 MG/2ML
INJECTION, SOLUTION INTRAMUSCULAR; INTRAVENOUS AS NEEDED
Status: DISCONTINUED | OUTPATIENT
Start: 2023-01-30 | End: 2023-01-30

## 2023-01-30 RX ORDER — LIDOCAINE HYDROCHLORIDE 10 MG/ML
INJECTION, SOLUTION EPIDURAL; INFILTRATION; INTRACAUDAL; PERINEURAL AS NEEDED
Status: DISCONTINUED | OUTPATIENT
Start: 2023-01-30 | End: 2023-01-30 | Stop reason: HOSPADM

## 2023-01-30 RX ORDER — DEXMEDETOMIDINE HYDROCHLORIDE 100 UG/ML
INJECTION, SOLUTION INTRAVENOUS AS NEEDED
Status: DISCONTINUED | OUTPATIENT
Start: 2023-01-30 | End: 2023-01-30

## 2023-01-30 RX ORDER — HEPARIN SODIUM 1000 [USP'U]/ML
INJECTION, SOLUTION INTRAVENOUS; SUBCUTANEOUS AS NEEDED
Status: DISCONTINUED | OUTPATIENT
Start: 2023-01-30 | End: 2023-01-30

## 2023-01-30 RX ADMIN — GABAPENTIN 300 MG: 300 CAPSULE ORAL at 16:12

## 2023-01-30 RX ADMIN — HEPARIN SODIUM 2000 UNITS: 1000 INJECTION INTRAVENOUS; SUBCUTANEOUS at 01:26

## 2023-01-30 RX ADMIN — CLOPIDOGREL BISULFATE 150 MG: 75 TABLET ORAL at 12:13

## 2023-01-30 RX ADMIN — FENTANYL CITRATE 25 MCG: 50 INJECTION INTRAMUSCULAR; INTRAVENOUS at 09:44

## 2023-01-30 RX ADMIN — DEXMEDETOMIDINE HCL 4 MCG: 100 INJECTION INTRAVENOUS at 08:45

## 2023-01-30 RX ADMIN — FENTANYL CITRATE 25 MCG: 50 INJECTION INTRAMUSCULAR; INTRAVENOUS at 08:45

## 2023-01-30 RX ADMIN — DEXMEDETOMIDINE HCL 4 MCG: 100 INJECTION INTRAVENOUS at 11:19

## 2023-01-30 RX ADMIN — SODIUM CHLORIDE 0.5 MCG/KG/HR: 9 INJECTION, SOLUTION INTRAVENOUS at 08:45

## 2023-01-30 RX ADMIN — INSULIN GLARGINE 8 UNITS: 100 INJECTION, SOLUTION SUBCUTANEOUS at 22:18

## 2023-01-30 RX ADMIN — MIDAZOLAM 1 MG: 1 INJECTION INTRAMUSCULAR; INTRAVENOUS at 09:56

## 2023-01-30 RX ADMIN — HEPARIN SODIUM 12 UNITS/KG/HR: 10000 INJECTION, SOLUTION INTRAVENOUS at 18:45

## 2023-01-30 RX ADMIN — PANTOPRAZOLE SODIUM 40 MG: 40 TABLET, DELAYED RELEASE ORAL at 04:32

## 2023-01-30 RX ADMIN — DOCUSATE SODIUM 100 MG: 100 CAPSULE, LIQUID FILLED ORAL at 17:07

## 2023-01-30 RX ADMIN — ONDANSETRON 4 MG: 2 INJECTION INTRAMUSCULAR; INTRAVENOUS at 11:14

## 2023-01-30 RX ADMIN — DEXMEDETOMIDINE HCL 4 MCG: 100 INJECTION INTRAVENOUS at 11:14

## 2023-01-30 RX ADMIN — SODIUM CHLORIDE: 0.9 INJECTION, SOLUTION INTRAVENOUS at 08:32

## 2023-01-30 RX ADMIN — MIDAZOLAM 1 MG: 1 INJECTION INTRAMUSCULAR; INTRAVENOUS at 08:45

## 2023-01-30 RX ADMIN — HEPARIN SODIUM 2000 UNITS: 1000 INJECTION INTRAVENOUS; SUBCUTANEOUS at 10:32

## 2023-01-30 RX ADMIN — DEXMEDETOMIDINE HCL 8 MCG: 100 INJECTION INTRAVENOUS at 09:44

## 2023-01-30 RX ADMIN — SUCRALFATE 1 G: 1 TABLET ORAL at 16:12

## 2023-01-30 RX ADMIN — METOPROLOL TARTRATE 50 MG: 50 TABLET, FILM COATED ORAL at 07:53

## 2023-01-30 RX ADMIN — VANCOMYCIN HYDROCHLORIDE 750 MG: 750 INJECTION, SOLUTION INTRAVENOUS at 08:45

## 2023-01-30 RX ADMIN — FENTANYL CITRATE 25 MCG: 50 INJECTION INTRAMUSCULAR; INTRAVENOUS at 11:14

## 2023-01-30 RX ADMIN — SUCRALFATE 1 G: 1 TABLET ORAL at 05:33

## 2023-01-30 RX ADMIN — MORPHINE SULFATE 2 MG: 2 INJECTION, SOLUTION INTRAMUSCULAR; INTRAVENOUS at 20:30

## 2023-01-30 RX ADMIN — HEPARIN SODIUM 5000 UNITS: 1000 INJECTION INTRAVENOUS; SUBCUTANEOUS at 09:36

## 2023-01-30 RX ADMIN — SODIUM CHLORIDE: 0.9 INJECTION, SOLUTION INTRAVENOUS at 11:19

## 2023-01-30 RX ADMIN — CEFEPIME HYDROCHLORIDE 2000 MG: 2 INJECTION, POWDER, FOR SOLUTION INTRAVENOUS at 04:35

## 2023-01-30 RX ADMIN — INSULIN LISPRO 1 UNITS: 100 INJECTION, SOLUTION INTRAVENOUS; SUBCUTANEOUS at 22:18

## 2023-01-30 RX ADMIN — TRAZODONE HYDROCHLORIDE 50 MG: 50 TABLET ORAL at 22:17

## 2023-01-30 RX ADMIN — HEPARIN SODIUM 14 UNITS/KG/HR: 10000 INJECTION, SOLUTION INTRAVENOUS at 04:31

## 2023-01-30 RX ADMIN — METOPROLOL TARTRATE 50 MG: 50 TABLET, FILM COATED ORAL at 20:36

## 2023-01-30 RX ADMIN — GABAPENTIN 300 MG: 300 CAPSULE ORAL at 20:32

## 2023-01-30 RX ADMIN — ROPINIROLE 0.5 MG: 1 TABLET, FILM COATED ORAL at 22:15

## 2023-01-30 RX ADMIN — MIRTAZAPINE 30 MG: 15 TABLET, FILM COATED ORAL at 22:16

## 2023-01-30 RX ADMIN — FENTANYL CITRATE 25 MCG: 50 INJECTION INTRAMUSCULAR; INTRAVENOUS at 11:19

## 2023-01-30 RX ADMIN — FENTANYL CITRATE 25 MCG: 50 INJECTION INTRAMUSCULAR; INTRAVENOUS at 10:52

## 2023-01-30 RX ADMIN — ATORVASTATIN CALCIUM 10 MG: 10 TABLET, FILM COATED ORAL at 16:12

## 2023-01-30 RX ADMIN — FENTANYL CITRATE 25 MCG: 50 INJECTION INTRAMUSCULAR; INTRAVENOUS at 10:49

## 2023-01-30 RX ADMIN — FENTANYL CITRATE 25 MCG: 50 INJECTION INTRAMUSCULAR; INTRAVENOUS at 09:40

## 2023-01-30 RX ADMIN — SERTRALINE HYDROCHLORIDE 50 MG: 50 TABLET ORAL at 22:16

## 2023-01-30 RX ADMIN — OXYCODONE HYDROCHLORIDE 10 MG: 10 TABLET ORAL at 00:15

## 2023-01-30 RX ADMIN — OXYCODONE 5 MG: 5 TABLET ORAL at 17:08

## 2023-01-30 RX ADMIN — FENTANYL CITRATE 25 MCG: 50 INJECTION INTRAMUSCULAR; INTRAVENOUS at 09:19

## 2023-01-30 NOTE — ASSESSMENT & PLAN NOTE
Lab Results   Component Value Date    HGBA1C 7 5 (H) 01/23/2023     Recent Labs     01/29/23  1604 01/29/23  2100 01/30/23  0700 01/30/23  1222   POCGLU 185* 163* 112 130   · On 12 units Lantus h s  · Will decrease to 8 units with hopes to avoid hypoglycemia  · SSI with accucheks

## 2023-01-30 NOTE — OCCUPATIONAL THERAPY NOTE
OCCUPATIONAL THERAPY CANCELLATION     OT orders received and chart reviewed  Pt in OR for L LE angiogram w/ psb intervention  Will f/u and see as able and appropriate       Claude Priestly MS, OTR/L

## 2023-01-30 NOTE — PLAN OF CARE
Problem: Prexisting or High Potential for Compromised Skin Integrity  Goal: Skin integrity is maintained or improved  Description: INTERVENTIONS:  - Identify patients at risk for skin breakdown  - Assess and monitor skin integrity  - Assess and monitor nutrition and hydration status  - Monitor labs   - Assess for incontinence   - Turn and reposition patient  - Assist with mobility/ambulation  - Relieve pressure over bony prominences  - Avoid friction and shearing  - Provide appropriate hygiene as needed including keeping skin clean and dry  - Evaluate need for skin moisturizer/barrier cream  - Collaborate with interdisciplinary team   - Patient/family teaching  - Consider wound care consult   Outcome: Progressing     Problem: PAIN - ADULT  Goal: Verbalizes/displays adequate comfort level or baseline comfort level  Description: Interventions:  - Encourage patient to monitor pain and request assistance  - Assess pain using appropriate pain scale  - Administer analgesics based on type and severity of pain and evaluate response  - Implement non-pharmacological measures as appropriate and evaluate response  - Consider cultural and social influences on pain and pain management  - Notify physician/advanced practitioner if interventions unsuccessful or patient reports new pain  Outcome: Progressing     Problem: INFECTION - ADULT  Goal: Absence or prevention of progression during hospitalization  Description: INTERVENTIONS:  - Assess and monitor for signs and symptoms of infection  - Monitor lab/diagnostic results  - Monitor all insertion sites, i e  indwelling lines, tubes, and drains  - Monitor endotracheal if appropriate and nasal secretions for changes in amount and color  - [place] appropriate cooling/warming therapies per order  - Administer medications as ordered  - Instruct and encourage patient and family to use good hand hygiene technique  - Identify and instruct in appropriate isolation precautions for identified infection/condition  Outcome: Progressing     Problem: SAFETY ADULT  Goal: Patient will remain free of falls  Description: INTERVENTIONS:  - Educate patient/family on patient safety including physical limitations  - Instruct patient to call for assistance with activity   - Consult OT/PT to assist with strengthening/mobility   - Keep Call bell within reach  - Keep bed low and locked with side rails adjusted as appropriate  - Keep care items and personal belongings within reach  - Initiate and maintain comfort rounds  - Make Fall Risk Sign visible to staff  - Offer Toileting every 2  Hours, in advance of need  - Initiate/Maintain bed alarm  - Obtain necessary fall risk management equipment: walker Apply yellow socks and bracelet for high fall risk patients  - Consider moving patient to room near nurses station  Outcome: Progressing     Problem: DISCHARGE PLANNING  Goal: Discharge to home or other facility with appropriate resources  Description: INTERVENTIONS:  - Identify barriers to discharge w/patient and caregiver  - Arrange for needed discharge resources and transportation as appropriate  - Identify discharge learning needs (meds, wound care, etc )  - Arrange for interpretive services to assist at discharge as needed  - Refer to Case Management Department for coordinating discharge planning if the patient needs post-hospital services based on physician/advanced practitioner order or complex needs related to functional status, cognitive ability, or social support system  Outcome: Progressing     Problem: Knowledge Deficit  Goal: Patient/family/caregiver demonstrates understanding of disease process, treatment plan, medications, and discharge instructions  Description: Complete learning assessment and assess knowledge base    Interventions:  - Provide teaching at level of understanding  - Provide teaching via preferred learning methods  Outcome: Progressing

## 2023-01-30 NOTE — PROGRESS NOTES
Follow up Consultation    Nephrology   Stephan Schmitt [de-identified] y o  female MRN: [id number]  Unit/Bed#: E5 -01 Encounter: [id number]      Physician Requesting Consult: Doretha Ahumada, MD        ASSESSMENT/PLAN:   22-year-old female with multiple comorbidities including chronic hypoxic hypercapnic respite failure, diabetes, peripheral arterial disease and CKD stage III presented from outside facility with worsening left foot wound and need for angiogram   Nephrology consulted for perioperative optimization to reduce incidence for acute kidney injury  Perioperative optimization to reduce incidence for acute kidney injury/CKD stage III:  At risk for CONSTANCE multifactorial most likely secondary to KAT due to planned angiogram on 1/30/2023 in light of underlying comorbidities  After review of records [facility] as well as Care everywhere it appears that the patient has a baseline Creatinine of 1 2-1 4 mg/dL  patient was admitted with a creatinine of 1 35 mg/dL on 1/29/2023  patient's creatinine today is at 1 25 mg/dL, stable at baseline  Continue current IV fluids for at least 4 hours post angiogram then DC  check BMP in a m  Optimize hemodynamic status to avoid delay in renal recovery  Place on a renal diet when allowed diet order  Avoid nephrotoxins, adjust meds to appropriate GFR  Strict I/O  Daily weights  Urinary retention protocol if patient does not have a Pastor  Most likely has underlying CKD secondary to diabetic kidney disease plus hypertensive nephrosclerosis procedure nephron loss plus decreased nephron mass due to atrophic left kidney  will need to set up patient for follow up with Nephrology as an outpatient post hospitalization  for nephrology as an outpatient patient follows up with no nephrologist    Blood pressure/hypertension:  current medications: Norvasc 5 mg p o  daily  recommendations: Continue to hold Lasix for now  Optimize hemodynamics    Maintain MAP > 65mmHg  Avoid BP fluctuations  H/H/anemia:  most recent hemoglobin at 7 7 g/dL  maintain hemoglobin greater than 8 grams/deciliter  Monitor closely for now supplementation    Acid-base electrolytes:    Electrolytes:    Stable    Acid-base:    Most recent bicarb stable at 31    Other medical problems:  Osteomyelitis left foot/peripheral arterial disease: Management primary team MRI with possible early signs of osteomyelitis follow-up with vascular and podiatry status post angiogram on   On vancomycin monitor for level hold if level greater than 20  Diabetes:  Management per primary team   On insulin, most recent A1c 7 5% as of 2023  Chronic hypoxic respiratory failure:  Management per primary team   On home O2 baseline      Thanks for the consult  Will continue to follow  Please call with questions/ concerns  Above-mentioned orders and Plan in terms of perioperative management close monitoring of BMP tomorrow a m  and discontinuation of fluids in a few hours was discussed with the team in depth with [facility] Med[facility], 2:20 PM              Objective :   Patient seen and examined in room overnight events he medically stable remains afebrile status post angiogram earlier this a m still with effect of anesthesia  Wants to be left alone and go back to sleep  PHYSICAL EXAM  BP (!) 143/47   Pulse 64   Temp 98 °F (36 7 °C) (Temporal)   Resp 18   Ht 5' 6" (1 676 m)   Wt 75 kg (165 lb 5 5 oz)   SpO2 95%   BMI 26 69 kg/m²   Temp (24hrs), Av 3 °F (36 8 °C), Min:98 °F (36 7 °C), Max:98 7 °F (37 1 °C)        Intake/Output Summary (Last 24 hours) at 2023 1420  Last data filed at 2023 1140  Gross per 24 hour   Intake 1000 ml   Output 20 ml   Net 980 ml       I/O last 24 hours:   In: 1000 [I V :1000]  Out: 20 [Blood:20]      Current Weight: Weight - Scale: 75 kg (165 lb 5 5 oz)  First Weight: Weight - Scale: 75 kg (165 lb 5 5 oz)  Physical Exam  Vitals and nursing note reviewed  Constitutional:       General: She is not in acute distress  Appearance: Normal appearance  She is normal weight  She is ill-appearing  She is not toxic-appearing or diaphoretic  HENT:      Head: Normocephalic and atraumatic  Mouth/Throat:      Mouth: Mucous membranes are moist       Pharynx: Oropharynx is clear  No oropharyngeal exudate  Eyes:      General: No scleral icterus  Conjunctiva/sclera: Conjunctivae normal    Cardiovascular:      Rate and Rhythm: Normal rate  Pulses: Normal pulses  Pulmonary:      Effort: Pulmonary effort is normal  No respiratory distress  Breath sounds: No stridor  Abdominal:      General: There is no distension  Palpations: Abdomen is soft  There is no mass  Tenderness: There is no abdominal tenderness  Musculoskeletal:         General: No swelling  Cervical back: Normal range of motion  No rigidity  Comments: Left lower extremity in dressing   Skin:     General: Skin is warm  Coloration: Skin is not jaundiced  Neurological:      General: No focal deficit present  Mental Status: She is alert  Psychiatric:         Mood and Affect: Mood normal              Review of Systems   Unable to perform ROS: Acuity of condition   Constitutional: Negative for fever  Respiratory: Negative for cough and shortness of breath          Scheduled Meds:  Current Facility-Administered Medications   Medication Dose Route Frequency Provider Last Rate   • acetaminophen  650 mg Oral Q6H PRN Vidya End, DO     • albuterol  0 63 mg Nebulization 4x Daily PRN Vidya End, DO     • allopurinol  50 mg Oral Daily Vidya End, DO     • amLODIPine  5 mg Oral Daily Vidya End, DO     • atorvastatin  10 mg Oral Daily With [address], DO     • calcium carbonate  1,000 mg Oral Daily PRN Vidya End, DO     • [START ON 1/31/2023] clopidogrel  75 mg Oral Daily Vidya End, DO     • docusate sodium  100 mg Oral BID Harlan Encarnacion, DO     • Fluticasone Furoate-Vilanterol  1 puff Inhalation Daily Harlan Encarnacion, DO     • gabapentin  300 mg Oral TID Harlan Encarnacion, DO     • heparin (porcine)  2,000 Units Intravenous Q6H PRN Harlan Encarnacion, DO     • heparin (porcine)  4,000 Units Intravenous Q6H PRN Harlan Encarnacion, DO     • insulin glargine  8 Units Subcutaneous HS Harlan Encarnacion, DO     • insulin lispro  1-6 Units Subcutaneous HS Harlan Encarnacion, DO     • insulin lispro  2-12 Units Subcutaneous TID AC Harlan Encranacion, DO     • metoprolol tartrate  50 mg Oral Q12H [address] Harlan Encarnacion, DO     • mirtazapine  30 mg Oral HS Harlan Encarnacion, DO     • morphine injection  2 mg Intravenous Q4H PRN Harlan Encarnacion, DO     • multi-electrolyte  50 mL/hr Intravenous Continuous Anju AIYANA Dodson     • nicotine  1 patch Transdermal Daily Harlan Encarnacion, DO     • ondansetron  4 mg Intravenous Q8H PRN Harlan Encarnacion, DO     • oxybutynin  5 mg Oral Daily Harlan Encarnacion, DO     • oxyCODONE  10 mg Oral Q6H PRN Harlan Encarnacion, DO     • oxyCODONE  5 mg Oral Q4H PRN Harlan Encarnacion, DO     • pantoprazole  40 mg Oral Daily Harlan Encarnacion, DO     • polyethylene glycol  17 g Oral Daily PRN Harlan Encarnacion, DO     • predniSONE  2 5 mg Oral Daily Harlan Encarnacion, DO     • rOPINIRole  0 5 mg Oral HS Harlan Encarnacion, DO     • saccharomyces boulardii  250 mg Oral Daily Haraln Encarnacion, DO     • senna  1 tablet Oral Daily Harlan Encarnacion, DO     • sertraline  50 mg Oral HS Harlan Encarnacion, DO     • sucralfate  1 g Oral BID AC Harlan Encarnacion, DO     • traZODone  50 mg Oral HS Harlan Encarnacion, DO     • umeclidinium  1 puff Inhalation Daily Harlan Encarnacion, DO     • vancomycin  750 mg Intravenous Q24H Johana Duckworth,  mg (01/29/23 0744)       PRN Meds: •  acetaminophen  •  albuterol  •  calcium carbonate  •  heparin (porcine)  •  heparin (porcine)  •  morphine injection  •  ondansetron  •  oxyCODONE  •  oxyCODONE  •  polyethylene glycol    Continuous Infusions:multi-electrolyte, 50 mL/hr          Invasive Devices: Invasive Devices     Peripheral Intravenous Line  Duration           Peripheral IV 01/29/23 Left;Ventral (anterior) Forearm 1 day                  LABORATORY:    Results from last 7 days   Lab Units 01/30/23  0732 01/30/23  0500 01/29/23  0517 01/28/23  0554 01/27/23  1438 01/27/23  0449 01/26/23  1231 01/26/23  0513 01/25/23  0457 01/24/23  1528   WBC Thousand/uL 7 36  --  10 56* 8 54 7 82  --  9 44  --  9 18 10 87*   HEMOGLOBIN g/dL 7 7*  --  9 3* 9 4* 9 0*  --  9 8*  --  9 4* 10 5*   HEMATOCRIT % 25 6*  --  30 9* 31 0* 29 7*  --  33 0*  --  30 8* 35 0   PLATELETS Thousands/uL 186  --  265 213 211  --  257  --  241 297   POTASSIUM mmol/L  --  4 1 4 1 4 3  --  4 2  --  4 4 4 7 4 4   CHLORIDE mmol/L  --  103 102 103  --  102  --  101 100 96   CO2 mmol/L  --  31 31 33*  --  28  --  35* 36* 36*   BUN mg/dL  --  28* 30* 33*  --  34*  --  34* 33* 30*   CREATININE mg/dL  --  1 25 1 35* 1 40*  --  1 43*  --  1 43* 1 42* 1 28   CALCIUM mg/dL  --  7 9* 8 5 8 5  --  8 2*  --  8 8 9 5 10 3*      rest all reviewed    RADIOLOGY:  VAS AD & waveform analysis, multiple levels    (Results Pending)     Rest all reviewed    Portions of the record may have been created with voice recognition software  Occasional wrong word or "sound a like" substitutions may have occurred due to the inherent limitations of voice recognition software  Read the chart carefully and recognize, using context, where substitutions have occurred  If you have any questions, please contact the dictating provider

## 2023-01-30 NOTE — PLAN OF CARE
Problem: Prexisting or High Potential for Compromised Skin Integrity  Goal: Skin integrity is maintained or improved  Description: INTERVENTIONS:  - Identify patients at risk for skin breakdown  - Assess and monitor skin integrity  - Assess and monitor nutrition and hydration status  - Monitor labs   - Assess for incontinence   - Turn and reposition patient  - Assist with mobility/ambulation  - Relieve pressure over bony prominences  - Avoid friction and shearing  - Provide appropriate hygiene as needed including keeping skin clean and dry  - Evaluate need for skin moisturizer/barrier cream  - Collaborate with interdisciplinary team   - Patient/family teaching  - Consider wound care consult   Outcome: Progressing     Problem: PAIN - ADULT  Goal: Verbalizes/displays adequate comfort level or baseline comfort level  Description: Interventions:  - Encourage patient to monitor pain and request assistance  - Assess pain using appropriate pain scale  - Administer analgesics based on type and severity of pain and evaluate response  - Implement non-pharmacological measures as appropriate and evaluate response  - Consider cultural and social influences on pain and pain management  - Notify physician/advanced practitioner if interventions unsuccessful or patient reports new pain  Outcome: Progressing     Problem: INFECTION - ADULT  Goal: Absence or prevention of progression during hospitalization  Description: INTERVENTIONS:  - Assess and monitor for signs and symptoms of infection  - Monitor lab/diagnostic results  - Monitor all insertion sites, i e  indwelling lines, tubes, and drains  - Monitor endotracheal if appropriate and nasal secretions for changes in amount and color  - Anthony appropriate cooling/warming therapies per order  - Administer medications as ordered  - Instruct and encourage patient and family to use good hand hygiene technique  - Identify and instruct in appropriate isolation precautions for identified infection/condition  Outcome: Progressing  Goal: Absence of fever/infection during neutropenic period  Description: INTERVENTIONS:  - Monitor WBC    Outcome: Progressing     Problem: SAFETY ADULT  Goal: Patient will remain free of falls  Description: INTERVENTIONS:  - Educate patient/family on patient safety including physical limitations  - Instruct patient to call for assistance with activity   - Consult OT/PT to assist with strengthening/mobility   - Keep Call bell within reach  - Keep bed low and locked with side rails adjusted as appropriate  - Keep care items and personal belongings within reach  - Initiate and maintain comfort rounds  - Make Fall Risk Sign visible to staff  - Apply yellow socks and bracelet for high fall risk patients  - Consider moving patient to room near nurses station  Outcome: Progressing  Goal: Maintain or return to baseline ADL function  Description: INTERVENTIONS:  -  Assess patient's ability to carry out ADLs; assess patient's baseline for ADL function and identify physical deficits which impact ability to perform ADLs (bathing, care of mouth/teeth, toileting, grooming, dressing, etc )  - Assess/evaluate cause of self-care deficits   - Assess range of motion  - Assess patient's mobility; develop plan if impaired  - Assess patient's need for assistive devices and provide as appropriate  - Encourage maximum independence but intervene and supervise when necessary  - Involve family in performance of ADLs  - Assess for home care needs following discharge   - Consider OT consult to assist with ADL evaluation and planning for discharge  - Provide patient education as appropriate  Outcome: Progressing  Goal: Maintains/Returns to pre admission functional level  Description: INTERVENTIONS:  - Perform BMAT or MOVE assessment daily    - Set and communicate daily mobility goal to care team and patient/family/caregiver     - Collaborate with rehabilitation services on mobility goals if consulted  - Ambulate patient 3 times a day  - Out of bed to chair 3 times a day   - Out of bed for meals 3  Problem: DISCHARGE PLANNING  Goal: Discharge to home or other facility with appropriate resources  Description: INTERVENTIONS:  - Identify barriers to discharge w/patient and caregiver  - Arrange for needed discharge resources and transportation as appropriate  - Identify discharge learning needs (meds, wound care, etc )  - Arrange for interpretive services to assist at discharge as needed  - Refer to Case Management Department for coordinating discharge planning if the patient needs post-hospital services based on physician/advanced practitioner order or complex needs related to functional status, cognitive ability, or social support system  Outcome: Progressing    times a day  - Out of bed for toileting  - Record patient progress and toleration of activity level   Outcome: Progressing

## 2023-01-30 NOTE — PHYSICAL THERAPY NOTE
PHYSICAL THERAPY NOTE          Patient Name: Roseann RONQUILLO Date: 1/30/2023 01/30/23 0836   PT Last Visit   PT Visit Date 01/30/23   Note Type   Note type Cancelled Session   Cancel Reasons Patient to operating room   Additional Comments PT consult received  Chart reviewed  Off floor in OR for Agram   Will complete PT evaluation when appropriate       Irma Dumont, PT

## 2023-01-30 NOTE — ASSESSMENT & PLAN NOTE
Lab Results   Component Value Date    EGFR 40 01/30/2023    EGFR 37 01/29/2023    EGFR 35 01/28/2023    CREATININE 1 25 01/30/2023    CREATININE 1 35 (H) 01/29/2023    CREATININE 1 40 (H) 01/28/2023   · Most recent creatinine 1 40  · Appears to be patients baseline   · Continue to monitor BMP  · Nephrology consulted for prep for angiogram

## 2023-01-30 NOTE — INTERIM OP NOTE
Brief Op Note  PATIENT NAME: Pepe Diaz  : 1940  MRN: [id number]  AL HYBRID 09    Surgery Date: 2023    Preop Diagnosis:  atherosclerosis w/ nonhealing L foot wounds    Post-Op Diagnosis Codes:  same    Procedure(s) (LRB):  R CFA access w/ 6F sheath and Mynx closure   Aortogram   LLE angiogram   R EIA PTA w/ 6x60mm    L EIA PTA w/ 6x60mm    L SFA PTA w/ [id number] Kofi POBA and [id number] [place] DCB   L SFA stents: 5x27mm Express LD (prox) and 6x60mm Innova (dist)    Surgeon(s) and Role:     Vj Guzman MD - Primary     * Kel Julio DO - Fellow    Estimated Blood Loss:   Minimal    Anesthesia Type:   Conscious sedation by anesthesia    Findings:   Long segment high grade stenosis of the L SFA (string sign)  Moderate stenoses of the B EIA at the iliac bifurcation  L PT/peroneal runoff    2+ L PT at completion  R fem palp thrill/pulse  L fem palp pulse    Complications:   None      SIGNATURE: Jonathan Guzman MD   DATE: 2023   TIME: 11:36 AM       Vascular Quality Initiative - Peripheral Vascular Intervention     Urgency: Urgent    Functional Status:  Capable of only limited self-care, confined to bed or chair 50% or more of waking hours     Ambulation: Amb w/ prosthesis =  independently ambulatory using an above or below knee prosthesis    Leg Symptoms    Right: Asymptomatic:  documented peripheral arterial disease without symptoms of claudication or ischemic pain      Treatment of Native Artery to Maintain Bypass Patency?:  No  Left: Ulcer/necrosis (gangrene): de beatrice tissue loss due to peripheral arterial disease, not due to non-healing prior amputation       Tissue Loss Severity: Grade 2, Deep = deeper full thickness ulcer or necrosis (gangrene) on distal leg or foot with exposed bone, joint, or tendon, or shallow heel ulcer without involvement of the calcaneus (ie, major tissue loss: salvageable with 3 digital amputations or standard transmetatarsal amputation [TMA] plus skin coverage)  Infection: Grade 2, Moderate = Local infection is present as defined for Grade 1, but extends >2 cm around ulcer, or involves structures deeper than the skin and subcutaneous tissues (eg, abscess, osteomyelitis, septic arthritis, fasciitis)  No clinical signs of systemic inflammatory response  COVID Information  COVID Symptoms Pre-Procedure: Asymptomatic    Treatment Delayed by Pandemic: None    Access   Number of Sites: 1     Access Site 1:     Side 1: Right    Site 1: Femoral Retrograde    Access Guidance 1:U/S    Largest Sheath Size 1: 6 Fr      Closure Device 1: MynxGrip      Number of Closure Devices: 1     Closure Device Outcome: Closure device successful         Procedure  Fluoro Time: 33 9 minutes  Contrast Volume: Visipaque 110 ml  DAP: 141 95 Gy cm2  CO2: no  Anticoagulant: Heparin  Protamine: No  If Creatinine is > 1 2 or missing, KAT Prophylaxis IV saline     Treatment Details  Indication: Occlusive Disease,    Completion Assessment  Artery 1 treated:  External Iliac  Right               Outflow: SFA, PROF, POP: Not Imaged                  Was this Site previously treated?: No          TASC Grade: B          Total Treated Length: 6 cm          Total Occluded Length: 0 cm          Calcification: Moderate (calcification on both sides of artery < half length of lesion)          Number of Treatment types (Devices):   1           Device 1          Treatment Type: Plain Balloon            Concomitant: None          Technical result: Successful (stenosis <=30%)      Artery 2 treated:  External Iliac  Left               Outflow: SFA, PROF, POP: 3                  Was this Site previously treated?: No          TASC Grade: B          Total Treated Length: 6 cm           Total Occluded Length: 0 cm          Calcification: Moderate (calcification on both sides of artery < half length of lesion)          Number of Treatment types (Devices):   1           Device 1 Treatment Type: Plain Balloon            Concomitant: None          Technical result: Successful (stenosis <=30%)        Artery 3 treated: SFA        Left               Outflow: AT,PT,Peroneal: 2                  Was this Site previously treated?: No          TASC Grade: C          Total Treated Length: 30 cm           Total Occluded Length: 0 cm          Calcification: Mild (calcification on one side of artery > half length of lesion)     Number of Treatment types (Devices):   3           Device 1          Treatment Type: Plain Balloon         Device 2          Treatment Type: Special Balloon,  Drug Coated Balloon                Diameter: 4 mm          Length: 200 mm              Device 3          Treatment Type: Stent,  Bare Metal Stent                L SFA stents: 5x27mm Express LD (prox) and 6x60mm Innova (dist)            Concomitant: None          Technical result: Successful (stenosis <=30%)                       Post Procedure  Patient currently taking: Statin, Yes      Antiplatelet Medication, Yes    Procedure Complications: No

## 2023-01-30 NOTE — ASSESSMENT & PLAN NOTE
Results from last 7 days   Lab Units 01/30/23  0732 01/29/23  0517 01/28/23  0554 01/27/23  1438   HEMOGLOBIN g/dL 7 7* 9 3* 9 4* 9 0*   · Previous baseline appears to be in the 8-9's  · Await labs from today   · Transfuse for hemoglobin less than 7  · Iron panel obtained-iron saturation 21, TIBC 220, iron 47, ferritin 90

## 2023-01-30 NOTE — UTILIZATION REVIEW
Initial Clinical Review    TRANSFER FROM [facility]    Admission: Date/Time/Statement:   Admission Orders (From admission, onward)     Ordered        01/29/23 0230  Inpatient Admission  Once                      Orders Placed This Encounter   Procedures   • Inpatient Admission     Standing Status:   Standing     Number of Occurrences:   1     Order Specific Question:   Level of Care     Answer:   Med Surg [16]     Order Specific Question:   Estimated length of stay     Answer:   More than 2 Midnights     Order Specific Question:   Certification     Answer:   I certify that inpatient services are medically necessary for this patient for a duration of greater than two midnights  See H&P and MD Progress Notes for additional information about the patient's course of treatment  Initial Presentation: [de-identified] y o  female initially  Admitted to [facility]   On  1/24 with osteo left foot  PAD  Noted on doppler studies  PMH is   CKD  Stage  3,  Chronic opioid  Use, chronic respiratory failure with hypoxia, on 3 L  NC  Baseline  And  DM2  Transferred to [facility] for further care  Admit  Ip with   ACUTE   Osteo  Left foot,  PAD and plan is   Vascular/renal/podiatry  consult,  VIRGINIE, monitor labs, IV  heparin  And angiogram      Vascular consult   Plan   LLE  Angiogram   Possible intervention  1/30  Continue  VIRGINIE  And IV  Heparin  Nephrology consult  Baseline creatinine   1 3 - 1 4, currently at baseline  Avoid hypotension  Hold home lasix for now  Monitor closely  Podiatry consult  Plan local wound care to  Left hallux diabetic ulcer  High surgical risk  Continue VIRGINIE  Can be  WBAT            Date:     1/30        Day 2:   Surgery Date: 1/30/2023   Procedure(s) (LRB):   R CFA access w/ 6F sheath and Mynx closure   Aortogram   LLE angiogram   R EIA PTA w/ 6x60mm    L EIA PTA w/ 6x60mm    L SFA PTA w/ [id number] Kofi POBA and [id number] [place] DCB   L SFA stents: 5x27mm Express LD (prox) and 6x60mm Innova (dist)    Findings:   Long segment high grade stenosis of the L SFA (string sign)   Moderate stenoses of the B EIA at the iliac bifurcation   L PT/peroneal runoff   2+ L PT at completion   R fem palp thrill/pulse   L fem palp pulse    Restart  IV  Heparin 4 hours post procedure  Now optimized for  Podiatry procedure, if indicated         Wt Readings from Last 1 Encounters:   01/29/23 75 kg (165 lb 5 5 oz)     Additional Vital Signs:    -- 64 18 143/47 Abnormal  79 95 % -- -- -- --   01/30/23 13:54:32 -- 65 -- 135/50 78 98 % -- -- -- --   01/30/23 13:50:28 -- 61 18 -- -- 97 % -- -- -- --   01/30/23 1306 -- 58 17 121/56 81 100 % 32 3 L/min Nasal cannula --   01/30/23 1251 -- 58 13 149/63 91 99 % -- -- -- --   01/30/23 1246 -- -- -- -- -- 95 % 32 3 L/min Nasal cannula --   01/30/23 1236 -- 60 15 157/64 92 91 % -- -- None (Room air) --   01/30/23 1221 -- 60 14 143/65 94 93 % -- -- None (Room air) --   01/30/23 1206 -- 60 12 146/61 88 100 % 36 4 L/min Simple mask --   01/30/23 1151 98 °F (36 7 °C) 62 13 153/62 89 100 % 44 6 L/min Simple mask --   01/30/23 06:59:38 98 7 °F (37 1 °C) 68 18 148/47 Abnormal  81 96 % 32 3 L/min -- --   01/29/23 22:11:29 98 2 °F (36 8 °C) 80 19 119/58 78 97 % -- -- -- --   01/29/23 21:22:39 -- -- -- 169/64 99 -- -- -- -- --   01/29/23 15:20:14 98 4 °F (36 9 °C) 79 18 148/57 87 96 % -- -- -- --   01/29/23 08:14:46 98 9 °F (37 2 °C) 76 17 135/36 Abnormal  69 97 % 32 3 L/min Nasal cannula Lying   01/29/23 0619 98 °F (36 7 °C) 70 20 160/57 -- -- -- -- --        Pertinent Labs/Diagnostic Test Results:   VAS AD & waveform analysis, multiple levels    (Results Pending)     Results from last 7 days   Lab Units 01/29/23  1700   SARS-COV-2  Negative     Results from last 7 days   Lab Units 01/30/23  0732 01/29/23  0517 01/28/23  0554 01/27/23  1438 01/26/23  1231 01/25/23  0457 01/24/23  1528   WBC Thousand/uL 7 36 10 56* 8 54 7 82 9 44   < > 10 87*   HEMOGLOBIN g/dL 7  7* 9 3* 9 4* 9 0* 9 8*   < > 10 5*   HEMATOCRIT % 25 6* 30 9* 31 0* 29 7* 33 0*   < > 35 0   PLATELETS Thousands/uL 186 265 213 211 257   < > 297   NEUTROS ABS Thousands/µL 3 96 5 89  --   --   --   --  7 92*    < > = values in this interval not displayed  Results from last 7 days   Lab Units 01/30/23  0500 01/29/23  0517 01/28/23  0554 01/27/23  0449 01/26/23  0513   SODIUM mmol/L 138 139 141 138 140   POTASSIUM mmol/L 4 1 4 1 4 3 4 2 4 4   CHLORIDE mmol/L 103 102 103 102 101   CO2 mmol/L 31 31 33* 28 35*   ANION GAP mmol/L 4 6 5 8 4   BUN mg/dL 28* 30* 33* 34* 34*   CREATININE mg/dL 1 25 1 35* 1 40* 1 43* 1 43*   EGFR ml/min/1 73sq m 40 37 35 34 34   CALCIUM mg/dL 7 9* 8 5 8 5 8 2* 8 8       Results from last 7 days   Lab Units 01/30/23  1222 01/30/23  0700 01/29/23  2100 01/29/23  1604 01/29/23  1148 01/29/23  0820 01/28/23  2100 01/28/23  1604 01/28/23  1135 01/28/23  0725 01/27/23  2042 01/27/23  1607   POC GLUCOSE mg/dl 130 112 163* 185* 103 167* 205* 211* 182* 104 137 174*     Results from last 7 days   Lab Units 01/30/23  0500 01/29/23  0517 01/28/23  0554 01/27/23  0449 01/26/23  0513 01/25/23  0457 01/24/23  1528   GLUCOSE RANDOM mg/dL 105 103 83 97 76 102 180*               Results from last 7 days   Lab Units 01/30/23  0732 01/30/23  0029 01/29/23  1736 01/29/23  1025 01/29/23  0307 01/28/23  1820 01/28/23  1017 01/26/23  2139 01/26/23  1231   PROTIME seconds  --   --   --   --  14 1  --  13 5  --  13 9   INR   --   --   --   --  1 09  --  1 02  --  1 06   PTT seconds 120* 57* 94*   < > 48*   < > 165*   < > 31    < > = values in this interval not displayed       Results from last 7 days   Lab Units 01/25/23  0457   TSH 3RD GENERATON uIU/mL 3 272         Results from last 7 days   Lab Units 01/24/23  1528   LACTIC ACID mmol/L 1 1               Results from last 7 days   Lab Units 01/27/23  0501   CREATININE UR mg/dL 42 7  44 8   PROTEIN UR mg/dL 21   PROT/CREAT RATIO UR  0 47*     Results from last 7 days   Lab Units 01/29/23  1700   INFLUENZA A PCR  Negative   INFLUENZA B PCR  Negative   RSV PCR  Negative           Results from last 7 days   Lab Units 01/24/23  1640 01/24/23  1528   BLOOD CULTURE  No Growth After 5 Days  No Growth After 5 Days                 Present on Admission:  • Acute hematogenous osteomyelitis of left foot (HCC)  • PAD (peripheral artery disease) (HCC)  • Chronic, continuous use of opioids  • Mixed simple and mucopurulent chronic bronchitis (HCC)  • Chronic respiratory failure with hypoxia and hypercapnia (HCC)  • Stage 3b chronic kidney disease (HCC)  • Anemia      Admitting Diagnosis: Acute hematogenous osteomyelitis of left foot (HCC) [M86 [id number]]  Age/Sex: [de-identified] y o  female  Admission Orders:  Scheduled Medications:  allopurinol, 50 mg, Oral, Daily  amLODIPine, 5 mg, Oral, Daily  atorvastatin, 10 mg, Oral, Daily With Dinner  [START ON 1/31/2023] clopidogrel, 75 mg, Oral, Daily  docusate sodium, 100 mg, Oral, BID  Fluticasone Furoate-Vilanterol, 1 puff, Inhalation, Daily  gabapentin, 300 mg, Oral, TID  insulin glargine, 8 Units, Subcutaneous, HS  insulin lispro, 1-6 Units, Subcutaneous, HS  insulin lispro, 2-12 Units, Subcutaneous, TID AC  metoprolol tartrate, 50 mg, Oral, Q12H SOULEYMANE  mirtazapine, 30 mg, Oral, HS  nicotine, 1 patch, Transdermal, Daily  oxybutynin, 5 mg, Oral, Daily  pantoprazole, 40 mg, Oral, Daily  predniSONE, 2 5 mg, Oral, Daily  rOPINIRole, 0 5 mg, Oral, HS  saccharomyces boulardii, 250 mg, Oral, Daily  senna, 1 tablet, Oral, Daily  sertraline, 50 mg, Oral, HS  sucralfate, 1 g, Oral, BID AC  traZODone, 50 mg, Oral, HS  umeclidinium, 1 puff, Inhalation, Daily  vancomycin, 750 mg, Intravenous, Q24H      Continuous IV Infusions:  multi-electrolyte, 50 mL/hr, Intravenous, Continuous  IV  Heparin - d/c   1/30      PRN Meds:  acetaminophen, 650 mg, Oral, Q6H PRN  albuterol, 0 63 mg, Nebulization, 4x Daily PRN  calcium carbonate, 1,000 mg, Oral, Daily PRN  heparin (porcine), 2,000 Units, Intravenous, Q6H PRN  heparin (porcine), 4,000 Units, Intravenous, Q6H PRN  morphine injection, 2 mg, Intravenous, Q4H PRN  ondansetron, 4 mg, Intravenous, Q8H PRN  oxyCODONE, 10 mg, Oral, Q6H PRN  oxyCODONE, 5 mg, Oral, Q4H PRN  polyethylene glycol, 17 g, Oral, Daily PRN        IP CONSULT TO PHARMACY  IP CONSULT TO VASCULAR SURGERY  IP CONSULT TO PODIATRY  IP CONSULT TO NEPHROLOGY  IP CONSULT TO INFECTIOUS DISEASES    Network Utilization Review Department  ATTENTION: Please call with any questions or concerns to [phone] and carefully listen to the prompts so that you are directed to the right person  All voicemails are confidential   Racquel Birch all requests for admission clinical reviews, approved or denied determinations and any other requests to dedicated fax number below belonging to the campus where the patient is receiving treatment   List of dedicated fax numbers for the Facilities:  [address] DENIALS (Administrative/Medical Necessity) [phone]   [address] (Maternity/NICU/Pediatrics) [phone]   919 Rosy Johnson [phone]   [facility] 77 [phone]   [address] [place] [address] Cielo Diggs 28 [phone]   1558 [facility] Osiris Ventura [place] 134 815 [facility] [phone]

## 2023-01-30 NOTE — PROGRESS NOTES
[facility]  Progress Note - Penny Jc 1940, [de-identified] y o  female MRN: [id number]  Unit/Bed#: OR [place] Encounter: [id number]  Primary Care Provider: Noe Acevedo MD   Date and time admitted to hospital: 1/29/2023  2:19 AM    PAD (peripheral artery disease) ([address] )  Assessment & Plan  [de-identified] female smoker w/ HTN, type II DM, CHF, COPD on 2L O2 NC, history of LLE DVT on Eliquis, CKD, who initially presented to [address] [place] [address] with nonhealing wound of the LLE, found to have underlying arterial occlusive disease  Patient was transferred to [facility] for revascularization prior to podiatric intervention  Diagnostics:  - LEAD 1/25/23: RLE- 50-75% mid SFA disease w/ diffuse disease in fem-pop and tibioperoneal arteries  AD 0 42/38/-  LLE- 50-75% proximal and distal SFA disease with occlusion of the AT  Evidence of diffuse fem-pop and tibioperoneal disease  AD 0 39/33/not obtained  - MRI- Ulcer with evidence of small adjacent medial, distal 1st MTP osteomyelitis  Altered marrow signal intensity at the proximal-medial aspect of the 1st toe proximal phalanx is less specific and may be early osteomyelitis  - CT abd/pelvis 2020- aortoiliac calcified disease     sCr/eGFR 1 25/40    Recommendations:   - Left hallux and 1st MTP ulcerations, worsening over the past week despite treatment with PO abx  Underlying OM on MRI  Will likely require podiatric intervention  - LEAD demonstrates significant SFA disease with evidence of tibioperoneal disease and occluded AT  Evidence of aortoiliac disease on CT from 2020  - R femoral pulse with thrill, L femoral pulse 1+  Non-palpable pedal pulses  - Patient would likely benefit from LLE angiogram for arterial optimization for wound healing prior to podiatric intervention  Plan for today w/Dr Alvin Laird Nephrology input   - Continue ASA, statin therapy  - On eliquis for hx of DVT  Currently on hold in anticipation of OR   Heparin gtt    - Local wound care as per podiatry  - Smoking cessation  - Optimize diabetes for wound healing  - D/w Dr Yovanny Couch Doctor       Subjective: Patient seen in holding  Reports that she is minimally ambulatory  Has wound on the left lower extremity  Plan for angiogram today which patient is in agreement with    Vitals:  BP (!) 148/47   Pulse 68   Temp 98 7 °F (37 1 °C)   Resp 18   Ht 5' 6" (1 676 m)   Wt 75 kg (165 lb 5 5 oz)   SpO2 96%   BMI 26 69 kg/m²     I/Os:  No intake/output data recorded  No intake/output data recorded  Lab Results and Cultures:   CBC with diff: Lab Results   Component Value Date    WBC 7 36 01/30/2023    HGB 7 7 (L) 01/30/2023    HCT 25 6 (L) 01/30/2023    MCV 87 01/30/2023     01/30/2023    MCH 26 3 (L) 01/30/2023    MCHC 30 1 (L) 01/30/2023    RDW 15 9 (H) 01/30/2023    MPV 10 6 01/30/2023    NRBC 0 01/30/2023   ,   BMP/CMP:  Lab Results   Component Value Date    SODIUM 138 01/30/2023    K 4 1 01/30/2023     01/30/2023    CO2 31 01/30/2023    BUN 28 (H) 01/30/2023    CREATININE 1 25 01/30/2023    CALCIUM 7 9 (L) 01/30/2023    AST 9 (L) 01/24/2023    ALT 7 01/24/2023    ALKPHOS 59 01/24/2023    EGFR 40 01/30/2023   ,   Lipid Panel: No results found for: CHOL,   Coags:   Lab Results   Component Value Date     (H) 01/30/2023    INR 1 09 01/29/2023   ,     Blood Culture:   Lab Results   Component Value Date    BLOODCX No Growth After 5 Days   01/24/2023   ,   Urinalysis: Lab Results   Component Value Date    COLORU Yellow 07/11/2020    CLARITYU Clear 07/11/2020    SPECGRAV 1 015 07/11/2020    PHUR 7 0 07/11/2020    LEUKOCYTESUR Negative 07/11/2020    NITRITE Negative 07/11/2020    GLUCOSEU Negative 07/11/2020    KETONESU Negative 07/11/2020    BILIRUBINUR Negative 07/11/2020    BLOODU Negative 07/11/2020   ,   Urine Culture: No results found for: URINECX,   Wound Culure: No results found for: WOUNDCULT    Medications:  Current Facility-Administered Medications Medication Dose Route Frequency   • [MAR Hold] acetaminophen (TYLENOL) tablet 650 mg  650 mg Oral Q6H PRN   • [MAR Hold] albuterol inhalation solution 0 63 mg  0 63 mg Nebulization 4x Daily PRN   • [MAR Hold] allopurinol (ZYLOPRIM) tablet 50 mg  50 mg Oral Daily   • [MAR Hold] amLODIPine (NORVASC) tablet 5 mg  5 mg Oral Daily   • [MAR Hold] aspirin chewable tablet 81 mg  81 mg Oral Daily   • [MAR Hold] atorvastatin (LIPITOR) tablet 10 mg  10 mg Oral Daily With Dinner   • [MAR Hold] calcium carbonate (TUMS) chewable tablet 1,000 mg  1,000 mg Oral Daily PRN   • [MAR Hold] docusate sodium (COLACE) capsule 100 mg  100 mg Oral BID   • [MAR Hold] Fluticasone Furoate-Vilanterol 100-25 mcg/actuation 1 puff  1 puff Inhalation Daily   • [MAR Hold] gabapentin (NEURONTIN) capsule 300 mg  300 mg Oral TID   • heparin (porcine) 25,000 units in 0 45% NaCl 250 mL infusion (premix)  3-20 Units/kg/hr (Order-Specific) Intravenous Titrated   • [MAR Hold] heparin (porcine) injection 2,000 Units  2,000 Units Intravenous Q6H PRN   • [MAR Hold] heparin (porcine) injection 4,000 Units  4,000 Units Intravenous Q6H PRN   • heparin 1000 units in 500 mL infusion (premix) for sheath patency    Continuous PRN   • [MAR Hold] insulin glargine (LANTUS) subcutaneous injection 8 Units 0 08 mL  8 Units Subcutaneous HS   • [MAR Hold] insulin lispro (HumaLOG) 100 units/mL subcutaneous injection 1-6 Units  1-6 Units Subcutaneous HS   • [MAR Hold] insulin lispro (HumaLOG) 100 units/mL subcutaneous injection 2-12 Units  2-12 Units Subcutaneous TID AC   • lidocaine (PF) (XYLOCAINE-MPF) 1 % injection    PRN   • [MAR Hold] metoprolol tartrate (LOPRESSOR) tablet 50 mg  50 mg Oral Q12H [address]   • [MAR Hold] mirtazapine (REMERON) tablet 30 mg  30 mg Oral HS   • [MAR Hold] morphine injection 2 mg  2 mg Intravenous Q4H PRN   • multi-electrolyte (PLASMALYTE-A/ISOLYTE-S PH 7 4) IV solution  50 mL/hr Intravenous Continuous   • [MAR Hold] nicotine (NICODERM CQ) 7 mg/24hr TD 24 hr patch 1 patch  1 patch Transdermal Daily   • [MAR Hold] ondansetron (ZOFRAN) injection 4 mg  4 mg Intravenous Q8H PRN   • [MAR Hold] oxybutynin (DITROPAN-XL) 24 hr tablet 5 mg  5 mg Oral Daily   • [MAR Hold] oxyCODONE (ROXICODONE) immediate release tablet 10 mg  10 mg Oral Q6H PRN   • [MAR Hold] oxyCODONE (ROXICODONE) IR tablet 5 mg  5 mg Oral Q4H PRN   • [MAR Hold] pantoprazole (PROTONIX) EC tablet 40 mg  40 mg Oral Daily   • [MAR Hold] polyethylene glycol (MIRALAX) packet 17 g  17 g Oral Daily PRN   • [MAR Hold] predniSONE tablet 2 5 mg  2 5 mg Oral Daily   • [MAR Hold] rOPINIRole (REQUIP) tablet 0 5 mg  0 5 mg Oral HS   • [MAR Hold] saccharomyces boulardii (FLORASTOR) capsule 250 mg  250 mg Oral Daily   • [MAR Hold] senna (SENOKOT) tablet 8 6 mg  1 tablet Oral Daily   • [MAR Hold] sertraline (ZOLOFT) tablet 50 mg  50 mg Oral HS   • [MAR Hold] sucralfate (CARAFATE) tablet 1 g  1 g Oral BID AC   • [MAR Hold] traZODone (DESYREL) tablet 50 mg  50 mg Oral HS   • [MAR Hold] umeclidinium 62 5 mcg/actuation inhaler AEPB 1 puff  1 puff Inhalation Daily   • [MAR Hold] vancomycin (VANCOCIN) IVPB (premix in dextrose) 750 mg 150 mL  750 mg Intravenous Q24H     Facility-Administered Medications Ordered in Other Encounters   Medication Dose Route Frequency   • dexmedeTOMIDine (Precedex) 400 mcg in sodium chloride 0 9 % 100 mL infusion   Intravenous Continuous PRN   • dexmedeTOMIDine (Precedex) injection   Intravenous PRN   • fentanyl citrate (PF) 100 MCG/2ML   Intravenous PRN   • heparin (porcine) injection   Intravenous PRN   • midazolam (VERSED) injection   Intravenous PRN   • sodium chloride 0 9 % infusion   Intravenous Continuous PRN       Imaging:  Reviewed  Physical Exam:    General: Alert and oriented  In no acute distress  CV: Regular rate   Respiratory: Normal effort  Nasal cannula in place   Abdominal: Soft, non-distended   Extremities: Bandage in place to the L foot     Neurologic: Grossly normal    Wound/Incision:            Pulse exam:  Femoral: Right: Thrill suggestive of AVF Left: 1+  DP: Right: Non-palpable Left: Non-palpable   PT: Right: Non-palpable Left: Non-palpable     Michelle Hebert PA-C  1/30/2023

## 2023-01-30 NOTE — PROGRESS NOTES
[facility]  Progress Note - Osman Guadarrama 1940, [de-identified] y o  female MRN: [id number]  Unit/Bed#: OR [place] Encounter: [id number]  Primary Care Provider: Melia Sullivan MD   Date and time admitted to hospital: 1/29/2023  2:19 AM      Addendum:  Noted to have anemia   Results from last 7 days   Lab Units 01/30/23  0732 01/29/23  0517 01/28/23  0554 01/27/23  1438   HEMOGLOBIN g/dL 7 7* 9 3* 9 4* 9 0*   Noted brusing to left upper arm while on heparin drip  Will recheck H&H later today and transfer for hgb <7    * Acute hematogenous osteomyelitis of left foot ([address] )  Assessment & Plan  · Patient initially presented to the [address] [place] [address] with blisters on the left great toe that progressed to open wounds  She was prescribed keflex and doxycycline outpatient with no resolution  · Was transferred to [address] [place] [address] for further vascular evaluation  · Xray of left foot 1/24 negative for osteomyelitis   · MRI left foot 1/25/23: "Ulcer with evidence of small adjacent medial, distal 1st MTP osteomyelitis   Altered marrow signal intensity at the proximal-medial aspect of the 1st toe proximal phalanx is less specific and may be early osteomyelitis consult podiatry for further recommendations "  · On IV cefepime and vancomycin - day #7  · Underwent angiogram today with successful treatment of high grade long segment SFA stenosis & B EIA stenosis; peroneal and PT runoff  · Switch ASA to plavix for new PAD stents- per vascular  · ABIs to be done tomorrow by vascular  · Further intervention by podiatry to follow    Stage 3b chronic kidney disease [facility])  Assessment & Plan  Lab Results   Component Value Date    EGFR 40 01/30/2023    EGFR 37 01/29/2023    EGFR 35 01/28/2023    CREATININE 1 25 01/30/2023    CREATININE 1 35 (H) 01/29/2023    CREATININE 1 40 (H) 01/28/2023   · Most recent creatinine 1 40  · Appears to be patients baseline   · Continue to monitor BMP  · Nephrology consulted for prep for angiogram    PAD (peripheral artery disease) ([facility] )  Assessment & Plan  · Patient had arterial doppler studies of bilateral extremities while at [address] [place] [address]  · RLL: There is a 50-75% stenosis noted in the mid superficial femoral artery with diffuse atherosclerotic disease throughout the remaining femoro-popliteal andtibio-peroneal segments  AD 0 42  · LLL: There is a 50-75% stenosis noted in the prox and distal superficial femoral artery and an occlusion vs high grade stenosis of the distal anterior tibial artery  Diffuse atherosclerotic disease throughout the remaining femoro-popliteal and tibio-peroneal segments   AD 0 39  · Patient with remote history of DVT previously anticoagulated on Eliquis  · Currently on heparin gtt   · Patient transferred to [facility] for angiogram - performed 1/30 with successful stenting  · Can restart heparin drip 4 hrs after surgery completion  · Await podiatry recommendations now that pt is optimized vascularly    Chronic, continuous use of opioids  Assessment & Plan  · Patient chronically on oxycodone, morphine, and movantik at home   · PDMP reviewed  · Pain medications increased in the setting of ischemic left foot pain    Chronic respiratory failure with hypoxia and hypercapnia (HCC)  Assessment & Plan  · Continue baseline 3L NC     Mixed simple and mucopurulent chronic bronchitis ([facility] )  Assessment & Plan  · No acute exacerbation  · Patient is currently on baseline oxygen requirements of 3L NC  · Continue to encourage cessation  · Nicotine patch supplementation  · Continue home regimen of Advair substituted to Breo, Spiriva and albuterol inhaler/neb prednisone 2 5 mg daily as per home regimen    Type 2 diabetes mellitus with skin complication, with long-term current use of insulin [facility])  Assessment & Plan  Lab Results   Component Value Date    HGBA1C 7 5 (H) 01/23/2023     Recent Labs     01/29/23  1604 01/29/23  2100 01/30/23  0700 01/30/23  1222   POCGLU 185* 163* 112 130   · On 12 units Lantus h s  · Will decrease to 8 units with hopes to avoid hypoglycemia  · SSI with accucheks        VTE Pharmacologic Prophylaxis:   Pharmacologic: Heparin Drip  Mechanical VTE Prophylaxis in Place: Yes    Discharge Plan: Need for continued inpatient stay for further podiatry interventions    Discussions with Specialists or Other Care Team Provider: Nursing, case management    Education and Discussions with Family / Patient: Patient    Time Spent for Care: 30 minutes  More than 50% of total time spent on counseling and coordination of care as described above  Current Length of Stay: 1 day(s)  Current Patient Status: Inpatient   Code Status: Level 3 - DNAR and DNI    Subjective:   Seen prior to going for angiogram   Reported some pain in her foot  Otherwise feels okay  Objective:     Vitals:   Temp (24hrs), Av 3 °F (36 8 °C), Min:98 °F (36 7 °C), Max:98 7 °F (37 1 °C)    Temp:  [98 °F (36 7 °C)-98 7 °F (37 1 °C)] 98 °F (36 7 °C)  HR:  [58-80] 58  Resp:  [12-19] 17  BP: (119-169)/(47-65) 121/56  SpO2:  [91 %-100 %] 100 %  Body mass index is 26 69 kg/m²  Input and Output Summary (last 24 hours): Intake/Output Summary (Last 24 hours) at 2023 1311  Last data filed at 2023 1140  Gross per 24 hour   Intake 1000 ml   Output 20 ml   Net 980 ml       Physical Exam:     Physical Exam  Vitals and nursing note reviewed  Constitutional:       General: She is not in acute distress  Appearance: Normal appearance  She is normal weight  She is not ill-appearing, toxic-appearing or diaphoretic  HENT:      Head: Normocephalic and atraumatic  Eyes:      General: No scleral icterus  Cardiovascular:      Rate and Rhythm: Normal rate and regular rhythm  Pulmonary:      Effort: Pulmonary effort is normal  No respiratory distress  Breath sounds: Normal breath sounds  No stridor  No wheezing or rhonchi  Comments:  On 3 L oxygen nasal cannula  Abdominal:      General: Bowel sounds are normal  There is no distension  Palpations: Abdomen is soft  There is no mass  Tenderness: There is no abdominal tenderness  Hernia: No hernia is present  Musculoskeletal:      Comments: Left foot gauze wrapped   Skin:     General: Skin is warm and dry  Neurological:      Mental Status: She is oriented to person, place, and time  Mental status is at baseline  Psychiatric:         Mood and Affect: Mood normal          Behavior: Behavior normal          Additional Data:     Labs:    Results from last 7 days   Lab Units 01/30/23  0732   WBC Thousand/uL 7 36   HEMOGLOBIN g/dL 7 7*   HEMATOCRIT % 25 6*   PLATELETS Thousands/uL 186   NEUTROS PCT % 54   LYMPHS PCT % 30   MONOS PCT % 9   EOS PCT % 5     Results from last 7 days   Lab Units 01/30/23  0500 01/25/23  0457 01/24/23  1528   POTASSIUM mmol/L 4 1   < > 4 4   CHLORIDE mmol/L 103   < > 96   CO2 mmol/L 31   < > 36*   BUN mg/dL 28*   < > 30*   CREATININE mg/dL 1 25   < > 1 28   CALCIUM mg/dL 7 9*   < > 10 3*   ALK PHOS U/L  --   --  59   ALT U/L  --   --  7   AST U/L  --   --  9*    < > = values in this interval not displayed  Results from last 7 days   Lab Units 01/29/23  0307   INR  1 09       * I Have Reviewed All Lab Data Listed Above  * Additional Pertinent Lab Tests Reviewed: Le[address] Admission Reviewed    Imaging:    Imaging Reports Reviewed Today Include:   Imaging Personally Reviewed by Myself Includes:      Recent Cultures (last 7 days):     Results from last 7 days   Lab Units 01/24/23  1640 01/24/23  1528   BLOOD CULTURE  No Growth After 5 Days  No Growth After 5 Days         Last 24 Hours Medication List:   Current Facility-Administered Medications   Medication Dose Route Frequency Provider Last Rate   • [MAR Hold] acetaminophen  650 mg Oral Q6H PRN Yimi Strange PA-C     • [facility] Hold] albuterol  0 63 mg Nebulization 4x Daily PRN Yimi Strange PA-C     • [facility] Hold] allopurinol  50 mg Oral Daily Yimi Strange PA-C     • [MAR Hold] amLODIPine  5 mg Oral Daily Claudell Levering, CRNP     • [facility] Hold] atorvastatin  10 mg Oral Daily With Maynor Vasquez PA-C     • [facility] Hold] calcium carbonate  1,000 mg Oral Daily PRN Armin Marcelo PA-C     • [START ON 1/31/2023] clopidogrel  75 mg Oral Daily Robinson Maya DO     • [facility] Hold] docusate sodium  100 mg Oral BID Armin Marcelo PA-C     • [facility] Hold] Fluticasone Furoate-Vilanterol  1 puff Inhalation Daily Armin Marcelo PA-C     • [facility] Hold] gabapentin  300 mg Oral TID Armin Marcelo PA-C     • heparin (porcine)  3-20 Units/kg/hr (Order-Specific) Intravenous Titrated Armin Marcelo PA-C Stopped (01/30/23 0835)   • [MAR Hold] heparin (porcine)  2,000 Units Intravenous Q6H PRN Armin Marcelo PA-C     • [facility] Hold] heparin (porcine)  4,000 Units Intravenous Q6H PRN Armin Marcelo PA-C     • [facility] Hold] insulin glargine  8 Units Subcutaneous HS Amilcar Regan MD     • [facility] Hold] insulin lispro  1-6 Units Subcutaneous HS Armin Marcelo PA-C     • [facility] Hold] insulin lispro  2-12 Units Subcutaneous TID AC Armin Marcelo PA-C     • [facility] Hold] metoprolol tartrate  50 mg Oral Q12H [facility] Armin Marcelo PA-C     • [facility] Hold] mirtazapine  30 mg Oral HS Armin Marcelo PA-C     • [facility] Hold] morphine injection  2 mg Intravenous Q4H PRN Armin Marcelo PA-C     • multi-electrolyte  50 mL/hr Intravenous Continuous Claudell Levering, CRNP     • [facility] Hold] nicotine  1 patch Transdermal Daily Armin Marcelo PA-C     • [facility] Hold] ondansetron  4 mg Intravenous Q8H PRN Armin Marcelo PA-C     • ondansetron  4 mg Intravenous Q6H PRN Татьяна Rodriguez DO     • [facility] Hold] oxybutynin  5 mg Oral Daily Armin Marcelo PA-C     • [facility] Hold] oxyCODONE  10 mg Oral Q6H PRN Armin Marcelo PA-C     • [facility] Hold] oxyCODONE  5 mg Oral Q4H PRN Armin Marcelo PA-C     • [facility] Hold] pantoprazole  40 mg Oral Daily Armin Marcelo PA-C     • [facility] Hold] polyethylene glycol 17 g Oral Daily PRN Michelle Smart PA-C     • [facility] Hold] predniSONE  2 5 mg Oral Daily Michelle ADELIA Smart     • [facility] Hold] rOPINIRole  0 5 mg Oral HS Michelle ADELIA Smart     • [facility] Hold] saccharomyces boulardii  250 mg Oral Daily Michelle ADELIA Smart     • [facility] Hold] senna  1 tablet Oral Daily Michelle ADELIA Smart     • [facility] Hold] sertraline  50 mg Oral HS Michelle ADELIA Smart     • [facility] Hold] sucralfate  1 g Oral BID AC Michelle Smart PA-C     • [facility] Hold] traZODone  50 mg Oral HS Michelle ADELIA Smart     • [facility] Hold] umeclidinium  1 puff Inhalation Daily Michelle Smart PA-C     • [facility] Hold] vancomycin  750 mg Intravenous Q24H Michelle Smart PA-C 750 mg (01/29/23 0744)        Today, Patient Was Seen By: Maddie Melendez PA-C    ** Please Note: This note has been constructed using a voice recognition system   **

## 2023-01-30 NOTE — ANESTHESIA POSTPROCEDURE EVALUATION
Post-Op Assessment Note    CV Status:  Stable    Pain management: adequate     Mental Status:  Alert and awake   Hydration Status:  Euvolemic   PONV Controlled:  Controlled   Airway Patency:  Patent      Post Op Vitals Reviewed: Yes      Staff: Anesthesiologist         No notable events documented      BP      Temp      Pulse     Resp      SpO2      /59   Pulse 69   Temp 98 °F (36 7 °C) (Temporal)   Resp 19   Ht 5' 6" (1 676 m)   Wt 75 kg (165 lb 5 5 oz)   SpO2 94%   BMI 26 69 kg/m²

## 2023-01-30 NOTE — ASSESSMENT & PLAN NOTE
· Patient initially presented to the [address] [place] [address] with blisters on the left great toe that progressed to open wounds  She was prescribed keflex and doxycycline outpatient with no resolution  · Was transferred to [address] [place] [address] for further vascular evaluation  · Xray of left foot 1/24 negative for osteomyelitis   · MRI left foot 1/25/23: "Ulcer with evidence of small adjacent medial, distal 1st MTP osteomyelitis   Altered marrow signal intensity at the proximal-medial aspect of the 1st toe proximal phalanx is less specific and may be early osteomyelitis consult podiatry for further recommendations "  · On IV cefepime and vancomycin - day #7  · Underwent angiogram today with successful treatment of high grade long segment SFA stenosis & B EIA stenosis; peroneal and PT runoff  · Switch ASA to plavix for new PAD stents- per vascular  · ABIs to be done tomorrow by vascular  · Further intervention by podiatry to follow

## 2023-01-30 NOTE — UTILIZATION REVIEW
NOTIFICATION OF INPATIENT ADMISSION   AUTHORIZATION REQUEST   SERVICING FACILITY:   [address] [place] [address], [address]   Tax ID: [id number]  NPI: [id number] ATTENDING PROVIDER:  Attending Name and NPI#: Josh Pearce Md [[id number]]  Address: [address] [place] [address], [address]   Phone: [phone]     ADMISSION INFORMATION:  [facility]  Place of Service Code: 21  Inpatient Admission Date/Time: 1/29/23  2:19 AM  Discharge Date/Time: No discharge date for patient encounter  Admitting Diagnosis Code/Description:  Acute hematogenous osteomyelitis of left foot ([facility] ) [[id number] [id number]]     UTILIZATION REVIEW CONTACT:  Judy Morrow Utilization   Network Utilization Review Department  Phone: [phone]  Fax: [phone]  Email: [email or web address]  Contact for approvals/pending authorizations, clinical reviews, and discharge  PHYSICIAN ADVISORY SERVICES:  Medical Necessity Denial & [id number] Review  Phone: [phone]  Fax: [phone]  Email: [email or web address]

## 2023-01-30 NOTE — ASSESSMENT & PLAN NOTE
· Patient had arterial doppler studies of bilateral extremities while at [facility]  · RLL: There is a 50-75% stenosis noted in the mid superficial femoral artery with diffuse atherosclerotic disease throughout the remaining femoro-popliteal andtibio-peroneal segments  AD 0 42  · LLL: There is a 50-75% stenosis noted in the prox and distal superficial femoral artery and an occlusion vs high grade stenosis of the distal anterior tibial artery  Diffuse atherosclerotic disease throughout the remaining femoro-popliteal and tibio-peroneal segments   AD 0 39  · Patient with remote history of DVT previously anticoagulated on Eliquis  · Currently on heparin gtt   · Patient transferred to [facility] for angiogram - performed 1/30 with successful stenting  · Can restart heparin drip 4 hrs after surgery completion  · Await podiatry recommendations now that pt is optimized vascularly

## 2023-01-30 NOTE — PROGRESS NOTES
Follow up Consultation    Nephrology   Eulogio Alegre [de-identified] y o  female MRN: [id number]  Unit/Bed#: E5 -01 Encounter: [id number]      Physician Requesting Consult: Devang Ortega MD        ASSESSMENT/PLAN:   63-year-old female with multiple comorbidities including chronic hypoxic hypercapnic respite failure, diabetes, peripheral arterial disease and CKD stage III presented from outside facility with worsening left foot wound and need for angiogram   Nephrology consulted for perioperative optimization to reduce incidence for acute kidney injury  Perioperative optimization to reduce incidence for acute kidney injury/CKD stage III:  At risk for CONSTANCE multifactorial most likely secondary to KAT due to planned angiogram on 1/30/2023 in light of underlying comorbidities  After review of records In [facility] as well as Care everywhere it appears that the patient has a baseline Creatinine of 1 2-1 4 mg/dL  patient was admitted with a creatinine of 1 35 mg/dL on 1/29/2023  patient's creatinine today is at 1 11 mg/dL, continues to remain stable slightly below baseline  Advised patient with regards to risk for CONSTANCE secondary to hemodynamic perturbations intraoperatively in light of planned surgery on 2/1/2023 for resection of left foot first ray  Recommend placing on normal saline 50 cc an hour for 4 hours pre and 4 hours postsurgery  check BMP in a m if still in the hospital   Optimize hemodynamic status to avoid delay in renal recovery  Place on a renal diet when allowed diet order  Avoid nephrotoxins, adjust meds to appropriate GFR  Strict I/O  Daily weights  Urinary retention protocol if patient does not have a Pastor  Most likely has underlying CKD secondary to diabetic kidney disease plus hypertensive nephrosclerosis procedure nephron loss plus decreased nephron mass due to atrophic left kidney  will need to set up patient for follow up with Nephrology as an outpatient post hospitalization    for nephrology as an outpatient patient follows up with no nephrologist  Patient is stable from a renal standpoint discharge and medically cleared    Blood pressure/hypertension:  current medications: Norvasc 5 mg p o  daily, metoprolol 50 mg p o  every 12  recommendations: Continue to hold Lasix for now, may consider reinitiating in next 24 hours if patient with increasing volume status  Optimize hemodynamics  Maintain MAP > 65mmHg  Avoid BP fluctuations  H/H/anemia:  most recent hemoglobin at 8 2 g/dL  maintain hemoglobin greater than 8 grams/deciliter  Monitor closely for now supplementation  Avoid IV Venofer as patient on antibiotics    Acid-base electrolytes:    Electrolytes:    Stable    Acid-base:    Most recent bicarb stable at 30    Other medical problems:  Osteomyelitis left foot/peripheral arterial disease: Management primary team MRI with possible early signs of osteomyelitis follow-up with vascular and podiatry status post angiogram on 1/30 with successful treatment of high-grade stenosis  On vancomycin monitor for level hold if level greater than 20  Follow-up with podiatry for further management, plan for first ray resection of left foot on 2/1/2023  Diabetes:  Management per primary team   On insulin, most recent A1c 7 5% as of January 2023  Chronic hypoxic respiratory failure:  Management per primary team   On home O2 baseline      Thanks for the consult  Will continue to follow  Please call with questions/ concerns  Above-mentioned orders and Plan in terms of in terms of holding further IV fluids as well as Lasix may need to reinitiate Lasix if the patient gains weight in the next 24 hours and checking BMP in a m  was discussed with the team in depth with [facility] 1/31/2023, 11:09 AM                  Objective :   Patient seen and examined in room no overnight events hemodynamically stable remains afebrile, still with pain in left lower extremity        PHYSICAL EXAM  BP (!) 143/47   Pulse 64   Temp 98 °F (36 7 °C) (Temporal)   Resp 18   Ht 5' 6" (1 676 m)   Wt 75 kg (165 lb 5 5 oz)   SpO2 95%   BMI 26 69 kg/m²   Temp (24hrs), Av 3 °F (36 8 °C), Min:98 °F (36 7 °C), Max:98 7 °F (37 1 °C)        Intake/Output Summary (Last 24 hours) at 2023 1456  Last data filed at 2023 1140  Gross per 24 hour   Intake 1000 ml   Output 20 ml   Net 980 ml       I/O last 24 hours: In: 1000 [I V :1000]  Out: 20 [Blood:20]      Current Weight: Weight - Scale: 75 kg (165 lb 5 5 oz)  First Weight: Weight - Scale: 75 kg (165 lb 5 5 oz)  Physical Exam  Vitals and nursing note reviewed  Constitutional:       General: She is not in acute distress  Appearance: Normal appearance  She is normal weight  She is not ill-appearing, toxic-appearing or diaphoretic  HENT:      Head: Normocephalic and atraumatic  Mouth/Throat:      Pharynx: Oropharynx is clear  No oropharyngeal exudate  Eyes:      General: No scleral icterus  Conjunctiva/sclera: Conjunctivae normal    Cardiovascular:      Rate and Rhythm: Normal rate  Pulses: Normal pulses  Pulmonary:      Effort: Pulmonary effort is normal  No respiratory distress  Breath sounds: No stridor  Abdominal:      General: There is no distension  Palpations: Abdomen is soft  There is no mass  Tenderness: There is no abdominal tenderness  Musculoskeletal:         General: No swelling  Cervical back: Normal range of motion  No rigidity  Comments: Left lower extremity dressing in place   Skin:     General: Skin is warm  Coloration: Skin is not jaundiced  Neurological:      General: No focal deficit present  Mental Status: She is alert and oriented to person, place, and time  Psychiatric:         Mood and Affect: Mood normal          Behavior: Behavior normal              Review of Systems   Constitutional: Negative for chills and fatigue  HENT: Negative for congestion  Respiratory: Negative for cough, shortness of breath and wheezing  Cardiovascular: Negative for leg swelling  Gastrointestinal: Negative for abdominal pain and diarrhea  Genitourinary: Negative for flank pain  Musculoskeletal: Negative for back pain  Skin: Positive for wound  Neurological: Negative for headaches  Psychiatric/Behavioral: Negative for agitation and confusion  All other systems reviewed and are negative        Scheduled Meds:  Current Facility-Administered Medications   Medication Dose Route Frequency Provider Last Rate   • acetaminophen  650 mg Oral Q6H PRN Ramandeep Pacer, DO     • albuterol  0 63 mg Nebulization 4x Daily PRN Ramandeep Pacer, DO     • allopurinol  50 mg Oral Daily Ramandeep Pacer, DO     • amLODIPine  5 mg Oral Daily Ramandeep Pacer, DO     • atorvastatin  10 mg Oral Daily With [address], DO     • calcium carbonate  1,000 mg Oral Daily PRN Ramandeep Pacer, DO     • [START ON 1/31/2023] clopidogrel  75 mg Oral Daily Ramandeep Pacer, DO     • docusate sodium  100 mg Oral BID Ramandeep Pacer, DO     • Fluticasone Furoate-Vilanterol  1 puff Inhalation Daily Ramandeep Pacer, DO     • gabapentin  300 mg Oral TID Ramandeep Pacer, DO     • heparin (porcine)  2,000 Units Intravenous Q6H PRN Ramandeep Pacer, DO     • heparin (porcine)  4,000 Units Intravenous Q6H PRN Ramandeep Pacer, DO     • insulin glargine  8 Units Subcutaneous HS Ramandeep Pacer, DO     • insulin lispro  1-6 Units Subcutaneous HS Ramandeep Pacer, DO     • insulin lispro  2-12 Units Subcutaneous TID AC Ramandeep Pacer, DO     • metoprolol tartrate  50 mg Oral Q12H [facility] Ramandeep Pacer, DO     • mirtazapine  30 mg Oral HS Ramandeep Pacer, DO     • morphine injection  2 mg Intravenous Q4H PRN Ramandeep Pacer, DO     • multi-electrolyte  50 mL/hr Intravenous Continuous Richardine Line, CRNP     • nicotine  1 patch Transdermal Daily Zelpha Brace, DO     • ondansetron  4 mg Intravenous Q8H PRN Zelpha Brace, DO     • oxybutynin  5 mg Oral Daily Zelpha Brace, DO     • oxyCODONE  10 mg Oral Q6H PRN Zelpha Brace, DO     • oxyCODONE  5 mg Oral Q4H PRN Zelpha Brace, DO     • pantoprazole  40 mg Oral Daily Zelpha Brace, DO     • polyethylene glycol  17 g Oral Daily PRN Zelpha Brace, DO     • predniSONE  2 5 mg Oral Daily Zelpha Brace, DO     • rOPINIRole  0 5 mg Oral HS Zelpha Brace, DO     • saccharomyces boulardii  250 mg Oral Daily Zelpha Brace, DO     • senna  1 tablet Oral Daily Zelpha Brace, DO     • sertraline  50 mg Oral HS Zelpha Brace, DO     • sucralfate  1 g Oral BID AC Zelpha Brace, DO     • traZODone  50 mg Oral HS Zelpha Brace, DO     • umeclidinium  1 puff Inhalation Daily Zelpha Brace, DO     • vancomycin  750 mg Intravenous Q24H Zelpha Brace,  mg (01/29/23 0744)       PRN Meds: •  acetaminophen  •  albuterol  •  calcium carbonate  •  heparin (porcine)  •  heparin (porcine)  •  morphine injection  •  ondansetron  •  oxyCODONE  •  oxyCODONE  •  polyethylene glycol    Continuous Infusions:multi-electrolyte, 50 mL/hr          Invasive Devices:      Invasive Devices     Peripheral Intravenous Line  Duration           Peripheral IV 01/29/23 Left;Ventral (anterior) Forearm 1 day                  LABORATORY:    Results from last 7 days   Lab Units 01/30/23  0732 01/30/23  0500 01/29/23  0517 01/28/23  0554 01/27/23  1438 01/27/23  0449 01/26/23  1231 01/26/23  0513 01/25/23  0457 01/24/23  1528   WBC Thousand/uL 7 36  --  10 56* 8 54 7 82  --  9 44  --  9 18 10 87*   HEMOGLOBIN g/dL 7 7*  --  9 3* 9 4* 9 0*  --  9 8*  --  9 4* 10 5*   HEMATOCRIT % 25 6*  --  30 9* 31 0* 29 7*  --  33 0*  --  30 8* 35 0   PLATELETS Thousands/uL 186  --  265 213 211  --  257  --  241 297   POTASSIUM mmol/L  --  4 1 4 1 4 3  -- 4 2  --  4 4 4 7 4 4   CHLORIDE mmol/L  --  103 102 103  --  102  --  101 100 96   CO2 mmol/L  --  31 31 33*  --  28  --  35* 36* 36*   BUN mg/dL  --  28* 30* 33*  --  34*  --  34* 33* 30*   CREATININE mg/dL  --  1 25 1 35* 1 40*  --  1 43*  --  1 43* 1 42* 1 28   CALCIUM mg/dL  --  7 9* 8 5 8 5  --  8 2*  --  8 8 9 5 10 3*      rest all reviewed    RADIOLOGY:  VAS AD & waveform analysis, multiple levels    (Results Pending)     Rest all reviewed    Portions of the record may have been created with voice recognition software  Occasional wrong word or "sound a like" substitutions may have occurred due to the inherent limitations of voice recognition software  Read the chart carefully and recognize, using context, where substitutions have occurred  If you have any questions, please contact the dictating provider

## 2023-01-30 NOTE — ASSESSMENT & PLAN NOTE
27-year-old female smoker w/ HTN, type II DM, CHF, COPD on 2L O2 NC, history of LLE DVT on Eliquis, CKD, who initially presented to [address] [place] [address] with nonhealing wound of the LLE, found to have underlying arterial occlusive disease  Patient was transferred to [address] [facility] for revascularization prior to podiatric intervention  Diagnostics:  - LEAD 1/25/23: RLE- 50-75% mid SFA disease w/ diffuse disease in fem-pop and tibioperoneal arteries  AD 0 42/38/-  LLE- 50-75% proximal and distal SFA disease with occlusion of the AT  Evidence of diffuse fem-pop and tibioperoneal disease  AD 0 39/33/not obtained  - MRI- Ulcer with evidence of small adjacent medial, distal 1st MTP osteomyelitis  Altered marrow signal intensity at the proximal-medial aspect of the 1st toe proximal phalanx is less specific and may be early osteomyelitis  - CT abd/pelvis 2020- aortoiliac calcified disease     sCr/eGFR 1 25/40    Recommendations:   - Left hallux and 1st MTP ulcerations, worsening over the past week despite treatment with PO abx  Underlying OM on MRI  Will likely require podiatric intervention  - LEAD demonstrates significant SFA disease with evidence of tibioperoneal disease and occluded AT  Evidence of aortoiliac disease on CT from 2020  - R femoral pulse with thrill, L femoral pulse 1+  Non-palpable pedal pulses  - Patient would likely benefit from LLE angiogram for arterial optimization for wound healing prior to podiatric intervention  Plan for today w/Dr Alvin Laird Nephrology input   - Continue ASA, statin therapy  - On eliquis for hx of DVT  Currently on hold in anticipation of OR  Heparin gtt    - Local wound care as per podiatry     - Smoking cessation  - Optimize diabetes for wound healing  - D/w Dr Tesfaye Dawson Doctor

## 2023-01-30 NOTE — QUICK NOTE
Attempted to see patient earlier today at [facility] which she was in the OR for most of the morning  Patient underwent vascular intervention vascular surgery  Previous ID evaluations reviewed  Patient without significant culture data in our system or in care everywhere  Clinical images of her wound reviewed  She was noted to failed outpatient treatment with both Keflex and doxycycline  Anticipated plan is for subsequent amputation with podiatry  Based on current history and chart review we will maintain the patient on vancomycin alone  We will discontinue cefepime for now  We will plan to evaluate patient formally in consult tomorrow  Above plan discussed with primary service attending

## 2023-01-30 NOTE — UTILIZATION REVIEW
NOTIFICATION OF ADMISSION DISCHARGE   This is a Notification of Discharge from [address]  Please be advised that this patient has been discharge from our facility  Below you will find the admission and discharge date and time including the patient’s disposition  UTILIZATION REVIEW CONTACT:  Antonia Carrion  Utilization   Network Utilization Review Department  Phone: [phone] x carefully listen to the prompts  All voicemails are confidential   Email: [email or web address]     ADMISSION INFORMATION  PRESENTATION DATE: 1/24/2023  3:17 PM  OBERVATION ADMISSION DATE:  INPATIENT ADMISSION DATE: 1/24/23  4:16 PM   DISCHARGE DATE: 1/29/2023  1:53 AM   DISPOSITION:[facility]    IMPORTANT INFORMATION:  Send all requests for admission clinical reviews, approved or denied determinations and any other requests to dedicated fax number below belonging to the campus where the patient is receiving treatment   List of dedicated fax numbers:  [address] DENIALS (Administrative/Medical Necessity) [phone]   [address] (Maternity/NICU/Pediatrics) [phone]   [facility] [phone]   [place] [address] [phone]   [address] [phone]   [address] [facility] [place] [facility] [place] [phone] [address] [facility] [phone]   [facility] 119 [phone]   [facility]  [phone]   [address] [facility] [phone] [address] [facility] [address] [facility] [phone]

## 2023-01-31 ENCOUNTER — ANESTHESIA EVENT (INPATIENT)
Dept: PERIOP | Facility: HOSPITAL | Age: 83
End: 2023-01-31

## 2023-01-31 ENCOUNTER — APPOINTMENT (INPATIENT)
Dept: NON INVASIVE DIAGNOSTICS | Facility: HOSPITAL | Age: 83
End: 2023-01-31

## 2023-01-31 PROBLEM — I25.10 CORONARY ARTERY DISEASE INVOLVING NATIVE CORONARY ARTERY: Status: ACTIVE | Noted: 2023-01-31

## 2023-01-31 PROBLEM — J44.9 COPD (CHRONIC OBSTRUCTIVE PULMONARY DISEASE) (HCC): Status: ACTIVE | Noted: 2023-01-24

## 2023-01-31 PROBLEM — Z86.718 HISTORY OF DVT (DEEP VEIN THROMBOSIS): Status: ACTIVE | Noted: 2023-01-31

## 2023-01-31 LAB
ANION GAP SERPL CALCULATED.3IONS-SCNC: 5 MMOL/L (ref 4–13)
APTT PPP: 26 SECONDS (ref 23–37)
APTT PPP: 48 SECONDS (ref 23–37)
BUN SERPL-MCNC: 20 MG/DL (ref 5–25)
CALCIUM SERPL-MCNC: 8.2 MG/DL (ref 8.4–10.2)
CHLORIDE SERPL-SCNC: 103 MMOL/L (ref 96–108)
CO2 SERPL-SCNC: 30 MMOL/L (ref 21–32)
CREAT SERPL-MCNC: 1.11 MG/DL (ref 0.6–1.3)
ERYTHROCYTE [DISTWIDTH] IN BLOOD BY AUTOMATED COUNT: 16.1 % (ref 11.6–15.1)
GFR SERPL CREATININE-BSD FRML MDRD: 47 ML/MIN/1.73SQ M
GLUCOSE SERPL-MCNC: 110 MG/DL (ref 65–140)
GLUCOSE SERPL-MCNC: 124 MG/DL (ref 65–140)
GLUCOSE SERPL-MCNC: 146 MG/DL (ref 65–140)
GLUCOSE SERPL-MCNC: 186 MG/DL (ref 65–140)
GLUCOSE SERPL-MCNC: 293 MG/DL (ref 65–140)
HCT VFR BLD AUTO: 27.2 % (ref 34.8–46.1)
HGB BLD-MCNC: 8.2 G/DL (ref 11.5–15.4)
MCH RBC QN AUTO: 26.5 PG (ref 26.8–34.3)
MCHC RBC AUTO-ENTMCNC: 30.1 G/DL (ref 31.4–37.4)
MCV RBC AUTO: 88 FL (ref 82–98)
PLATELET # BLD AUTO: 237 THOUSANDS/UL (ref 149–390)
PMV BLD AUTO: 10.5 FL (ref 8.9–12.7)
POTASSIUM SERPL-SCNC: 4.1 MMOL/L (ref 3.5–5.3)
RBC # BLD AUTO: 3.09 MILLION/UL (ref 3.81–5.12)
SODIUM SERPL-SCNC: 138 MMOL/L (ref 135–147)
WBC # BLD AUTO: 11.42 THOUSAND/UL (ref 4.31–10.16)

## 2023-01-31 RX ORDER — SODIUM CHLORIDE 9 MG/ML
50 INJECTION, SOLUTION INTRAVENOUS CONTINUOUS
Status: DISCONTINUED | OUTPATIENT
Start: 2023-01-31 | End: 2023-02-02

## 2023-01-31 RX ORDER — HEPARIN SODIUM 5000 [USP'U]/ML
5000 INJECTION, SOLUTION INTRAVENOUS; SUBCUTANEOUS EVERY 8 HOURS SCHEDULED
Status: DISCONTINUED | OUTPATIENT
Start: 2023-01-31 | End: 2023-02-02

## 2023-01-31 RX ORDER — CHLORHEXIDINE GLUCONATE 0.12 MG/ML
15 RINSE ORAL ONCE
Status: CANCELLED | OUTPATIENT
Start: 2023-02-01 | End: 2023-01-31

## 2023-01-31 RX ADMIN — PANTOPRAZOLE SODIUM 40 MG: 40 TABLET, DELAYED RELEASE ORAL at 04:52

## 2023-01-31 RX ADMIN — DOCUSATE SODIUM 100 MG: 100 CAPSULE, LIQUID FILLED ORAL at 17:37

## 2023-01-31 RX ADMIN — SENNOSIDES 8.6 MG: 8.6 TABLET ORAL at 08:10

## 2023-01-31 RX ADMIN — MORPHINE SULFATE 2 MG: 2 INJECTION, SOLUTION INTRAMUSCULAR; INTRAVENOUS at 04:50

## 2023-01-31 RX ADMIN — METOPROLOL TARTRATE 50 MG: 50 TABLET, FILM COATED ORAL at 22:17

## 2023-01-31 RX ADMIN — OXYCODONE HYDROCHLORIDE 10 MG: 10 TABLET ORAL at 01:42

## 2023-01-31 RX ADMIN — DOCUSATE SODIUM 100 MG: 100 CAPSULE, LIQUID FILLED ORAL at 08:10

## 2023-01-31 RX ADMIN — Medication 250 MG: at 08:10

## 2023-01-31 RX ADMIN — OXYCODONE HYDROCHLORIDE 10 MG: 10 TABLET ORAL at 23:46

## 2023-01-31 RX ADMIN — GABAPENTIN 300 MG: 300 CAPSULE ORAL at 08:10

## 2023-01-31 RX ADMIN — SUCRALFATE 1 G: 1 TABLET ORAL at 08:12

## 2023-01-31 RX ADMIN — GABAPENTIN 300 MG: 300 CAPSULE ORAL at 15:56

## 2023-01-31 RX ADMIN — MORPHINE SULFATE 2 MG: 2 INJECTION, SOLUTION INTRAMUSCULAR; INTRAVENOUS at 10:09

## 2023-01-31 RX ADMIN — SERTRALINE HYDROCHLORIDE 50 MG: 50 TABLET ORAL at 22:22

## 2023-01-31 RX ADMIN — AMLODIPINE BESYLATE 5 MG: 5 TABLET ORAL at 08:10

## 2023-01-31 RX ADMIN — OXYBUTYNIN CHLORIDE 5 MG: 5 TABLET, EXTENDED RELEASE ORAL at 08:10

## 2023-01-31 RX ADMIN — TRAZODONE HYDROCHLORIDE 50 MG: 50 TABLET ORAL at 22:17

## 2023-01-31 RX ADMIN — CLOPIDOGREL BISULFATE 75 MG: 75 TABLET ORAL at 08:11

## 2023-01-31 RX ADMIN — MIRTAZAPINE 30 MG: 15 TABLET, FILM COATED ORAL at 22:17

## 2023-01-31 RX ADMIN — INSULIN LISPRO 2 UNITS: 100 INJECTION, SOLUTION INTRAVENOUS; SUBCUTANEOUS at 12:06

## 2023-01-31 RX ADMIN — GABAPENTIN 300 MG: 300 CAPSULE ORAL at 22:17

## 2023-01-31 RX ADMIN — INSULIN GLARGINE 8 UNITS: 100 INJECTION, SOLUTION SUBCUTANEOUS at 22:15

## 2023-01-31 RX ADMIN — NICOTINE 7 MG/24 HR DAILY TRANSDERMAL PATCH 1 PATCH: at 08:11

## 2023-01-31 RX ADMIN — METOPROLOL TARTRATE 50 MG: 50 TABLET, FILM COATED ORAL at 08:10

## 2023-01-31 RX ADMIN — ATORVASTATIN CALCIUM 10 MG: 10 TABLET, FILM COATED ORAL at 15:56

## 2023-01-31 RX ADMIN — FLUTICASONE FUROATE AND VILANTEROL TRIFENATATE 1 PUFF: 100; 25 POWDER RESPIRATORY (INHALATION) at 10:09

## 2023-01-31 RX ADMIN — ROPINIROLE 0.5 MG: 1 TABLET, FILM COATED ORAL at 22:17

## 2023-01-31 RX ADMIN — SODIUM CHLORIDE 50 ML/HR: 0.9 INJECTION, SOLUTION INTRAVENOUS at 11:59

## 2023-01-31 RX ADMIN — HEPARIN SODIUM 5000 UNITS: 5000 INJECTION INTRAVENOUS; SUBCUTANEOUS at 22:16

## 2023-01-31 RX ADMIN — VANCOMYCIN HYDROCHLORIDE 750 MG: 750 INJECTION, SOLUTION INTRAVENOUS at 10:39

## 2023-01-31 RX ADMIN — PREDNISONE 2.5 MG: 1 TABLET ORAL at 08:10

## 2023-01-31 RX ADMIN — HEPARIN SODIUM 5000 UNITS: 5000 INJECTION INTRAVENOUS; SUBCUTANEOUS at 14:52

## 2023-01-31 RX ADMIN — INSULIN LISPRO 4 UNITS: 100 INJECTION, SOLUTION INTRAVENOUS; SUBCUTANEOUS at 22:15

## 2023-01-31 RX ADMIN — ALLOPURINOL 50 MG: 100 TABLET ORAL at 08:10

## 2023-01-31 RX ADMIN — OXYCODONE HYDROCHLORIDE 10 MG: 10 TABLET ORAL at 08:10

## 2023-01-31 RX ADMIN — SUCRALFATE 1 G: 1 TABLET ORAL at 15:56

## 2023-01-31 RX ADMIN — OXYCODONE HYDROCHLORIDE 10 MG: 10 TABLET ORAL at 17:38

## 2023-01-31 NOTE — ASSESSMENT & PLAN NOTE
Lab Results   Component Value Date    EGFR 47 01/31/2023    EGFR 40 01/30/2023    EGFR 37 01/29/2023    CREATININE 1 11 01/31/2023    CREATININE 1 25 01/30/2023    CREATININE 1 35 (H) 01/29/2023   · Appears to be patients baseline   · Continue to monitor BMP  · Nephrology consulted for prep for angiogram

## 2023-01-31 NOTE — ASSESSMENT & PLAN NOTE
· Patient initially presented to the [address] [place] [address] with blisters on the left great toe that progressed to open wounds  She was prescribed keflex and doxycycline outpatient with no resolution  · Was transferred to [address] [place] [address] for further vascular evaluation  · Xray of left foot 1/24 negative for osteomyelitis   · MRI left foot 1/25/23: "Ulcer with evidence of small adjacent medial, distal 1st MTP osteomyelitis   Altered marrow signal intensity at the proximal-medial aspect of the 1st toe proximal phalanx is less specific and may be early osteomyelitis consult podiatry for further recommendations "  · S/p cefepime x 7 days   · Now on solo vancomycin per ID- day #8  · Underwent angiogram 1/30/23 with successful treatment of high grade long segment SFA stenosis & B EIA stenosis; peroneal and PT runoff  · Switched ASA to plavix for new PAD stents- per vascular  · ABIs to be done today by vascular  · Further intervention by podiatry to follow

## 2023-01-31 NOTE — PROGRESS NOTES
Surgery  Post op check     [de-identified] y/o F w PAD c/b LLE rest pain and non healing L foot wounds, s/p LLE agram, L EIA PTA and L SFA stent on 1/30  Doing well post op  Vss  Afebrile  R groin access site c/d/i  No hematoma  B/l LE doppl DP/ PT signals       Plan:   Continue plavix  Continue heparin gtt for dvt per SLIM  Iv abx for non healing foot wounds per medicine  Follow up AD s/p revascularization    KIMBERLEE Wilson  1/30/23  9:38 PM

## 2023-01-31 NOTE — PLAN OF CARE
Problem: Prexisting or High Potential for Compromised Skin Integrity  Goal: Skin integrity is maintained or improved  Description: INTERVENTIONS:  - Identify patients at risk for skin breakdown  - Assess and monitor skin integrity  - Assess and monitor nutrition and hydration status  - Monitor labs   - Assess for incontinence   - Turn and reposition patient  - Assist with mobility/ambulation  - Relieve pressure over bony prominences  - Avoid friction and shearing  - Provide appropriate hygiene as needed including keeping skin clean and dry  - Evaluate need for skin moisturizer/barrier cream  - Collaborate with interdisciplinary team   - Patient/family teaching  - Consider wound care consult   Outcome: Progressing     Problem: PAIN - ADULT  Goal: Verbalizes/displays adequate comfort level or baseline comfort level  Description: Interventions:  - Encourage patient to monitor pain and request assistance  - Assess pain using appropriate pain scale  - Administer analgesics based on type and severity of pain and evaluate response  - Implement non-pharmacological measures as appropriate and evaluate response  - Consider cultural and social influences on pain and pain management  - Notify physician/advanced practitioner if interventions unsuccessful or patient reports new pain  Outcome: Progressing     Problem: INFECTION - ADULT  Goal: Absence or prevention of progression during hospitalization  Description: INTERVENTIONS:  - Assess and monitor for signs and symptoms of infection  - Monitor lab/diagnostic results  - Monitor all insertion sites, i e  indwelling lines, tubes, and drains  - Monitor endotracheal if appropriate and nasal secretions for changes in amount and color  - Leona appropriate cooling/warming therapies per order  - Administer medications as ordered  - Instruct and encourage patient and family to use good hand hygiene technique  - Identify and instruct in appropriate isolation precautions for identified infection/condition  Outcome: Progressing     Problem: SAFETY ADULT  Goal: Patient will remain free of falls  Description: INTERVENTIONS:  - Educate patient/family on patient safety including physical limitations  - Instruct patient to call for assistance with activity   - Consult OT/PT to assist with strengthening/mobility   - Keep Call bell within reach  - Keep bed low and locked with side rails adjusted as appropriate  - Keep care items and personal belongings within reach  - Initiate and maintain comfort rounds  - Make Fall Risk Sign visible to staff  - Offer Toileting every 2  Hours, in advance of need  - Initiate/Maintain bed alarm  - Obtain necessary fall risk management equipment: walker  - Apply yellow socks and bracelet for high fall risk patients  - Consider moving patient to room near nurses station  Outcome: Progressing     Problem: DISCHARGE PLANNING  Goal: Discharge to home or other facility with appropriate resources  Description: INTERVENTIONS:  - Identify barriers to discharge w/patient and caregiver  - Arrange for needed discharge resources and transportation as appropriate  - Identify discharge learning needs (meds, wound care, etc )  - Arrange for interpretive services to assist at discharge as needed  - Refer to Case Management Department for coordinating discharge planning if the patient needs post-hospital services based on physician/advanced practitioner order or complex needs related to functional status, cognitive ability, or social support system  Outcome: Progressing     Problem: Knowledge Deficit  Goal: Patient/family/caregiver demonstrates understanding of disease process, treatment plan, medications, and discharge instructions  Description: Complete learning assessment and assess knowledge base    Interventions:  - Provide teaching at level of understanding  - Provide teaching via preferred learning methods  Outcome: Progressing

## 2023-01-31 NOTE — ASSESSMENT & PLAN NOTE
· Patient with remote history of DVT previously anticoagulated on Eliquis  · Was on heparin drip for her hx of DVT - Reviewed chart and appears this was years ago   · No need for heparin drip at this time since DVT was not acute - will d c heparin drip  · Eliquis on hold for podiatry intervention

## 2023-01-31 NOTE — OCCUPATIONAL THERAPY NOTE
Occupational Therapy Cancellation        Patient Name: Marilee Saleem  HVHMD'V Date: 1/31/2023      OT consult received, chart reviewed, pt is to go to OR tomorrow for a first ray resection of left foot, will follow up post-op      Leslie Gary MS, OTR/L

## 2023-01-31 NOTE — ANESTHESIA PREPROCEDURE EVALUATION
Procedure:  RAY RESECTION FOOT Left 1st (Left: Foot)    Relevant Problems   CARDIO   (+) CHF (congestive heart failure) ([facility])   (+) Coronary artery disease involving native coronary artery   (+) PAD (peripheral artery disease) ([facility])      ENDO   (+) Type 2 diabetes mellitus with skin complication, with long-term current use of insulin ([facility])      /RENAL   (+) Stage 3b chronic kidney disease ([facility])      HEMATOLOGY   (+) Anemia      NEURO/PSYCH   (+) Chronic, continuous use of opioids   (+) History of DVT (deep vein thrombosis)      PULMONARY   (+) COPD (chronic obstructive pulmonary disease) ([facility])   (+) Chronic respiratory failure with hypoxia and hypercapnia ([facility]) (3 L O2 at baseline)   (+) Oxygen dependent   (+) Smoking      Other   (+) Acute hematogenous osteomyelitis of left foot ([facility])        Physical Exam    Airway    Mallampati score: II  TM Distance: <3 FB  Neck ROM: full     Dental   upper dentures and lower dentures,     Cardiovascular  Rhythm: regular, Rate: normal,     Pulmonary  Comment: Prolonged exp phase noted, Decreased breath sounds, Wheezes,     Other Findings        Anesthesia Plan  ASA Score- 4     Anesthesia Type- IV sedation with anesthesia with ASA Monitors  Additional Monitors:   Airway Plan:     Comment: Discussed sedation the same as yesterday for rvascularization (predecdex + fent)  Patient is very concerned/scared of pain  Discussed conversion to GA if needed with risk of prolonged intubation and ICU stay  Also discussed post-op never blocks if pain is a problem post-op  She voiced understanding and is OK with proceeding  She will suspend her DNR/DNI for the procedure          Plan Factors-Exercise tolerance (METS): <4 METS  Chart reviewed  Existing labs reviewed  Patient is a current smoker  Patient instructed to abstain from smoking on day of procedure  Patient did not smoke on day of surgery (nicotine patch in situ)  Induction- intravenous      Postoperative Plan- Plan for postoperative opioid use  Planned trial extubation    Informed Consent- Anesthetic plan and risks discussed with patient

## 2023-01-31 NOTE — PROGRESS NOTES
Progress Note - Curtis Hilton [de-identified] y o  female MRN: [id number]    Unit/Bed#: E5 -01 Encounter: [id number]      Assessment:  [de-identified] y/o F w PAD c/b LLE rest pain and non healing L foot wounds, s/p LLE agram, L EIA PTA and L SFA stent on 1/30       Doing well post op  Vss  Afebrile  R groin access site c/d/i  No hematoma  B/l LE doppl DP/ PT signals         Plan:  Continue plavix for new SFA stent  Follow up AD  Wound care per podiatry  Rest of care per medicine    Subjective:   Feels about the same  Still persistent L foot pain unchanged since agram  Denied chest pain or shortness of breath  Sitting up on side of bed watching tv  Denied any fever, chills or night sweats  Objective:     Vitals: Blood pressure 137/54, pulse 80, temperature 98 1 °F (36 7 °C), temperature source Temporal, resp  rate 18, height 5' 6" (1 676 m), weight 75 kg (165 lb 5 5 oz), SpO2 95 %  ,Body mass index is 26 69 kg/m²  Intake/Output Summary (Last 24 hours) at 1/31/2023 0639  Last data filed at 1/30/2023 1140  Gross per 24 hour   Intake 1000 ml   Output 20 ml   Net 980 ml       Physical Exam  General: NAD  HEENT: NC/AT  MMM  Cv: RRR     Lungs: normal effort  Ab: Soft, NT/ND  Ex: no CCE  Neuro: AAOx3    Scheduled Meds:  Current Facility-Administered Medications   Medication Dose Route Frequency Provider Last Rate   • acetaminophen  650 mg Oral Q6H PRN Sylvie Carlin, DO     • albuterol  0 63 mg Nebulization 4x Daily PRN Vebaron Carlin, DO     • allopurinol  50 mg Oral Daily Vevelypastor Carlin, DO     • amLODIPine  5 mg Oral Daily Vebaron Carlin, DO     • atorvastatin  10 mg Oral Daily With [address], DO     • calcium carbonate  1,000 mg Oral Daily PRN Vebaron Macdonaldon, DO     • clopidogrel  75 mg Oral Daily Vevelypastor Macdonaldon, DO     • docusate sodium  100 mg Oral BID Vevelypastor Macdonaldon, DO     • Fluticasone Furoate-Vilanterol  1 puff Inhalation Daily Vebaron Carlin, DO     • gabapentin  300 mg Oral TID Zari Locket, DO     • heparin (porcine)  3-20 Units/kg/hr Intravenous Titrated Tripp Fang MD 16 Units/kg/hr (01/31/23 0239)   • insulin glargine  8 Units Subcutaneous HS Zari Locket, DO     • insulin lispro  1-6 Units Subcutaneous HS Zari Locket, DO     • insulin lispro  2-12 Units Subcutaneous TID AC Zari Locket, DO     • metoprolol tartrate  50 mg Oral Q12H [facility] Zari Locket, DO     • mirtazapine  30 mg Oral HS Zari Locket, DO     • morphine injection  2 mg Intravenous Q4H PRN Zari Locket, DO     • nicotine  1 patch Transdermal Daily Zari Locket, DO     • ondansetron  4 mg Intravenous Q8H PRN Zari Locket, DO     • oxybutynin  5 mg Oral Daily Zari Locket, DO     • oxyCODONE  10 mg Oral Q6H PRN Zari Locket, DO     • oxyCODONE  5 mg Oral Q4H PRN Zari Locket, DO     • pantoprazole  40 mg Oral Daily Zari Locket, DO     • polyethylene glycol  17 g Oral Daily PRN Zari Locket, DO     • predniSONE  2 5 mg Oral Daily Zari Locket, DO     • rOPINIRole  0 5 mg Oral HS Zari Locket, DO     • saccharomyces boulardii  250 mg Oral Daily Zari Locket, DO     • senna  1 tablet Oral Daily Zari Locket, DO     • sertraline  50 mg Oral HS Zari Locket, DO     • sucralfate  1 g Oral BID AC Zari Locket, DO     • traZODone  50 mg Oral HS Zari Locket, DO     • umeclidinium  1 puff Inhalation Daily Zari Locket, DO     • vancomycin  750 mg Intravenous Q24H Zari Locket,  mg (01/29/23 0744)     Continuous Infusions:heparin (porcine), 3-20 Units/kg/hr, Last Rate: 16 Units/kg/hr (01/31/23 0239)      PRN Meds: •  acetaminophen  •  albuterol  •  calcium carbonate  •  morphine injection  •  ondansetron  •  oxyCODONE  •  oxyCODONE  •  polyethylene glycol      Invasive Devices     Peripheral Intravenous Line  Duration           Peripheral IV 01/29/23 Left;Ventral (anterior) Forearm 1 day                Lab, Imaging and other studies: I have personally reviewed pertinent reports      VTE Pharmacologic Prophylaxis: Heparin  VTE Mechanical Prophylaxis: sequential compression device

## 2023-01-31 NOTE — PROGRESS NOTES
Pepe Diaz is a [de-identified] y o  female who is currently ordered Vancomycin IV with management by the Pharmacy Consult service  Relevant clinical data and objective / subjective history reviewed  Vancomycin Assessment:  Indication and Goal AUC/Trough: Soft tissue (goal -600, trough >10), -600, trough >10  Clinical Status: stable  Micro:   No growth after 5 days  Renal Function:  SCr: 1 11 mg/dL  CrCl: 41 9 mL/min  Renal replacement: Not on dialysis  Days of Therapy: 7  Current Dose: 750 mg IV every 24 hours  Vancomycin Plan:  New Dosing: continue regimen  Estimated AUC:  462 mcg*hr/mL  Estimated Trough: 14 7 mcg/mL  Next Level: 2/5/23 with am labs  Renal Function Monitoring: Daily BMP and [place] will continue to follow closely for s/sx of nephrotoxicity, infusion reactions and appropriateness of therapy  BMP and CBC will be ordered per protocol  We will continue to follow the patient’s culture results and clinical progress daily      Britney Perdomo, Pharmacist

## 2023-01-31 NOTE — ASSESSMENT & PLAN NOTE
Lab Results   Component Value Date    HGBA1C 7 5 (H) 01/23/2023     Recent Labs     01/30/23  1222 01/30/23  1620 01/30/23  2124 01/31/23  0741   POCGLU 130 84 165* 124   · On 12 units Lantus h s  · Decreased to 8 units with hopes to avoid hypoglycemia  · SSI with accucheks

## 2023-01-31 NOTE — ASSESSMENT & PLAN NOTE
· No acute exacerbation  · Patient is currently on baseline oxygen requirements of 3L NC  · Continue home regimen of Advair substituted to Breo, Spiriva and albuterol inhaler/neb prednisone 2 5 mg daily as per home regimen

## 2023-01-31 NOTE — PROGRESS NOTES
[facility]  Progress Note - Pedro Swanson 1940, [de-identified] y o  female MRN: [id number]  Unit/Bed#: E5 -01 Encounter: [id number]  Primary Care Provider: Marcela Abbasi MD   Date and time admitted to hospital: 1/29/2023  2:19 AM    * Acute hematogenous osteomyelitis of left foot ([address] )  Assessment & Plan  · Patient initially presented to the [facility] with blisters on the left great toe that progressed to open wounds  She was prescribed keflex and doxycycline outpatient with no resolution  · Was transferred to [address] [place] [address] for further vascular evaluation  · Xray of left foot 1/24 negative for osteomyelitis   · MRI left foot 1/25/23: "Ulcer with evidence of small adjacent medial, distal 1st MTP osteomyelitis   Altered marrow signal intensity at the proximal-medial aspect of the 1st toe proximal phalanx is less specific and may be early osteomyelitis consult podiatry for further recommendations "  · S/p cefepime x 7 days   · Now on solo vancomycin per ID- day #8  · Underwent angiogram 1/30/23 with successful treatment of high grade long segment SFA stenosis & B EIA stenosis; peroneal and PT runoff  · Switched ASA to plavix for new PAD stents- per vascular  · ABIs to be done today by vascular  · Further intervention by podiatry to follow    Anemia  Assessment & Plan  Results from last 7 days   Lab Units 01/30/23  0732 01/29/23  0517 01/28/23  0554 01/27/23  1438   HEMOGLOBIN g/dL 7 7* 9 3* 9 4* 9 0*   · Previous baseline appears to be in the 8-9's  · Await labs from today   · Transfuse for hemoglobin less than 7  · Iron panel obtained-iron saturation 21, TIBC 220, iron 47, ferritin 90    PAD (peripheral artery disease) (HCC)  Assessment & Plan  · Patient had arterial doppler studies of bilateral extremities while at [facility]  · RLL: There is a 50-75% stenosis noted in the mid superficial femoral artery with diffuse atherosclerotic disease throughout the remaining femoro-popliteal andtibio-peroneal segments  AD 0 42  · LLL: There is a 50-75% stenosis noted in the prox and distal superficial femoral artery and an occlusion vs high grade stenosis of the distal anterior tibial artery  Diffuse atherosclerotic disease throughout the remaining femoro-popliteal and tibio-peroneal segments   AD 0 39  · Patient transferred to [facility] for angiogram - performed 1/30 with successful stenting  · Await podiatry recommendations now that pt is optimized vascularly    History of DVT (deep vein thrombosis)  Assessment & Plan  · Patient with remote history of DVT previously anticoagulated on Eliquis  · Was on heparin drip for her hx of DVT - Reviewed chart and appears this was years ago   · No need for heparin drip at this time since DVT was not acute - will d c heparin drip  · Eliquis on hold for podiatry intervention    Stage 3b chronic kidney disease [facility])  Assessment & Plan  Lab Results   Component Value Date    EGFR 47 01/31/2023    EGFR 40 01/30/2023    EGFR 37 01/29/2023    CREATININE 1 11 01/31/2023    CREATININE 1 25 01/30/2023    CREATININE 1 35 (H) 01/29/2023   · Appears to be patients baseline   · Continue to monitor BMP  · Nephrology consulted for prep for angiogram    Chronic, continuous use of opioids  Assessment & Plan  · Patient chronically on oxycodone, morphine, and movantik at home   · PDMP reviewed  · Pain medications increased in the setting of ischemic left foot pain    Chronic respiratory failure with hypoxia and hypercapnia ([address] )  Assessment & Plan  · Secondary to COPD  · Continue baseline 3L NC     COPD (chronic obstructive pulmonary disease) ([address] )  Assessment & Plan  · No acute exacerbation  · Patient is currently on baseline oxygen requirements of 3L NC  · Continue home regimen of Advair substituted to Breo, Spiriva and albuterol inhaler/neb prednisone 2 5 mg daily as per home regimen    Type 2 diabetes mellitus with skin complication, with long-term current use of insulin ([address] )  Assessment & Plan  Lab Results   Component Value Date    HGBA1C 7 5 (H) 2023     Recent Labs     23  1222 23  1620 23  2124 23  0741   POCGLU 130 84 165* 124   · On 12 units Lantus h s  · Decreased to 8 units with hopes to avoid hypoglycemia  · SSI with accucheks      VTE Pharmacologic Prophylaxis:   Pharmacologic: Heparin  Mechanical VTE Prophylaxis in Place: Yes    Discharge Plan: With need for continued inpatient stay for further podiatry intervention    Discussions with Specialists or Other Care Team Provider: Nursing, Dr Ric Guillory    Education and Discussions with Family / Patient: Patient    Time Spent for Care: 30 minutes  More than 50% of total time spent on counseling and coordination of care as described above  Current Length of Stay: 2 day(s)  Current Patient Status: Inpatient   Code Status: Level 3 - DNAR and DNI    Subjective:   Seen while getting washed up  Patient reports a significant amount of toe pain that is worse than yesterday  She is currently on her baseline oxygen and otherwise without complaints  Eating and drinking okay  Objective:     Vitals:   Temp (24hrs), Av °F (36 7 °C), Min:97 8 °F (36 6 °C), Max:98 2 °F (36 8 °C)    Temp:  [97 8 °F (36 6 °C)-98 2 °F (36 8 °C)] 97 8 °F (36 6 °C)  HR:  [58-93] 80  Resp:  [12-19] 19  BP: (121-163)/(47-65) 143/55  SpO2:  [91 %-100 %] 100 %  Body mass index is 26 69 kg/m²  Input and Output Summary (last 24 hours): Intake/Output Summary (Last 24 hours) at 2023 1049  Last data filed at 2023 1001  Gross per 24 hour   Intake 1240 ml   Output 20 ml   Net 1220 ml       Physical Exam:     Physical Exam  Vitals and nursing note reviewed  Constitutional:       General: She is not in acute distress  Appearance: Normal appearance  She is obese  She is not ill-appearing, toxic-appearing or diaphoretic  HENT:      Head: Normocephalic and atraumatic  Eyes:      General: No scleral icterus    Cardiovascular:      Rate and Rhythm: Normal rate and regular rhythm  Pulmonary:      Effort: Pulmonary effort is normal  No respiratory distress  Breath sounds: Normal breath sounds  No stridor  No wheezing or rhonchi  Abdominal:      General: Bowel sounds are normal  There is no distension  Palpations: Abdomen is soft  There is no mass  Tenderness: There is no abdominal tenderness  Hernia: No hernia is present  Musculoskeletal:         General: No swelling  Cervical back: Neck supple  Comments: Left foot gauze wrapped   Skin:     General: Skin is warm and dry  Neurological:      Mental Status: She is oriented to person, place, and time  Mental status is at baseline  Psychiatric:         Mood and Affect: Mood normal          Behavior: Behavior normal          Additional Data:     Labs:    Results from last 7 days   Lab Units 01/31/23  0931 01/30/23  1724 01/30/23  0732   WBC Thousand/uL 11 42*  --  7 36   HEMOGLOBIN g/dL 8 2*   < > 7 7*   HEMATOCRIT % 27 2*   < > 25 6*   PLATELETS Thousands/uL 237  --  186   NEUTROS PCT %  --   --  54   LYMPHS PCT %  --   --  30   MONOS PCT %  --   --  9   EOS PCT %  --   --  5    < > = values in this interval not displayed  Results from last 7 days   Lab Units 01/31/23  0931 01/25/23  0457 01/24/23  1528   POTASSIUM mmol/L 4 1   < > 4 4   CHLORIDE mmol/L 103   < > 96   CO2 mmol/L 30   < > 36*   BUN mg/dL 20   < > 30*   CREATININE mg/dL 1 11   < > 1 28   CALCIUM mg/dL 8 2*   < > 10 3*   ALK PHOS U/L  --   --  59   ALT U/L  --   --  7   AST U/L  --   --  9*    < > = values in this interval not displayed  Results from last 7 days   Lab Units 01/30/23  1913   INR  1 02       * I Have Reviewed All Lab Data Listed Above  * Additional Pertinent Lab Tests Reviewed:  All Labs Within Last 24 Hours Reviewed    Imaging:    Imaging Reports Reviewed Today Include:   Imaging Personally Reviewed by Myself Includes:      Recent Cultures (last 7 days):     Results from last 7 days Lab Units 01/24/23  1640 01/24/23  1528   BLOOD CULTURE  No Growth After 5 Days  No Growth After 5 Days         Last 24 Hours Medication List:   Current Facility-Administered Medications   Medication Dose Route Frequency Provider Last Rate   • acetaminophen  650 mg Oral Q6H PRN Harlan Encarnacion, DO     • albuterol  0 63 mg Nebulization 4x Daily PRN Harlan Encarnacion, DO     • allopurinol  50 mg Oral Daily Harlan Encarnacion, DO     • amLODIPine  5 mg Oral Daily Harlan Encarnacion, DO     • atorvastatin  10 mg Oral Daily With [address], DO     • calcium carbonate  1,000 mg Oral Daily PRN Halran Encarnacion, DO     • clopidogrel  75 mg Oral Daily Harlan Encarnacion, DO     • docusate sodium  100 mg Oral BID Harlan Encarnacion, DO     • Fluticasone Furoate-Vilanterol  1 puff Inhalation Daily Harlan Encarnacion, DO     • gabapentin  300 mg Oral TID Harlan Encarnacion, DO     • heparin (porcine)  5,000 Units Subcutaneous Q8H [address] Swati Carlos PA-C     • insulin glargine  8 Units Subcutaneous HS Harlan Encarnacion, DO     • insulin lispro  1-6 Units Subcutaneous HS Harlan Encarnacion, DO     • insulin lispro  2-12 Units Subcutaneous TID AC Harlan Encarnacion, DO     • metoprolol tartrate  50 mg Oral Q12H [address] Harlan Encarnacion, DO     • mirtazapine  30 mg Oral HS Harlan Encarnacion, DO     • morphine injection  2 mg Intravenous Q4H PRN Harlan Encarnacion, DO     • nicotine  1 patch Transdermal Daily Harlan Encarnacion, DO     • ondansetron  4 mg Intravenous Q8H PRN Harlan Encarnacion, DO     • oxybutynin  5 mg Oral Daily Harlan Encarnacion, DO     • oxyCODONE  10 mg Oral Q6H PRN Harlan Encarnacion, DO     • oxyCODONE  5 mg Oral Q4H PRN Harlan Encarnacion, DO     • pantoprazole  40 mg Oral Daily Harlan Encarnacion, DO     • polyethylene glycol  17 g Oral Daily PRN Harlan Encarnacion, DO     • predniSONE  2 5 mg Oral Daily Harlan Encarnacion, DO     • rOPINIRole  0 5 mg Oral HS Janeen Bame, DO     • saccharomyces boulardii  250 mg Oral Daily Janeen Bame, DO     • senna  1 tablet Oral Daily Janeen Bame, DO     • sertraline  50 mg Oral HS Janeen Bame, DO     • sucralfate  1 g Oral BID AC Janeen Bame, DO     • traZODone  50 mg Oral HS Janeen Bame, DO     • umeclidinium  1 puff Inhalation Daily Janeen Bame, DO     • vancomycin  750 mg Intravenous Q24H Janeen Bame,  mg (01/31/23 1039)        Today, Patient Was Seen By: Josep Singletary PA-C    ** Please Note: This note has been constructed using a voice recognition system   **

## 2023-01-31 NOTE — PHYSICAL THERAPY NOTE
PHYSICAL THERAPY NOTE          Patient Name: Cailin Frost  [id number] Date: 1/31/2023 01/31/23 1115   PT Last Visit   PT Visit Date 01/31/23   Note Type   Note type Cancelled Session   Cancel Reasons Patient off floor/test   Additional Comments Pt consult received  Having vascular testing at bedside  Will attempt to see at later time       Ivonne Kaur, PT

## 2023-01-31 NOTE — CONSULTS
Consultation - Infectious Disease   Patrick Ochoa [de-identified] y o  female MRN: [id number]  Unit/Bed#: E5 -01 Encounter: [id number]      IMPRESSION & RECOMMENDATIONS:   1  Left great toe diabetic ulcer with first metatarsal osteomyelitis  Patient initially presented to outside campus with progressing pain and drainage from her left great toe  Unfortunately she failed to improve with oral antibiotic and I suspect this was due to progressing bone infection  MRI consistent with osteomyelitis  Completed revascularization on 1/30  Pending amputation with podiatry tomorrow  No significant history of resistance  Unfortunately no other prior culture data  Fortunately hemodynamically stable  Discontinued cefepime  Continue vancomycin alone  Pharmacy consult for vancomycin  Continue to trend fever curve/vitals  Repeat CBC and chemistry tomorrow  Ongoing follow-up by podiatry  We will follow-up operative findings  Additional supportive care as per primary  If surgical source control achieved anticipate 7 days of antibiotic post procedure with doxycycline/Keflex  2   Severe peripheral arterial disease  Patient with significant vascular disease which can unfortunately affect wound healing  Underwent revascularization on 1/30  Now pending amputation  Antibiotic as above  Ongoing follow-up by vascular surgery  Ongoing follow-up by podiatry  Continue to trend fever curve/WBC    3  Penicillin allergy  Documented as having reported hives but this was a remote allergy  Patient may benefit from amoxicillin challenge as an outpatient  Recommend follow-up with PCP  Tolerating current antibiotics above  We will adjust allergy list if needed  4   Chronic kidney disease  Creatinine appears stable compared to prior baseline  This does affect antibiotic dosing    We will continue vancomycin alone  Discontinue cefepime  Pharmacy consulted  We will dose adjust antibiotics as needed  Repeat chemistry tomorrow  Will dose adjust outpatient regimen as needed  Avoid nephrotoxic agents  Additional cares per primary/nephrology    5  Leukocytosis  Mild elevation in white count noted today  I suspect that this is related to recent revascularization procedure  Overall patient is hemodynamically stable  Antibiotics as above  Repeat CBC tomorrow  Monitor wound site  Low threshold for repeat blood cultures    Above plan discussed in detail with the patient and with primary service attending  ID consult service will continue to follow  HISTORY OF PRESENT ILLNESS:  Reason for Consult: Osteomyelitis    HPI: Red Rg is a [de-identified]y o  year old female with type 2 diabetes, COPD on chronic oxygen, tobacco use, chronic kidney disease and peripheral arterial disease and chronic left toe wound  The patient initially presented to outside campus and was seen by our service on 1/27  At that time she reported approximately 2 weeks of progressing discomfort at her toe where she developed a blister over a bunion and then she was having redness and drainage from the site  She had been treated empirically for about a week with Keflex and doxycycline  As her symptoms progressed she decided to come into the ER for evaluation  She was started on broad-spectrum antibiotics with vancomycin and cefepime  MRI was ultimately obtained which was concerning for osteomyelitis at the distal first metatarsal head and possible early osteomyelitis of the proximal great toe  Dopplers however were notable for significant vascular disease  Blood cultures essentially were without growth  The patient was ultimately transferred to [facility] for further vascular intervention  She underwent angiogram with stenting with vascular surgery yesterday  She is now tentatively planned for amputation with podiatry tomorrow  The patient denies having any nausea, vomiting, chest pain or shortness of breath    She is having ongoing discomfort in the toe/distal foot   She reports overall since she had started antibiotics and then subsequently presented to the hospital her pain has essentially remained steady and constant  She reports tolerance to prior administration of both Keflex and doxycycline  She denies any extensive history of drug resistance or extensive hospitalizations for infections in the past   We discussed possibility of transition to oral therapy after amputation completed if surgical source control achieved  Additional questions answered  We are consulted at this time for further assistance with management  REVIEW OF SYSTEMS:  A complete 12 point system-based review of systems is negative other than that noted in the HPI  PAST MEDICAL HISTORY:  Past Medical History:   Diagnosis Date   • Asthma    • CHF (congestive heart failure) ([facility])    • COPD (chronic obstructive pulmonary disease) ([facility] [address] )    • Diabetes mellitus ([facility] [address] )    • DVT of lower limb, acute ([facility])    • Hypertension    • Renal disorder      Past Surgical History:   Procedure Laterality Date   • APPENDECTOMY     •  SECTION     • HYSTERECTOMY     • ME SLCTV CATHJ 3RD+ ORD SLCTV ABDL PEL/LXTR [facility] N/A 2023    Procedure: R CFA access w/ 6F sheath and Mynx closure Aortogram LLE angiogram R EIA PTA w/ 6x60mm  L EIA PTA w/ 6x60mm  L SFA PTA w/ [id number] Kofi POBA and 1y026vr [place] DCB L SFA stents: 5x27mm Express LD (prox) and 6x60mm Innova (dist);   Surgeon: Linda Guzman MD;  Location: [facility];  Service: Vascular       FAMILY HISTORY:  Non-contributory    SOCIAL HISTORY:  Social History   Social History     Substance and Sexual Activity   Alcohol Use Not Currently     Social History     Substance and Sexual Activity   Drug Use Never     Social History     Tobacco Use   Smoking Status Every Day   • Packs/day: 0 50   • Types: Cigarettes   • Passive exposure: Past   Smokeless Tobacco Never   Tobacco Comments    Pt currently wearing nicotine patch ALLERGIES:  Allergies   Allergen Reactions   • Doxycycline GI Intolerance     Refer to office visit 4/27/15   • Codeine      Occasional nausea      • Penicillins Hives     Last dose about 10years ago          MEDICATIONS:  All current active medications have been reviewed  PHYSICAL EXAM:  Temp:  [97 8 °F (36 6 °C)-98 2 °F (36 8 °C)] 97 8 °F (36 6 °C)  HR:  [58-93] 80  Resp:  [12-19] 19  BP: (121-163)/(47-65) 143/55  SpO2:  [91 %-100 %] 100 %  Temp (24hrs), Av °F (36 7 °C), Min:97 8 °F (36 6 °C), Max:98 2 °F (36 8 °C)  Current: Temperature: 97 8 °F (36 6 °C)    Intake/Output Summary (Last 24 hours) at 2023 1106  Last data filed at 2023 1001  Gross per 24 hour   Intake 1240 ml   Output 20 ml   Net 1220 ml       General Appearance:  Appearing well, nontoxic, and in no distress; patient requires multiple devices/aids for her sight   Head:  Normocephalic, without obvious abnormality, atraumatic   Eyes:  Conjunctiva pink and sclera anicteric, both eyes   Nose: Nares normal, mucosa normal, no drainage   Throat: Oropharynx moist without lesions; missing teeth noted   Neck: Supple, symmetrical, no adenopathy, no tenderness/mass/nodules   Back:   Symmetric, no curvature, ROM normal, no CVA tenderness; no spinal or paraspinal muscle tenderness to palpation   Lungs:    Decreased throughout, no wheezing or rales  Currently remains on steady 2 to 3 L of oxygen  Chest Wall:  No tenderness or deformity   Heart:  RRR; no murmur, rub or gallop appreciated   Abdomen:   Soft, non-tender, non-distended, positive bowel sounds    Extremities: No cyanosis, clubbing or edema   Skin: No rashes or lesions  No draining wounds noted  Clinical images reviewed of the patient's foot wound  The wound itself is currently wrapped at this time  No surgical incisions otherwise  Lymph nodes: Cervical, supraclavicular nodes normal   Neurologic: Alert and oriented times 3, sitting up in bed without assistance         LABS, IMAGING, & OTHER STUDIES:  In completing this consult I have performed an extensive review of the medical records in epic including review of the notes, radiographs, and laboratory results as detailed below  Lab Results:  I have personally reviewed pertinent labs  Comments/Interpretations: Mild leukocytosis likely procedure related  Chemistry otherwise stable  Results from last 7 days   Lab Units 01/31/23  0931 01/30/23  1724 01/30/23  0732 01/29/23  0517   WBC Thousand/uL 11 42*  --  7 36 10 56*   HEMOGLOBIN g/dL 8 2* 8 5* 7 7* 9 3*   PLATELETS Thousands/uL 237  --  186 265     Results from last 7 days   Lab Units 01/31/23  0931 01/25/23  0457 01/24/23  1528   POTASSIUM mmol/L 4 1   < > 4 4   CHLORIDE mmol/L 103   < > 96   CO2 mmol/L 30   < > 36*   BUN mg/dL 20   < > 30*   CREATININE mg/dL 1 11   < > 1 28   EGFR ml/min/1 73sq m 47   < > 39   CALCIUM mg/dL 8 2*   < > 10 3*   AST U/L  --   --  9*   ALT U/L  --   --  7   ALK PHOS U/L  --   --  59    < > = values in this interval not displayed  Results from last 7 days   Lab Units 01/24/23  1640 01/24/23  1528   BLOOD CULTURE  No Growth After 5 Days  No Growth After 5 Days  Imaging Studies:   I have personally reviewed pertinent imaging study reports and images in PACS  Comments/Interpretations: Prior MRI of the foot reviewed with ulceration present and changes at the first metatarsal concerning for osteomyelitis  Patient also with some degree of tenosynovitis  Other Studies:   I have personally reviewed other pertinent reports as below  Records in [address] [place] [address] Loop: No recent hospital visits or cultures available in [address] [place] [address] for review  Current/Prior Cultures: Blood cultures without growth  COVID testing negative  Patient without significant culture data in our system previously

## 2023-01-31 NOTE — PLAN OF CARE
Problem: Prexisting or High Potential for Compromised Skin Integrity  Goal: Skin integrity is maintained or improved  Description: INTERVENTIONS:  - Identify patients at risk for skin breakdown  - Assess and monitor skin integrity  - Assess and monitor nutrition and hydration status  - Monitor labs   - Assess for incontinence   - Turn and reposition patient  - Assist with mobility/ambulation  - Relieve pressure over bony prominences  - Avoid friction and shearing  - Provide appropriate hygiene as needed including keeping skin clean and dry  - Evaluate need for skin moisturizer/barrier cream  - Collaborate with interdisciplinary team   - Patient/family teaching  - Consider wound care consult   Outcome: Progressing     Problem: PAIN - ADULT  Goal: Verbalizes/displays adequate comfort level or baseline comfort level  Description: Interventions:  - Encourage patient to monitor pain and request assistance  - Assess pain using appropriate pain scale  - Administer analgesics based on type and severity of pain and evaluate response  - Implement non-pharmacological measures as appropriate and evaluate response  - Consider cultural and social influences on pain and pain management  - Notify physician/advanced practitioner if interventions unsuccessful or patient reports new pain  Outcome: Progressing     Problem: INFECTION - ADULT  Goal: Absence or prevention of progression during hospitalization  Description: INTERVENTIONS:  - Assess and monitor for signs and symptoms of infection  - Monitor lab/diagnostic results  - Monitor all insertion sites, i e  indwelling lines, tubes, and drains  - Monitor endotracheal if appropriate and nasal secretions for changes in amount and color  - [place] appropriate cooling/warming therapies per order  - Administer medications as ordered  - Instruct and encourage patient and family to use good hand hygiene technique  - Identify and instruct in appropriate isolation precautions for identified infection/condition  Outcome: Progressing     Problem: SAFETY ADULT  Goal: Patient will remain free of falls  Description: INTERVENTIONS:  - Educate patient/family on patient safety including physical limitations  - Instruct patient to call for assistance with activity   - Consult OT/PT to assist with strengthening/mobility   - Keep Call bell within reach  - Keep bed low and locked with side rails adjusted as appropriate  - Keep care items and personal belongings within reach  - Initiate and maintain comfort rounds  - Make Fall Risk Sign visible to staff  - Offer Toileting every  Hours, in advance of need  - Initiate/Maintain alarm  - Obtain necessary fall risk management equipment: Apply yellow socks and bracelet for high fall risk patients  - Consider moving patient to room near nurses station  Outcome: Progressing     Problem: DISCHARGE PLANNING  Goal: Discharge to home or other facility with appropriate resources  Description: INTERVENTIONS:  - Identify barriers to discharge w/patient and caregiver  - Arrange for needed discharge resources and transportation as appropriate  - Identify discharge learning needs (meds, wound care, etc )  - Arrange for interpretive services to assist at discharge as needed  - Refer to Case Management Department for coordinating discharge planning if the patient needs post-hospital services based on physician/advanced practitioner order or complex needs related to functional status, cognitive ability, or social support system  Outcome: Progressing     Problem: Knowledge Deficit  Goal: Patient/family/caregiver demonstrates understanding of disease process, treatment plan, medications, and discharge instructions  Description: Complete learning assessment and assess knowledge base    Interventions:  - Provide teaching at level of understanding  - Provide teaching via preferred learning methods  Outcome: Progressing

## 2023-01-31 NOTE — PROGRESS NOTES
[facility] Podiatry - Progress Note  Patient: Moraima Rosen [de-identified] y o  female   MRN: [id number]  PCP: Yolanda Grullon MD  Unit/Bed#: E5 -01 Encounter: [id number]  Date Of Visit: 23  Hospital Stay Days: 2    ASSESSMENT:    Moraima Rosen is a [de-identified] y o  female with:    1  Left Hallux diabetic ulcer  2  Type 2 diabetes mellitis  3  PAD  4  Stage 3b CKD     PLAN:     • Podiatry planning for first ray resection of left foot for tomorrow   • NPO midnight  • Vascular surgery note reviewed: Okay for podiatric intervention, optimized from vascular standpoint  • Local wound care with Betadine DSD  • F/U postintervention ABIs  • MRI reviewed 2023: Osteomyelitis noted to the first metatarsal and hallux  • Rest of care per primary team      Weightbearing status: Weightbearing as tolerated      SUBJECTIVE:     The patient was seen, evaluated, and assessed at bedside today  The patient was awake, alert, and in no acute distress  No acute events overnight  The patient reports significant pain to the left great toe and metatarsal head  She does note some improvement since vascular intervention  Patient denies N/V/F/chills/SOB/CP  OBJECTIVE:     Vitals:   /55   Pulse 80   Temp 97 8 °F (36 6 °C)   Resp 19   Ht 5' 6" (1 676 m)   Wt 75 kg (165 lb 5 5 oz)   SpO2 100%   BMI 26 69 kg/m²     Temp (24hrs), Av °F (36 7 °C), Min:97 8 °F (36 6 °C), Max:98 2 °F (36 8 °C)      Physical Exam :     General:  Alert, cooperative, and in no distress  Lower extremity exam:  Neurologic, Vascular, musculoskeletal status at baseline    Dermatologic: At baseline with necrotic ulceration noted to the medial aspect of the left great toe and first metatarsal phalangeal joint  Minimal surrounding erythema, without deep fluctuance felt  Painful on palpation      Wound #: #1  Location: Hallux medial  Size: 2 0 x 1 2 x 0 2 cm  Deepest Tissue Noted in Base: Capsule  Probe to Bone: Negative  Peripheral Skin Description: Attached with mild signs of inflammation with erythema  Wound bed discription: 90% fibrotic 10% granular  Drainage Amount: Negative  Signs of Infection: Yes      Clinical Images 01/31/23: Additional Data:     Labs:    Results from last 7 days   Lab Units 01/31/23  0931 01/30/23  1724 01/30/23  0732   WBC Thousand/uL 11 42*  --  7 36   HEMOGLOBIN g/dL 8 2*   < > 7 7*   HEMATOCRIT % 27 2*   < > 25 6*   PLATELETS Thousands/uL 237  --  186   NEUTROS PCT %  --   --  54   LYMPHS PCT %  --   --  30   MONOS PCT %  --   --  9   EOS PCT %  --   --  5    < > = values in this interval not displayed  Results from last 7 days   Lab Units 01/31/23  0931 01/25/23  0457 01/24/23  1528   POTASSIUM mmol/L 4 1   < > 4 4   CHLORIDE mmol/L 103   < > 96   CO2 mmol/L 30   < > 36*   BUN mg/dL 20   < > 30*   CREATININE mg/dL 1 11   < > 1 28   CALCIUM mg/dL 8 2*   < > 10 3*   ALK PHOS U/L  --   --  59   ALT U/L  --   --  7   AST U/L  --   --  9*    < > = values in this interval not displayed  Results from last 7 days   Lab Units 01/30/23  1913   INR  1 02       * I Have Reviewed All Lab Data Listed Above  Recent Cultures (last 7 days):     Results from last 7 days   Lab Units 01/24/23  1640 01/24/23  1528   BLOOD CULTURE  No Growth After 5 Days  No Growth After 5 Days  Imaging: I have personally reviewed pertinent films in PACS  No results found  EKG, Pathology, and Other Studies: I have personally reviewed pertinent reports      Active medications:  Current Facility-Administered Medications   Medication Dose Route Frequency   • acetaminophen (TYLENOL) tablet 650 mg  650 mg Oral Q6H PRN   • albuterol inhalation solution 0 63 mg  0 63 mg Nebulization 4x Daily PRN   • allopurinol (ZYLOPRIM) tablet 50 mg  50 mg Oral Daily   • amLODIPine (NORVASC) tablet 5 mg  5 mg Oral Daily   • atorvastatin (LIPITOR) tablet 10 mg  10 mg Oral Daily With Dinner   • calcium carbonate (TUMS) chewable tablet 1,000 mg 1,000 mg Oral Daily PRN   • clopidogrel (PLAVIX) tablet 75 mg  75 mg Oral Daily   • docusate sodium (COLACE) capsule 100 mg  100 mg Oral BID   • Fluticasone Furoate-Vilanterol 100-25 mcg/actuation 1 puff  1 puff Inhalation Daily   • gabapentin (NEURONTIN) capsule 300 mg  300 mg Oral TID   • heparin (porcine) 25,000 units in 0 45% NaCl 250 mL infusion (premix)  3-20 Units/kg/hr Intravenous Titrated   • insulin glargine (LANTUS) subcutaneous injection 8 Units 0 08 mL  8 Units Subcutaneous HS   • insulin lispro (HumaLOG) 100 units/mL subcutaneous injection 1-6 Units  1-6 Units Subcutaneous HS   • insulin lispro (HumaLOG) 100 units/mL subcutaneous injection 2-12 Units  2-12 Units Subcutaneous TID AC   • metoprolol tartrate (LOPRESSOR) tablet 50 mg  50 mg Oral Q12H [address]   • mirtazapine (REMERON) tablet 30 mg  30 mg Oral HS   • morphine injection 2 mg  2 mg Intravenous Q4H PRN   • nicotine (NICODERM CQ) 7 mg/24hr TD 24 hr patch 1 patch  1 patch Transdermal Daily   • ondansetron (ZOFRAN) injection 4 mg  4 mg Intravenous Q8H PRN   • oxybutynin (DITROPAN-XL) 24 hr tablet 5 mg  5 mg Oral Daily   • oxyCODONE (ROXICODONE) immediate release tablet 10 mg  10 mg Oral Q6H PRN   • oxyCODONE (ROXICODONE) IR tablet 5 mg  5 mg Oral Q4H PRN   • pantoprazole (PROTONIX) EC tablet 40 mg  40 mg Oral Daily   • polyethylene glycol (MIRALAX) packet 17 g  17 g Oral Daily PRN   • predniSONE tablet 2 5 mg  2 5 mg Oral Daily   • rOPINIRole (REQUIP) tablet 0 5 mg  0 5 mg Oral HS   • saccharomyces boulardii (FLORASTOR) capsule 250 mg  250 mg Oral Daily   • senna (SENOKOT) tablet 8 6 mg  1 tablet Oral Daily   • sertraline (ZOLOFT) tablet 50 mg  50 mg Oral HS   • sucralfate (CARAFATE) tablet 1 g  1 g Oral BID AC   • traZODone (DESYREL) tablet 50 mg  50 mg Oral HS   • umeclidinium 62 5 mcg/actuation inhaler AEPB 1 puff  1 puff Inhalation Daily   • vancomycin (VANCOCIN) IVPB (premix in dextrose) 750 mg 150 mL  750 mg Intravenous Q24H         ** Please Note: Portions of the record may have been created with voice recognition software  Occasional wrong word or "sound a like" substitutions may have occurred due to the inherent limitations of voice recognition software  Read the chart carefully and recognize, using context, where substitutions have occurred   **

## 2023-01-31 NOTE — ASSESSMENT & PLAN NOTE
· Patient had arterial doppler studies of bilateral extremities while at [facility]  · RLL: There is a 50-75% stenosis noted in the mid superficial femoral artery with diffuse atherosclerotic disease throughout the remaining femoro-popliteal andtibio-peroneal segments  AD 0 42  · LLL: There is a 50-75% stenosis noted in the prox and distal superficial femoral artery and an occlusion vs high grade stenosis of the distal anterior tibial artery  Diffuse atherosclerotic disease throughout the remaining femoro-popliteal and tibio-peroneal segments   AD 0 39  · [facility] - performed 1/30 with successful stenting  · Await podiatry recommendations now that pt is optimized vascularly

## 2023-02-01 ENCOUNTER — TELEPHONE (OUTPATIENT)
Dept: VASCULAR SURGERY | Facility: CLINIC | Age: 83
End: 2023-02-01

## 2023-02-01 ENCOUNTER — APPOINTMENT (OUTPATIENT)
Dept: RADIOLOGY | Facility: HOSPITAL | Age: 83
End: 2023-02-01

## 2023-02-01 ENCOUNTER — ANESTHESIA (INPATIENT)
Dept: PERIOP | Facility: HOSPITAL | Age: 83
End: 2023-02-01

## 2023-02-01 PROBLEM — K59.00 CONSTIPATION: Status: ACTIVE | Noted: 2023-02-01

## 2023-02-01 LAB
ABO GROUP BLD: NORMAL
ABO GROUP BLD: NORMAL
ALBUMIN SERPL BCP-MCNC: 3.4 G/DL (ref 3.5–5)
ALP SERPL-CCNC: 46 U/L (ref 34–104)
ALT SERPL W P-5'-P-CCNC: 8 U/L (ref 7–52)
ANION GAP SERPL CALCULATED.3IONS-SCNC: 5 MMOL/L (ref 4–13)
AST SERPL W P-5'-P-CCNC: 10 U/L (ref 13–39)
BILIRUB SERPL-MCNC: 0.32 MG/DL (ref 0.2–1)
BLD GP AB SCN SERPL QL: POSITIVE
BLOOD GROUP ANTIBODIES SERPL: NORMAL
BLOOD GROUP ANTIBODIES SERPL: NORMAL
BUN SERPL-MCNC: 18 MG/DL (ref 5–25)
CALCIUM ALBUM COR SERPL-MCNC: 8.2 MG/DL (ref 8.3–10.1)
CALCIUM SERPL-MCNC: 7.7 MG/DL (ref 8.4–10.2)
CHLORIDE SERPL-SCNC: 107 MMOL/L (ref 96–108)
CO2 SERPL-SCNC: 27 MMOL/L (ref 21–32)
CREAT SERPL-MCNC: 1 MG/DL (ref 0.6–1.3)
DAT POLY-SP REAG RBC QL: NEGATIVE
ERYTHROCYTE [DISTWIDTH] IN BLOOD BY AUTOMATED COUNT: 16.2 % (ref 11.6–15.1)
GFR SERPL CREATININE-BSD FRML MDRD: 53 ML/MIN/1.73SQ M
GLUCOSE SERPL-MCNC: 146 MG/DL (ref 65–140)
GLUCOSE SERPL-MCNC: 184 MG/DL (ref 65–140)
GLUCOSE SERPL-MCNC: 70 MG/DL (ref 65–140)
GLUCOSE SERPL-MCNC: 77 MG/DL (ref 65–140)
GLUCOSE SERPL-MCNC: 78 MG/DL (ref 65–140)
GLUCOSE SERPL-MCNC: 91 MG/DL (ref 65–140)
HCT VFR BLD AUTO: 23.4 % (ref 34.8–46.1)
HCT VFR BLD AUTO: 27.3 % (ref 34.8–46.1)
HGB BLD-MCNC: 7 G/DL (ref 11.5–15.4)
HGB BLD-MCNC: 8.1 G/DL (ref 11.5–15.4)
MCH RBC QN AUTO: 26.3 PG (ref 26.8–34.3)
MCHC RBC AUTO-ENTMCNC: 29.9 G/DL (ref 31.4–37.4)
MCV RBC AUTO: 88 FL (ref 82–98)
PLATELET # BLD AUTO: 208 THOUSANDS/UL (ref 149–390)
PMV BLD AUTO: 10.8 FL (ref 8.9–12.7)
POTASSIUM SERPL-SCNC: 4.1 MMOL/L (ref 3.5–5.3)
PROT SERPL-MCNC: 5.5 G/DL (ref 6.4–8.4)
RBC # BLD AUTO: 2.66 MILLION/UL (ref 3.81–5.12)
RH BLD: NEGATIVE
RH BLD: NEGATIVE
SODIUM SERPL-SCNC: 139 MMOL/L (ref 135–147)
SPECIMEN EXPIRATION DATE: NORMAL
WBC # BLD AUTO: 8.06 THOUSAND/UL (ref 4.31–10.16)

## 2023-02-01 PROCEDURE — 0Y6N0Z9 DETACHMENT AT LEFT FOOT, PARTIAL 1ST RAY, OPEN APPROACH: ICD-10-PCS | Performed by: INTERNAL MEDICINE

## 2023-02-01 RX ORDER — KETAMINE HYDROCHLORIDE 50 MG/ML
INJECTION, SOLUTION, CONCENTRATE INTRAMUSCULAR; INTRAVENOUS AS NEEDED
Status: DISCONTINUED | OUTPATIENT
Start: 2023-02-01 | End: 2023-02-01

## 2023-02-01 RX ORDER — MIDAZOLAM HYDROCHLORIDE 2 MG/2ML
INJECTION, SOLUTION INTRAMUSCULAR; INTRAVENOUS AS NEEDED
Status: DISCONTINUED | OUTPATIENT
Start: 2023-02-01 | End: 2023-02-01

## 2023-02-01 RX ORDER — MAGNESIUM HYDROXIDE 1200 MG/15ML
LIQUID ORAL AS NEEDED
Status: DISCONTINUED | OUTPATIENT
Start: 2023-02-01 | End: 2023-02-01 | Stop reason: HOSPADM

## 2023-02-01 RX ORDER — FENTANYL CITRATE/PF 50 MCG/ML
25 SYRINGE (ML) INJECTION
Status: DISCONTINUED | OUTPATIENT
Start: 2023-02-01 | End: 2023-02-01 | Stop reason: HOSPADM

## 2023-02-01 RX ORDER — HYDRALAZINE HYDROCHLORIDE 20 MG/ML
5 INJECTION INTRAMUSCULAR; INTRAVENOUS ONCE
Status: COMPLETED | OUTPATIENT
Start: 2023-02-01 | End: 2023-02-01

## 2023-02-01 RX ORDER — ONDANSETRON 2 MG/ML
4 INJECTION INTRAMUSCULAR; INTRAVENOUS ONCE AS NEEDED
Status: DISCONTINUED | OUTPATIENT
Start: 2023-02-01 | End: 2023-02-01 | Stop reason: HOSPADM

## 2023-02-01 RX ORDER — PROPOFOL 10 MG/ML
INJECTION, EMULSION INTRAVENOUS CONTINUOUS PRN
Status: DISCONTINUED | OUTPATIENT
Start: 2023-02-01 | End: 2023-02-01

## 2023-02-01 RX ORDER — LORAZEPAM 1 MG/1
1 TABLET ORAL ONCE
Status: COMPLETED | OUTPATIENT
Start: 2023-02-01 | End: 2023-02-01

## 2023-02-01 RX ADMIN — ALLOPURINOL 50 MG: 100 TABLET ORAL at 08:32

## 2023-02-01 RX ADMIN — SODIUM CHLORIDE 50 ML/HR: 0.9 INJECTION, SOLUTION INTRAVENOUS at 07:38

## 2023-02-01 RX ADMIN — HEPARIN SODIUM 5000 UNITS: 5000 INJECTION INTRAVENOUS; SUBCUTANEOUS at 20:58

## 2023-02-01 RX ADMIN — GABAPENTIN 300 MG: 300 CAPSULE ORAL at 08:32

## 2023-02-01 RX ADMIN — OXYCODONE HYDROCHLORIDE 10 MG: 10 TABLET ORAL at 08:37

## 2023-02-01 RX ADMIN — KETAMINE HYDROCHLORIDE 20 MG: 50 INJECTION INTRAMUSCULAR; INTRAVENOUS at 17:02

## 2023-02-01 RX ADMIN — PROPOFOL 40 MCG/KG/MIN: 10 INJECTION, EMULSION INTRAVENOUS at 17:02

## 2023-02-01 RX ADMIN — LORAZEPAM 1 MG: 1 TABLET ORAL at 11:15

## 2023-02-01 RX ADMIN — ROPINIROLE 0.5 MG: 1 TABLET, FILM COATED ORAL at 20:55

## 2023-02-01 RX ADMIN — DOCUSATE SODIUM 100 MG: 100 CAPSULE, LIQUID FILLED ORAL at 20:03

## 2023-02-01 RX ADMIN — OXYCODONE HYDROCHLORIDE 10 MG: 10 TABLET ORAL at 20:56

## 2023-02-01 RX ADMIN — KETAMINE HYDROCHLORIDE 10 MG: 50 INJECTION INTRAMUSCULAR; INTRAVENOUS at 17:13

## 2023-02-01 RX ADMIN — HEPARIN SODIUM 5000 UNITS: 5000 INJECTION INTRAVENOUS; SUBCUTANEOUS at 05:59

## 2023-02-01 RX ADMIN — SERTRALINE HYDROCHLORIDE 50 MG: 50 TABLET ORAL at 20:57

## 2023-02-01 RX ADMIN — VANCOMYCIN HYDROCHLORIDE 750 MG: 750 INJECTION, SOLUTION INTRAVENOUS at 08:33

## 2023-02-01 RX ADMIN — PANTOPRAZOLE SODIUM 40 MG: 40 TABLET, DELAYED RELEASE ORAL at 05:59

## 2023-02-01 RX ADMIN — METOPROLOL TARTRATE 50 MG: 50 TABLET, FILM COATED ORAL at 08:32

## 2023-02-01 RX ADMIN — Medication 250 MG: at 08:32

## 2023-02-01 RX ADMIN — MIRTAZAPINE 30 MG: 15 TABLET, FILM COATED ORAL at 20:57

## 2023-02-01 RX ADMIN — MIDAZOLAM 1 MG: 1 INJECTION INTRAMUSCULAR; INTRAVENOUS at 16:57

## 2023-02-01 RX ADMIN — SUCRALFATE 1 G: 1 TABLET ORAL at 05:59

## 2023-02-01 RX ADMIN — GABAPENTIN 300 MG: 300 CAPSULE ORAL at 20:03

## 2023-02-01 RX ADMIN — SENNOSIDES 8.6 MG: 8.6 TABLET ORAL at 08:32

## 2023-02-01 RX ADMIN — FLUTICASONE FUROATE AND VILANTEROL TRIFENATATE 1 PUFF: 100; 25 POWDER RESPIRATORY (INHALATION) at 08:39

## 2023-02-01 RX ADMIN — PREDNISONE 2.5 MG: 1 TABLET ORAL at 08:31

## 2023-02-01 RX ADMIN — TRAZODONE HYDROCHLORIDE 50 MG: 50 TABLET ORAL at 20:57

## 2023-02-01 RX ADMIN — OXYBUTYNIN CHLORIDE 5 MG: 5 TABLET, EXTENDED RELEASE ORAL at 08:32

## 2023-02-01 RX ADMIN — METOPROLOL TARTRATE 50 MG: 50 TABLET, FILM COATED ORAL at 20:03

## 2023-02-01 RX ADMIN — MORPHINE SULFATE 2 MG: 2 INJECTION, SOLUTION INTRAMUSCULAR; INTRAVENOUS at 19:45

## 2023-02-01 RX ADMIN — UMECLIDINIUM 1 PUFF: 62.5 AEROSOL, POWDER ORAL at 09:27

## 2023-02-01 RX ADMIN — INSULIN LISPRO 1 UNITS: 100 INJECTION, SOLUTION INTRAVENOUS; SUBCUTANEOUS at 22:25

## 2023-02-01 RX ADMIN — INSULIN GLARGINE 8 UNITS: 100 INJECTION, SOLUTION SUBCUTANEOUS at 22:25

## 2023-02-01 RX ADMIN — HYDRALAZINE HYDROCHLORIDE 5 MG: 20 INJECTION, SOLUTION INTRAMUSCULAR; INTRAVENOUS at 18:26

## 2023-02-01 RX ADMIN — DOCUSATE SODIUM 100 MG: 100 CAPSULE, LIQUID FILLED ORAL at 08:32

## 2023-02-01 RX ADMIN — AMLODIPINE BESYLATE 5 MG: 5 TABLET ORAL at 08:32

## 2023-02-01 RX ADMIN — CLOPIDOGREL BISULFATE 75 MG: 75 TABLET ORAL at 08:32

## 2023-02-01 NOTE — ASSESSMENT & PLAN NOTE
Results from last 7 days   Lab Units 02/01/23  0900 02/01/23  0541 01/31/23  0931 01/30/23  1724   HEMOGLOBIN g/dL 8 1* 7 0* 8 2* 8 5*   · Previous baseline appears to be in the 8-9's  · Hemoglobin on a m  labs 7 however the repeat 8 1  · Transfuse for hemoglobin less than 7  · Iron panel obtained-iron saturation 21, TIBC 220, iron 47, ferritin 90

## 2023-02-01 NOTE — ASSESSMENT & PLAN NOTE
· Patient had arterial doppler studies of bilateral extremities while at [address] [place] [address]  · RLL: There is a 50-75% stenosis noted in the mid superficial femoral artery with diffuse atherosclerotic disease throughout the remaining femoro-popliteal andtibio-peroneal segments  AD 0 42  · LLL: There is a 50-75% stenosis noted in the prox and distal superficial femoral artery and an occlusion vs high grade stenosis of the distal anterior tibial artery  Diffuse atherosclerotic disease throughout the remaining femoro-popliteal and tibio-peroneal segments   AD 0 39  · Patient transferred to [facility] for angiogram - performed 1/30 with successful stenting  · Podiatry planning for surgical intervention today

## 2023-02-01 NOTE — PROGRESS NOTES
Podiatry - Progress Note  Patient: Rafael Viera [de-identified] y o  female   MRN: [id number]  PCP: Akilah Singh MD  Unit/Bed#: E5 -01 Encounter: [id number]  Date Of Visit: 23    ASSESSMENT:    Rafael Viera is a [de-identified] y o  female with:    1  Left Hallux diabetic ulcer  2  Type 2 diabetes mellitis  3  PAD  4  Stage 3b CKD      PLAN:    · Patient to go to OR today,23, for  left first ray resection with Dr Manjit Jolly  · Consent is signed and in preop holding area  · Confirmed NPO status  · H&P, vitals, and current labs reviewed  No acute changes noted  · Alternatives, risks, and complications discussed with patient  · All questions answered  No guarantees given to outcome of procedure  · Rest of medical care per primary team        SUBJECTIVE:     The patient was seen, evaluated, and assessed at bedside today  The patient was awake, alert, and in no acute distress  Patient confirmed NPO status  All questions and concerns regarding the surgical procedure addressed  Patient understands risks vs benefits of procedure and remains amenable with plan for surgery today  Patient denies N/V/F/chills/SOB/CP  OBJECTIVE:     Vitals:   /62 (BP Location: Right arm)   Pulse 73   Temp 98 6 °F (37 °C) (Oral)   Resp 16   Ht 5' 6" (1 676 m)   Wt 75 kg (165 lb 5 5 oz)   SpO2 97%   BMI 26 69 kg/m²     Temp (24hrs), Av 6 °F (37 °C), Min:98 4 °F (36 9 °C), Max:98 9 °F (37 2 °C)      Physical Exam:     General:  Alert, cooperative, and in no distress  Lower extremity exam:  Cardiovascular status at baseline  Neurological status at baseline  Musculoskeletal status at baseline  No calf tenderness noted bilaterally  Dressing left intact to the Operating Room  Clinical Images 23:       Additional Data:     Labs:    Results from last 7 days   Lab Units 23  0541 23  1724 23  0732   WBC Thousand/uL 8 06   < > 7 36   HEMOGLOBIN g/dL 7 0*   < > 7 7*   HEMATOCRIT % 23 4*   < > 25 6*   PLATELETS Thousands/uL 208   < > 186   NEUTROS PCT %  --   --  54   LYMPHS PCT %  --   --  30   MONOS PCT %  --   --  9   EOS PCT %  --   --  5    < > = values in this interval not displayed  Results from last 7 days   Lab Units 02/01/23  0541   POTASSIUM mmol/L 4 1   CHLORIDE mmol/L 107   CO2 mmol/L 27   BUN mg/dL 18   CREATININE mg/dL 1 00   CALCIUM mg/dL 7 7*   ALK PHOS U/L 46   ALT U/L 8   AST U/L 10*     Results from last 7 days   Lab Units 01/30/23  1913   INR  1 02       * I Have Reviewed All Lab Data Listed Above  Recent Cultures (last 7 days):               Imaging: I have personally reviewed pertinent films in PACS  EKG, Pathology, and Other Studies: I have personally reviewed pertinent reports  ** Please Note: Portions of the record may have been created with voice recognition software  Occasional wrong word or "sound a like" substitutions may have occurred due to the inherent limitations of voice recognition software  Read the chart carefully and recognize, using context, where substitutions have occurred   **

## 2023-02-01 NOTE — PROGRESS NOTES
Moraima Rosen is a [de-identified] y o  female who is currently ordered Vancomycin IV with management by the Pharmacy Consult service  Relevant clinical data and objective / subjective history reviewed  Vancomycin Assessment:  Indication and Goal AUC/Trough: Soft tissue (goal -600, trough >10), -600, trough >10  Clinical Status: stable  Micro:   No growth after 5 days  Renal Function:  SCr: 1 00 mg/dL  CrCl: 46 5 mL/min  Renal replacement: Not on dialysis  Days of Therapy: 8  Current Dose: 750 mg IV every 24 hours  Vancomycin Plan:  New Dosing: continue regimen  Estimated AUC: 428 mcg*hr/mL  Estimated Trough: 13 3 mcg/mL  Next Level: 2/5/23 with AM labs  Renal Function Monitoring: Daily BMP and [place] will continue to follow closely for s/sx of nephrotoxicity, infusion reactions and appropriateness of therapy  BMP and CBC will be ordered per protocol  We will continue to follow the patient’s culture results and clinical progress daily      Shantanu Tai, Pharmacist

## 2023-02-01 NOTE — ASSESSMENT & PLAN NOTE
Lab Results   Component Value Date    EGFR 53 02/01/2023    EGFR 47 01/31/2023    EGFR 40 01/30/2023    CREATININE 1 00 02/01/2023    CREATININE 1 11 01/31/2023    CREATININE 1 25 01/30/2023   · Appears to be patients baseline   · Continue to monitor BMP  · Nephrology consulted appreciate recommendations

## 2023-02-01 NOTE — PROGRESS NOTES
[address] [place] [address]  Progress Note - Louie Angeles 1940, [de-identified] y o  female MRN: [id number]  Unit/Bed#: OR [place] Encounter: [id number]  Primary Care Provider: Javan Guthrie MD   Date and time admitted to hospital: 1/29/2023  2:19 AM    * Acute hematogenous osteomyelitis of left foot ([place] [address] )  Assessment & Plan  · Patient initially presented to the [address] [place] [address] with blisters on the left great toe that progressed to open wounds  She was prescribed keflex and doxycycline outpatient with no resolution  · Was transferred to [address] [place] [address] for further vascular evaluation  · Xray of left foot 1/24 negative for osteomyelitis   · MRI left foot 1/25/23: "Ulcer with evidence of small adjacent medial, distal 1st MTP osteomyelitis  Altered marrow signal intensity at the proximal-medial aspect of the 1st toe proximal phalanx is less specific and may be early osteomyelitis consult podiatry for further recommendations "  · S/p cefepime x 7 days   · Now on solo vancomycin per ID- day #9  · Underwent angiogram 1/30/23 with successful treatment of high grade long segment SFA stenosis & B EIA stenosis; peroneal and PT runoff  · Switched ASA to plavix for new PAD stents- per vascular  · AD final report pending   · Podiatry inpatient to OR today for left first ray resection    PAD (peripheral artery disease) ([place] [address] )  Assessment & Plan  · Patient had arterial doppler studies of bilateral extremities while at [address] [place] [address]  · RLL: There is a 50-75% stenosis noted in the mid superficial femoral artery with diffuse atherosclerotic disease throughout the remaining femoro-popliteal andtibio-peroneal segments  AD 0 42  · LLL: There is a 50-75% stenosis noted in the prox and distal superficial femoral artery and an occlusion vs high grade stenosis of the distal anterior tibial artery  Diffuse atherosclerotic disease throughout the remaining femoro-popliteal and tibio-peroneal segments   AD 0 39  · Patient transferred to [address] [place] [address] for angiogram - performed 1/30 with successful stenting  · Podiatry planning for surgical intervention today    Anemia  Assessment & Plan  Results from last 7 days   Lab Units 02/01/23  0900 02/01/23  0541 01/31/23  0931 01/30/23  1724   HEMOGLOBIN g/dL 8 1* 7 0* 8 2* 8 5*   · Previous baseline appears to be in the 8-9's  · Hemoglobin on a m  labs 7 however the repeat 8 1  · Transfuse for hemoglobin less than 7  · Iron panel obtained-iron saturation 21, TIBC 220, iron 47, ferritin 90    Stage 3b chronic kidney disease ([facility] )  Assessment & Plan  Lab Results   Component Value Date    EGFR 53 02/01/2023    EGFR 47 01/31/2023    EGFR 40 01/30/2023    CREATININE 1 00 02/01/2023    CREATININE 1 11 01/31/2023    CREATININE 1 25 01/30/2023   · Appears to be patients baseline   · Continue to monitor BMP  · Nephrology consulted appreciate recommendations    Chronic respiratory failure with hypoxia and hypercapnia (HCC)  Assessment & Plan  · Secondary to COPD  · Continue baseline 3L [place]     COPD [facility]chronic obstructive pulmonary disease) [facility]  Assessment & Plan  · No acute exacerbation  · Patient is currently on baseline oxygen requirements of 3L [place]  · Continue home regimen of Advair substituted to Breo, Spiriva and albuterol inhaler/neb prednisone 2 5 mg daily as per home regimen    Type 2 diabetes mellitus with skin complication, with long-term current use of insulin [facility])  Assessment & Plan  Lab Results   Component Value Date    HGBA1C 7 5 (H) 01/23/2023     Recent Labs     01/31/23  2048 02/01/23  0742 02/01/23  1107 02/01/23  1130   POCGLU 293* 70 91 146*   · On Lantus 8 units  · SSI with accucheks    Constipation  Assessment & Plan  · She reports not having a bowel movement in over 5 days    · Bowel regiment Colace twice daily, senna daily and MiraLAX as needed    History of DVT (deep vein thrombosis)  Assessment & Plan  · Patient with remote history of DVT previously anticoagulated on Eliquis  · Was on heparin drip for her hx of DVT however this was years ago therefore heparin drip was DC'd  · Eliquis on hold for podiatry intervention    Chronic, continuous use of opioids  Assessment & Plan  · Patient chronically on oxycodone, morphine, and movantik at home   · PDMP reviewed  · Pain medications increased in the setting of ischemic left foot pain    VTE Pharmacologic Prophylaxis: VTE Score: 9 High Risk (Score >/= 5) - Pharmacological DVT Prophylaxis Ordered: heparin  Sequential Compression Devices Ordered  Patient Centered Rounds: I performed bedside rounds with nursing staff today  Discussions with Specialists or Other Care Team Provider: none    Education and Discussions with Family / Patient: Updated  (daughter) via phone  Time Spent for Care: 30 minutes  More than 50% of total time spent on counseling and coordination of care as described above  Current Length of Stay: 3 day(s)  Current Patient Status: Inpatient   Certification Statement: The patient will continue to require additional inpatient hospital stay due to Left first ray resection with podiatry  Discharge Plan: Anticipate discharge in 48-72 hrs to discharge location to be determined pending rehab evaluations  Code Status: Level 3 - DNAR and DNI    Subjective:   Patient was seen sitting up in bed today  She reports feeling well today  She states that the pain in her foot is controlled on current pain regimen  Denies any fever, chills, nausea, vomiting, chest pain  She states that she is nervous for surgery today  Objective:     Vitals:   Temp (24hrs), Av 2 °F (36 8 °C), Min:97 1 °F (36 2 °C), Max:99 4 °F (37 4 °C)    Temp:  [97 1 °F (36 2 °C)-99 4 °F (37 4 °C)] (P) 97 1 °F (36 2 °C)  HR:  [68-80] 80  Resp:  [16-20] 20  BP: ()/(45-64) 90/56  SpO2:  [95 %-98 %] 95 %  Body mass index is 26 69 kg/m²  Input and Output Summary (last 24 hours):      Intake/Output Summary (Last 24 hours) at 2023 1751  Last data filed at 2023 1746  Gross per 24 hour   Intake 300 ml   Output 300 ml   Net 0 ml       Physical Exam:   Physical Exam  Vitals and nursing note reviewed  Constitutional:       Appearance: Normal appearance  HENT:      Head: Normocephalic and atraumatic  Eyes:      General: No scleral icterus  Cardiovascular:      Rate and Rhythm: Normal rate and regular rhythm  Pulmonary:      Effort: Pulmonary effort is normal       Breath sounds: Normal breath sounds  Abdominal:      General: Abdomen is flat  Bowel sounds are normal       Palpations: Abdomen is soft  Tenderness: There is no abdominal tenderness  Skin:     General: Skin is warm and dry  Comments: Left foot and gauze wrap bandage   Neurological:      Mental Status: She is alert  Mental status is at baseline  Comments: Sensation intact to light touch bilateral lower extremities   Psychiatric:         Mood and Affect: Mood normal          Behavior: Behavior normal         Additional Data:     Labs:  Results from last 7 days   Lab Units 02/01/23  0900 02/01/23  0541 01/30/23  1724 01/30/23  0732   WBC Thousand/uL  --  8 06   < > 7 36   HEMOGLOBIN g/dL 8 1* 7 0*   < > 7 7*   HEMATOCRIT % 27 3* 23 4*   < > 25 6*   PLATELETS Thousands/uL  --  208   < > 186   NEUTROS PCT %  --   --   --  54   LYMPHS PCT %  --   --   --  30   MONOS PCT %  --   --   --  9   EOS PCT %  --   --   --  5    < > = values in this interval not displayed       Results from last 7 days   Lab Units 02/01/23  0541   SODIUM mmol/L 139   POTASSIUM mmol/L 4 1   CHLORIDE mmol/L 107   CO2 mmol/L 27   BUN mg/dL 18   CREATININE mg/dL 1 00   ANION GAP mmol/L 5   CALCIUM mg/dL 7 7*   ALBUMIN g/dL 3 4*   TOTAL BILIRUBIN mg/dL 0 32   ALK PHOS U/L 46   ALT U/L 8   AST U/L 10*   GLUCOSE RANDOM mg/dL 77     Results from last 7 days   Lab Units 01/30/23  1913   INR  1 02     Results from last 7 days   Lab Units 02/01/23  1751 02/01/23  1130 02/01/23  1107 02/01/23  0742 01/31/23  2048 01/31/23  1623 01/31/23  1102 01/31/23  0741 01/30/23  2124 01/30/23  1620 01/30/23  1222 01/30/23  0700   POC GLUCOSE mg/dl 78 146* 91 70 293* 146* 186* 124 165* 84 130 112               Lines/Drains:  Invasive Devices     Peripheral Intravenous Line  Duration           Peripheral IV 01/29/23 Left;Ventral (anterior) Forearm 3 days                      Imaging: No pertinent imaging reviewed      Recent Cultures (last 7 days):         Last 24 Hours Medication List:   Current Facility-Administered Medications   Medication Dose Route Frequency Provider Last Rate   • [[facility] Hold] acetaminophen  650 mg Oral Q6H PRN Zari Locket, DO     • [facility] Hold] albuterol  0 63 mg Nebulization 4x Daily PRN Zari Locket, DO     • [facility] Hold] allopurinol  50 mg Oral Daily Zari Locket, DO     • [facility] Hold] amLODIPine  5 mg Oral Daily Zari Locket, DO     • [facility] Hold] atorvastatin  10 mg Oral Daily With [address] [facility] [address], DO     • [facility] Hold] calcium carbonate  1,000 mg Oral Daily PRN Zari Locket, DO     • [facility] Hold] clopidogrel  75 mg Oral Daily Zari Locket, DO     • [facility] Hold] docusate sodium  100 mg Oral BID Zari Locket, DO     • [facility] Hold] Fluticasone Furoate-Vilanterol  1 puff Inhalation Daily Zari Locket, DO     • [facility] Hold] gabapentin  300 mg Oral TID Zari Locket, DO     • [facility] Hold] heparin (porcine)  5,000 Units Subcutaneous Q8H [facility]     • [facility] Hold] insulin glargine  8 Units Subcutaneous HS Zari Locket, DO     • [facility] Hold] insulin lispro  1-6 Units Subcutaneous HS Zari Locket, DO     • [facility] Hold] insulin lispro  2-12 Units Subcutaneous TID AC Lester Duckworth, DO     • [facility] Hold] metoprolol tartrate  50 mg Oral [facility] Zari Locket, DO     • [facility] Hold] mirtazapine  30 mg Oral HS Zari Locket, DO     • [facility] Hold] morphine injection  2 mg Intravenous Q4H PRN Zari Locket, DO     • [facility] Hold] nicotine  1 patch Transdermal Daily Harlan Chime, DO     • [facility] Hold] ondansetron  4 mg Intravenous Q8H PRN Harlan Chime, DO     • [facility] Hold] oxybutynin  5 mg Oral Daily Harlan Chime, DO     • [facility] Hold] oxyCODONE  10 mg Oral Q6H PRN Harlan Chime, DO     • [facility] Hold] oxyCODONE  5 mg Oral Q4H PRN Harlan Chime, DO     • [facility] Hold] pantoprazole  40 mg Oral Daily Harlan Chime, DO     • [facility] Hold] polyethylene glycol  17 g Oral Daily PRN Harlan Chime, DO     • [facility] Hold] predniSONE  2 5 mg Oral Daily Harlan Chime, DO     • [facility] Hold] rOPINIRole  0 5 mg Oral HS Harlan Chime, DO     • [facility] Hold] saccharomyces boulardii  250 mg Oral Daily Harlan Chime, DO     • [facility] Hold] senna  1 tablet Oral Daily Harlan Chime, DO     • [facility] Hold] sertraline  50 mg Oral HS Harlan Chime, DO     • sodium chloride  50 mL/hr Intravenous Continuous Radha Rab Luis A Jimenez MD 50 mL/hr (02/01/23 1651)   • [[facility] Hold] sucralfate  1 g Oral BID AC Harlan Chime, DO     • [facility] Hold] traZODone  50 mg Oral HS Harlan Chime, DO     • [facility] Hold] umeclidinium  1 puff Inhalation Daily Harlan Mathewe, DO     • [facility] Hold] vancomycin  750 mg Intravenous Q24H Harlan Encarnacion,  mg (02/01/23 2612)        Today, Patient Was Seen By: Ray Wheeler PA-C    **Please Note: This note may have been constructed using a voice recognition system  **

## 2023-02-01 NOTE — PROGRESS NOTES
Progress Note - Infectious Disease   Penny Jc [de-identified] y o  female MRN: [id number]  Unit/Bed#: OR [place] Encounter: [id number]      Impression/Plan:    1  Left great toe diabetic ulcer with first metatarsal osteomyelitis  Patient initially presented to outside campus with progressing pain and drainage from her left great toe  Unfortunately she failed to improve with oral antibiotic and I suspect this was due to progressing bone infection  MRI consistent with osteomyelitis of the distal 1st MTP  Status post IR revascularization on 1/30  Plan for left first ray resection with podiatry today  No significant history of resistance  Unfortunately no other prior culture data  She remains hemodynamically stable  -Continue IV vancomycin, dosing per pharmacy  -Continue to trend fever curve/vitals  -Ongoing follow-up by podiatry, plan for left first ray resection today  -Will follow-up operative findings  -If surgical source control achieved anticipate 7 days of antibiotic post procedure with doxycycline/Keflex      2  Severe peripheral arterial disease  Patient with significant vascular disease which can unfortunately affect wound healing  Underwent revascularization with IR on 1/30  Now pending amputation today  -Antibiotic as above  -Ongoing follow-up by vascular surgery  -Ongoing follow-up by podiatry  -Continue to trend fever curve/WBC     3  Penicillin allergy  Documented as having reported hives but this was a remote allergy  Patient may benefit from amoxicillin challenge as an outpatient  Recommend follow-up with PCP  Tolerating current antibiotics above  We will adjust allergy list if needed      4  Chronic kidney disease  Creatinine appears stable compared to prior baseline  This does affect antibiotic dosing   -continue vancomycin alone  -Pharmacy consulted  -We will dose adjust antibiotics as needed  -Monitor creatinine     Above plan discussed in detail with the patient at bedside   ID will follow  Antibiotics:  Abx day 9  IV Vancomycin    Subjective:  Patient reports some pain in the left foot  She is anxious about going to the OR for amputation today  Otherwise she is doing okay, denies fever, chills, nausea, vomiting, diarrhea  Objective:  Vitals:  Temp:  [97 7 °F (36 5 °C)-99 4 °F (37 4 °C)] 97 7 °F (36 5 °C)  HR:  [68-80] 80  Resp:  [16-20] 20  BP: ()/(45-64) 90/56  SpO2:  [95 %-98 %] 95 %  Temp (24hrs), Av 5 °F (36 9 °C), Min:97 7 °F (36 5 °C), Max:99 4 °F (37 4 °C)  Current: Temperature: 97 7 °F (36 5 °C)    Physical Exam:   General Appearance:  Chronically ill appearing but interactive, nontoxic, no acute distress  Throat: Oropharynx moist without lesions  Lungs:   Clear to auscultation bilaterally; no wheezes, rhonchi or rales; respirations unlabored   Heart:  RRR; no murmur, rub or gallop   Abdomen:   Soft, non-tender, non-distended, positive bowel sounds  Extremities: No clubbing, cyanosis or edema   Skin: Left foot wrapped in guaze       Labs: All pertinent labs and imaging studies were personally reviewed  Results from last 7 days   Lab Units 23  0900 23  0541 23  0931 23  1724 23  0732   WBC Thousand/uL  --  8 06 11 42*  --  7 36   HEMOGLOBIN g/dL 8 1* 7 0* 8 2*   < > 7 7*   PLATELETS Thousands/uL  --  208 237  --  186    < > = values in this interval not displayed       Results from last 7 days   Lab Units 23  0541 23  0931 23  0500   SODIUM mmol/L 139 138 138   POTASSIUM mmol/L 4 1 4 1 4 1   CHLORIDE mmol/L 107 103 103   CO2 mmol/L 27 30 31   BUN mg/dL 18 20 28*   CREATININE mg/dL 1 00 1 11 1 25   EGFR ml/min/1 73sq m 53 47 40   CALCIUM mg/dL 7 7* 8 2* 7 9*   AST U/L 10*  --   --    ALT U/L 8  --   --    ALK PHOS U/L 46  --   --              Results from last 7 days   Lab Units 23  0500   FERRITIN ng/mL 90

## 2023-02-01 NOTE — PLAN OF CARE
Problem: Prexisting or High Potential for Compromised Skin Integrity  Goal: Skin integrity is maintained or improved  Description: INTERVENTIONS:  - Identify patients at risk for skin breakdown  - Assess and monitor skin integrity  - Assess and monitor nutrition and hydration status  - Monitor labs   - Assess for incontinence   - Turn and reposition patient  - Assist with mobility/ambulation  - Relieve pressure over bony prominences  - Avoid friction and shearing  - Provide appropriate hygiene as needed including keeping skin clean and dry  - Evaluate need for skin moisturizer/barrier cream  - Collaborate with interdisciplinary team   - Patient/family teaching  - Consider wound care consult   Outcome: Progressing     Problem: PAIN - ADULT  Goal: Verbalizes/displays adequate comfort level or baseline comfort level  Description: Interventions:  - Encourage patient to monitor pain and request assistance  - Assess pain using appropriate pain scale  - Administer analgesics based on type and severity of pain and evaluate response  - Implement non-pharmacological measures as appropriate and evaluate response  - Consider cultural and social influences on pain and pain management  - Notify physician/advanced practitioner if interventions unsuccessful or patient reports new pain  Outcome: Progressing     Problem: INFECTION - ADULT  Goal: Absence or prevention of progression during hospitalization  Description: INTERVENTIONS:  - Assess and monitor for signs and symptoms of infection  - Monitor lab/diagnostic results  - Monitor all insertion sites, i e  indwelling lines, tubes, and drains  - Monitor endotracheal if appropriate and nasal secretions for changes in amount and color  - [place] appropriate cooling/warming therapies per order  - Administer medications as ordered  - Instruct and encourage patient and family to use good hand hygiene technique  - Identify and instruct in appropriate isolation precautions for identified infection/condition  Outcome: Progressing     Problem: SAFETY ADULT  Goal: Patient will remain free of falls  Description: INTERVENTIONS:  - Educate patient/family on patient safety including physical limitations  - Instruct patient to call for assistance with activity   - Consult OT/PT to assist with strengthening/mobility   - Keep Call bell within reach  - Keep bed low and locked with side rails adjusted as appropriate  - Keep care items and personal belongings within reach  - Initiate and maintain comfort rounds  - Make Fall Risk Sign visible to staff  - Offer Toileting every 2 Hours, in advance of need  - Initiate/Maintain bed alarm  - Obtain necessary fall risk management equipment:walker  - Apply yellow socks and bracelet for high fall risk patients  - Consider moving patient to room near nurses station  Outcome: Progressing     Problem: DISCHARGE PLANNING  Goal: Discharge to home or other facility with appropriate resources  Description: INTERVENTIONS:  - Identify barriers to discharge w/patient and caregiver  - Arrange for needed discharge resources and transportation as appropriate  - Identify discharge learning needs (meds, wound care, etc )  - Arrange for interpretive services to assist at discharge as needed  - Refer to Case Management Department for coordinating discharge planning if the patient needs post-hospital services based on physician/advanced practitioner order or complex needs related to functional status, cognitive ability, or social support system  Outcome: Progressing     Problem: Knowledge Deficit  Goal: Patient/family/caregiver demonstrates understanding of disease process, treatment plan, medications, and discharge instructions  Description: Complete learning assessment and assess knowledge base    Interventions:  - Provide teaching at level of understanding  - Provide teaching via preferred learning methods  Outcome: Progressing

## 2023-02-01 NOTE — PROGRESS NOTES
NEPHROLOGY PROGRESS NOTE   Stacey Clancy [de-identified] y o  female MRN: [id number]  Unit/Bed#: E5 -01 Encounter: [id number]  Reason for Consult: CKD 3, perioperative renal optimization to reduce the incidence of acute kidney injury  82-year-old female with history of CKD stage III, diabetes, hypertension, peripheral artery disease, chronic hypoxic respiratory failure, who was transferred to [facility] for vascular surgery and angiogram   Nephrology is consulted for perioperative renal optimization to reduce acute kidney injury  ASSESSMENT/PLAN:  CKD III: Perioperative renal optimization to reduce the incidence of acute kidney injury  Etiology of chronic disease likely secondary to diabetic kidney disease, hypertensive nephrosclerosis, atrophic left kidney, and age-related nephron loss  -Baseline creatinine 1 2-1 4   -Repeat creatinine today 1 0, at baseline   -At risk for contrast associated nephropathy, status post angiogram with contrast on 1/30/2023   -Currently on gentle hydration with normal saline at 50 cc/h  May discontinue 4 hours post surgery   -Holding Lasix  Likely resume tomorrow  -Most recent renal imaging showed atrophic left kidney, normal echogenicity and contour of right kidney   -Urine protein to creatinine ratio 0 47 g, will continue to work-up as an outpatient   -Recommend avoiding nephrotoxins, hypotension, IV contrast   -Strict I/O's   -Recommend routine follow-up with nephrologist as an outpatient  Left renal cyst: Recommending nonemergent follow-up with CT/MRI versus repeat ultrasound in 3 to 6 months  Hypertension: Blood pressure above goal at times   -Home medications: Norvasc 5 mg daily, Lasix 40 mg daily, Lopressor 50 mg every 12 hours   -Current medications: Amlodipine 5 mg daily, metoprolol 50 mg every 12 hours  -Goal blood pressure less than 140/90 mmHg    -Recommend avoiding hypotension or high fluctuations in blood pressure   -Recommend holding primers antihypertensive for systolic blood pressure less than 130  Left hallux diabetic ulcer/osteomyelitis: Podiatry team following  Status post a gram 1/30 with successful treatment of high-grade stenosis  -Infectious disease managing antibiotics   -Planning on operating room today for left first ray resection   -Currently n p o  and receiving gentle hydration and holding diuretics  -Recommend avoiding intraoperative hypotension or high fluctuations in blood pressure  Anemia: Hgb currently 8 1   -Continue to monitor and transfuse as needed for hemoglobin less than 7 0   -Would avoid IV Venofer as patient is currently on antibiotics  Other: Diabetes, chronic respiratory failure maintained on chronic O2  Disposition: Requiring additional stay due to medical needs  SUBJECTIVE:  Patient is sitting up at her bedside  She denies chest discomfort or increased shortness of breath  She remains on her home O2 requirements  She reports feeling anxious in relation to her surgery  She also reports having some lower back pain      OBJECTIVE:  Current Weight: Weight - Scale: 75 kg (165 lb 5 5 oz)  Vitals:    01/31/23 0739 01/31/23 1521 01/31/23 2238 02/01/23 0708   BP: 143/55 (!) 120/45 (!) 162/45 140/62   BP Location:  Right arm  Right arm   Pulse: 80 76 76 73   Resp: 19 17 18 16   Temp: 97 8 °F (36 6 °C) 98 9 °F (37 2 °C) 98 4 °F (36 9 °C) 98 6 °F (37 °C)   TempSrc:  Oral  Oral   SpO2: 100% 95% 97% 97%   Weight:       Height:           Intake/Output Summary (Last 24 hours) at 2/1/2023 0958  Last data filed at 1/31/2023 1801  Gross per 24 hour   Intake 390 ml   Output 300 ml   Net 90 ml     General: NAD  Skin: warm, dry, intact, no rash  HEENT: Moist mucous membranes, sclera anicteric, normocephalic, atraumatic  Neck: No apparent JVD appreciated  Chest: lung sounds clear B/L, on O2  CVS:Regular rate and rhythm, no murmer   Abdomen: Soft, round, non-tender, +BS  Extremities: No B/L LE edema present, toes wrapped with silvina  Neuro: alert and oriented  Psych: anxious    Medications:    Current Facility-Administered Medications:   •  acetaminophen (TYLENOL) tablet 650 mg, 650 mg, Oral, Q6H PRN, Yolie Meza DO  •  albuterol inhalation solution 0 63 mg, 0 63 mg, Nebulization, 4x Daily PRN, Yolie Meza DO  •  allopurinol (ZYLOPRIM) tablet 50 mg, 50 mg, Oral, Daily, Yolie Meza DO, 50 mg at 02/01/23 5251  •  amLODIPine (NORVASC) tablet 5 mg, 5 mg, Oral, Daily, Yolie Meza DO, 5 mg at 02/01/23 1208  •  atorvastatin (LIPITOR) tablet 10 mg, 10 mg, Oral, Daily With Woodrow Mukul, , 10 mg at 01/31/23 1556  •  calcium carbonate (TUMS) chewable tablet 1,000 mg, 1,000 mg, Oral, Daily PRN, Yolie Meza DO  •  clopidogrel (PLAVIX) tablet 75 mg, 75 mg, Oral, Daily, Yolie Meza DO, 75 mg at 02/01/23 2940  •  docusate sodium (COLACE) capsule 100 mg, 100 mg, Oral, BID, Yolie Meza DO, 100 mg at 02/01/23 2835  •  Fluticasone Furoate-Vilanterol 100-25 mcg/actuation 1 puff, 1 puff, Inhalation, Daily, Yolie Meza DO, 1 puff at 02/01/23 1786  •  gabapentin (NEURONTIN) capsule 300 mg, 300 mg, Oral, TID, Yolie Meza DO, 300 mg at 02/01/23 8892  •  heparin (porcine) subcutaneous injection 5,000 Units, 5,000 Units, Subcutaneous, Q8H [facility], Swati Carlos PA-C, 5,000 Units at 02/01/23 0559  •  insulin glargine (LANTUS) subcutaneous injection 8 Units 0 08 mL, 8 Units, Subcutaneous, HS, Yolie Meza DO, 8 Units at 01/31/23 2215  •  insulin lispro (HumaLOG) 100 units/mL subcutaneous injection 1-6 Units, 1-6 Units, Subcutaneous, HS, Yolie Meza DO, 4 Units at 01/31/23 2215  •  insulin lispro (HumaLOG) 100 units/mL subcutaneous injection 2-12 Units, 2-12 Units, Subcutaneous, TID AC, 2 Units at 01/31/23 1206 **AND** Fingerstick Glucose (POCT), , , TID AC, Yolie Meza DO  •  metoprolol tartrate (LOPRESSOR) tablet 50 mg, 50 mg, Oral, Q12H [facility], Donah Notice, DO, 50 mg at 02/01/23 7096  •  mirtazapine (REMERON) tablet 30 mg, 30 mg, Oral, HS, Donah Notice, DO, 30 mg at 01/31/23 2217  •  morphine injection 2 mg, 2 mg, Intravenous, Q4H PRN, Donah Notice, DO, 2 mg at 01/31/23 1009  •  nicotine (NICODERM CQ) 7 mg/24hr TD 24 hr patch 1 patch, 1 patch, Transdermal, Daily, Donah Notice, DO, 1 patch at 01/31/23 9448  •  ondansetron [facility]) injection 4 mg, 4 mg, Intravenous, Q8H PRN, Donah Notice, DO  •  oxybutynin (DITROPAN-XL) 24 hr tablet 5 mg, 5 mg, Oral, Daily, Donah Notice, DO, 5 mg at 02/01/23 0281  •  oxyCODONE (ROXICODONE) immediate release tablet 10 mg, 10 mg, Oral, Q6H PRN, Donah Notice, DO, 10 mg at 02/01/23 1650  •  oxyCODONE (ROXICODONE) IR tablet 5 mg, 5 mg, Oral, Q4H PRN, Donah Notice, DO, 5 mg at 01/30/23 1708  •  pantoprazole (PROTONIX) EC tablet 40 mg, 40 mg, Oral, Daily, Donah Notice, DO, 40 mg at 02/01/23 0559  •  polyethylene glycol (MIRALAX) packet 17 g, 17 g, Oral, Daily PRN, Donah Notice, DO  •  predniSONE tablet 2 5 mg, 2 5 mg, Oral, Daily, Donah Notice, DO, 2 5 mg at 02/01/23 9028  •  rOPINIRole (REQUIP) tablet 0 5 mg, 0 5 mg, Oral, HS, Donah Notice, DO, 0 5 mg at 01/31/23 2217  •  saccharomyces boulardii (FLORASTOR) capsule 250 mg, 250 mg, Oral, Daily, Donah Notice, DO, 250 mg at 02/01/23 6075  •  senna (SENOKOT) tablet 8 6 mg, 1 tablet, Oral, Daily, Donah Notice, DO, 8 6 mg at 02/01/23 3653  •  sertraline (ZOLOFT) tablet 50 mg, 50 mg, Oral, HS, Suni Notice, DO, 50 mg at 01/31/23 2222  •  sodium chloride 0 9 % infusion, 50 mL/hr, Intravenous, Continuous, Radha Parkinson MD, Last Rate: 50 mL/hr at 02/01/23 0738, 50 mL/hr at 02/01/23 0738  •  sucralfate (CARAFATE) tablet 1 g, 1 g, Oral, BID AC, Suni Notice, DO, 1 g at 02/01/23 0559  •  traZODone (DESYREL) tablet 50 mg, 50 mg, Oral, HS, Suni Notice, DO, 50 mg at 01/31/23 2217  •  umeclidinium 62 5 mcg/actuation inhaler AEPB 1 puff, 1 puff, Inhalation, Daily, Yolie Meza DO, 1 puff at 02/01/23 6093  •  vancomycin (VANCOCIN) IVPB (premix in dextrose) 750 mg 150 mL, 750 mg, Intravenous, Q24H, Yolie Meza DO, Last Rate: 150 mL/hr at 02/01/23 0833, 750 mg at 02/01/23 7580    Laboratory Results:  Results from last 7 days   Lab Units 02/01/23  0900 02/01/23  0541 01/31/23  0931 01/30/23  1724 01/30/23  0732 01/30/23  0500   WBC Thousand/uL  --  8 06 11 42*  --  7 36  --    HEMOGLOBIN g/dL 8 1* 7 0* 8 2*   < > 7 7*  --    HEMATOCRIT % 27 3* 23 4* 27 2*   < > 25 6*  --    PLATELETS Thousands/uL  --  208 237  --  186  --    SODIUM mmol/L  --  139 138  --   --  138   POTASSIUM mmol/L  --  4 1 4 1  --   --  4 1   CHLORIDE mmol/L  --  107 103  --   --  103   CO2 mmol/L  --  27 30  --   --  31   BUN mg/dL  --  18 20  --   --  28*   CREATININE mg/dL  --  1 00 1 11  --   --  1 25   CALCIUM mg/dL  --  7 7* 8 2*  --   --  7 9*   ALK PHOS U/L  --  46  --   --   --   --    ALT U/L  --  8  --   --   --   --    AST U/L  --  10*  --   --   --   --     < > = values in this interval not displayed

## 2023-02-01 NOTE — PHYSICAL THERAPY NOTE
PHYSICAL THERAPY NOTE          Patient Name: August DOBBS Date: 2/1/2023 02/01/23 1138   PT Last Visit   PT Visit Date 02/01/23   Note Type   Note type Cancelled Session   Cancel Reasons Patient to operating room   Additional Comments Planned for 1st ray resection farheen  Will follow and complete evaluation as appropriate post operatively       Jerman Cleary, PT

## 2023-02-01 NOTE — CASE MANAGEMENT
Case Management Assessment & Discharge Planning Note    Patient name Josy Bates  Location [address] 551/E5 MS 21 -* MRN [id number]  : 1940 Date 2023       Current Admission Date: 2023  Current Admission Diagnosis:Acute hematogenous osteomyelitis of left foot [facility])   Patient Active Problem List    Diagnosis Date Noted   • History of DVT (deep vein thrombosis) 2023   • Coronary artery disease involving native coronary artery 2023   • Anemia 2023   • Stage 3b chronic kidney disease ([facility] ) 2023   • CHF (congestive heart failure) ([facility] ) 2023   • Smoking 2023   • Oxygen dependent 2023   • PAD (peripheral artery disease) ([facility] [facility] ) 2023   • Acute hematogenous osteomyelitis of left foot ([facility] [address] [facility] ) 2023   • Type 2 diabetes mellitus with skin complication, with long-term current use of insulin ([facility] ) 2023   • COPD (chronic obstructive pulmonary disease) ([facility] [facility] ) 2023   • Chronic respiratory failure with hypoxia and hypercapnia ([facility] ) 2023   • Chronic, continuous use of opioids 2023      LOS (days): 3  Geometric Mean LOS (GMLOS) (days): 4 30  Days to GMLOS:0 9     OBJECTIVE:    Risk of Unplanned Readmission Score: 37 57         Current admission status: Inpatient       Preferred Pharmacy:   [address] [place] [address] #[id number] [facility]/ Noe Ramos [address] [place] [id number]  Phone: [phone] Fax: [phone]    Primary Care Provider: Alyson Lemos MD    Primary Insurance: Katiana Villalobos [facility]  Secondary Insurance:     ASSESSMENT:  George Gonzalez Proxies    There are no active Health Care Proxies on file           Readmission Root Cause  30 Day Readmission: No    Patient Information  Admitted from[de-identified] Home  Mental Status: Alert  During Assessment patient was accompanied by: Not accompanied during assessment  Assessment information provided by[de-identified] Patient  Primary Caregiver: Family  Caregiver's Name[de-identified] Guerda Mccollum (Daughter) [phone] Alondra Wilder)  Caregiver's Relationship to Patient[de-identified] Family Member  Caregiver's Telephone Number[de-identified] Drake Montaño (Daughter) [phone] (M)  Support Systems: DaughterYeyo 61 of Residence: [facility] do you live in?: [address] entry access options  Select all that apply : Stairs  Number of steps to enter home :  (18 STEPS)  Do the steps have railings?: Yes  Type of Current Residence: 2 story home (Above a StoreFront)  Upon entering residence, is there a bedroom on the main floor (no further steps)?: Yes  Upon entering residence, is there a bathroom on the main floor (no further steps)?: Yes  In the last 12 months, was there a time when you were not able to pay the mortgage or rent on time?: No  In the last 12 months, how many places have you lived?: 1  In the last 12 months, was there a time when you did not have a steady place to sleep or slept in a shelter (including now)?: No  Homeless/housing insecurity resource given?: N/A  Living Arrangements: Lives w/ Daughter  Is patient a ?: No    Activities of Daily Living Prior to Admission  Functional Status: Assistance  Completes ADLs independently?: Yes  Level of ADL dependence: Assistance  Ambulates independently?: Yes  Level of ambulatory dependence: Assistance  Does patient use assisted devices?: Yes  Assisted Devices (DME) used:  Wheelchair, Home Oxygen concentrator, Walker, Portable Oxygen tanks, [facility] Name (respiratory supplies): [address] [place] [address] DME [place], [place]  O2 Rate(s): 3L  Does patient currently own DME?: Yes  What DME does the patient currently own?: Shabbir Amado, Wheelchair, Shower Chair  Does patient have a history of Outpatient Therapy (PT/OT)?: No  Does the patient have a history of Short-Term Rehab?: No  Does patient have a history of HHC?: Yes (For PT/OT)  Does patient currently have [address]?: Yes ([facility] SN/MD visits)  [address] [place] [address]  Type of Current Home Care Services: Nurse visit, Other (Comment) (MD/PCP)  [facility] Provider[de-identified] PCP    Patient Information Continued  Income Source: Pension/USP  Does patient have prescription coverage?: Yes  Within the past 12 months, you worried that your food would run out before you got the money to buy more : Never true  Within the past 12 months, the food you bought just didn't last and you didn't have money to get more : Never true  Food insecurity resource given?: N/A  Does patient receive dialysis treatments?: No  Does patient have a history of substance abuse?: No  Does patient have a history of Mental Health Diagnosis?: No    Means of Transportation  Means of Transport to Appts[de-identified] Family transport  In the past 12 months, has lack of transportation kept you from medical appointments or from getting medications?: No  In the past 12 months, has lack of transportation kept you from meetings, work, or from getting things needed for daily living?: No  Was application for public transport provided?: N/A        DISCHARGE DETAILS:    Discharge planning discussed with[de-identified] Patient and Inessa Noriega (Daughter) [phone] (M) contacted for additional details  Freedom of Choice: Yes  Comments - Freedom of Choice: Pt states she is expecting to discharge home  Pt is refusing STR    CM contacted family/caregiver?: Yes  Were Treatment Team discharge recommendations reviewed with patient/caregiver?: Yes  Did patient/caregiver verbalize understanding of patient care needs?: Yes  Were patient/caregiver advised of the risks associated with not following Treatment Team discharge recommendations?: Yes    Contacts  Patient Contacts: Inessa Noriega (Daughter) [phone] (M)  Relationship to Patient[de-identified] Family  Contact Method: Phone  Phone Number: Inessa Noriega (Daughter) [phone] Silvina Haas  Reason/Outcome: Continuity of Care, Emergency Contact, Discharge [address]         Is the patient interested in Chen Diaz at discharge?: Yes  [address] requested[de-identified] Medical Social Work, Nursing, Occupational Therapy, Physical [address] Name[de-identified] Other  [facility][de-identified] PCP  [address] Needed[de-identified] Evaluate Functional Status and Safety, Gait/ADL Training, Post-Op Care and Assessment, Strengthening/Theraputic Exercises to Improve Function, Wound/Ostomy Care, Oxygen Via Nasal Cannula  Oxygen LPM Ordered (if applicable based on home health services needed):: 3 LPM (Chronic-DME provided by [address] [place] [address], [address] [facility])  Homebound Criteria Met[de-identified] Requires the Assistance of Another Person for Safe Ambulation or to Leave the Home, Uses an Assist Device (i e  cane, walker, etc)  Supporting Clincal Findings[de-identified] Fatigues Easliy in [employer], Limited Endurance, Requires Oxygen    Treatment Team Recommendation: Home with [address] at Discharge : BLS Ambulance (For [address] home )    Additional Comments: CM met with Pt and later spoke w/ Dtr Martita Macario by telephone to acquire information that Pt could not recall  CM discussed discharge outcomes post surgery-HHC v STR  Pt is refusing STR-Pt states she will accept Therapy at home  Pt currently receives weekly RN visits through [facility], as well as MD home visits through the same program  Pt and Dtr conformed that Dtr is home and provides Pt 24/7 care  CM requested contact information for Pts Jose at [address] [place] [address] [employer] team to coordinate care w/ Home PT/OT services  CM following

## 2023-02-01 NOTE — TELEPHONE ENCOUNTER
----- Message from Ronald Eller PA-C sent at 2/1/2023 11:23 AM EST -----  The above patient is admitted at [facility] and is s/p LLE angiogram with Dr Tesfaye Dawson Doctor  She will need outpatient follow up once she is discharged       Thanks  Cee Scott

## 2023-02-01 NOTE — ASSESSMENT & PLAN NOTE
· Patient with remote history of DVT previously anticoagulated on Eliquis  · Was on heparin drip for her hx of DVT however this was years ago therefore heparin drip was DC'd  · Eliquis on hold for podiatry intervention

## 2023-02-01 NOTE — ASSESSMENT & PLAN NOTE
· She reports not having a bowel movement in over 5 days    · Bowel regiment Colace twice daily, senna daily and MiraLAX as needed

## 2023-02-01 NOTE — OP NOTE
OPERATIVE REPORT - Podiatry  PATIENT NAME: Jocelyn Vazquez    :  1940  MRN: [id number]  Pt Location: [facility] OR ROOM 02    SURGERY DATE: 2023    Surgeon(s) and Role:     * Roxanne Benitez DPM - Primary     * Ana Laura Ahn DPM - Assisting    Pre-op Diagnosis:  Wound infection [T14  8XXA, L08 9]  Acute hematogenous osteomyelitis of left foot ([address] ) [M86 072]    Post-Op Diagnosis Codes:     * Wound infection [T14  8XXA, L08 9]     * Acute hematogenous osteomyelitis of left foot (HCC) [M86 072]    Procedure(s) (LRB):  RAY RESECTION FOOT Left 1st (Left)    Specimen(s):  ID Type Source Tests Collected by Time Destination   1 : LEFT GREAT TOE Tissue Toe, Left TISSUE EXAM Roxanne Benitez DPM 2023 1717    A : LEFT FIRST TOE CULTURE Wound Toe, Left ANAEROBIC CULTURE AND GRAM STAIN, WOUND CULTURE Roxanne Benitez DPM 2023 1716        Estimated Blood Loss:   Minimal    Drains:  * No LDAs found *    Anesthesia Type:   Choice with 20 ml of 1% Lidocaine and 0 5% Bupivacaine in a 1:1 mixture    Hemostasis:  Direct compression, electrocautery    Materials:  * No implants in log *      Injectables:  None    Operative Findings:  Consistent with Diagnosis    Complications:   None    Procedure and Technique:     Under mild sedation, the patient was brought into the operating room and remained on her litter in the supine position  IV sedation was achieved by anesthesia team and a universal timeout was performed where all parties are in agreement of correct patient, correct procedure and correct site  A regional block was performed using local anesthesia  The foot was then prepped and draped in the usual aseptic manner  Attention was drawn to the left great toe and medial ulceration measuring 1 5x 1 2 x 0 5cm  Incision was drain out with a marking pen and the incision was made with #15 blade circumferentially around digit  Using a towel clamp and blade the toe and 1st metatarsal was exposed  Sagittal saw was used to resect the first metatarsal head and shaft  Specimen was then removed from surgical site and placed on the back table, at this point cultures were taken from the amputated specimen  And specimen was sent to pathology  In the first interspace there is noted to be a large amount of what appeared to be gouty tophi, this was sent with the original specimen to pathology  Amputation site was cleaned of all non-viable soft-tissue using a rongeur  When healthy bleeding base remained with bleeding surrounding skin edges the site was flushed with copious amounts of sterile saline  Skin edges were approximated with nylon suture without tension on skin  Foot was cleaned and dried  The incision site was dressed with Xeroform, and gauze  This was then covered with a Linda and an ACE wrap  The patient tolerated the procedure and anesthesia well without immediate complications and transferred to PACU with vital signs stable  As with many limb salvage procedures, we contemplate the possibility of performing further stages to this procedure  Procedures may include debridements, delayed closure, plastic surgery techniques, or more proximal amputations  This procedure may be considered part of a multi-staged limb salvage treatment plan  Dr Lexie Gomez was present during the entire procedure and participated in all key aspects  SIGNATURE: Anant Alvarez, DPTANYA  DATE: February 1, 2023  TIME: 5:46 PM      Portions of the record may have been created with voice recognition software  Occasional wrong word or "sound a like" substitutions may have occurred due to the inherent limitations of voice recognition software  Read the chart carefully and recognize, using context, where substitutions have occurred

## 2023-02-01 NOTE — ASSESSMENT & PLAN NOTE
· Patient initially presented to the [address] [place] [address] with blisters on the left great toe that progressed to open wounds  She was prescribed keflex and doxycycline outpatient with no resolution  · Was transferred to [facility] for further vascular evaluation  · Xray of left foot 1/24 negative for osteomyelitis   · MRI left foot 1/25/23: "Ulcer with evidence of small adjacent medial, distal 1st MTP osteomyelitis   Altered marrow signal intensity at the proximal-medial aspect of the 1st toe proximal phalanx is less specific and may be early osteomyelitis consult podiatry for further recommendations "  · S/p cefepime x 7 days   · Now on solo vancomycin per ID- day #9  · Underwent angiogram 1/30/23 with successful treatment of high grade long segment SFA stenosis & B EIA stenosis; peroneal and PT runoff  · Switched ASA to plavix for new PAD stents- per vascular  · AD final report pending   · Podiatry inpatient to OR today for left first ray resection

## 2023-02-01 NOTE — ASSESSMENT & PLAN NOTE
Lab Results   Component Value Date    HGBA1C 7 5 (H) 01/23/2023     Recent Labs     01/31/23  2048 02/01/23  0742 02/01/23  1107 02/01/23  1130   POCGLU 293* 70 91 146*   · On Lantus 8 units  · SSI with accucheks

## 2023-02-01 NOTE — PLAN OF CARE
Problem: Prexisting or High Potential for Compromised Skin Integrity  Goal: Skin integrity is maintained or improved  Description: INTERVENTIONS:  - Identify patients at risk for skin breakdown  - Assess and monitor skin integrity  - Assess and monitor nutrition and hydration status  - Monitor labs   - Assess for incontinence   - Turn and reposition patient  - Assist with mobility/ambulation  - Relieve pressure over bony prominences  - Avoid friction and shearing  - Provide appropriate hygiene as needed including keeping skin clean and dry  - Evaluate need for skin moisturizer/barrier cream  - Collaborate with interdisciplinary team   - Patient/family teaching  - Consider wound care consult   Outcome: Progressing     Problem: PAIN - ADULT  Goal: Verbalizes/displays adequate comfort level or baseline comfort level  Description: Interventions:  - Encourage patient to monitor pain and request assistance  - Assess pain using appropriate pain scale  - Administer analgesics based on type and severity of pain and evaluate response  - Implement non-pharmacological measures as appropriate and evaluate response  - Consider cultural and social influences on pain and pain management  - Notify physician/advanced practitioner if interventions unsuccessful or patient reports new pain  Outcome: Progressing     Problem: INFECTION - ADULT  Goal: Absence or prevention of progression during hospitalization  Description: INTERVENTIONS:  - Assess and monitor for signs and symptoms of infection  - Monitor lab/diagnostic results  - Monitor all insertion sites, i e  indwelling lines, tubes, and drains  - Monitor endotracheal if appropriate and nasal secretions for changes in amount and color  - [place] appropriate cooling/warming therapies per order  - Administer medications as ordered  - Instruct and encourage patient and family to use good hand hygiene technique  - Identify and instruct in appropriate isolation precautions for identified infection/condition  Outcome: Progressing     Problem: SAFETY ADULT  Goal: Patient will remain free of falls  Description: INTERVENTIONS:  - Educate patient/family on patient safety including physical limitations  - Instruct patient to call for assistance with activity   - Consult OT/PT to assist with strengthening/mobility   - Keep Call bell within reach  - Keep bed low and locked with side rails adjusted as appropriate  - Keep care items and personal belongings within reach  - Initiate and maintain comfort rounds  - Make Fall Risk Sign visible to staff  - Offer Toileting every 2 Hours, in advance of need  - Initiate/Maintain bed alarm  - Apply yellow socks and bracelet for high fall risk patients  - Consider moving patient to room near nurses station  Outcome: Progressing     Problem: DISCHARGE PLANNING  Goal: Discharge to home or other facility with appropriate resources  Description: INTERVENTIONS:  - Identify barriers to discharge w/patient and caregiver  - Arrange for needed discharge resources and transportation as appropriate  - Identify discharge learning needs (meds, wound care, etc )  - Arrange for interpretive services to assist at discharge as needed  - Refer to Case Management Department for coordinating discharge planning if the patient needs post-hospital services based on physician/advanced practitioner order or complex needs related to functional status, cognitive ability, or social support system  Outcome: Progressing     Problem: Knowledge Deficit  Goal: Patient/family/caregiver demonstrates understanding of disease process, treatment plan, medications, and discharge instructions  Description: Complete learning assessment and assess knowledge base    Interventions:  - Provide teaching at level of understanding  - Provide teaching via preferred learning methods  Outcome: Progressing

## 2023-02-02 ENCOUNTER — APPOINTMENT (OUTPATIENT)
Dept: RADIOLOGY | Facility: HOSPITAL | Age: 83
End: 2023-02-02

## 2023-02-02 LAB
ANION GAP SERPL CALCULATED.3IONS-SCNC: 4 MMOL/L (ref 4–13)
BUN SERPL-MCNC: 15 MG/DL (ref 5–25)
CALCIUM SERPL-MCNC: 7.7 MG/DL (ref 8.4–10.2)
CHLORIDE SERPL-SCNC: 107 MMOL/L (ref 96–108)
CO2 SERPL-SCNC: 28 MMOL/L (ref 21–32)
CREAT SERPL-MCNC: 0.96 MG/DL (ref 0.6–1.3)
ERYTHROCYTE [DISTWIDTH] IN BLOOD BY AUTOMATED COUNT: 16.3 % (ref 11.6–15.1)
GFR SERPL CREATININE-BSD FRML MDRD: 56 ML/MIN/1.73SQ M
GLUCOSE SERPL-MCNC: 107 MG/DL (ref 65–140)
GLUCOSE SERPL-MCNC: 109 MG/DL (ref 65–140)
GLUCOSE SERPL-MCNC: 163 MG/DL (ref 65–140)
GLUCOSE SERPL-MCNC: 163 MG/DL (ref 65–140)
GLUCOSE SERPL-MCNC: 175 MG/DL (ref 65–140)
HCT VFR BLD AUTO: 21.7 % (ref 34.8–46.1)
HCT VFR BLD AUTO: 24.1 % (ref 34.8–46.1)
HGB BLD-MCNC: 6.7 G/DL (ref 11.5–15.4)
HGB BLD-MCNC: 7.2 G/DL (ref 11.5–15.4)
MCH RBC QN AUTO: 27.1 PG (ref 26.8–34.3)
MCHC RBC AUTO-ENTMCNC: 30.9 G/DL (ref 31.4–37.4)
MCV RBC AUTO: 88 FL (ref 82–98)
PLATELET # BLD AUTO: 237 THOUSANDS/UL (ref 149–390)
PMV BLD AUTO: 10.8 FL (ref 8.9–12.7)
POTASSIUM SERPL-SCNC: 4.1 MMOL/L (ref 3.5–5.3)
RBC # BLD AUTO: 2.47 MILLION/UL (ref 3.81–5.12)
SODIUM SERPL-SCNC: 139 MMOL/L (ref 135–147)
WBC # BLD AUTO: 9.77 THOUSAND/UL (ref 4.31–10.16)

## 2023-02-02 RX ORDER — POLYETHYLENE GLYCOL 3350 17 G/17G
17 POWDER, FOR SOLUTION ORAL DAILY
Status: DISCONTINUED | OUTPATIENT
Start: 2023-02-02 | End: 2023-02-04 | Stop reason: HOSPADM

## 2023-02-02 RX ORDER — BISACODYL 10 MG
10 SUPPOSITORY, RECTAL RECTAL DAILY PRN
Status: DISCONTINUED | OUTPATIENT
Start: 2023-02-02 | End: 2023-02-04 | Stop reason: HOSPADM

## 2023-02-02 RX ORDER — FUROSEMIDE 10 MG/ML
40 INJECTION INTRAMUSCULAR; INTRAVENOUS ONCE
Status: COMPLETED | OUTPATIENT
Start: 2023-02-02 | End: 2023-02-02

## 2023-02-02 RX ORDER — FUROSEMIDE 40 MG/1
40 TABLET ORAL DAILY
Status: DISCONTINUED | OUTPATIENT
Start: 2023-02-02 | End: 2023-02-04 | Stop reason: HOSPADM

## 2023-02-02 RX ADMIN — ALLOPURINOL 50 MG: 100 TABLET ORAL at 08:59

## 2023-02-02 RX ADMIN — MORPHINE SULFATE 2 MG: 2 INJECTION, SOLUTION INTRAMUSCULAR; INTRAVENOUS at 06:05

## 2023-02-02 RX ADMIN — OXYCODONE HYDROCHLORIDE 10 MG: 10 TABLET ORAL at 03:08

## 2023-02-02 RX ADMIN — Medication 250 MG: at 09:00

## 2023-02-02 RX ADMIN — POLYETHYLENE GLYCOL 3350 17 G: 17 POWDER, FOR SOLUTION ORAL at 10:51

## 2023-02-02 RX ADMIN — BISACODYL 10 MG: 10 SUPPOSITORY RECTAL at 16:41

## 2023-02-02 RX ADMIN — APIXABAN 5 MG: 5 TABLET, FILM COATED ORAL at 10:51

## 2023-02-02 RX ADMIN — ATORVASTATIN CALCIUM 10 MG: 10 TABLET, FILM COATED ORAL at 16:40

## 2023-02-02 RX ADMIN — PANTOPRAZOLE SODIUM 40 MG: 40 TABLET, DELAYED RELEASE ORAL at 06:05

## 2023-02-02 RX ADMIN — ACETAMINOPHEN 650 MG: 325 TABLET ORAL at 12:57

## 2023-02-02 RX ADMIN — DOCUSATE SODIUM 100 MG: 100 CAPSULE, LIQUID FILLED ORAL at 17:46

## 2023-02-02 RX ADMIN — TRAZODONE HYDROCHLORIDE 50 MG: 50 TABLET ORAL at 21:48

## 2023-02-02 RX ADMIN — OXYCODONE HYDROCHLORIDE 10 MG: 10 TABLET ORAL at 22:00

## 2023-02-02 RX ADMIN — INSULIN LISPRO 1 UNITS: 100 INJECTION, SOLUTION INTRAVENOUS; SUBCUTANEOUS at 21:47

## 2023-02-02 RX ADMIN — OXYBUTYNIN CHLORIDE 5 MG: 5 TABLET, EXTENDED RELEASE ORAL at 08:59

## 2023-02-02 RX ADMIN — MIRTAZAPINE 30 MG: 15 TABLET, FILM COATED ORAL at 21:48

## 2023-02-02 RX ADMIN — SERTRALINE HYDROCHLORIDE 50 MG: 50 TABLET ORAL at 21:47

## 2023-02-02 RX ADMIN — MORPHINE SULFATE 2 MG: 2 INJECTION, SOLUTION INTRAMUSCULAR; INTRAVENOUS at 20:10

## 2023-02-02 RX ADMIN — SUCRALFATE 1 G: 1 TABLET ORAL at 16:38

## 2023-02-02 RX ADMIN — OXYCODONE HYDROCHLORIDE 10 MG: 10 TABLET ORAL at 14:56

## 2023-02-02 RX ADMIN — SODIUM CHLORIDE 50 ML/HR: 0.9 INJECTION, SOLUTION INTRAVENOUS at 00:27

## 2023-02-02 RX ADMIN — INSULIN GLARGINE 8 UNITS: 100 INJECTION, SOLUTION SUBCUTANEOUS at 21:58

## 2023-02-02 RX ADMIN — HEPARIN SODIUM 5000 UNITS: 5000 INJECTION INTRAVENOUS; SUBCUTANEOUS at 06:05

## 2023-02-02 RX ADMIN — OXYCODONE HYDROCHLORIDE 10 MG: 10 TABLET ORAL at 08:59

## 2023-02-02 RX ADMIN — MORPHINE SULFATE 2 MG: 2 INJECTION, SOLUTION INTRAMUSCULAR; INTRAVENOUS at 00:19

## 2023-02-02 RX ADMIN — APIXABAN 5 MG: 5 TABLET, FILM COATED ORAL at 17:46

## 2023-02-02 RX ADMIN — METOPROLOL TARTRATE 50 MG: 50 TABLET, FILM COATED ORAL at 08:59

## 2023-02-02 RX ADMIN — VANCOMYCIN HYDROCHLORIDE 750 MG: 750 INJECTION, SOLUTION INTRAVENOUS at 09:04

## 2023-02-02 RX ADMIN — METOPROLOL TARTRATE 50 MG: 50 TABLET, FILM COATED ORAL at 21:48

## 2023-02-02 RX ADMIN — DOCUSATE SODIUM 100 MG: 100 CAPSULE, LIQUID FILLED ORAL at 08:59

## 2023-02-02 RX ADMIN — AMLODIPINE BESYLATE 5 MG: 5 TABLET ORAL at 08:59

## 2023-02-02 RX ADMIN — FUROSEMIDE 40 MG: 10 INJECTION, SOLUTION INTRAVENOUS at 09:04

## 2023-02-02 RX ADMIN — CLOPIDOGREL BISULFATE 75 MG: 75 TABLET ORAL at 09:00

## 2023-02-02 RX ADMIN — GABAPENTIN 300 MG: 300 CAPSULE ORAL at 08:59

## 2023-02-02 RX ADMIN — GABAPENTIN 300 MG: 300 CAPSULE ORAL at 16:38

## 2023-02-02 RX ADMIN — ROPINIROLE 0.5 MG: 1 TABLET, FILM COATED ORAL at 21:47

## 2023-02-02 RX ADMIN — FUROSEMIDE 40 MG: 40 TABLET ORAL at 13:25

## 2023-02-02 RX ADMIN — INSULIN LISPRO 2 UNITS: 100 INJECTION, SOLUTION INTRAVENOUS; SUBCUTANEOUS at 11:49

## 2023-02-02 RX ADMIN — PREDNISONE 2.5 MG: 1 TABLET ORAL at 08:59

## 2023-02-02 RX ADMIN — SUCRALFATE 1 G: 1 TABLET ORAL at 06:05

## 2023-02-02 RX ADMIN — NICOTINE 7 MG/24 HR DAILY TRANSDERMAL PATCH 1 PATCH: at 09:00

## 2023-02-02 RX ADMIN — GABAPENTIN 300 MG: 300 CAPSULE ORAL at 21:47

## 2023-02-02 RX ADMIN — INSULIN LISPRO 2 UNITS: 100 INJECTION, SOLUTION INTRAVENOUS; SUBCUTANEOUS at 16:39

## 2023-02-02 RX ADMIN — FLUTICASONE FUROATE AND VILANTEROL TRIFENATATE 1 PUFF: 100; 25 POWDER RESPIRATORY (INHALATION) at 09:02

## 2023-02-02 RX ADMIN — SENNOSIDES 8.6 MG: 8.6 TABLET ORAL at 08:59

## 2023-02-02 RX ADMIN — UMECLIDINIUM 1 PUFF: 62.5 AEROSOL, POWDER ORAL at 09:02

## 2023-02-02 NOTE — ASSESSMENT & PLAN NOTE
Lab Results   Component Value Date    HGBA1C 7 5 (H) 01/23/2023     Recent Labs     02/01/23  1130 02/01/23  1751 02/01/23  2104 02/02/23  0728   POCGLU 146* 78 184* 107   · On Lantus 8 units  · SSI with accucheks

## 2023-02-02 NOTE — ASSESSMENT & PLAN NOTE
· Secondary to COPD  · Currently at baseline 3L NC   · Status post Lasix 40 mg IV  · Chest x-ray ordered

## 2023-02-02 NOTE — ANESTHESIA POSTPROCEDURE EVALUATION
Post-Op Assessment Note    CV Status:  Stable    Pain management: adequate     Mental Status:  Alert and awake   Hydration Status:  Euvolemic   PONV Controlled:  Controlled   Airway Patency:  Patent      Post Op Vitals Reviewed: Yes      Staff: Anesthesiologist         No notable events documented

## 2023-02-02 NOTE — ASSESSMENT & PLAN NOTE
Lab Results   Component Value Date    EGFR 56 02/02/2023    EGFR 53 02/01/2023    EGFR 47 01/31/2023    CREATININE 0 96 02/02/2023    CREATININE 1 00 02/01/2023    CREATININE 1 11 01/31/2023   · Appears to be patients baseline   · Continue to monitor BMP  · Nephrology consulted appreciate recommendations

## 2023-02-02 NOTE — PHYSICAL THERAPY NOTE
PT EVALUATION 09:07-09:27    82 y o     [id number]    Acute hematogenous osteomyelitis of left foot (HCC) [T94 [id number]]    Past Medical History:   Diagnosis Date    Asthma     CHF (congestive heart failure) (HCC)     COPD (chronic obstructive pulmonary disease) ([facility] )     Diabetes mellitus ([facility] )     [facility] of [facility] limb, acute [facility] )     Hypertension     Renal disorder          Past Surgical History:   Procedure Laterality Date    APPENDECTOMY       SECTION      HYSTERECTOMY      IR LOWER EXTREMITY ANGIOGRAM  2023    [place] AMPUTATION METATARSAL W/TOE SINGLE Left 2023    Procedure: RAY RESECTION FOOT Left 1st;  Surgeon: Ariel Pod, DPM;  Location: [facility];  Service: Podiatry    [place] [facility] [facility] 3RD+ ORD SLCTV ABDL PEL/LXTR [facility] N/A 2023    Procedure: R CFA access w/ 6F sheath and Mynx closure Aortogram LLE angiogram R EIA PTA w/ 6x60mm  L EIA PTA w/ 6x60mm  L SFA PTA w/ 5t638nf Kofi POBA and 1o748xo [place] DCB L SFA stents: 5x27mm Express LD (prox) and 6x60mm Innova (dist); Surgeon: Renetta Guzman MD;  Location: [facility];  Service: Vascular        23 0907   PT Last Visit   PT Visit Date 23   Note Type   Note type Evaluation   Pain Assessment   Pain Score 10 - Worst Possible Pain  ('11")   Pain Location/Orientation Orientation: Left; Location: Leg   Restrictions/Precautions   Weight Bearing Precautions Per Order Yes   LLE Weight Bearing Per Order WBAT   Braces or Orthoses Other (Comment)  (in darco wedge shoe )   Other Precautions Bed Alarm; Chair Alarm;Multiple lines; Fall Risk;Pain   Home Living   Type of Home Apartment   Home Layout Performs ADLs on one level;Stairs to enter with rails  ([address])   Bathroom Shower/Tub Tub/shower unit   Bathroom Toilet Standard   Bathroom Equipment Shower chair;Grab bars in shower   [address] [place]   Additional Comments resides with daughter and friend in apt with [address]   Denies being home alone  Prior Function   Level of [place] Independent with ADLs; Independent with functional mobility; Needs assistance with IADLS   Lives With Daughter   Receives Help From Family   IADLs Family/Friend/Other provides transportation; Family/Friend/Other provides meals; Family/Friend/Other provides medication management   Falls in the last 6 months 0  (denies)   Vocational Retired   Comments Reports ambulation primarily household distances with RW support and 3L O2  General   Additional Pertinent History Pt is s/p Agram and 1st ray resection  PT consulted  WBAT with darco wedge shoe  Family/Caregiver Present No   Cognition   Overall Cognitive Status Impaired   Attention Attends with cues to redirect   Orientation Level Oriented X4   Following Commands Follows one step commands with increased time or repetition   Comments ? insight into impairments  Subjective   Subjective "I just can't today "  Reports pain 11/10 and having trouble breathing  RUE Assessment   RUE Assessment WFL  (grossly at least 3/5 observed with functional reach and grasp )   LUE Assessment   LUE Assessment WFL  (grossly at least 3/5 observed with functional reach and grasp )   RLE Assessment   RLE Assessment WFL   Strength RLE   RLE Overall Strength 3+/5   LLE Assessment   LLE Assessment X  (pain limiting evaluation, unable to move 2* pain at this time )   Bed Mobility   Supine to Sit 5  Supervision  (long sit in bed )   Additional Comments Perform supine to long sit briefly  Transfers   Sit to Stand Unable to assess   Additional Comments declines OOB 2* pain, SOB  Endurance Deficit   Endurance Deficit Yes   Endurance Deficit Description fatigue, pain, weakness  Activity Tolerance   Activity Tolerance Treatment limited secondary to medical complications (Comment); Patient limited by pain   Medical Staff Made Aware Nurse, Cherie Mata: Pt seen for co-evaluation/treatment with skilled Occupational Therapist 2* clinically unstable/unpredictable presentation, medical complexity, fall risk, functional/physical limitations, impaired functional balance, decreased safety awareness, limited activity tolerance which is decline from PLOF and may impact overall functional mobility/mobility safety  Nurse Made Aware yes   Assessment   Prognosis Guarded   Problem List Decreased strength;Decreased range of motion;Decreased endurance; Impaired balance;Decreased mobility;Obesity; Decreased skin integrity;Orthopedic restrictions;Pain   Assessment Jose Zacarias is a [de-identified] y o  female with a PMH of DM2, CKD3, PAD, chronic respiratory failure on 3L O2 who presents with osteomyelitis of the left great toe  S/P LLE agram on 1/30 and now s/p L first ray resection on 2/1  PT consulted  WBAT to LLE post operatively and activity as tolerated orders  Toe unloading shoe ordered  Prior to admission resides with daughter in apt with [address]  Household ambulation distances with RW support  Currently presents with significant functional limitations related to impairments in strength, balance, activity tolerance, functional mobility and locomotion ability  Increased pain and SOB  Limited mobility assessment to long sitting only in which is able to complete with S with HOB elevation  Given impairments will benefit from skilled PT in order to optimize functional outcomes  The patient's AM-PAC Basic Mobility Inpatient Short Form Raw Score is 8   A Raw score of less than or equal to 16 suggests the patient may benefit from discharge to post-acute rehabilitation services  Please also refer to the recommendation of the Physical Therapist for safe discharge planning  At this time STR is recommended  Barriers to Discharge Inaccessible home environment   Barriers to Discharge Comments [address]   Goals   Patient Goals go home   STG Expiration Date 02/10/23   Short Term Goal #1 10 days: 1)    Pt will perform bed mobility with Daphne demonstrating appropriate technique 100% of the time in order to improve function  2)  Further assess functional mobility and revise goals  3)  Improve overall strength and balance 1/2 grade in order to optimize ability to perform functional tasks and reduce fall risk  5) Increase activity tolerance to 30 minutes in order to improve endurance to functional tasks  6) PT for ongoing patient and family/caregiver education, DME needs and d/c planning in order to promote highest level of function in least restrictive environment  Plan   Treatment/Interventions Functional transfer training;LE strengthening/ROM; Elevations; Therapeutic exercise; Endurance training;Patient/family training;Equipment eval/education; Bed mobility;Gait training; Compensatory technique education;Continued evaluation;Spoke to nursing;OT   PT Frequency 3-5x/wk   Recommendation   PT Discharge Recommendation Post acute rehabilitation services   AM-PAC Basic Mobility Inpatient   Turning in Flat Bed Without Bedrails 2   Lying on Back to Sitting on Edge of Flat Bed Without Bedrails 2   Moving Bed to Chair 1   Standing Up From Chair Using Arms 1   Walk in Room 1   Climb 3-5 Stairs With Railing 1   Basic Mobility Inpatient Raw Score 8   Turning Head Towards Sound 4   Follow Simple Instructions 3   Low Function Basic Mobility Raw Score 15   Low Function Basic Mobility Standardized Score 23 9   Highest Level Of Mobility   -HL Goal 3: Sit at edge of bed   -HL Achieved 2: Bed activities/Dependent transfer   End of Consult   Patient Position at End of Consult Supine;Bed/Chair alarm activated; All needs within reach     Hx/personal factors: co-morbidities, inaccessible home, dec caregiver support, advanced age, mutliple lines, telemetry, use of AD, pain, WB restrictions, fall risk, assist w/ ADL's, and O2  Examination: dec mobility, dec balance, dec endurance, dec amb, risk for falls, pain, assessed body system, balance, endurance, amb, D/C disposition & fall risk  Clinical: unpredictable (ongoing medical status, abnormal lab values, risk for falls, and pain mgt)  Complexity: high           Estrellita Donohue, PT

## 2023-02-02 NOTE — PROGRESS NOTES
[address] [place] [address]  Progress Note - Selma Valles 1940, [de-identified] y o  female MRN: [id number]  Unit/Bed#: E5 -01 Encounter: [id number]  Primary Care Provider: Mariola Mullins MD   Date and time admitted to hospital: 1/29/2023  2:19 AM    * Acute hematogenous osteomyelitis of left foot ([place] [address] )  Assessment & Plan  · Patient initially presented to the [address] [place] [address] with blisters on the left great toe that progressed to open wounds  She was prescribed keflex and doxycycline outpatient with no resolution  · Was transferred to [address] [place] [address] for further vascular evaluation  · Xray of left foot 1/24 negative for osteomyelitis   · MRI left foot 1/25/23: "Ulcer with evidence of small adjacent medial, distal 1st MTP osteomyelitis  Altered marrow signal intensity at the proximal-medial aspect of the 1st toe proximal phalanx is less specific and may be early osteomyelitis consult podiatry for further recommendations "  · S/p cefepime x 7 days   · Now on solo vancomycin per ID- day #10  · Underwent angiogram 1/30/23 with successful treatment of high grade long segment SFA stenosis & B EIA stenosis; peroneal and PT runoff  · Switched ASA to plavix for new PAD stents- per vascular  · AD showing no significant change in the disease process on the right and improvement noted in the left AD  2 study done 1/25/2023  · Postop day 1 left first ray resection  · PT/OT recommending acute rehab     PAD (peripheral artery disease) ([place] [address] )  Assessment & Plan  · Patient had arterial doppler studies of bilateral extremities while at [address] [place] [address]  · RLL: There is a 50-75% stenosis noted in the mid superficial femoral artery with diffuse atherosclerotic disease throughout the remaining femoro-popliteal andtibio-peroneal segments  AD 0 42  · LLL: There is a 50-75% stenosis noted in the prox and distal superficial femoral artery and an occlusion vs high grade stenosis of the distal anterior tibial artery   Diffuse atherosclerotic disease throughout the remaining femoro-popliteal and tibio-peroneal segments  AD 0 39  · Patient transferred to [facility] for angiogram - performed 1/30 with successful stenting  · POD day 1 left first ray resection    Anemia  Assessment & Plan  Results from last 7 days   Lab Units 02/02/23  0505 02/01/23  0900 02/01/23  0541 01/31/23  0931   HEMOGLOBIN g/dL 6 7* 8 1* 7 0* 8 2*   · Previous baseline appears to be in the 8-9's  · Hemoglobin on a m  labs 6 7 repeat H&H pending transfuse if still less than 7   · Type and screen and blood consent obtained  · Transfuse for hemoglobin less than 7  · Iron panel obtained-iron saturation 21, TIBC 220, iron 47, ferritin 90    Stage 3b chronic kidney disease ([facility] [address] )  Assessment & Plan  Lab Results   Component Value Date    EGFR 56 02/02/2023    EGFR 53 02/01/2023    EGFR 47 01/31/2023    CREATININE 0 96 02/02/2023    CREATININE 1 00 02/01/2023    CREATININE 1 11 01/31/2023   · Appears to be patients baseline   · Continue to monitor BMP  · Nephrology consulted appreciate recommendations    Chronic respiratory failure with hypoxia and hypercapnia (HCC)  Assessment & Plan  · Secondary to COPD  · Currently at baseline 3L NC   · Status post Lasix 40 mg IV  · Chest x-ray ordered    COPD (chronic obstructive pulmonary disease) ([facility] [address] )  Assessment & Plan  · No acute exacerbation  · Patient is currently on baseline oxygen requirements of 3L NC  · Continue home regimen of Advair substituted to Breo, Spiriva and albuterol inhaler/neb prednisone 2 5 mg daily as per home regimen    Type 2 diabetes mellitus with skin complication, with long-term current use of insulin [facility])  Assessment & Plan  Lab Results   Component Value Date    HGBA1C 7 5 (H) 01/23/2023     Recent Labs     02/01/23  1130 02/01/23  1751 02/01/23  2104 02/02/23  0728   POCGLU 146* 78 184* 107   · On Lantus 8 units  · SSI with accucheks    Constipation  Assessment & Plan  · She reports not having a bowel movement in over 5 days    · Bowel regiment Colace twice daily, senna daily and Miralax daily  · Suppository as needed    History of DVT (deep vein thrombosis)  Assessment & Plan  · Patient with remote history of DVT previously anticoagulated on Eliquis  · Was on heparin drip for her hx of DVT however this was years ago therefore heparin drip was DC'd  · Eliquis on hold for podiatry intervention    Chronic, continuous use of opioids  Assessment & Plan  · Patient chronically on oxycodone, morphine, and movantik at home   · PDMP reviewed  · Pain medications increased in the setting of ischemic left foot pain    VTE Pharmacologic Prophylaxis: VTE Score: 9 High Risk (Score >/= 5) - Pharmacological DVT Prophylaxis Ordered: heparin  Sequential Compression Devices Ordered  Patient Centered Rounds: I performed bedside rounds with nursing staff today  Discussions with Specialists or Other Care Team Provider: podiatry    Education and Discussions with Family / Patient: Updated  (daughter) via phone  Time Spent for Care: 30 minutes  More than 50% of total time spent on counseling and coordination of care as described above  Current Length of Stay: 4 day(s)  Current Patient Status: Inpatient   Certification Statement: The patient will continue to require additional inpatient hospital stay due to osteomyelitis of left foot requiring iv antibiotics  Discharge Plan: Anticipate discharge in 48 hrs to discharge location to be determined pending rehab evaluations  Code Status: Level 3 - DNAR and DNI    Subjective:   Patient was seen laying in bed today  She reports having a lot of pain today  She states that the pain medication is helping but she is still uncomfortable  She reports feeling more SOB today  She has not had a bowel movement yet  She denies any other acute complaints at this time      Objective:     Vitals:   Temp (24hrs), Av 1 °F (36 7 °C), Min:97 1 °F (36 2 °C), Max:99 4 °F (37 4 °C)    Temp:  [97 1 °F (36 2 °C)-99 4 °F (37 4 °C)] 99 2 °F (37 3 °C)  HR:  [64-95] 86  Resp:  [14-22] 18  BP: ()/(53-76) 152/53  SpO2:  [89 %-98 %] 90 %  Body mass index is 26 69 kg/m²  Input and Output Summary (last 24 hours): Intake/Output Summary (Last 24 hours) at 2/2/2023 0944  Last data filed at 2/2/2023 0601  Gross per 24 hour   Intake 633 33 ml   Output 625 ml   Net 8 33 ml       Physical Exam:   Physical Exam  Vitals and nursing note reviewed  Constitutional:       Appearance: Normal appearance  HENT:      Head: Normocephalic and atraumatic  Eyes:      General: No scleral icterus  Cardiovascular:      Rate and Rhythm: Normal rate and regular rhythm  Pulmonary:      Effort: Pulmonary effort is normal       Comments: Decreased breath sounds at bases  Abdominal:      General: Abdomen is flat  Bowel sounds are normal       Palpations: Abdomen is soft  Tenderness: There is no abdominal tenderness  Skin:     General: Skin is warm and dry  Comments: Left foot in bandage   Neurological:      Mental Status: She is alert  Mental status is at baseline  Comments: Sensation intact to light touch bilaterally   Psychiatric:         Mood and Affect: Mood normal          Behavior: Behavior normal       Additional Data:     Labs:  Results from last 7 days   Lab Units 02/02/23  0505 01/30/23  1724 01/30/23  0732   WBC Thousand/uL 9 77   < > 7 36   HEMOGLOBIN g/dL 6 7*   < > 7 7*   HEMATOCRIT % 21 7*   < > 25 6*   PLATELETS Thousands/uL 237   < > 186   NEUTROS PCT %  --   --  54   LYMPHS PCT %  --   --  30   MONOS PCT %  --   --  9   EOS PCT %  --   --  5    < > = values in this interval not displayed       Results from last 7 days   Lab Units 02/02/23  0505 02/01/23  0541   SODIUM mmol/L 139 139   POTASSIUM mmol/L 4 1 4 1   CHLORIDE mmol/L 107 107   CO2 mmol/L 28 27   BUN mg/dL 15 18   CREATININE mg/dL 0 96 1 00   ANION GAP mmol/L 4 5   CALCIUM mg/dL 7 7* 7 7*   ALBUMIN g/dL  --  3 4*   TOTAL BILIRUBIN mg/dL  --  0 32   ALK PHOS U/L  --  46   ALT U/L  --  8   AST U/L  --  10*   GLUCOSE RANDOM mg/dL 109 77     Results from last 7 days   Lab Units 01/30/23  1913   INR  1 02     Results from last 7 days   Lab Units 02/02/23  0728 02/01/23  2104 02/01/23  1751 02/01/23  1130 02/01/23  1107 02/01/23  0742 01/31/23  2048 01/31/23  1623 01/31/23  1102 01/31/23  0741 01/30/23  2124 01/30/23  1620   POC GLUCOSE mg/dl 107 184* 78 146* 91 70 293* 146* 186* 124 165* 84               Lines/Drains:  Invasive Devices     Peripheral Intravenous Line  Duration           Peripheral IV 01/29/23 Left;Ventral (anterior) Forearm 4 days                      Imaging: No pertinent imaging reviewed      Recent Cultures (last 7 days):   Results from last 7 days   Lab Units 02/01/23  1716   GRAM STAIN RESULT  No Polys or Bacteria seen       Last 24 Hours Medication List:   Current Facility-Administered Medications   Medication Dose Route Frequency Provider Last Rate   • acetaminophen  650 mg Oral Q6H PRN Cristi Mcnair DPM     • albuterol  0 63 mg Nebulization 4x Daily PRN Cristi Mcnair DPM     • allopurinol  50 mg Oral Daily Cristi Mcnair DPM     • amLODIPine  5 mg Oral Daily Cristi Mcnair DPM     • atorvastatin  10 mg Oral Daily With Dinner Cristi Mcnair DPM     • bisacodyl  10 mg Rectal Daily PRN Fabiano Jones PA-C     • calcium carbonate  1,000 mg Oral Daily PRN Cristi Mcnair DPM     • clopidogrel  75 mg Oral Daily Cristi Mcnair DPM     • docusate sodium  100 mg Oral BID Cristi Mcnair DPM     • Fluticasone Furoate-Vilanterol  1 puff Inhalation Daily Cristi Mcnair DPM     • gabapentin  300 mg Oral TID Cristi Mcnair DPM     • heparin (porcine)  5,000 Units Subcutaneous [facility] Cristi Mcnair DPM     • insulin glargine  8 Units Subcutaneous HS Cristi Mcnair DPM     • insulin lispro  1-6 Units Subcutaneous HS Cristi Mcnair DPM     • insulin lispro  2-12 Units Subcutaneous TID AC Luciano Vijay, DPM     • metoprolol tartrate  50 mg Oral Q12H [address] Luciano Vijay, DPM     • mirtazapine  30 mg Oral HS Luciano Vijay, DPM     • morphine injection  2 mg Intravenous Q4H PRN Luciano Vijay, DPM     • nicotine  1 patch Transdermal Daily Luciano Vijay, DPM     • ondansetron  4 mg Intravenous Q8H PRN Luciano Vijay, DPM     • oxybutynin  5 mg Oral Daily Luciano Vijay, DPM     • oxyCODONE  10 mg Oral Q6H PRN Luciano Vijay, DPM     • oxyCODONE  5 mg Oral Q4H PRN Luciano Vijay, DPM     • pantoprazole  40 mg Oral Daily Luciano Vijay, DPM     • polyethylene glycol  17 g Oral Daily Sarah Bush PA-C     • predniSONE  2 5 mg Oral Daily Luciano Vijay, DPM     • rOPINIRole  0 5 mg Oral HS Luciano Vijay, DPM     • saccharomyces boulardii  250 mg Oral Daily Luciano Vijay, DPM     • senna  1 tablet Oral Daily Luciano Vijay, DPM     • sertraline  50 mg Oral HS Luciano Vijay, DPM     • sucralfate  1 g Oral BID AC Luciano Vijay, DPM     • traZODone  50 mg Oral HS Luciano Vijay, DPM     • umeclidinium  1 puff Inhalation Daily Luciano Vijay, DPM     • vancomycin  750 mg Intravenous Q24H Luciano Vijay,  mg (02/02/23 0904)        Today, Patient Was Seen By: Sarah Bush PA-C    **Please Note: This note may have been constructed using a voice recognition system  **

## 2023-02-02 NOTE — ASSESSMENT & PLAN NOTE
Results from last 7 days   Lab Units 02/02/23  0505 02/01/23  0900 02/01/23  0541 01/31/23  0931   HEMOGLOBIN g/dL 6 7* 8 1* 7 0* 8 2*   · Previous baseline appears to be in the 8-9's  · Hemoglobin on a m  labs 6 7 repeat H&H pending transfuse if still less than 7   · Type and screen and blood consent obtained  · Transfuse for hemoglobin less than 7  · Iron panel obtained-iron saturation 21, TIBC 220, iron 47, ferritin 90

## 2023-02-02 NOTE — PLAN OF CARE
Problem: Prexisting or High Potential for Compromised Skin Integrity  Goal: Skin integrity is maintained or improved  Description: INTERVENTIONS:  - Identify patients at risk for skin breakdown  - Assess and monitor skin integrity  - Assess and monitor nutrition and hydration status  - Monitor labs   - Assess for incontinence   - Turn and reposition patient  - Assist with mobility/ambulation  - Relieve pressure over bony prominences  - Avoid friction and shearing  - Provide appropriate hygiene as needed including keeping skin clean and dry  - Evaluate need for skin moisturizer/barrier cream  - Collaborate with interdisciplinary team   - Patient/family teaching  - Consider wound care consult   Outcome: Progressing     Problem: PAIN - ADULT  Goal: Verbalizes/displays adequate comfort level or baseline comfort level  Description: Interventions:  - Encourage patient to monitor pain and request assistance  - Assess pain using appropriate pain scale  - Administer analgesics based on type and severity of pain and evaluate response  - Implement non-pharmacological measures as appropriate and evaluate response  - Consider cultural and social influences on pain and pain management  - Notify physician/advanced practitioner if interventions unsuccessful or patient reports new pain  Outcome: Progressing     Problem: INFECTION - ADULT  Goal: Absence or prevention of progression during hospitalization  Description: INTERVENTIONS:  - Assess and monitor for signs and symptoms of infection  - Monitor lab/diagnostic results  - Monitor all insertion sites, i e  indwelling lines, tubes, and drains  - Monitor endotracheal if appropriate and nasal secretions for changes in amount and color  - [place] appropriate cooling/warming therapies per order  - Administer medications as ordered  - Instruct and encourage patient and family to use good hand hygiene technique  - Identify and instruct in appropriate isolation precautions for identified infection/condition  Outcome: Progressing     Problem: SAFETY ADULT  Goal: Patient will remain free of falls  Description: INTERVENTIONS:  - Educate patient/family on patient safety including physical limitations  - Instruct patient to call for assistance with activity   - Consult OT/PT to assist with strengthening/mobility   - Keep Call bell within reach  - Keep bed low and locked with side rails adjusted as appropriate  - Keep care items and personal belongings within reach  - Initiate and maintain comfort rounds  - Make Fall Risk Sign visible to staff  - Offer Toileting every  Hours, in advance of need  - Initiate/Maintain alarm  - Obtain necessary fall risk management equipment:   - Apply yellow socks and bracelet for high fall risk patients  - Consider moving patient to room near nurses station  Outcome: Progressing     Problem: DISCHARGE PLANNING  Goal: Discharge to home or other facility with appropriate resources  Description: INTERVENTIONS:  - Identify barriers to discharge w/patient and caregiver  - Arrange for needed discharge resources and transportation as appropriate  - Identify discharge learning needs (meds, wound care, etc )  - Arrange for interpretive services to assist at discharge as needed  - Refer to Case Management Department for coordinating discharge planning if the patient needs post-hospital services based on physician/advanced practitioner order or complex needs related to functional status, cognitive ability, or social support system  Outcome: Progressing     Problem: Knowledge Deficit  Goal: Patient/family/caregiver demonstrates understanding of disease process, treatment plan, medications, and discharge instructions  Description: Complete learning assessment and assess knowledge base    Interventions:  - Provide teaching at level of understanding  - Provide teaching via preferred learning methods  Outcome: Progressing

## 2023-02-02 NOTE — PLAN OF CARE
Problem: PHYSICAL THERAPY ADULT  Goal: Performs mobility at highest level of function for planned discharge setting  See evaluation for individualized goals  Description: Treatment/Interventions: Functional transfer training, LE strengthening/ROM, Elevations, Therapeutic exercise, Endurance training, Patient/family training, Equipment eval/education, Bed mobility, Gait training, Compensatory technique education, Continued evaluation, Spoke to nursing, OT          See flowsheet documentation for full assessment, interventions and recommendations  Note: Prognosis: Guarded  Problem List: Decreased strength, Decreased range of motion, Decreased endurance, Impaired balance, Decreased mobility, Obesity, Decreased skin integrity, Orthopedic restrictions, Pain  Assessment: Alecia Vera is a [de-identified] y o  female with a PMH of DM2, CKD3, PAD, chronic respiratory failure on 3L O2 who presents with osteomyelitis of the left great toe  S/P LLE agram on 1/30 and now s/p L first ray resection on 2/1  PT consulted  WBAT to LLE post operatively and activity as tolerated orders  Toe unloading shoe ordered  Prior to admission resides with daughter in apt with [address]  Household ambulation distances with RW support  Currently presents with significant functional limitations related to impairments in strength, balance, activity tolerance, functional mobility and locomotion ability  Increased pain and SOB  Limited mobility assessment to long sitting only in which is able to complete with S with HOB elevation  Given impairments will benefit from skilled PT in order to optimize functional outcomes  The patient's AM-PAC Basic Mobility Inpatient Short Form Raw Score is 8   A Raw score of less than or equal to 16 suggests the patient may benefit from discharge to post-acute rehabilitation services  Please also refer to the recommendation of the Physical Therapist for safe discharge planning   At this time STR is recommended  Barriers to Discharge: Inaccessible home environment  Barriers to Discharge Comments: [address]  PT Discharge Recommendation: Post acute rehabilitation services    See flowsheet documentation for full assessment

## 2023-02-02 NOTE — PROGRESS NOTES
NEPHROLOGY PROGRESS NOTE   Josy Bates [de-identified] y o  female MRN: [id number]  Unit/Bed#: E5 -01 Encounter: [id number]      ASSESSMENT/PLAN:  1  CKD stage IIIa: Baseline creatinine 1 2-1 4 likely due to diabetes, hypertension and atrophic left kidney  · Creatinine today stable slightly below baseline at 0 96  · Status post angiogram 1/30 but no current signs of contrast nephropathy  · Due to shortness of breath Will discontinue IV fluids  · Give Lasix 40 IV x1  2  Hypertension: Blood pressure above goal recently  · Continue amlodipine 5 mg daily, metoprolol 50 mg every 12 hours  · Home Lasix 40 mg p o  daily currently on hold, giving IV Lasix x1 now  3  Chronic hypoxic respiratory failure: On 3 L nasal cannula chronically  Currently appears acutely short of breath  · Lasix has been on hold so we will give Lasix 40 IV x1 now in case component of volume overload contributing to shortness of breath  4  Left foot osteomyelitis/PVD:   · Status post angiogram 1/30  · Status post left first ray resection yesterday  · IV vancomycin per ID    Plan Summary:   • D/c IVF   • Lasix 40mg IV x 1   • Consider CXR  • Check am BMP   • Above plan discussed with primary team who agree with this plan    SUBJECTIVE:  Complains of new acute shortness of breath which she states started this morning  She is sitting on the edge of her bed and does appear very short of breath      OBJECTIVE:  Current Weight: Weight - Scale: 75 kg (165 lb 5 5 oz)  Vitals:    02/02/23 0730   BP: 152/53   Pulse: 86   Resp: 18   Temp: 99 2 °F (37 3 °C)   SpO2: 90%       Intake/Output Summary (Last 24 hours) at 2/2/2023 0855  Last data filed at 2/2/2023 0601  Gross per 24 hour   Intake 633 33 ml   Output 625 ml   Net 8 33 ml       General: Ill-appearing  Skin:  No rash  Eyes:  Sclerae anicteric, no periorbital edema   ENT:  Moist mucous membranes  Neck:  Trachea midline, symmetric   Chest: Decreased breath sounds  CVS:  Regular rate and rhythm  Abdomen: Soft, nontender, nondistended  Neuro:  Awake and alert  Psych:  Appropriate affect  Extremities: no lower extremity edema       Medications:  Scheduled Meds:  Current Facility-Administered Medications   Medication Dose Route Frequency Provider Last Rate   • acetaminophen  650 mg Oral Q6H PRN [facility], DPM     • albuterol  0 63 mg Nebulization 4x Daily PRN [facility], DPM     • allopurinol  50 mg Oral Daily [facility], DPM     • amLODIPine  5 mg Oral Daily [facility], DPM     • atorvastatin  10 mg Oral Daily With Dinner [facility], DPM     • calcium carbonate  1,000 mg Oral Daily PRN [facility], DPM     • clopidogrel  75 mg Oral Daily [facility], DPM     • docusate sodium  100 mg Oral BID [facility], DPM     • Fluticasone Furoate-Vilanterol  1 puff Inhalation Daily [facility], DPM     • furosemide  40 mg Intravenous Once Celeste Brannon PA-C     • gabapentin  300 mg Oral TID [facility], DPM     • heparin (porcine)  5,000 Units Subcutaneous [facility], DPM     • insulin glargine  8 Units Subcutaneous HS [facility], DPM     • insulin lispro  1-6 Units Subcutaneous HS [facility], DPM     • insulin lispro  2-12 Units Subcutaneous TID AC [facility], DPM     • metoprolol tartrate  50 mg Oral Q12H [address] [facility], DPM     • mirtazapine  30 mg Oral HS [facility], DPM     • morphine injection  2 mg Intravenous Q4H PRN [facility], DPM     • nicotine  1 patch Transdermal Daily [facility], DPM     • ondansetron  4 mg Intravenous Q8H PRN [facility], DPM     • oxybutynin  5 mg Oral Daily [facility], DPM     • oxyCODONE  10 mg Oral Q6H PRN [facility], DPM     • oxyCODONE  5 mg Oral Q4H PRN [facility], DPM     • pantoprazole  40 mg Oral Daily [facility], DPM     • polyethylene glycol  17 g Oral Daily PRN Alvaro Pantoja, PEARL     • predniSONE  2 5 mg Oral Daily Alvaro Pantoja, RAQUELM     • rOPINIRole  0 5 mg Oral HS RAQUEL ChopraM     • saccharomyces boulardii  250 mg Oral Daily RAQUEL ChopraM     • senna  1 tablet Oral Daily RAQUEL ChopraM     • sertraline  50 mg Oral HS RAQUEL ChopraM     • sucralfate  1 g Oral BID AC RAQUEL ChopraM     • traZODone  50 mg Oral HS RAQUEL ChopraM     • umeclidinium  1 puff Inhalation Daily RAQUEL ChopraM     • vancomycin  750 mg Intravenous Q24H Alvaro Pantoja  mg (02/01/23 3711)       PRN Meds: •  acetaminophen  •  albuterol  •  calcium carbonate  •  morphine injection  •  ondansetron  •  oxyCODONE  •  oxyCODONE  •  polyethylene glycol    Laboratory Results:  Results from last 7 days   Lab Units 02/02/23  0505 02/01/23  0900 02/01/23  0541 01/31/23  0931 01/30/23  1724 01/30/23  0732 01/30/23  0500 01/29/23  0517 01/28/23  0554 01/27/23  1438 01/27/23  0449   WBC Thousand/uL 9 77  --  8 06 11 42*  --  7 36  --  10 56* 8 54 7 82  --    HEMOGLOBIN g/dL 6 7* 8 1* 7 0* 8 2* 8 5* 7 7*  --  9 3* 9 4* 9 0*  --    HEMATOCRIT % 21 7* 27 3* 23 4* 27 2* 28 6* 25 6*  --  30 9* 31 0* 29 7*  --    PLATELETS Thousands/uL 237  --  208 237  --  186  --  265 213 211  --    SODIUM mmol/L 139  --  139 138  --   --  138 139 141  --  138   POTASSIUM mmol/L 4 1  --  4 1 4 1  --   --  4 1 4 1 4 3  --  4 2   CHLORIDE mmol/L 107  --  107 103  --   --  103 102 103  --  102   CO2 mmol/L 28  --  27 30  --   --  31 31 33*  --  28   BUN mg/dL 15  --  18 20  --   --  28* 30* 33*  --  34*   CREATININE mg/dL 0 96  --  1 00 1 11  --   --  1 25 1 35* 1 40*  --  1 43*   CALCIUM mg/dL 7 7*  --  7 7* 8 2*  --   --  7 9* 8 5 8 5  --  8 2*

## 2023-02-02 NOTE — PROGRESS NOTES
Yumi Delarosa is a [de-identified] y o  female who is currently ordered Vancomycin IV with management by the Pharmacy Consult service  Relevant clinical data and objective / subjective history reviewed  Vancomycin Assessment:  Indication and Goal AUC/Trough: Soft tissue (goal -600, trough >10), -600, trough >10  Clinical Status: stable  Renal Function:  SCr:  0 96 mg/dL  CrCl: 48 4 mL/min  Renal replacement: Not on dialysis  Days of Therapy: 9  Current Dose: 750 mg IV every 24 hours  Vancomycin Plan:  New Dosing: continue regimen  Estimated Trough: 12 mcg/mL  Next Level: 2/5 with AM labs  Renal Function Monitoring: Daily BMP and UOP  Pharmacy will continue to follow closely for s/sx of nephrotoxicity, infusion reactions and appropriateness of therapy  BMP and CBC will be ordered per protocol  We will continue to follow the patient’s culture results and clinical progress daily      Yumiko Nuñez, HectorD

## 2023-02-02 NOTE — PLAN OF CARE
Problem: Prexisting or High Potential for Compromised Skin Integrity  Goal: Skin integrity is maintained or improved  Description: INTERVENTIONS:  - Identify patients at risk for skin breakdown  - Assess and monitor skin integrity  - Assess and monitor nutrition and hydration status  - Monitor labs   - Assess for incontinence   - Turn and reposition patient  - Assist with mobility/ambulation  - Relieve pressure over bony prominences  - Avoid friction and shearing  - Provide appropriate hygiene as needed including keeping skin clean and dry  - Evaluate need for skin moisturizer/barrier cream  - Collaborate with interdisciplinary team   - Patient/family teaching  - Consider wound care consult   Outcome: Progressing     Problem: PAIN - ADULT  Goal: Verbalizes/displays adequate comfort level or baseline comfort level  Description: Interventions:  - Encourage patient to monitor pain and request assistance  - Assess pain using appropriate pain scale  - Administer analgesics based on type and severity of pain and evaluate response  - Implement non-pharmacological measures as appropriate and evaluate response  - Consider cultural and social influences on pain and pain management  - Notify physician/advanced practitioner if interventions unsuccessful or patient reports new pain  Outcome: Progressing     Problem: INFECTION - ADULT  Goal: Absence or prevention of progression during hospitalization  Description: INTERVENTIONS:  - Assess and monitor for signs and symptoms of infection  - Monitor lab/diagnostic results  - Monitor all insertion sites, i e  indwelling lines, tubes, and drains  - Monitor endotracheal if appropriate and nasal secretions for changes in amount and color  - [place] appropriate cooling/warming therapies per order  - Administer medications as ordered  - Instruct and encourage patient and family to use good hand hygiene technique  - Identify and instruct in appropriate isolation precautions for identified infection/condition  Outcome: Progressing     Problem: SAFETY ADULT  Goal: Patient will remain free of falls  Description: INTERVENTIONS:  - Educate patient/family on patient safety including physical limitations  - Instruct patient to call for assistance with activity   - Consult OT/PT to assist with strengthening/mobility   - Keep Call bell within reach  - Keep bed low and locked with side rails adjusted as appropriate  - Keep care items and personal belongings within reach  - Initiate and maintain comfort rounds  - Make Fall Risk Sign visible to staff  - Offer Toileting every  Hours, in advance of need  - Initiate/Maintain alarm  - Obtain necessary fall risk management equipme  - Apply yellow socks and bracelet for high fall risk patients  - Consider moving patient to room near nurses station  Outcome: Progressing     Problem: DISCHARGE PLANNING  Goal: Discharge to home or other facility with appropriate resources  Description: INTERVENTIONS:  - Identify barriers to discharge w/patient and caregiver  - Arrange for needed discharge resources and transportation as appropriate  - Identify discharge learning needs (meds, wound care, etc )  - Arrange for interpretive services to assist at discharge as needed  - Refer to Case Management Department for coordinating discharge planning if the patient needs post-hospital services based on physician/advanced practitioner order or complex needs related to functional status, cognitive ability, or social support system  Outcome: Progressing     Problem: Knowledge Deficit  Goal: Patient/family/caregiver demonstrates understanding of disease process, treatment plan, medications, and discharge instructions  Description: Complete learning assessment and assess knowledge base    Interventions:  - Provide teaching at level of understanding  - Provide teaching via preferred learning methods  Outcome: Progressing

## 2023-02-02 NOTE — ASSESSMENT & PLAN NOTE
· Patient with remote history of DVT previously anticoagulated on Eliquis  · Was on heparin drip for her hx of DVT however this was years ago therefore heparin drip was DC'd  · Eliquis on hold for podiatry intervention, will confirm with them when okay to restart

## 2023-02-02 NOTE — OCCUPATIONAL THERAPY NOTE
Occupational Therapy Evaluation(time=)     Patient Name: Renetta Ashton  ZRGQL'[id number] Date: 2023  Problem List  Principal Problem:    Acute hematogenous osteomyelitis of left foot ([facility] )  Active Problems:    Type 2 diabetes mellitus with skin complication, with long-term current use of insulin ([facility])    COPD (chronic obstructive pulmonary disease) ([facility])    Chronic respiratory failure with hypoxia and hypercapnia ([facility])    Chronic, continuous use of opioids    PAD (peripheral artery disease) ([facility])    Stage 3b chronic kidney disease ([facility])    Anemia    History of DVT (deep vein thrombosis)    Coronary artery disease involving native coronary artery    Constipation    Past Medical History  Past Medical History:   Diagnosis Date    Asthma     CHF (congestive heart failure) ([facility])     [facility]) [facility]     Diabetes mellitus ([facility] )     DVT of lower limb, acute ([facility] )     Hypertension     Renal disorder      Past Surgical History  Past Surgical History:   Procedure Laterality Date    APPENDECTOMY       SECTION      HYSTERECTOMY      IR LOWER EXTREMITY ANGIOGRAM  2023    SD AMPUTATION METATARSAL W/TOE SINGLE Left 2023    Procedure: RAY RESECTION FOOT Left 1st;  Surgeon: Oz Hogue DPM;  Location: [facility];  Service: Podiatry    SD Hanane Raman 3RD+ ORD SLCTV ABDL PEL/LXTR [facility] N/A 2023    Procedure: R CFA access w/ 6F sheath and Mynx closure Aortogram LLE angiogram R EIA PTA w/ 6x60mm  L EIA PTA w/ 6x60mm  L SFA PTA w/ [id number] Kofi POBA and 2h100kc [place] DCB L SFA stents: 5x27mm Express LD (prox) and 6x60mm Innova (dist); Surgeon: Leesa Guzman MD;  Location: [facility];  Service: Vascular           23 [id number]   Note Type   Note type Evaluation   Pain Assessment   Pain Assessment Tool 0-10   Pain Score 10 - Worst Possible Pain   Pain Location/Orientation Orientation: Left; Location: Foot   Restrictions/Precautions   Weight Bearing Precautions Per Order Yes   LLE Weight Bearing Per Order WBAT   Braces or Orthoses   (with toe unloading shoe)   Other Precautions Fall Risk;Pain   Home Living   Type of Home Apartment  (2nd flr[address] with railing)   Home Layout One level   Bathroom Shower/Tub Tub/shower unit   Bathroom Toilet Standard   Bathroom Equipment Grab bars in shower; [address]   Prior Function   Lives With Daughter; Other (Comment)  (roommate)   Falls in the last 6 months 0   Lifestyle   Autonomy PTA pt states independence with her ADLs, transfers, ambulation--with RW, apt distances only; neg falls, neg , neg home alone   Reciprocal Relationships supportive dtr   Service to Others homemaker   Intrinsic Gratification watching TV   Subjective   Subjective "I wasn't able to sleep last night because of the pain "   ADL   Where Assessed Supine, bed   Eating Assistance 6  Modified independent   Grooming Assistance 5  Supervision/Setup   UB Bathing Assistance 4  Minimal Assistance   LB Bathing Assistance 2  Maximal Assistance   UB Dressing Assistance 4  Minimal Assistance   LB Dressing Assistance 2  Maximal [address]  1  Total Assistance   Bed Mobility   Supine to Sit 5  Supervision  (long sit in bed)   Transfers   Sit to Stand Unable to assess  (Pt declining OOB)   Functional Mobility   Functional Mobility   (TBA)   Balance   Static Sitting Fair   Dynamic Sitting Fair -   Activity Tolerance   Activity Tolerance Patient limited by fatigue;Patient limited by pain;Treatment limited secondary to medical complications (Comment)   Medical Staff Made Aware BITA oates T    RUE Assessment   RUE Assessment WFL   RUE Strength   RUE Overall Strength Within Functional Limits - able to perform ADL tasks with strength  (3+/5 throughout)   LUE Assessment   LUE Assessment WFL   LUE Strength   LUE Overall Strength Within Functional Limits - able to perform ADL tasks with strength  (3+/5 throughout) Hand Function   Gross Motor Coordination Functional   Fine Motor Coordination Functional   Sensation   Light Touch No apparent deficits   Proprioception   Proprioception No apparent deficits   Vision-Basic Assessment   Current Vision   (glasses)   Vision - Complex Assessment   Acuity   (impaired)   Psychosocial   Psychosocial (WDL) X   Patient Behaviors/Mood Flat affect; Cooperative   Perception   Inattention/Neglect Appears intact   Cognition   Overall Cognitive Status Impaired   Arousal/Participation Alert   Attention Attends with cues to redirect   Orientation Level Oriented X4   Memory Decreased short term memory;Decreased recall of precautions   Following Commands Follows one step commands with increased time or repetition   Assessment   Limitation Decreased ADL status; Decreased UE strength;Decreased Safe judgement during ADL;Decreased cognition;Decreased endurance;Decreased high-level ADLs   Prognosis Fair   Assessment Pt is a [de-identified] female admitted to the hospital(x-sebastian from [facility]) 2* OM L great toe  Pt required a s/p L foot 1st ray resection(2/1)  Pt currently allowed WBAT L LE with toe unloading shoe  Pt also noted with anemia  Pt with PMH asthma, PAD, COPD, home O2(3liters), DM, DVT, HTN, CKD  PTA pt states independence with her ADLs, transfers, ambulation--with RW, apt distances only; neg falls, neg , neg home alone  During initial eval, pt demonstrated deficits with her functional balance, functional mobility, ADL status, transfer safety, b/l UE strength, activity tolerance(currently fair=15-20mins), and questionable cognition(i e problem-solving)  Currently functional mobility assessment limited 2* pt declining OOB mobility  Pt would benefit from continued OT assessment and tx for the above deficits  3-5xwk/1-2wks  The patient's raw score on the AM-PAC Daily Activity inpatient short form is 17, standardized score is 37 26, less than 39 4   Patients at this level are likely to benefit from discharge to post-acute rehabilitation services  Please refer to the recommendation of the Occupational Therapist for safe discharge planning  Goals   Patient Goals "to go home"   STG Time Frame   (1-7 days)   Short Term Goal #1 Pt will tolerate continued functional mobility/cognitive assessment and appropriate goals and d/c recommendations will be provided  Short Term Goal #2 Pt will demonstrate improved activity tolerance to good(20-30mins) and standing tolerance to 3-5mins to assist with ADLs  Short Term Goal  Pt will demonstrate mod I with their bed mobility to facilitate EOB ADLs  LTG Time Frame   (7-14 days)   Long Term Goal #1 Pt will demonstrate mod I with their UE and LE bathing/dresssing  Long Term Goal #2 Pt will demonstrate g/g- balance with all functional activities  Long Term Goal Pt will demonstrate improved b/l UE strength by 1/2 MM grade to assist with ADLs/transfers  Plan   Treatment Interventions ADL retraining;Functional transfer training;UE strengthening/ROM; Endurance training;Cognitive reorientation;Patient/family training;Equipment evaluation/education; Compensatory technique education;Continued evaluation   Goal Expiration Date 02/16/23   OT Treatment Day 0   OT Frequency 3-5x/wk   Recommendation   OT Discharge Recommendation Post acute rehabilitation services   AM-PAC Daily Activity Inpatient   Lower Body Dressing 2   Bathing 2   Toileting 2   Upper Body Dressing 3   Grooming 4   Eating 4   Daily Activity Raw Score 17   Daily Activity Standardized Score (Calc for Raw Score >=11) 37 26   AM-PAC Applied Cognition Inpatient   Following a Speech/Presentation 4   Understanding Ordinary Conversation 4   Taking Medications 3   Remembering Where Things Are Placed or Put Away 3   Remembering List of 4-5 Errands 3   Taking Care of Complicated Tasks 3   Applied Cognition Raw Score 20   Applied Cognition Standardized Score 41 76   Milka Santoyo

## 2023-02-02 NOTE — PROGRESS NOTES
Progress Note - Infectious Disease   Josy Bates [de-identified] y o  female MRN: [id number]  Unit/Bed#: E5 -01 Encounter: [id number]      Impression/Plan:    1  Left great toe diabetic ulcer with first metatarsal osteomyelitis  Patient initially presented to outside campus with progressing pain and drainage from her left great toe  Unfortunately she failed to improve with oral antibiotic and I suspect this was due to progressing bone infection  MRI consistent with osteomyelitis of the distal 1st MTP  Status post IR revascularization on 1/30  No significant history of resistance  Unfortunately no other prior culture data  She remains hemodynamically stable  Status post left first ray resection yesterday, with surgical source control  -Continue IV vancomycin, dosing per pharmacy  -Continue to trend fever curve/vitals  -Ongoing follow-up by podiatry  -Follow up Intra-Op cultures  -Based on culture results, plan for transition to oral antibiotics prior to discharge to complete a 7-day total postoperative antibiotic course through 2/7/2023    2  Severe peripheral arterial disease  Patient with significant vascular disease which can unfortunately affect wound healing  Underwent revascularization with IR on 1/30  Status post left first ray amputation 2/1  -Antibiotic as above  -Ongoing follow-up by vascular surgery  -Ongoing follow-up by podiatry  -Continue to trend fever curve/WBC     3  Penicillin allergy  Documented as having reported hives but this was a remote allergy  Patient may benefit from amoxicillin challenge as an outpatient  Recommend follow-up with PCP  Tolerating current antibiotics above  We will adjust allergy list if needed      4  Chronic kidney disease  Creatinine appears stable compared to prior baseline    This does affect antibiotic dosing   -continue vancomycin alone  -Pharmacy consulted  -We will dose adjust antibiotics as needed  -Monitor creatinine     Above plan discussed in detail with the patient at bedside  ID will follow  Antibiotics:  Abx day 10  IV Vancomycin    Subjective: The patient underwent left first ray amputation yesterday  She reports ongoing pain in the left foot  She currently denies fever, chills, nausea, diarrhea  Objective:  Vitals:  Temp:  [97 3 °F (36 3 °C)-99 2 °F (37 3 °C)] 97 3 °F (36 3 °C)  HR:  [67-95] 69  Resp:  [16-20] 16  BP: (109-183)/(37-76) 114/37  SpO2:  [89 %-97 %] 96 %  Temp (24hrs), Av °F (36 7 °C), Min:97 3 °F (36 3 °C), Max:99 2 °F (37 3 °C)  Current: Temperature: (!) 97 3 °F (36 3 °C)    Physical Exam:   General Appearance:  Chronically ill appearing but interactive, nontoxic, no acute distress  Throat: Oropharynx moist without lesions  Lungs:   Clear to auscultation bilaterally; no wheezes, rhonchi or rales; respirations unlabored   Heart:  RRR; no murmur, rub or gallop   Abdomen:   Soft, non-tender, non-distended, positive bowel sounds  Extremities: No clubbing, cyanosis or edema   Skin: Left foot wrapped in guaze       Labs:    All pertinent labs and imaging studies were personally reviewed  Results from last 7 days   Lab Units 23  0958 23  0505 23  0900 23  0541 23  0931   WBC Thousand/uL  --  9 77  --  8 06 11 42*   HEMOGLOBIN g/dL 7 2* 6 7* 8 1* 7 0* 8 2*   PLATELETS Thousands/uL  --  237  --  208 237     Results from last 7 days   Lab Units 23  0505 23  0541 23  0931   SODIUM mmol/L 139 139 138   POTASSIUM mmol/L 4 1 4 1 4 1   CHLORIDE mmol/L 107 107 103   CO2 mmol/L 28 27 30   BUN mg/dL 15 18 20   CREATININE mg/dL 0 96 1 00 1 11   EGFR ml/min/1 73sq m 56 53 47   CALCIUM mg/dL 7 7* 7 7* 8 2*   AST U/L  --  10*  --    ALT U/L  --  8  --    ALK PHOS U/L  --  46  --              Results from last 7 days   Lab Units 23  0500   FERRITIN ng/mL 90

## 2023-02-02 NOTE — PROGRESS NOTES
[facility] Podiatry - Progress Note  Patient: José Luis Swan [de-identified] y o  female   MRN: [id number]  PCP: Moe Vo MD  Unit/Bed#: E5 -01 Encounter: [id number]  Date Of Visit: 23  Hospital Stay Days: 4    ASSESSMENT:    José Luis Swan is a [de-identified] y o  female with:    1  Left Hall[place] diabetic ulcer with osteomyelitis  - s/p First ray resection DOS 23  2  Type 2 diabetes mellitis  3  PAD  4  Stage 3b CKD    PLAN:    · Postop day 1 dressings left clean dry and intact  · Educated patient about elevating and icing the extremity for help with pain control  Pain medications were reviewed  · Keep dressings clean dry and intact  · Follow-up operative cultures/path  · May restart Eliquis  · F/U PT/OT recommendation  · Rest of care per primary team    Weight bearing status: Weight-bear as tolerated in [place] wedge shoe  To the heel  SUBJECTIVE:     The patient was seen, evaluated, and assessed at bedside today  The patient was awake, alert, and in no acute distress  No acute events overnight  The patient reports her pain to the foot is different than it was  And that is significant  She would like more morphine as possible    Patient denies N/V/F/chills/SOB/CP  OBJECTIVE:     Vitals:   /53 (BP Location: Right arm)   Pulse 86   Temp 99 2 °F (37 3 °C) (Oral)   Resp 18   Ht 5' 6" (1 676 m)   Wt 75 kg (165 lb 5 5 oz)   SpO2 90%   BMI 26 69 kg/m²     Temp (24hrs), Av 1 °F (36 7 °C), Min:97 1 °F (36 2 °C), Max:99 4 °F (37 4 °C)      Physical Exam :     General:  Alert, cooperative, and in no distress    Lower extremity exam:  Neurologic, Vascular, musculoskeletal status at baseline    Dermatologic: Dressings left clean dry and intact    Clinical Images 23:      Additional Data:     Labs:    Results from last 7 days   Lab Units 23  0505 23  1724 23  0732   WBC Thousand/uL 9 77   < > 7 36   HEMOGLOBIN g/dL 6 7*   < > 7 7*   HEMATOCRIT % 21 7*   < > 25 6* PLATELETS Thousands/uL 237   < > 186   NEUTROS PCT %  --   --  54   LYMPHS PCT %  --   --  30   MONOS PCT %  --   --  9   EOS PCT %  --   --  5    < > = values in this interval not displayed  Results from last 7 days   Lab Units 02/02/23  0505 02/01/23  0541   POTASSIUM mmol/L 4 1 4 1   CHLORIDE mmol/L 107 107   CO2 mmol/L 28 27   BUN mg/dL 15 18   CREATININE mg/dL 0 96 1 00   CALCIUM mg/dL 7 7* 7 7*   ALK PHOS U/L  --  46   ALT U/L  --  8   AST U/L  --  10*     Results from last 7 days   Lab Units 01/30/23  1913   INR  1 02       * I Have Reviewed All Lab Data Listed Above  Recent Cultures (last 7 days):     Results from last 7 days   Lab Units 02/01/23  1716   GRAM STAIN RESULT  No Polys or Bacteria seen     Results from last 7 days   Lab Units 02/01/23  1716   ANAEROBIC CULTURE  Culture results to follow  Imaging: I have personally reviewed pertinent films in PACS  No results found  EKG, Pathology, and Other Studies: I have personally reviewed pertinent reports      Active medications:  Current Facility-Administered Medications   Medication Dose Route Frequency   • acetaminophen (TYLENOL) tablet 650 mg  650 mg Oral Q6H PRN   • albuterol inhalation solution 0 63 mg  0 63 mg Nebulization 4x Daily PRN   • allopurinol (ZYLOPRIM) tablet 50 mg  50 mg Oral Daily   • amLODIPine (NORVASC) tablet 5 mg  5 mg Oral Daily   • atorvastatin (LIPITOR) tablet 10 mg  10 mg Oral Daily With Dinner   • bisacodyl (DULCOLAX) rectal suppository 10 mg  10 mg Rectal Daily PRN   • calcium carbonate (TUMS) chewable tablet 1,000 mg  1,000 mg Oral Daily PRN   • clopidogrel (PLAVIX) tablet 75 mg  75 mg Oral Daily   • docusate sodium (COLACE) capsule 100 mg  100 mg Oral BID   • Fluticasone Furoate-Vilanterol 100-25 mcg/actuation 1 puff  1 puff Inhalation Daily   • gabapentin (NEURONTIN) capsule 300 mg  300 mg Oral TID   • heparin (porcine) subcutaneous injection 5,000 Units  5,000 Units Subcutaneous Q8H [address]   • insulin glargine (LANTUS) subcutaneous injection 8 Units 0 08 mL  8 Units Subcutaneous HS   • insulin lispro (HumaLOG) 100 units/mL subcutaneous injection 1-6 Units  1-6 Units Subcutaneous HS   • insulin lispro (HumaLOG) 100 units/mL subcutaneous injection 2-12 Units  2-12 Units Subcutaneous TID AC   • metoprolol tartrate (LOPRESSOR) tablet 50 mg  50 mg Oral Q12H [address]   • mirtazapine (REMERON) tablet 30 mg  30 mg Oral HS   • morphine injection 2 mg  2 mg Intravenous Q4H PRN   • nicotine (NICODERM CQ) 7 mg/24hr TD 24 hr patch 1 patch  1 patch Transdermal Daily   • ondansetron (ZOFRAN) injection 4 mg  4 mg Intravenous Q8H PRN   • oxybutynin (DITROPAN-XL) 24 hr tablet 5 mg  5 mg Oral Daily   • oxyCODONE (ROXICODONE) immediate release tablet 10 mg  10 mg Oral Q6H PRN   • oxyCODONE (ROXICODONE) IR tablet 5 mg  5 mg Oral Q4H PRN   • pantoprazole (PROTONIX) EC tablet 40 mg  40 mg Oral Daily   • polyethylene glycol (MIRALAX) packet 17 g  17 g Oral Daily   • predniSONE tablet 2 5 mg  2 5 mg Oral Daily   • rOPINIRole (REQUIP) tablet 0 5 mg  0 5 mg Oral HS   • saccharomyces boulardii (FLORASTOR) capsule 250 mg  250 mg Oral Daily   • senna (SENOKOT) tablet 8 6 mg  1 tablet Oral Daily   • sertraline (ZOLOFT) tablet 50 mg  50 mg Oral HS   • sucralfate (CARAFATE) tablet 1 g  1 g Oral BID AC   • traZODone (DESYREL) tablet 50 mg  50 mg Oral HS   • umeclidinium 62 5 mcg/actuation inhaler AEPB 1 puff  1 puff Inhalation Daily   • vancomycin (VANCOCIN) IVPB (premix in dextrose) 750 mg 150 mL  750 mg Intravenous Q24H         ** Please Note: Portions of the record may have been created with voice recognition software  Occasional wrong word or "sound a like" substitutions may have occurred due to the inherent limitations of voice recognition software  Read the chart carefully and recognize, using context, where substitutions have occurred   **

## 2023-02-02 NOTE — ASSESSMENT & PLAN NOTE
· Patient initially presented to the [address] [place] [address] with blisters on the left great toe that progressed to open wounds  She was prescribed keflex and doxycycline outpatient with no resolution  · Was transferred to [address] [place] [address] for further vascular evaluation  · Xray of left foot 1/24 negative for osteomyelitis   · MRI left foot 1/25/23: "Ulcer with evidence of small adjacent medial, distal 1st MTP osteomyelitis  Altered marrow signal intensity at the proximal-medial aspect of the 1st toe proximal phalanx is less specific and may be early osteomyelitis consult podiatry for further recommendations "  · S/p cefepime x 7 days   · Now on solo vancomycin per ID- day #10  · Underwent angiogram 1/30/23 with successful treatment of high grade long segment SFA stenosis & B EIA stenosis; peroneal and PT runoff  · Switched ASA to plavix for new PAD stents- per vascular  · AD showing no significant change in the disease process on the right and improvement noted in the left AD    2 study done 1/25/2023  · Postop day 1 left first ray resection  · PT/OT recommending acute rehab

## 2023-02-02 NOTE — CASE MANAGEMENT
Case Management Discharge Planning Note    Patient name Pepe Diaz  Location East  [address]/E5 MS 21 -* MRN [id number]  : 1940 Date 2023       Current Admission Date: 2023  Current Admission Diagnosis:Acute hematogenous osteomyelitis of left foot [facility])   Patient Active Problem List    Diagnosis Date Noted   • Constipation 2023   • History of DVT (deep vein thrombosis) 2023   • Coronary artery disease involving native coronary artery 2023   • Anemia 2023   • Stage 3b chronic kidney disease ([facility] ) 2023   • CHF (congestive heart failure) ([facility] [facility] ) 2023   • Smoking 2023   • Oxygen dependent 2023   • PAD (peripheral artery disease) ([facility] [address] ) 2023   • Acute hematogenous osteomyelitis of left foot ([facility] [address] ) 2023   • Type 2 diabetes mellitus with skin complication, with long-term current use of insulin ([facility] [address] ) 2023   • COPD (chronic obstructive pulmonary disease) ([facility] [address] ) 2023   • Chronic respiratory failure with hypoxia and hypercapnia ([facility] [address] ) 2023   • Chronic, continuous use of opioids 2023      LOS (days): 4  Geometric Mean LOS (GMLOS) (days): 4 30  Days to GMLOS:-0 3     OBJECTIVE:  Risk of Unplanned Readmission Score: 37 59         Current admission status: Inpatient   Preferred Pharmacy:   [address] [place] [address] #[id number] Yajaira Galvanma - / Noe Mena  / [address]  [address] [facility] [address]  Phone: [phone] Fax: [phone]    Primary Care Provider: Ade Dunbar MD    Primary Insurance: [facility]  Secondary Insurance:     DISCHARGE DETAILS:    Discharge planning discussed with[de-identified] Patient  Freedom of Choice: Yes  Comments - Freedom of Choice: Pt will discharge home w/ [address]- and w/ Dtr providing 24hr assistance    Additional Comments: CM spoke w/ Pt to confirm her preference to discharge home, as the SLA Therapy Team is recommending STR  Pt confirmed   Pt set up w/ [facility] and will also resume the [facility] program  Dtr is available 24/7 to provide oversight and assistance  CM continues to follow through discharge

## 2023-02-02 NOTE — PLAN OF CARE
Problem: OCCUPATIONAL THERAPY ADULT  Goal: Performs self-care activities at highest level of function for planned discharge setting  See evaluation for individualized goals  Description: Treatment Interventions: ADL retraining, Functional transfer training, UE strengthening/ROM, Endurance training, Cognitive reorientation, Patient/family training, Equipment evaluation/education, Compensatory technique education, Continued evaluation          See flowsheet documentation for full assessment, interventions and recommendations  Note: Limitation: Decreased ADL status, Decreased UE strength, Decreased Safe judgement during ADL, Decreased cognition, Decreased endurance, Decreased high-level ADLs  Prognosis: Fair  Assessment: Pt is a 81y/o female admitted to the hospital(x-sebastian from [facility]) 2* OM L great toe  Pt required a s/p L foot 1st ray resection(2/1)  Pt currently allowed WBAT L LE with toe unloading shoe  Pt also noted with anemia  Pt with PMH asthma, PAD, COPD, home O2(3liters), DM, DVT, HTN, CKD  PTA pt states independence with her ADLs, transfers, ambulation--with RW, apt distances only; neg falls, neg , neg home alone  During initial eval, pt demonstrated deficits with her functional balance, functional mobility, ADL status, transfer safety, b/l UE strength, activity tolerance(currently fair=15-20mins), and questionable cognition(i e problem-solving)  Currently functional mobility assessment limited 2* pt declining OOB mobility  Pt would benefit from continued OT assessment and tx for the above deficits  3-5xwk/1-2wks  The patient's raw score on the AM-PAC Daily Activity inpatient short form is 17, standardized score is 37 26, less than 39 4  Patients at this level are likely to benefit from discharge to post-acute rehabilitation services  Please refer to the recommendation of the Occupational Therapist for safe discharge planning       OT Discharge Recommendation: Post acute rehabilitation services

## 2023-02-02 NOTE — ASSESSMENT & PLAN NOTE
· She reports not having a bowel movement in over 5 days    · Bowel regiment Colace twice daily, senna daily and Miralax daily  · Suppository as needed

## 2023-02-02 NOTE — ASSESSMENT & PLAN NOTE
· Patient had arterial doppler studies of bilateral extremities while at [address] [place] [address]  · RLL: There is a 50-75% stenosis noted in the mid superficial femoral artery with diffuse atherosclerotic disease throughout the remaining femoro-popliteal andtibio-peroneal segments  AD 0 42  · LLL: There is a 50-75% stenosis noted in the prox and distal superficial femoral artery and an occlusion vs high grade stenosis of the distal anterior tibial artery  Diffuse atherosclerotic disease throughout the remaining femoro-popliteal and tibio-peroneal segments   AD 0 39  · Patient transferred to [address] [facility] for angiogram - performed 1/30 with successful stenting  · POD day 1 left first ray resection

## 2023-02-03 PROBLEM — I10 HYPERTENSION: Status: ACTIVE | Noted: 2023-02-03

## 2023-02-03 LAB
BACTERIA SPEC ANAEROBE CULT: NORMAL
ERYTHROCYTE [DISTWIDTH] IN BLOOD BY AUTOMATED COUNT: 16.6 % (ref 11.6–15.1)
GLUCOSE SERPL-MCNC: 102 MG/DL (ref 65–140)
GLUCOSE SERPL-MCNC: 189 MG/DL (ref 65–140)
GLUCOSE SERPL-MCNC: 228 MG/DL (ref 65–140)
GLUCOSE SERPL-MCNC: 94 MG/DL (ref 65–140)
HCT VFR BLD AUTO: 25.7 % (ref 34.8–46.1)
HGB BLD-MCNC: 7.7 G/DL (ref 11.5–15.4)
MAGNESIUM SERPL-MCNC: 1.9 MG/DL (ref 1.9–2.7)
MCH RBC QN AUTO: 26.1 PG (ref 26.8–34.3)
MCHC RBC AUTO-ENTMCNC: 30 G/DL (ref 31.4–37.4)
MCV RBC AUTO: 87 FL (ref 82–98)
PLATELET # BLD AUTO: 292 THOUSANDS/UL (ref 149–390)
PMV BLD AUTO: 10.5 FL (ref 8.9–12.7)
RBC # BLD AUTO: 2.95 MILLION/UL (ref 3.81–5.12)
WBC # BLD AUTO: 10.64 THOUSAND/UL (ref 4.31–10.16)

## 2023-02-03 RX ORDER — VANCOMYCIN HYDROCHLORIDE 1 G/200ML
1000 INJECTION, SOLUTION INTRAVENOUS EVERY 24 HOURS
Status: DISCONTINUED | OUTPATIENT
Start: 2023-02-04 | End: 2023-02-03

## 2023-02-03 RX ORDER — CEPHALEXIN 500 MG/1
500 CAPSULE ORAL EVERY 6 HOURS SCHEDULED
Status: DISCONTINUED | OUTPATIENT
Start: 2023-02-04 | End: 2023-02-04 | Stop reason: HOSPADM

## 2023-02-03 RX ORDER — DOXYCYCLINE HYCLATE 100 MG/1
100 CAPSULE ORAL EVERY 12 HOURS SCHEDULED
Status: DISCONTINUED | OUTPATIENT
Start: 2023-02-04 | End: 2023-02-04 | Stop reason: HOSPADM

## 2023-02-03 RX ORDER — AMLODIPINE BESYLATE 10 MG/1
10 TABLET ORAL DAILY
Status: DISCONTINUED | OUTPATIENT
Start: 2023-02-04 | End: 2023-02-04 | Stop reason: HOSPADM

## 2023-02-03 RX ADMIN — UMECLIDINIUM 1 PUFF: 62.5 AEROSOL, POWDER ORAL at 08:43

## 2023-02-03 RX ADMIN — TRAZODONE HYDROCHLORIDE 50 MG: 50 TABLET ORAL at 21:09

## 2023-02-03 RX ADMIN — METOPROLOL TARTRATE 50 MG: 50 TABLET, FILM COATED ORAL at 21:09

## 2023-02-03 RX ADMIN — SENNOSIDES 8.6 MG: 8.6 TABLET ORAL at 08:32

## 2023-02-03 RX ADMIN — INSULIN GLARGINE 8 UNITS: 100 INJECTION, SOLUTION SUBCUTANEOUS at 21:06

## 2023-02-03 RX ADMIN — PREDNISONE 2.5 MG: 1 TABLET ORAL at 08:31

## 2023-02-03 RX ADMIN — DOCUSATE SODIUM 100 MG: 100 CAPSULE, LIQUID FILLED ORAL at 08:57

## 2023-02-03 RX ADMIN — SUCRALFATE 1 G: 1 TABLET ORAL at 17:19

## 2023-02-03 RX ADMIN — SERTRALINE HYDROCHLORIDE 50 MG: 50 TABLET ORAL at 21:10

## 2023-02-03 RX ADMIN — OXYBUTYNIN CHLORIDE 5 MG: 5 TABLET, EXTENDED RELEASE ORAL at 08:29

## 2023-02-03 RX ADMIN — OXYCODONE HYDROCHLORIDE 10 MG: 10 TABLET ORAL at 08:29

## 2023-02-03 RX ADMIN — POLYETHYLENE GLYCOL 3350 17 G: 17 POWDER, FOR SOLUTION ORAL at 08:31

## 2023-02-03 RX ADMIN — GABAPENTIN 300 MG: 300 CAPSULE ORAL at 08:30

## 2023-02-03 RX ADMIN — GABAPENTIN 300 MG: 300 CAPSULE ORAL at 17:19

## 2023-02-03 RX ADMIN — Medication 250 MG: at 08:32

## 2023-02-03 RX ADMIN — OXYCODONE HYDROCHLORIDE 10 MG: 10 TABLET ORAL at 21:10

## 2023-02-03 RX ADMIN — GABAPENTIN 300 MG: 300 CAPSULE ORAL at 21:08

## 2023-02-03 RX ADMIN — VANCOMYCIN HYDROCHLORIDE 750 MG: 750 INJECTION, SOLUTION INTRAVENOUS at 08:57

## 2023-02-03 RX ADMIN — DOCUSATE SODIUM 100 MG: 100 CAPSULE, LIQUID FILLED ORAL at 17:19

## 2023-02-03 RX ADMIN — INSULIN LISPRO 2 UNITS: 100 INJECTION, SOLUTION INTRAVENOUS; SUBCUTANEOUS at 11:48

## 2023-02-03 RX ADMIN — METOPROLOL TARTRATE 50 MG: 50 TABLET, FILM COATED ORAL at 08:31

## 2023-02-03 RX ADMIN — APIXABAN 5 MG: 5 TABLET, FILM COATED ORAL at 08:31

## 2023-02-03 RX ADMIN — ALLOPURINOL 50 MG: 100 TABLET ORAL at 08:27

## 2023-02-03 RX ADMIN — CLOPIDOGREL BISULFATE 75 MG: 75 TABLET ORAL at 08:31

## 2023-02-03 RX ADMIN — APIXABAN 5 MG: 5 TABLET, FILM COATED ORAL at 17:19

## 2023-02-03 RX ADMIN — ATORVASTATIN CALCIUM 10 MG: 10 TABLET, FILM COATED ORAL at 17:19

## 2023-02-03 RX ADMIN — MIRTAZAPINE 30 MG: 15 TABLET, FILM COATED ORAL at 21:09

## 2023-02-03 RX ADMIN — FLUTICASONE FUROATE AND VILANTEROL TRIFENATATE 1 PUFF: 100; 25 POWDER RESPIRATORY (INHALATION) at 08:42

## 2023-02-03 RX ADMIN — FUROSEMIDE 40 MG: 40 TABLET ORAL at 08:31

## 2023-02-03 RX ADMIN — ROPINIROLE 0.5 MG: 1 TABLET, FILM COATED ORAL at 21:08

## 2023-02-03 RX ADMIN — PANTOPRAZOLE SODIUM 40 MG: 40 TABLET, DELAYED RELEASE ORAL at 05:08

## 2023-02-03 RX ADMIN — NICOTINE 7 MG/24 HR DAILY TRANSDERMAL PATCH 1 PATCH: at 08:31

## 2023-02-03 RX ADMIN — SUCRALFATE 1 G: 1 TABLET ORAL at 05:07

## 2023-02-03 RX ADMIN — AMLODIPINE BESYLATE 5 MG: 5 TABLET ORAL at 08:31

## 2023-02-03 RX ADMIN — INSULIN LISPRO 2 UNITS: 100 INJECTION, SOLUTION INTRAVENOUS; SUBCUTANEOUS at 21:07

## 2023-02-03 NOTE — ASSESSMENT & PLAN NOTE
· Currently on Norvasc 5 mg daily and metoprolol 50 mg BID  · Increase Norvasc to 10 mg daily  · SBP remains elevated today

## 2023-02-03 NOTE — ASSESSMENT & PLAN NOTE
Results from last 7 days   Lab Units 02/03/23  0523 02/02/23  0958 02/02/23  0505 02/01/23  0900   HEMOGLOBIN g/dL 7 7* 7 2* 6 7* 8 1*   · Previous baseline appears to be in the 8-9's   · Hemoglobin on a m  labs 7 7   · Type and screen and blood consent obtained  · Transfuse for hemoglobin less than 7  · Iron panel obtained-iron saturation 21, TIBC 220, iron 47, ferritin 90

## 2023-02-03 NOTE — ASSESSMENT & PLAN NOTE
· Secondary to COPD and CHF  · Currently at baseline 3L NC   · Status post Lasix 40 mg IV, continue on lasix 40 mg daily  · Chest x-ray showing cardiomegaly with moderate congestive changes  Bilateral infiltrates predominantly in the lower lobes suggested  Small left effusion

## 2023-02-03 NOTE — OCCUPATIONAL THERAPY NOTE
Occupational Therapy Progress Note(time=1117-1140)     Patient Name: Randa Cunningham  ZUCMR'A Date: 2/3/2023  Problem List  Principal Problem:    Acute hematogenous osteomyelitis of left foot ([address] )  Active Problems:    Type 2 diabetes mellitus with skin complication, with long-term current use of insulin (HCC)    COPD (chronic obstructive pulmonary disease) (HCC)    Chronic respiratory failure with hypoxia and hypercapnia (HCC)    Chronic, continuous use of opioids    PAD (peripheral artery disease) (HCC)    Stage 3b chronic kidney disease (HCC)    Anemia    History of DVT (deep vein thrombosis)    Coronary artery disease involving native coronary artery    Constipation            02/03/23 1140   Note Type   Note Type Treatment   Pain Assessment   Pain Assessment Tool 0-10   Pain Score 5   Pain Location/Orientation Orientation: Left; Location: Foot   Pain Rating: FLACC (Rest) - Face 0   Pain Rating: FLACC (Rest) - Legs 0   Pain Rating: FLACC (Rest) - Activity 0   Pain Rating: FLACC (Rest) - Cry 1   Pain Rating: FLACC (Rest) - Consolability 0   Score: FLACC (Rest) 1   Restrictions/Precautions   Weight Bearing Precautions Per Order Yes   LLE Weight Bearing Per Order WBAT   Braces or Orthoses   (toe unloading shoe)   Lifestyle   Intrinsic Gratification playing on her I-PAD   ADL   Where Assessed Edge of bed   LB Dressing Assistance 1  Total Assistance   LB Dressing Deficit Don/doff L shoe   Functional Standing Tolerance   Time 1-2mins   Transfers   Sit to Stand 4  Minimal assistance   Additional items Assist x 1; Increased time required; Impulsive   Stand to Sit 5  Supervision   Additional items Increased time required;Verbal cues   Functional Mobility   Functional Mobility 4  Minimal assistance   Additional Comments x1   Additional items Rolling walker   Subjective   Subjective "I feel better today "   Cognition   Overall Cognitive Status [facility]   Arousal/Participation Alert   Attention Attends with cues to redirect Orientation Level Oriented X4   Memory Decreased short term memory;Decreased recall of precautions   Following Commands Follows one step commands without difficulty   Activity Tolerance   Activity Tolerance Patient tolerated treatment well   Medical Staff Made Aware nsg, P T  Assessment   Assessment Pt seen for 23 min tx session with focus on functional balance, functional mobility, ADL status, transfer safety, cognition, and education  Pt able to tolerate functional mobility assessment today; sitting balance=f+/f, standing balance=f/f-  Pt required verbal/physical cues to maintain transfer/walker safety  Pt demonstrating need for assistance with LE ADLs  Reviewed need for toe unloading shoe with limited carryover noted  Pt able to demonstrate good cognition(i e orientation, direction-following)  Pt perfers to go directly home with continued home therapy(i e P T )services; pt states family able to provided required assistance in home environment  Tx tolerated well  See below noted for updated functional mobility goals  The patient's raw score on the AM-PAC Daily Activity Inpatient Short Form is 21  A raw score of greater than or equal to 19 suggests the patient may benefit from discharge to home  Please refer to the recommendation of the Occupational Therapist for safe discharge planning  Plan   Treatment Interventions ADL retraining;Functional transfer training; Endurance training;Cognitive reorientation;Patient/family training; Compensatory technique education   Goal Expiration Date 02/16/23   OT Treatment Day 1   OT Frequency 2-3x/wk   Recommendation   OT Discharge Recommendation Home with home health rehabilitation  (home P T )   Equipment Recommended   (RW--?)   AM-PAC Daily Activity Inpatient   Lower Body Dressing 3   Bathing 3   Toileting 3   Upper Body Dressing 4   Grooming 4   Eating 4   Daily Activity Raw Score 21   Daily Activity Standardized Score (Calc for Raw Score >=11) 44 27   AM-PAC Applied Cognition Inpatient   Following a Speech/Presentation 3   Understanding Ordinary Conversation 3   Taking Medications 3   Remembering Where Things Are Placed or Put Away 3   Remembering List of 4-5 Errands 2   Taking Care of Complicated Tasks 2   Applied Cognition Raw Score 16   Applied Cognition Standardized Score 35 03     Emperatriz Banks

## 2023-02-03 NOTE — PROGRESS NOTES
[address] [place] [address]  Progress Note - Ines Lara 1940, [de-identified] y o  female MRN: [id number]  Unit/Bed#: E5 -01 Encounter: [id number]  Primary Care Provider: Angela Ying MD   Date and time admitted to hospital: 1/29/2023  2:19 AM    * Acute hematogenous osteomyelitis of left foot ([place] [address] )  Assessment & Plan  · Patient initially presented to the [address] [place] [address] with blisters on the left great toe that progressed to open wounds  She was prescribed keflex and doxycycline outpatient with no resolution  · Was transferred to [address] [place] [address] for further vascular evaluation  · Xray of left foot 1/24 negative for osteomyelitis   · MRI left foot 1/25/23: "Ulcer with evidence of small adjacent medial, distal 1st MTP osteomyelitis  Altered marrow signal intensity at the proximal-medial aspect of the 1st toe proximal phalanx is less specific and may be early osteomyelitis consult podiatry for further recommendations "  · S/p cefepime x 7 days   · Now on solo vancomycin per ID- day #11 continue antibiotics through 2/7/23 can transition to oral antibiotics for discharge  · Underwent angiogram 1/30/23 with successful treatment of high grade long segment SFA stenosis & B EIA stenosis; peroneal and PT runoff  · Switched ASA to plavix for new PAD stents- per vascular  · AD showing no significant change in the disease process on the right and improvement noted in the left AD  2 study done 1/25/2023  · Postop day 2 left first ray resection  · PT/OT recommending acute rehab, however patient wishes to go home with home health    PAD (peripheral artery disease) ([place] [address] )  Assessment & Plan  · Patient had arterial doppler studies of bilateral extremities while at [address] [place] [address]  · RLL: There is a 50-75% stenosis noted in the mid superficial femoral artery with diffuse atherosclerotic disease throughout the remaining femoro-popliteal andtibio-peroneal segments   AD 0 42  · LLL: There is a 50-75% stenosis noted in the prox and distal superficial femoral artery and an occlusion vs high grade stenosis of the distal anterior tibial artery  Diffuse atherosclerotic disease throughout the remaining femoro-popliteal and tibio-peroneal segments  AD 0 39  · Patient transferred to [address] [facility] for angiogram - performed 1/30 with successful stenting  · POD day 2 left first ray resection    Hypertension  Assessment & Plan  · Currently on Norvasc 5 mg daily and metoprolol 50 mg BID  · Increase Norvasc to 10 mg daily  · SBP remains elevated today    Anemia  Assessment & Plan  Results from last 7 days   Lab Units 02/03/23  0523 02/02/23  0958 02/02/23  0505 02/01/23  0900   HEMOGLOBIN g/dL 7 7* 7 2* 6 7* 8 1*   · Previous baseline appears to be in the 8-9's   · Hemoglobin on a m  labs 7 7   · Type and screen and blood consent obtained  · Transfuse for hemoglobin less than 7  · Iron panel obtained-iron saturation 21, TIBC 220, iron 47, ferritin 90    Stage 3b chronic kidney disease ([facility] [address] )  Assessment & Plan  Lab Results   Component Value Date    EGFR 56 02/02/2023    EGFR 53 02/01/2023    EGFR 47 01/31/2023    CREATININE 0 96 02/02/2023    CREATININE 1 00 02/01/2023    CREATININE 1 11 01/31/2023   · Appears to be patients baseline   · Continue to monitor BMP  · Nephrology consulted appreciate recommendations    Chronic respiratory failure with hypoxia and hypercapnia (HCC)  Assessment & Plan  · Secondary to COPD and CHF  · Currently at baseline 3L NC   · Status post Lasix 40 mg IV, continue on lasix 40 mg daily  · Chest x-ray showing cardiomegaly with moderate congestive changes  Bilateral infiltrates predominantly in the lower lobes suggested  Small left effusion      COPD (chronic obstructive pulmonary disease) (HCC)  Assessment & Plan  · No acute exacerbation  · Patient is currently on baseline oxygen requirements of 3L NC  · Continue home regimen of Advair substituted to Breo, Spiriva and albuterol inhaler/neb prednisone 2 5 mg daily as per home regimen    Type 2 diabetes mellitus with skin complication, with long-term current use of insulin [facility])  Assessment & Plan  Lab Results   Component Value Date    HGBA1C 7 5 (H) 01/23/2023     Recent Labs     02/02/23  1628 02/02/23  2113 02/03/23  0628 02/03/23  1100   POCGLU 163* 175* 94 189*   · On Lantus 8 units  · SSI with accucheks    Constipation  Assessment & Plan  · Bowel regiment Colace twice daily, senna daily and Miralax daily  · Suppository as needed    History of DVT (deep vein thrombosis)  Assessment & Plan  · Patient with remote history of DVT previously anticoagulated on Eliquis  · Continue on Eliquis    Chronic, continuous use of opioids  Assessment & Plan  · Patient chronically on oxycodone 10 mg, morphine sulfate 30 mg oral solution, and movantik at home   · PDMP reviewed  · Pain medications increased in the setting of ischemic left foot pain    VTE Pharmacologic Prophylaxis: VTE Score: 9 High Risk (Score >/= 5) - Pharmacological DVT Prophylaxis Ordered: apixaban (Eliquis)  Sequential Compression Devices Ordered  Patient Centered Rounds: I performed bedside rounds with nursing staff today  Discussions with Specialists or Other Care Team Provider: none    Education and Discussions with Family / Patient: Updated  (daughter) via phone  Time Spent for Care: 30 minutes  More than 50% of total time spent on counseling and coordination of care as described above  Current Length of Stay: 5 day(s)  Current Patient Status: Inpatient   Certification Statement: The patient will continue to require additional inpatient hospital stay due to hypertension requiring medication titration and pain control for discharge  Discharge Plan: Anticipate discharge tomorrow to home with home services  Code Status: Level 3 - DNAR and DNI    Subjective:   Patient was seen laying in bed today  She reports continued to report worsening pain in her foot  She does report having a bowel movement yesterday   She denies any acute complaints at this time  Objective:     Vitals:   Temp (24hrs), Av 7 °F (37 1 °C), Min:97 3 °F (36 3 °C), Max:99 9 °F (37 7 °C)    Temp:  [97 3 °F (36 3 °C)-99 9 °F (37 7 °C)] 98 6 °F (37 °C)  HR:  [69-99] 91  Resp:  [14-18] 14  BP: (114-180)/(37-70) 180/65  SpO2:  [93 %-97 %] 95 %  Body mass index is 28 15 kg/m²  Input and Output Summary (last 24 hours): Intake/Output Summary (Last 24 hours) at 2/3/2023 1419  Last data filed at 2/3/2023 1233  Gross per 24 hour   Intake --   Output 400 ml   Net -400 ml       Physical Exam:   Physical Exam  Vitals and nursing note reviewed  Constitutional:       Appearance: Normal appearance  HENT:      Head: Normocephalic and atraumatic  Eyes:      General: No scleral icterus  Cardiovascular:      Rate and Rhythm: Normal rate and regular rhythm  Pulmonary:      Effort: Pulmonary effort is normal       Comments: Breath sounds diminished at bases  Abdominal:      General: Bowel sounds are normal       Palpations: Abdomen is soft  Tenderness: There is no abdominal tenderness  Musculoskeletal:      Right lower leg: No edema  Left lower leg: No edema  Skin:     General: Skin is warm and dry  Comments: Left foot in bandage, clean dry and in place   Neurological:      Mental Status: She is alert  Mental status is at baseline  Psychiatric:         Mood and Affect: Mood normal          Behavior: Behavior normal        Additional Data:     Labs:  Results from last 7 days   Lab Units 23  0523 23  1724 23  0732   WBC Thousand/uL 10 64*   < > 7 36   HEMOGLOBIN g/dL 7 7*   < > 7 7*   HEMATOCRIT % 25 7*   < > 25 6*   PLATELETS Thousands/uL 292   < > 186   NEUTROS PCT %  --   --  54   LYMPHS PCT %  --   --  30   MONOS PCT %  --   --  9   EOS PCT %  --   --  5    < > = values in this interval not displayed       Results from last 7 days   Lab Units 23  0505 23  0541   SODIUM mmol/L 139 139   POTASSIUM mmol/L 4 1 4 1 CHLORIDE mmol/L 107 107   CO2 mmol/L 28 27   BUN mg/dL 15 18   CREATININE mg/dL 0 96 1 00   ANION GAP mmol/L 4 5   CALCIUM mg/dL 7 7* 7 7*   ALBUMIN g/dL  --  3 4*   TOTAL BILIRUBIN mg/dL  --  0 32   ALK PHOS U/L  --  46   ALT U/L  --  8   AST U/L  --  10*   GLUCOSE RANDOM mg/dL 109 77     Results from last 7 days   Lab Units 01/30/23  1913   INR  1 02     Results from last 7 days   Lab Units 02/03/23  1100 02/03/23  0628 02/02/23  2113 02/02/23  1628 02/02/23  1147 02/02/23  0728 02/01/23  2104 02/01/23  1751 02/01/23  1130 02/01/23  1107 02/01/23  0742 01/31/23  2048   POC GLUCOSE mg/dl 189* 94 175* 163* 163* 107 184* 78 146* 91 70 293*               Lines/Drains:  Invasive Devices     Peripheral Intravenous Line  Duration           Peripheral IV 02/03/23 Proximal;Right;Ventral (anterior) Forearm <1 day                      Imaging: Reviewed radiology reports from this admission including: chest xray    Recent Cultures (last 7 days):   Results from last 7 days   Lab Units 02/01/23  1716   GRAM STAIN RESULT  No Polys or Bacteria seen   WOUND CULTURE  Culture too young- will reincubate       Last 24 Hours Medication List:   Current Facility-Administered Medications   Medication Dose Route Frequency Provider Last Rate   • acetaminophen  650 mg Oral Q6H PRN Nathaniel Failing, DPM     • albuterol  0 63 mg Nebulization 4x Daily PRN Nathaniel Failing, DPM     • allopurinol  50 mg Oral Daily Nathaniel Failing, DPM     • [START ON 2/4/2023] amLODIPine  10 mg Oral Daily HELEN Jhaveri-C     • apixaban  5 mg Oral BID Martin Marcum, PA-C     • atorvastatin  10 mg Oral Daily With Dinner Nathaniel Failing, DPM     • bisacodyl  10 mg Rectal Daily PRN Lum Trixie, PA-C     • calcium carbonate  1,000 mg Oral Daily PRN Nathaniel Failing, DPM     • clopidogrel  75 mg Oral Daily Nathaniel Failing, DPM     • docusate sodium  100 mg Oral BID Nathaniel Failing, DPM     • Fluticasone Furoate-Vilanterol  1 puff Inhalation Daily Cristi Mcnair DPM     • furosemide  40 mg Oral Daily Ellis Dubose MD     • gabapentin  300 mg Oral TID Cristi Mcnair DPM     • insulin glargine  8 Units Subcutaneous HS Cristi Mcnair DPM     • insulin lispro  1-6 Units Subcutaneous HS Cristi Mcnair DPM     • insulin lispro  2-12 Units Subcutaneous TID AC Cristi Mcnair DPM     • metoprolol tartrate  50 mg Oral Q12H [address] Cristi Mcnair DPM     • mirtazapine  30 mg Oral HS Cristi Mcnair DPM     • morphine injection  2 mg Intravenous Q4H PRN Cristi Mcnair DPM     • nicotine  1 patch Transdermal Daily Cristi Mcnair DPM     • ondansetron  4 mg Intravenous Q8H PRN Cristi Mcnair DPM     • oxybutynin  5 mg Oral Daily Cristi Mcnair DPM     • oxyCODONE  10 mg Oral Q6H PRN Cristi Mcnair DPM     • oxyCODONE  5 mg Oral Q4H PRN Cristi Mcnair DPM     • pantoprazole  40 mg Oral Daily Cristi Mcnair DPM     • polyethylene glycol  17 g Oral Daily Fabiano Jones PA-C     • predniSONE  2 5 mg Oral Daily Cristi Mcnair DPM     • rOPINIRole  0 5 mg Oral HS Cristi Mcnair DPM     • saccharomyces boulardii  250 mg Oral Daily Cristi Mcnair DPM     • senna  1 tablet Oral Daily Cristi Mcnair DPM     • sertraline  50 mg Oral HS Cristi Mcnair DPM     • sucralfate  1 g Oral BID AC Cristi Mcnair DPM     • traZODone  50 mg Oral HS Cristi Mcnair DPM     • umeclidinium  1 puff Inhalation Daily Cristi Mcnair DPM     • [START ON 2/4/2023] vancomycin  1,000 mg Intravenous Q24H Mabel Myers MD          Today, Patient Was Seen By: Fabiano Jones PA-C    **Please Note: This note may have been constructed using a voice recognition system  **

## 2023-02-03 NOTE — PHYSICAL THERAPY NOTE
PHYSICAL THERAPY NOTE          Patient Name: Penny Jc  OHMOD'D Date: 2/3/2023  11:07-11:34       02/03/23 1107   PT Last Visit   PT Visit Date 02/03/23   Note Type   Note Type Treatment   Pain Assessment   Pain Score 5   Restrictions/Precautions   LLE Weight Bearing Per Order WBAT   Braces or Orthoses Other (Comment)  (darco wedge shoe)   Other Precautions O2;Fall Risk;Multiple lines;Pain; Chair Alarm; Bed Alarm;Visual impairment   General   Chart Reviewed Yes   Response to Previous Treatment Patient reporting fatigue but able to participate  Family/Caregiver Present No   Cognition   Overall Cognitive Status WFL   Arousal/Participation Alert   Attention Attends with cues to redirect   Orientation Level Oriented X4   Following Commands Follows one step commands without difficulty   Comments Encouragement needed to participate with therapy  Subjective   Subjective Initially declining therapy participation, with encouragement willing to participate  Bed Mobility   Supine to Sit 5  Supervision   Additional items Increased time required   Additional Comments Increased time to complete  Dependent to don darco wedge shoe at EOB  Transfers   Sit to Stand 4  Minimal assistance   Additional items Assist x 1; Increased time required;Verbal cues   Stand to Sit 5  Supervision   Additional items Verbal cues   Additional Comments Increased time to complete transitions  Ambulation/Elevation   Gait pattern Improper Weight shift; Antalgic;Decreased foot clearance;Decreased L stance; Step to;Short stride; Excessively slow   Gait Assistance 4  Minimal assist   Additional items Assist x 1;Verbal cues; Tactile cues   Assistive Device Rolling walker   Distance Amb with RW 20'x1, then 8'x1    Seated rest   O2 sat on 3L 92%   Stair Management Assistance Not tested  (declined)   Additional items Other (Comment)  (Review of step to pattern using appropriate sequencing as declining stair trial )   Stair Management Technique   (ed on step to pattern )   Ambulation/Elevation Additional Comments gait step to pattern, slowed pace, + fatigue  Balance   Static Sitting Fair +   Dynamic Sitting Fair   Static Standing Fair -   Dynamic Standing Poor +   Ambulatory Poor +   Endurance Deficit   Endurance Deficit Yes   Endurance Deficit Description fatigue, HOLLIS, pain  Activity Tolerance   Activity Tolerance Patient limited by fatigue;Patient limited by pain   Medical Staff Made Aware NurseDwayne  OT-Ulises    Nurse Made Aware yes   Assessment   Prognosis Fair   Problem List Decreased strength;Decreased endurance; Impaired balance;Decreased mobility; Decreased skin integrity;Orthopedic restrictions;Pain; Impaired vision   Assessment Seen for OOB assessment and progression of PT  Encouragement needed to participate with mobility  S for bed mobility  Transfers sit to stand vary from Daphne to S  Able to ambulate short distances with RW support, step to pattern with darco shoe  Slow pace, + fatigue  O2 sats on 3L 92%  Declines stair trial  Ed on step to pattern with appropriate sequencing  Overall progress with PT  May continue to benefit from rehab to optimize functional outcomes  The patient's AM-PAC Basic Mobility Inpatient Short Form Raw Score is 17    A Raw score of less than or equal to 16 suggests the patient may benefit from discharge to post-acute rehabilitation services  Please also refer to the recommendation of the Physical Therapist for safe discharge planning  Will follow and progress per POC  See updated mobility goals  Barriers to Discharge Inaccessible home environment   Barriers to Discharge Comments 18 MANJULA   Goals   Patient Goals go home   STG Expiration Date 02/10/23   Short Term Goal #1 1)  Pt will perform bed mobility with Sharifa demonstrating appropriate technique 100% of the time in order to improve function  2)  Perform all transfers with Sharifa demonstrating safe and appropriate technique 100% of the time in order to improve ability to negotiate safely in home environment  3) Amb with least restrictive AD > 80'x1 with mod I in order to demonstrate ability to negotiate in home environment  4)  Improve overall strength and balance 1/2 grade in order to optimize ability to perform functional tasks and reduce fall risk  5) Increase activity tolerance to 30 minutes in order to improve endurance to functional tasks  6)  Negotiate stairs using most appropriate technique and Daphne in order to be able to negotiate safely in home environment  7) PT for ongoing patient and family/caregiver education, DME needs and d/c planning in order to promote highest level of function in least restrictive environment  PT Treatment Day 1   Plan   Treatment/Interventions Functional transfer training;LE strengthening/ROM; Therapeutic exercise;Elevations; Endurance training;Patient/family training;Equipment eval/education; Bed mobility;Gait training; Compensatory technique education;Continued evaluation;Spoke to nursing;OT;Spoke to case management   Progress Progressing toward goals   PT Frequency 3-5x/wk   Recommendation   PT Discharge Recommendation Post acute rehabilitation services   Equipment Recommended [facility] Recommended Wheeled walker   AM-PAC Basic Mobility Inpatient   Turning in Flat Bed Without Bedrails 4   Lying on Back to Sitting on Edge of Flat Bed Without Bedrails 3   Moving Bed to Chair 3   Standing Up From Chair Using Arms 3   Walk in Room 3   Climb 3-5 Stairs With Railing 1   Basic Mobility Inpatient Raw Score 17   Basic Mobility Standardized Score 39 67   Highest Level Of Mobility   JH-HLM Goal 5: Stand one or more mins   JH-HLM Achieved 6: Walk 10 steps or more   Education   Education Provided Mobility training;Assistive device   Patient Demonstrates verbal understanding   End of Consult   Patient Position at End of Consult Bedside chair;Bed/Chair alarm activated; All needs within reach   Gaby Leavitt, PT

## 2023-02-03 NOTE — ASSESSMENT & PLAN NOTE
· Patient chronically on oxycodone 10 mg, morphine sulfate 30 mg oral solution, and movantik at home   · PDMP reviewed  · Pain medications increased in the setting of ischemic left foot pain

## 2023-02-03 NOTE — PLAN OF CARE
Problem: PHYSICAL THERAPY ADULT  Goal: Performs mobility at highest level of function for planned discharge setting  See evaluation for individualized goals  Description: Treatment/Interventions: Functional transfer training, LE strengthening/ROM, Elevations, Therapeutic exercise, Endurance training, Patient/family training, Equipment eval/education, Bed mobility, Gait training, Compensatory technique education, Continued evaluation, Spoke to nursing, OT          See flowsheet documentation for full assessment, interventions and recommendations  Outcome: Progressing  Note: Prognosis: Fair  Problem List: Decreased strength, Decreased endurance, Impaired balance, Decreased mobility, Decreased skin integrity, Orthopedic restrictions, Pain, Impaired vision  Assessment: Seen for OOB assessment and progression of PT  Encouragement needed to participate with mobility  S for bed mobility  Transfers sit to stand vary from Daphne to S  Able to ambulate short distances with RW support, step to pattern with darco shoe  Slow pace, + fatigue  O2 sats on 3L 92%  Declines stair trial  Ed on step to pattern with appropriate sequencing  Overall progress with PT  May continue to benefit from rehab to optimize functional outcomes  The patient's AM-PAC Basic Mobility Inpatient Short Form Raw Score is 17    A Raw score of less than or equal to 16 suggests the patient may benefit from discharge to post-acute rehabilitation services  Please also refer to the recommendation of the Physical Therapist for safe discharge planning  Will follow and progress per POC  See updated mobility goals  Barriers to Discharge: Inaccessible home environment  Barriers to Discharge Comments: 18 MANJULA  PT Discharge Recommendation: Post acute rehabilitation services    See flowsheet documentation for full assessment

## 2023-02-03 NOTE — PLAN OF CARE
Problem: OCCUPATIONAL THERAPY ADULT  Goal: Performs self-care activities at highest level of function for planned discharge setting  See evaluation for individualized goals  Description: Treatment Interventions: ADL retraining, Functional transfer training, Endurance training, Cognitive reorientation, Patient/family training, Compensatory technique education  Equipment Recommended:  (RW--?)       See flowsheet documentation for full assessment, interventions and recommendations  Outcome: Progressing  Note: Limitation: Decreased ADL status, Decreased UE strength, Decreased Safe judgement during ADL, Decreased cognition, Decreased endurance, Decreased high-level ADLs  Prognosis: Fair  Assessment: Pt seen for 23 min tx session with focus on functional balance, functional mobility, ADL status, transfer safety, cognition, and education  Pt able to tolerate functional mobility assessment today; sitting balance=f+/f, standing balance=f/f-  Pt required verbal/physical cues to maintain transfer/walker safety  Pt demonstrating need for assistance with LE ADLs  Reviewed need for toe unloading shoe with limited carryover noted  Pt able to demonstrate good cognition(i e orientation, direction-following)  Pt perfers to go directly home with continued home therapy(i e P T )services; pt states family able to provided required assistance in home environment  Tx tolerated well  See below noted for updated functional mobility goals  The patient's raw score on the AM-PAC Daily Activity Inpatient Short Form is 21  A raw score of greater than or equal to 19 suggests the patient may benefit from discharge to home  Please refer to the recommendation of the Occupational Therapist for safe discharge planning  OT Discharge Recommendation: Home with home health rehabilitation (home P  T )

## 2023-02-03 NOTE — PROGRESS NOTES
Selma Valles is a [de-identified] y o  female who is currently ordered Vancomycin IV with management by the Pharmacy Consult service  Relevant clinical data and objective / subjective history reviewed  Vancomycin Assessment:  Indication and Goal AUC/Trough: Soft tissue (goal -600, trough >10), -600, trough >10  Clinical Status: stable  Renal Function:  SCr: 0 96 mg/dL  CrCl: 167 mL/min  Renal replacement: Not on dialysis  Days of Therapy: 10  Current Dose: 750 mg IV every 24 hours  Vancomycin Plan:  New Dosinmg IV Q24H  Estimated AUC: 494 mcg*hr/mL  Estimated Trough: 15 1 mcg/mL  Next Level:  with AM labs  Renal Function Monitoring: Daily BMP and UOP  Pharmacy will continue to follow closely for s/sx of nephrotoxicity, infusion reactions and appropriateness of therapy  BMP and CBC will be ordered per protocol  We will continue to follow the patient’s culture results and clinical progress daily      Micky Irwin, HectorD

## 2023-02-03 NOTE — PLAN OF CARE
Problem: Prexisting or High Potential for Compromised Skin Integrity  Goal: Skin integrity is maintained or improved  Description: INTERVENTIONS:  - Identify patients at risk for skin breakdown  - Assess and monitor skin integrity  - Assess and monitor nutrition and hydration status  - Monitor labs   - Assess for incontinence   - Turn and reposition patient  - Assist with mobility/ambulation  - Relieve pressure over bony prominences  - Avoid friction and shearing  - Provide appropriate hygiene as needed including keeping skin clean and dry  - Evaluate need for skin moisturizer/barrier cream  - Collaborate with interdisciplinary team   - Patient/family teaching  - Consider wound care consult   Outcome: Progressing     Problem: PAIN - ADULT  Goal: Verbalizes/displays adequate comfort level or baseline comfort level  Description: Interventions:  - Encourage patient to monitor pain and request assistance  - Assess pain using appropriate pain scale  - Administer analgesics based on type and severity of pain and evaluate response  - Implement non-pharmacological measures as appropriate and evaluate response  - Consider cultural and social influences on pain and pain management  - Notify physician/advanced practitioner if interventions unsuccessful or patient reports new pain  Outcome: Progressing     Problem: INFECTION - ADULT  Goal: Absence or prevention of progression during hospitalization  Description: INTERVENTIONS:  - Assess and monitor for signs and symptoms of infection  - Monitor lab/diagnostic results  - Monitor all insertion sites, i e  indwelling lines, tubes, and drains  - Monitor endotracheal if appropriate and nasal secretions for changes in amount and color  - [place] appropriate cooling/warming therapies per order  - Administer medications as ordered  - Instruct and encourage patient and family to use good hand hygiene technique  - Identify and instruct in appropriate isolation precautions for identified infection/condition  Outcome: Progressing     Problem: SAFETY ADULT  Goal: Patient will remain free of falls  Description: INTERVENTIONS:  - Educate patient/family on patient safety including physical limitations  - Instruct patient to call for assistance with activity   - Consult OT/PT to assist with strengthening/mobility   - Keep Call bell within reach  - Keep bed low and locked with side rails adjusted as appropriate  - Keep care items and personal belongings within reach  - Initiate and maintain comfort rounds  - Make Fall Risk Sign visible to staff  - Apply yellow socks and bracelet for high fall risk patients  - Consider moving patient to room near nurses station  Outcome: Progressing     Problem: DISCHARGE PLANNING  Goal: Discharge to home or other facility with appropriate resources  Description: INTERVENTIONS:  - Identify barriers to discharge w/patient and caregiver  - Arrange for needed discharge resources and transportation as appropriate  - Identify discharge learning needs (meds, wound care, etc )  - Arrange for interpretive services to assist at discharge as needed  - Refer to Case Management Department for coordinating discharge planning if the patient needs post-hospital services based on physician/advanced practitioner order or complex needs related to functional status, cognitive ability, or social support system  Outcome: Progressing     Problem: Knowledge Deficit  Goal: Patient/family/caregiver demonstrates understanding of disease process, treatment plan, medications, and discharge instructions  Description: Complete learning assessment and assess knowledge base    Interventions:  - Provide teaching at level of understanding  - Provide teaching via preferred learning methods  Outcome: Progressing

## 2023-02-03 NOTE — PROGRESS NOTES
[facility] Podiatry - Progress Note  Patient: Osman Guadarrama [de-identified] y o  female   MRN: [id number]  PCP: Melia Sullivan MD  Unit/Bed#: E5 -01 Encounter: [id number]  Date Of Visit: 23    ASSESSMENT:    Osman Guadarrama is a [de-identified] y o  female with:    1  Left Hallux diabetic ulcer with osteomyelitis  - S/p First ray resection (DOS 2023)  2  Type 2 diabetes mellitis  3  PAD  4  Stage 3b CKD    PLAN:    · Dressings changed today, incision site stable with all sutures intact, mild erythema dorsal foot  · Continue IV antibiotics per ID team  Esa Bower will continue to monitor closely  · Continue local wound care, xeroform/DSD, appreciate nursing assistance with dressing changes  · Elevation on green foam wedges or pillows when non-ambulatory  · Rest of care per primary team     Weight bearing status: weight bearing as tolerated in Darco wedge shoe      SUBJECTIVE:     The patient was seen, evaluated, and assessed at bedside today  The patient was awake, alert, and in no acute distress  No acute events overnight  The patient reports moderate pain to left  Patient denies N/V/F/chills/SOB/CP  OBJECTIVE:     Vitals:   BP (!) 172/65   Pulse 73   Temp 99 °F (37 2 °C)   Resp 18   Ht 5' 6" (1 676 m)   Wt 79 1 kg (174 lb 6 1 oz)   SpO2 97%   BMI 28 15 kg/m²     Temp (24hrs), Av 9 °F (37 2 °C), Min:97 3 °F (36 3 °C), Max:99 9 °F (37 7 °C)      Physical Exam:     General:  Alert, cooperative, and in no distress  Lower extremity exam:  Cardiovascular status at baseline  Neurological status at baseline  Musculoskeletal status at baseline  No calf tenderness noted  Left foot incision site stable with all sutures intact and skin well aligned  Capillary refill <3seconds, skin temperature within normal limits  Erythema noted at dorsal foot  Clinical Images 23:               Additional Data:     Labs:    Results from last 7 days   Lab Units 23  0523 23  6972 01/30/23  0732   WBC Thousand/uL 10 64*   < > 7 36   HEMOGLOBIN g/dL 7 7*   < > 7 7*   HEMATOCRIT % 25 7*   < > 25 6*   PLATELETS Thousands/uL 292   < > 186   NEUTROS PCT %  --   --  54   LYMPHS PCT %  --   --  30   MONOS PCT %  --   --  9   EOS PCT %  --   --  5    < > = values in this interval not displayed  Results from last 7 days   Lab Units 02/02/23  0505 02/01/23  0541   POTASSIUM mmol/L 4 1 4 1   CHLORIDE mmol/L 107 107   CO2 mmol/L 28 27   BUN mg/dL 15 18   CREATININE mg/dL 0 96 1 00   CALCIUM mg/dL 7 7* 7 7*   ALK PHOS U/L  --  46   ALT U/L  --  8   AST U/L  --  10*     Results from last 7 days   Lab Units 01/30/23  1913   INR  1 02       * I Have Reviewed All Lab Data Listed Above  Recent Cultures (last 7 days):     Results from last 7 days   Lab Units 02/01/23  1716   GRAM STAIN RESULT  No Polys or Bacteria seen     Results from last 7 days   Lab Units 02/01/23  1716   ANAEROBIC CULTURE  Culture results to follow  Imaging: I have personally reviewed pertinent films in PACS  EKG, Pathology, and Other Studies: I have personally reviewed pertinent reports  ** Please Note: Portions of the record may have been created with voice recognition software  Occasional wrong word or "sound a like" substitutions may have occurred due to the inherent limitations of voice recognition software  Read the chart carefully and recognize, using context, where substitutions have occurred   **

## 2023-02-03 NOTE — ASSESSMENT & PLAN NOTE
· Patient initially presented to the [address] [place] [address] with blisters on the left great toe that progressed to open wounds  She was prescribed keflex and doxycycline outpatient with no resolution  · Was transferred to [address] [place] [address] for further vascular evaluation  · Xray of left foot 1/24 negative for osteomyelitis   · MRI left foot 1/25/23: "Ulcer with evidence of small adjacent medial, distal 1st MTP osteomyelitis  Altered marrow signal intensity at the proximal-medial aspect of the 1st toe proximal phalanx is less specific and may be early osteomyelitis consult podiatry for further recommendations "  · S/p cefepime x 7 days   · Now on solo vancomycin per ID- day #11 continue antibiotics through 2/7/23 can transition to oral antibiotics for discharge  · Underwent angiogram 1/30/23 with successful treatment of high grade long segment SFA stenosis & B EIA stenosis; peroneal and PT runoff  · Switched ASA to plavix for new PAD stents- per vascular  · AD showing no significant change in the disease process on the right and improvement noted in the left AD    2 study done 1/25/2023  · Postop day 2 left first ray resection  · PT/OT recommending acute rehab, however patient wishes to go home with home health

## 2023-02-03 NOTE — ASSESSMENT & PLAN NOTE
· Patient had arterial doppler studies of bilateral extremities while at [address] [place] [address]  · RLL: There is a 50-75% stenosis noted in the mid superficial femoral artery with diffuse atherosclerotic disease throughout the remaining femoro-popliteal andtibio-peroneal segments  AD 0 42  · LLL: There is a 50-75% stenosis noted in the prox and distal superficial femoral artery and an occlusion vs high grade stenosis of the distal anterior tibial artery  Diffuse atherosclerotic disease throughout the remaining femoro-popliteal and tibio-peroneal segments   AD 0 39  · Patient transferred to [facility] for angiogram - performed 1/30 with successful stenting  · POD day 2 left first ray resection

## 2023-02-03 NOTE — PLAN OF CARE
Problem: Prexisting or High Potential for Compromised Skin Integrity  Goal: Skin integrity is maintained or improved  Description: INTERVENTIONS:  - Identify patients at risk for skin breakdown  - Assess and monitor skin integrity  - Assess and monitor nutrition and hydration status  - Monitor labs   - Assess for incontinence   - Turn and reposition patient  - Assist with mobility/ambulation  - Relieve pressure over bony prominences  - Avoid friction and shearing  - Provide appropriate hygiene as needed including keeping skin clean and dry  - Evaluate need for skin moisturizer/barrier cream  - Collaborate with interdisciplinary team   - Patient/family teaching  - Consider wound care consult   Outcome: Progressing     Problem: PAIN - ADULT  Goal: Verbalizes/displays adequate comfort level or baseline comfort level  Description: Interventions:  - Encourage patient to monitor pain and request assistance  - Assess pain using appropriate pain scale  - Administer analgesics based on type and severity of pain and evaluate response  - Implement non-pharmacological measures as appropriate and evaluate response  - Consider cultural and social influences on pain and pain management  - Notify physician/advanced practitioner if interventions unsuccessful or patient reports new pain  Outcome: Progressing     Problem: INFECTION - ADULT  Goal: Absence or prevention of progression during hospitalization  Description: INTERVENTIONS:  - Assess and monitor for signs and symptoms of infection  - Monitor lab/diagnostic results  - Monitor all insertion sites, i e  indwelling lines, tubes, and drains  - Monitor endotracheal if appropriate and nasal secretions for changes in amount and color  - [place] appropriate cooling/warming therapies per order  - Administer medications as ordered  - Instruct and encourage patient and family to use good hand hygiene technique  - Identify and instruct in appropriate isolation precautions for identified infection/condition  Outcome: Progressing     Problem: SAFETY ADULT  Goal: Patient will remain free of falls  Description: INTERVENTIONS:  - Educate patient/family on patient safety including physical limitations  - Instruct patient to call for assistance with activity   - Consult OT/PT to assist with strengthening/mobility   - Keep Call bell within reach  - Keep bed low and locked with side rails adjusted as appropriate  - Keep care items and personal belongings within reach  - Initiate and maintain comfort rounds  - Make Fall Risk Sign visible to staff  - Offer Toileting every 2 Hours, in advance of need  - Initiate/Maintain bed alarm  - Obtain necessary fall risk management equipment:   - Apply yellow socks and bracelet for high fall risk patients  - Consider moving patient to room near nurses station  Outcome: Progressing     Problem: DISCHARGE PLANNING  Goal: Discharge to home or other facility with appropriate resources  Description: INTERVENTIONS:  - Identify barriers to discharge w/patient and caregiver  - Arrange for needed discharge resources and transportation as appropriate  - Identify discharge learning needs (meds, wound care, etc )  - Arrange for interpretive services to assist at discharge as needed  - Refer to Case Management Department for coordinating discharge planning if the patient needs post-hospital services based on physician/advanced practitioner order or complex needs related to functional status, cognitive ability, or social support system  Outcome: Progressing     Problem: Knowledge Deficit  Goal: Patient/family/caregiver demonstrates understanding of disease process, treatment plan, medications, and discharge instructions  Description: Complete learning assessment and assess knowledge base    Interventions:  - Provide teaching at level of understanding  - Provide teaching via preferred learning methods  Outcome: Progressing

## 2023-02-03 NOTE — ASSESSMENT & PLAN NOTE
Lab Results   Component Value Date    HGBA1C 7 5 (H) 01/23/2023     Recent Labs     02/02/23  1628 02/02/23  2113 02/03/23  0628 02/03/23  1100   POCGLU 163* 175* 94 189*   · On Lantus 8 units  · SSI with accucheks

## 2023-02-03 NOTE — DISCHARGE INSTR - OTHER ORDERS
Discharge Instructions - Podiatry    Weight Bearing Status: Weight bearing as tolerated in toe offloading shoe                   Pain: Continue analgesics as directed    Follow-up appointment instructions: Please make an appointment within one week of discharge with Dr Jag Arias  Contact sooner if any increase in pain, or signs of infection occur    Wound Care: Leave dressings clean, dry, and intact between professional dressing changes    Nursing Instructions: Please apply Betadine soaked adaptic  Then cover with Gauze and secure with Kerlix and tape  Please change dressing every Monday, Wednesday, and Friday

## 2023-02-03 NOTE — DISCHARGE INSTR - AVS FIRST PAGE
Discharge Instructions - Podiatry    Weight Bearing Status: Weight bearing as tolerated in toe offloading shoe                   Pain: Continue analgesics as directed    Follow-up appointment instructions: Please make an appointment within one week of discharge with Dr Phan Haddad  Contact sooner if any increase in pain, or signs of infection occur    Wound Care: Leave dressings clean, dry, and intact between professional dressing changes    Nursing Instructions: Please apply Betadine soaked adaptic  Then cover with Gauze and secure with Kerlix and tape  Please change dressing every Monday, Wednesday, and Friday

## 2023-02-04 VITALS
TEMPERATURE: 98.7 F | BODY MASS INDEX: 29.37 KG/M2 | DIASTOLIC BLOOD PRESSURE: 51 MMHG | RESPIRATION RATE: 20 BRPM | HEART RATE: 65 BPM | OXYGEN SATURATION: 93 % | WEIGHT: 182.76 LBS | HEIGHT: 66 IN | SYSTOLIC BLOOD PRESSURE: 168 MMHG

## 2023-02-04 LAB
ANION GAP SERPL CALCULATED.3IONS-SCNC: 5 MMOL/L (ref 4–13)
BUN SERPL-MCNC: 15 MG/DL (ref 5–25)
CALCIUM SERPL-MCNC: 7.9 MG/DL (ref 8.4–10.2)
CHLORIDE SERPL-SCNC: 103 MMOL/L (ref 96–108)
CO2 SERPL-SCNC: 32 MMOL/L (ref 21–32)
CREAT SERPL-MCNC: 1.18 MG/DL (ref 0.6–1.3)
ERYTHROCYTE [DISTWIDTH] IN BLOOD BY AUTOMATED COUNT: 16.3 % (ref 11.6–15.1)
GFR SERPL CREATININE-BSD FRML MDRD: 43 ML/MIN/1.73SQ M
GLUCOSE SERPL-MCNC: 106 MG/DL (ref 65–140)
GLUCOSE SERPL-MCNC: 168 MG/DL (ref 65–140)
GLUCOSE SERPL-MCNC: 175 MG/DL (ref 65–140)
GLUCOSE SERPL-MCNC: 89 MG/DL (ref 65–140)
HCT VFR BLD AUTO: 23.7 % (ref 34.8–46.1)
HGB BLD-MCNC: 7 G/DL (ref 11.5–15.4)
MCH RBC QN AUTO: 25.9 PG (ref 26.8–34.3)
MCHC RBC AUTO-ENTMCNC: 29.5 G/DL (ref 31.4–37.4)
MCV RBC AUTO: 88 FL (ref 82–98)
PLATELET # BLD AUTO: 273 THOUSANDS/UL (ref 149–390)
PMV BLD AUTO: 9.9 FL (ref 8.9–12.7)
POTASSIUM SERPL-SCNC: 3.7 MMOL/L (ref 3.5–5.3)
RBC # BLD AUTO: 2.7 MILLION/UL (ref 3.81–5.12)
SODIUM SERPL-SCNC: 140 MMOL/L (ref 135–147)
WBC # BLD AUTO: 7.8 THOUSAND/UL (ref 4.31–10.16)

## 2023-02-04 RX ORDER — ACETAMINOPHEN 325 MG/1
650 TABLET ORAL EVERY 6 HOURS PRN
Refills: 0
Start: 2023-02-04

## 2023-02-04 RX ORDER — CEPHALEXIN 500 MG/1
500 CAPSULE ORAL EVERY 6 HOURS SCHEDULED
Qty: 10 CAPSULE | Refills: 0 | Status: SHIPPED | OUTPATIENT
Start: 2023-02-04 | End: 2023-02-07

## 2023-02-04 RX ORDER — DOXYCYCLINE HYCLATE 100 MG/1
100 CAPSULE ORAL EVERY 12 HOURS SCHEDULED
Qty: 6 CAPSULE | Refills: 0 | Status: SHIPPED | OUTPATIENT
Start: 2023-02-04 | End: 2023-02-07

## 2023-02-04 RX ORDER — CLOPIDOGREL BISULFATE 75 MG/1
75 TABLET ORAL DAILY
Qty: 30 TABLET | Refills: 0 | Status: SHIPPED | OUTPATIENT
Start: 2023-02-05

## 2023-02-04 RX ADMIN — PANTOPRAZOLE SODIUM 40 MG: 40 TABLET, DELAYED RELEASE ORAL at 06:00

## 2023-02-04 RX ADMIN — SUCRALFATE 1 G: 1 TABLET ORAL at 16:43

## 2023-02-04 RX ADMIN — CEPHALEXIN 500 MG: 500 CAPSULE ORAL at 16:44

## 2023-02-04 RX ADMIN — MORPHINE SULFATE 2 MG: 2 INJECTION, SOLUTION INTRAMUSCULAR; INTRAVENOUS at 08:33

## 2023-02-04 RX ADMIN — OXYCODONE HYDROCHLORIDE 10 MG: 10 TABLET ORAL at 16:43

## 2023-02-04 RX ADMIN — GABAPENTIN 300 MG: 300 CAPSULE ORAL at 08:34

## 2023-02-04 RX ADMIN — DOCUSATE SODIUM 100 MG: 100 CAPSULE, LIQUID FILLED ORAL at 16:45

## 2023-02-04 RX ADMIN — APIXABAN 5 MG: 5 TABLET, FILM COATED ORAL at 16:44

## 2023-02-04 RX ADMIN — APIXABAN 5 MG: 5 TABLET, FILM COATED ORAL at 08:34

## 2023-02-04 RX ADMIN — METOPROLOL TARTRATE 50 MG: 50 TABLET, FILM COATED ORAL at 08:33

## 2023-02-04 RX ADMIN — FUROSEMIDE 40 MG: 40 TABLET ORAL at 08:33

## 2023-02-04 RX ADMIN — PREDNISONE 2.5 MG: 1 TABLET ORAL at 08:34

## 2023-02-04 RX ADMIN — ATORVASTATIN CALCIUM 10 MG: 10 TABLET, FILM COATED ORAL at 16:44

## 2023-02-04 RX ADMIN — INSULIN LISPRO 2 UNITS: 100 INJECTION, SOLUTION INTRAVENOUS; SUBCUTANEOUS at 11:50

## 2023-02-04 RX ADMIN — NICOTINE 7 MG/24 HR DAILY TRANSDERMAL PATCH 1 PATCH: at 08:34

## 2023-02-04 RX ADMIN — ALLOPURINOL 50 MG: 100 TABLET ORAL at 08:33

## 2023-02-04 RX ADMIN — Medication 250 MG: at 08:33

## 2023-02-04 RX ADMIN — GABAPENTIN 300 MG: 300 CAPSULE ORAL at 16:44

## 2023-02-04 RX ADMIN — DOXYCYCLINE 100 MG: 100 CAPSULE ORAL at 08:34

## 2023-02-04 RX ADMIN — INSULIN LISPRO 2 UNITS: 100 INJECTION, SOLUTION INTRAVENOUS; SUBCUTANEOUS at 16:43

## 2023-02-04 RX ADMIN — POLYETHYLENE GLYCOL 3350 17 G: 17 POWDER, FOR SOLUTION ORAL at 08:33

## 2023-02-04 RX ADMIN — OXYBUTYNIN CHLORIDE 5 MG: 5 TABLET, EXTENDED RELEASE ORAL at 08:34

## 2023-02-04 RX ADMIN — OXYCODONE HYDROCHLORIDE 10 MG: 10 TABLET ORAL at 11:50

## 2023-02-04 RX ADMIN — SUCRALFATE 1 G: 1 TABLET ORAL at 06:00

## 2023-02-04 RX ADMIN — UMECLIDINIUM 1 PUFF: 62.5 AEROSOL, POWDER ORAL at 08:33

## 2023-02-04 RX ADMIN — DOCUSATE SODIUM 100 MG: 100 CAPSULE, LIQUID FILLED ORAL at 08:34

## 2023-02-04 RX ADMIN — AMLODIPINE BESYLATE 10 MG: 10 TABLET ORAL at 08:34

## 2023-02-04 RX ADMIN — OXYCODONE HYDROCHLORIDE 10 MG: 10 TABLET ORAL at 04:03

## 2023-02-04 RX ADMIN — SENNOSIDES 8.6 MG: 8.6 TABLET ORAL at 08:34

## 2023-02-04 RX ADMIN — CEPHALEXIN 500 MG: 500 CAPSULE ORAL at 06:00

## 2023-02-04 RX ADMIN — CLOPIDOGREL BISULFATE 75 MG: 75 TABLET ORAL at 08:34

## 2023-02-04 RX ADMIN — CEPHALEXIN 500 MG: 500 CAPSULE ORAL at 11:50

## 2023-02-04 RX ADMIN — FLUTICASONE FUROATE AND VILANTEROL TRIFENATATE 1 PUFF: 100; 25 POWDER RESPIRATORY (INHALATION) at 08:33

## 2023-02-04 NOTE — PLAN OF CARE
Problem: Prexisting or High Potential for Compromised Skin Integrity  Goal: Skin integrity is maintained or improved  Description: INTERVENTIONS:  - Identify patients at risk for skin breakdown  - Assess and monitor skin integrity  - Assess and monitor nutrition and hydration status  - Monitor labs   - Assess for incontinence   - Turn and reposition patient  - Assist with mobility/ambulation  - Relieve pressure over bony prominences  - Avoid friction and shearing  - Provide appropriate hygiene as needed including keeping skin clean and dry  - Evaluate need for skin moisturizer/barrier cream  - Collaborate with interdisciplinary team   - Patient/family teaching  - Consider wound care consult   Outcome: Progressing     Problem: PAIN - ADULT  Goal: Verbalizes/displays adequate comfort level or baseline comfort level  Description: Interventions:  - Encourage patient to monitor pain and request assistance  - Assess pain using appropriate pain scale  - Administer analgesics based on type and severity of pain and evaluate response  - Implement non-pharmacological measures as appropriate and evaluate response  - Consider cultural and social influences on pain and pain management  - Notify physician/advanced practitioner if interventions unsuccessful or patient reports new pain  Outcome: Progressing     Problem: INFECTION - ADULT  Goal: Absence or prevention of progression during hospitalization  Description: INTERVENTIONS:  - Assess and monitor for signs and symptoms of infection  - Monitor lab/diagnostic results  - Monitor all insertion sites, i e  indwelling lines, tubes, and drains  - Monitor endotracheal if appropriate and nasal secretions for changes in amount and color  - [place] appropriate cooling/warming therapies per order  - Administer medications as ordered  - Instruct and encourage patient and family to use good hand hygiene technique  - Identify and instruct in appropriate isolation precautions for identified infection/condition  Outcome: Progressing     Problem: SAFETY ADULT  Goal: Patient will remain free of falls  Description: INTERVENTIONS:  - Educate patient/family on patient safety including physical limitations  - Instruct patient to call for assistance with activity   - Consult OT/PT to assist with strengthening/mobility   - Keep Call bell within reach  - Keep bed low and locked with side rails adjusted as appropriate  - Keep care items and personal belongings within reach  - Initiate and maintain comfort rounds  - Make Fall Risk Sign visible to staff  - Offer Toileting every  Hours, in advance of need  - Initiate/Maintain alarm  - Obtain necessary fall risk management equipment  - Apply yellow socks and bracelet for high fall risk patients  - Consider moving patient to room near nurses station  Outcome: Progressing     Problem: DISCHARGE PLANNING  Goal: Discharge to home or other facility with appropriate resources  Description: INTERVENTIONS:  - Identify barriers to discharge w/patient and caregiver  - Arrange for needed discharge resources and transportation as appropriate  - Identify discharge learning needs (meds, wound care, etc )  - Arrange for interpretive services to assist at discharge as needed  - Refer to Case Management Department for coordinating discharge planning if the patient needs post-hospital services based on physician/advanced practitioner order or complex needs related to functional status, cognitive ability, or social support system  Outcome: Progressing     Problem: Knowledge Deficit  Goal: Patient/family/caregiver demonstrates understanding of disease process, treatment plan, medications, and discharge instructions  Description: Complete learning assessment and assess knowledge base    Interventions:  - Provide teaching at level of understanding  - Provide teaching via preferred learning methods  Outcome: Progressing

## 2023-02-04 NOTE — PLAN OF CARE
Problem: Prexisting or High Potential for Compromised Skin Integrity  Goal: Skin integrity is maintained or improved  Description: INTERVENTIONS:  - Identify patients at risk for skin breakdown  - Assess and monitor skin integrity  - Assess and monitor nutrition and hydration status  - Monitor labs   - Assess for incontinence   - Turn and reposition patient  - Assist with mobility/ambulation  - Relieve pressure over bony prominences  - Avoid friction and shearing  - Provide appropriate hygiene as needed including keeping skin clean and dry  - Evaluate need for skin moisturizer/barrier cream  - Collaborate with interdisciplinary team   - Patient/family teaching  - Consider wound care consult   Outcome: Progressing     Problem: PAIN - ADULT  Goal: Verbalizes/displays adequate comfort level or baseline comfort level  Description: Interventions:  - Encourage patient to monitor pain and request assistance  - Assess pain using appropriate pain scale  - Administer analgesics based on type and severity of pain and evaluate response  - Implement non-pharmacological measures as appropriate and evaluate response  - Consider cultural and social influences on pain and pain management  - Notify physician/advanced practitioner if interventions unsuccessful or patient reports new pain  Outcome: Progressing     Problem: INFECTION - ADULT  Goal: Absence or prevention of progression during hospitalization  Description: INTERVENTIONS:  - Assess and monitor for signs and symptoms of infection  - Monitor lab/diagnostic results  - Monitor all insertion sites, i e  indwelling lines, tubes, and drains  - Monitor endotracheal if appropriate and nasal secretions for changes in amount and color  - [place] appropriate cooling/warming therapies per order  - Administer medications as ordered  - Instruct and encourage patient and family to use good hand hygiene technique  - Identify and instruct in appropriate isolation precautions for identified infection/condition  Outcome: Progressing     Problem: SAFETY ADULT  Goal: Patient will remain free of falls  Description: INTERVENTIONS:  - Educate patient/family on patient safety including physical limitations  - Instruct patient to call for assistance with activity   - Consult OT/PT to assist with strengthening/mobility   - Keep Call bell within reach  - Keep bed low and locked with side rails adjusted as appropriate  - Keep care items and personal belongings within reach  - Initiate and maintain comfort rounds  - Make Fall Risk Sign visible to staff  - Offer Toileting every 2 Hours, in advance of need  - Initiate/Maintain bed alarm  - Obtain necessary fall risk management equipment:   - Apply yellow socks and bracelet for high fall risk patients  - Consider moving patient to room near nurses station  Outcome: Progressing     Problem: DISCHARGE PLANNING  Goal: Discharge to home or other facility with appropriate resources  Description: INTERVENTIONS:  - Identify barriers to discharge w/patient and caregiver  - Arrange for needed discharge resources and transportation as appropriate  - Identify discharge learning needs (meds, wound care, etc )  - Arrange for interpretive services to assist at discharge as needed  - Refer to Case Management Department for coordinating discharge planning if the patient needs post-hospital services based on physician/advanced practitioner order or complex needs related to functional status, cognitive ability, or social support system  Outcome: Progressing     Problem: Knowledge Deficit  Goal: Patient/family/caregiver demonstrates understanding of disease process, treatment plan, medications, and discharge instructions  Description: Complete learning assessment and assess knowledge base    Interventions:  - Provide teaching at level of understanding  - Provide teaching via preferred learning methods  Outcome: Progressing

## 2023-02-04 NOTE — PROGRESS NOTES
Progress Note - Infectious Disease   Yumi Delarosa [de-identified] y o  female MRN: [id number]  Unit/Bed#: E5 -01 Encounter: [id number]      Impression/Plan:    1  Left great toe diabetic ulcer with first metatarsal osteomyelitis  Patient initially presented to outside campus with progressing pain and drainage from her left great toe  Unfortunately she failed to improve with oral antibiotic and I suspect this was due to progressing bone infection  MRI consistent with osteomyelitis of the distal 1st MTP  Status post IR revascularization on 1/30  No significant history of resistance  Unfortunately no other prior culture data  She remains hemodynamically stable  Status post left first ray resection 2/2/23, with surgical source control  A wound culture from the toe is in process  -Stop IV vancomycin  -Start p o  doxycycline 100 mg every 12 hours and Keflex 500 mg every 6 hours  -Follow-up soft tissue culture, adjust antibiotics as needed  -complete a 7-day total postoperative antibiotic course through 2/7/2023  -Continue to trend fever curve/vitals  -Ongoing follow-up by podiatry     2  Severe peripheral arterial disease  Patient with significant vascular disease which can unfortunately affect wound healing  Underwent revascularization with IR on 1/30  Status post left first ray amputation 2/1  -Antibiotic as above  -Ongoing follow-up by vascular surgery  -Ongoing follow-up by podiatry  -Continue to trend fever curve/WBC     3  Penicillin allergy  Documented as having reported hives but this was a remote allergy  Patient may benefit from amoxicillin challenge as an outpatient  Recommend follow-up with PCP  Tolerating current antibiotics above  We will adjust allergy list if needed      4  Chronic kidney disease  Creatinine appears stable compared to prior baseline    This does affect antibiotic dosing   -continue vancomycin alone  -Pharmacy consulted  -We will dose adjust antibiotics as needed  -Monitor creatinine     Above plan discussed in detail with the patient at bedside  ID will monitor cultures over the weekend, please call with questions  Antibiotics:  Abx day 11  IV Vancomycin    Subjective:  No acute events noted  Patient is doing well, pain controlled  No fevers, chills  Tolerating antibiotics without difficulty  Objective:  Vitals:  Temp:  [98 6 °F (37 °C)-99 9 °F (37 7 °C)] 98 9 °F (37 2 °C)  HR:  [69-99] 69  Resp:  [14-20] 20  BP: (143-180)/(47-70) 163/47  SpO2:  [93 %-97 %] 95 %  Temp (24hrs), Av 1 °F (37 3 °C), Min:98 6 °F (37 °C), Max:99 9 °F (37 7 °C)  Current: Temperature: 98 9 °F (37 2 °C)    Physical Exam:   General Appearance:  Chronically ill appearing but interactive, nontoxic, no acute distress  Throat: Oropharynx moist without lesions  Lungs:   Clear to auscultation bilaterally; no wheezes, rhonchi or rales; respirations unlabored   Heart:  RRR; no murmur, rub or gallop   Abdomen:   Soft, non-tender, non-distended, positive bowel sounds  Extremities: No clubbing, cyanosis or edema   Skin: Left foot wrapped in guaze       Labs: All pertinent labs and imaging studies were personally reviewed  Results from last 7 days   Lab Units 23  0523 23  0958 23  0505 23  0900 23  0541   WBC Thousand/uL 10 64*  --  9 77  --  8 06   HEMOGLOBIN g/dL 7 7* 7 2* 6 7*   < > 7 0*   PLATELETS Thousands/uL 292  --  237  --  208    < > = values in this interval not displayed       Results from last 7 days   Lab Units 23  0505 23  0541 23  0931   SODIUM mmol/L 139 139 138   POTASSIUM mmol/L 4 1 4 1 4 1   CHLORIDE mmol/L 107 107 103   CO2 mmol/L 28 27 30   BUN mg/dL 15 18 20   CREATININE mg/dL 0 96 1 00 1 11   EGFR ml/min/1 73sq m 56 53 47   CALCIUM mg/dL 7 7* 7 7* 8 2*   AST U/L  --  10*  --    ALT U/L  --  8  --    ALK PHOS U/L  --  46  --              Results from last 7 days   Lab Units 23  0500   FERRITIN ng/mL 90

## 2023-02-04 NOTE — CASE MANAGEMENT
Case Management Discharge Planning Note    Patient name Jordan Mayo  Location [facility]  [address]/E5 MS 21 -* MRN [id number]  : 1940 Date 2023       Current Admission Date: 2023  Current Admission Diagnosis:Acute hematogenous osteomyelitis of left foot [facility])   Patient Active Problem List    Diagnosis Date Noted   • Hypertension 2023   • Constipation 2023   • History of DVT (deep vein thrombosis) 2023   • Coronary artery disease involving native coronary artery 2023   • Anemia 2023   • Stage 3b chronic kidney disease ([facility] [address] ) 2023   • CHF (congestive heart failure) ([facility] [address] ) 2023   • Smoking 2023   • Oxygen dependent 2023   • PAD (peripheral artery disease) ([facility] [address] ) 2023   • Acute hematogenous osteomyelitis of left foot ([facility] [address] ) 2023   • Type 2 diabetes mellitus with skin complication, with long-term current use of insulin ([facility] [address] ) 2023   • COPD (chronic obstructive pulmonary disease) ([facility] [address] ) 2023   • Chronic respiratory failure with hypoxia and hypercapnia ([facility] [address] ) 2023   • Chronic, continuous use of opioids 2023      LOS (days): 6  Geometric Mean LOS (GMLOS) (days): 4 30  Days to GMLOS:-2     OBJECTIVE:  Risk of Unplanned Readmission Score: 38 58         Current admission status: Inpatient   Preferred Pharmacy:   [address] [place] [address] [id number] [place], [place] - / Noe Ramos [facility]/ [address]  [address] [facility] [address]  Phone: [phone] Fax: [phone]    Primary Care Provider: Rhett Potter MD    Primary Insurance: [facility]  Secondary Insurance:     DISCHARGE DETAILS:    Discharge planning discussed with[de-identified] Patient and Murphy Kruse (Daughter)   [phone] (M)  Freedom of Choice: Yes  Comments - Freedom of Choice: Pt will discharge home w/ [address] [facility]- and w/ Dtr providing 24hr assistance  Contacts  Patient Contacts: Murphy Kruse (Daughter) [phone] (M)  Relationship to Patient[de-identified] Family    Discharge Destination Plan[de-identified] Home with [place] at Discharge : BLS Ambulance  Dispatcher Contacted: Yes  Number/Name of Dispatcher: [facility] via [employer]  Transported by [employer] and Unit #): [facility]  ETA of Transport (Date): 02/04/23  ETA of Transport (Time): 1800  Transfer Mode: Stretcher  Accompanied by: EMS personnel  Transfer Equipment: BLS devices     IMM Given (Date):: 02/04/23  IMM Given to[de-identified] Patient    Additional Comments: CM updated Dtr Kosta Cardoza of Pts expected discharge today  Dtr in agreement with discharge plan

## 2023-02-04 NOTE — DISCHARGE SUMMARY
Discharge Summary - Jocelyn Vazquez, 1940, [id number]        Admission Date: 1/29/2023  Discharge Date: 2/4/2023      Discharge Diagnosis:   1  Acute osteomyelitis, left foot  2  Peripheral vascular disease  3  Hypertension  4  Anemia  5  Chronic kidney disease stage IIIb  6  Chronic respiratory failure with hypoxia and hypercapnia  7  COPD  8  Type 2 diabetes  9  History of DVT  10  Chronic opioid use    Consulting Physicians:  1   Dr Shane Holloway, podiatry  2  Dr Taylor Cueva, infectious disease  3  Dr Jose J Gupta, nephrology  4  Dr Kevin Sandy, vascular surgery    Procedures Performed:   1  Angiography with angioplasty of the right external iliac, left external iliac, left superficial femoral   2   First ray resection, left foot    HPI: The patient is an 68-year-old woman with multiple chronic medical problems who developed blisters on her left first toe  Unfortunately, this progressed to osteomyelitis  She was admitted for further care  Hospital Course: The patient was admitted to the hospital and started on intravenous antibiotics  She was evaluated by podiatry, vascular, and infectious disease  The patient was noted to have significant peripheral vascular disease and angiography was recommended  The patient underwent angiography and had angioplasty and stenting of both external iliac arteries as well as the left superficial femoral   The patient tolerated this procedure well  The patient's first toe was deemed nonsalvageable because of osteomyelitis  Amputation was recommended  The patient underwent first ray resection of her left foot by Dr Jasper Arteaga and his associates  The patient tolerated this procedure well  She was making a good recovery  Postoperatively, the patient had some increase in shortness of breath  This was thought to be fluid overload related to IV fluid administration and withholding of diuretic medication  She was treated for this with resolution      The patient's other medical issues remained stable during her hospitalization  At the time of discharge she was feeling well  Vital signs were stable  Lungs were clear  Cardiac exam reveals a regular rhythm  The abdomen was soft and nontender  There was no edema  Surgical dressing was intact  Disposition: The patient was discharged home on February 4  She will continue a diabetic diet  Her activity will be as tolerated with the exception of limited weightbearing on her left foot  She was asked to arrange follow-up with her primary care physician within 1 week  She will also be followed by podiatry  Discharge instructions/Information to patient and family:   See after visit summary for information provided to patient and family  Provisions for Follow-Up Care:  See after visit summary for information related to follow-up care and any pertinent home health orders  Planned Readmission: No    Discharge Statement   I spent 40 minutes discharging the patient  This time was spent on the day of discharge  I had direct contact with the patient on the day of discharge  Discharge Medications:  See after visit summary for reconciled discharge medications provided to patient and family

## 2023-02-05 LAB
BACTERIA WND AEROBE CULT: ABNORMAL
BACTERIA WND AEROBE CULT: ABNORMAL
GRAM STN SPEC: ABNORMAL

## 2023-02-07 NOTE — UTILIZATION REVIEW
NOTIFICATION OF ADMISSION DISCHARGE   This is a Notification of Discharge from [facility]  Please be advised that this patient has been discharge from our facility  Below you will find the admission and discharge date and time including the patient’s disposition  UTILIZATION REVIEW CONTACT:  Everett Ponce  Utilization   Network Utilization Review Department  Phone: [phone] x carefully listen to the prompts  All voicemails are confidential   Email: [email or web address]     ADMISSION INFORMATION  PRESENTATION DATE: 1/29/2023  2:19 AM  OBERVATION ADMISSION DATE:   INPATIENT ADMISSION DATE: 1/29/23  2:19 AM   DISCHARGE DATE: 2/4/2023  6:23 PM   DISPOSITION:Home with [address] INFORMATION:  Send all requests for admission clinical reviews, approved or denied determinations and any other requests to dedicated fax number below belonging to the campus where the patient is receiving treatment   List of dedicated fax numbers:  [address] DENIALS (Administrative/Medical Necessity) [phone]   [address] (Maternity/NICU/Pediatrics) [phone]   [facility] [phone]   [place] [address] [phone]   [phone]   [address] [facility] [place] [phone] [facility] [phone]   [address] [place], [place] [address] [address] [phone]   [facility]  [phone]   [facility] [phone]   412 [facility] 850 [facility] [phone]

## 2023-05-02 ENCOUNTER — HOSPITAL ENCOUNTER (INPATIENT)
Facility: HOSPITAL | Age: 83
LOS: 7 days | Discharge: HOME HEALTH | DRG: 291 | End: 2023-05-09
Attending: EMERGENCY MEDICINE | Admitting: STUDENT IN AN ORGANIZED HEALTH CARE EDUCATION/TRAINING PROGRAM
Payer: COMMERCIAL

## 2023-05-02 ENCOUNTER — APPOINTMENT (EMERGENCY)
Dept: CT IMAGING | Facility: HOSPITAL | Age: 83
DRG: 291 | End: 2023-05-02
Payer: COMMERCIAL

## 2023-05-02 ENCOUNTER — APPOINTMENT (EMERGENCY)
Dept: RADIOLOGY | Facility: HOSPITAL | Age: 83
DRG: 291 | End: 2023-05-02
Payer: COMMERCIAL

## 2023-05-02 DIAGNOSIS — J96.12 CHRONIC RESPIRATORY FAILURE WITH HYPOXIA AND HYPERCAPNIA (HCC): ICD-10-CM

## 2023-05-02 DIAGNOSIS — F11.90 CHRONIC, CONTINUOUS USE OF OPIOIDS: ICD-10-CM

## 2023-05-02 DIAGNOSIS — I50.9 CHF EXACERBATION (HCC): Primary | ICD-10-CM

## 2023-05-02 DIAGNOSIS — G93.40 ACUTE ENCEPHALOPATHY: ICD-10-CM

## 2023-05-02 DIAGNOSIS — D64.9 CHRONIC ANEMIA: ICD-10-CM

## 2023-05-02 DIAGNOSIS — R78.81 BACTEREMIA: ICD-10-CM

## 2023-05-02 DIAGNOSIS — J44.9 CHRONIC OBSTRUCTIVE PULMONARY DISEASE, UNSPECIFIED COPD TYPE (HCC): ICD-10-CM

## 2023-05-02 DIAGNOSIS — E87.5 HYPERKALEMIA: ICD-10-CM

## 2023-05-02 DIAGNOSIS — J96.11 CHRONIC RESPIRATORY FAILURE WITH HYPOXIA AND HYPERCAPNIA (HCC): ICD-10-CM

## 2023-05-02 DIAGNOSIS — N18.9 ACUTE KIDNEY INJURY SUPERIMPOSED ON CHRONIC KIDNEY DISEASE (HCC): ICD-10-CM

## 2023-05-02 DIAGNOSIS — R47.81 SLURRED SPEECH: ICD-10-CM

## 2023-05-02 DIAGNOSIS — K59.03 DRUG-INDUCED CONSTIPATION: ICD-10-CM

## 2023-05-02 DIAGNOSIS — Z99.81 OXYGEN DEPENDENT: ICD-10-CM

## 2023-05-02 DIAGNOSIS — N17.9 ACUTE KIDNEY INJURY SUPERIMPOSED ON CHRONIC KIDNEY DISEASE (HCC): ICD-10-CM

## 2023-05-02 DIAGNOSIS — I10 HYPERTENSION: ICD-10-CM

## 2023-05-02 DIAGNOSIS — R06.03 RESPIRATORY DISTRESS: ICD-10-CM

## 2023-05-02 PROBLEM — J96.22 ACUTE ON CHRONIC RESPIRATORY FAILURE WITH HYPOXIA AND HYPERCAPNIA (HCC): Status: ACTIVE | Noted: 2023-01-24

## 2023-05-02 PROBLEM — J96.21 ACUTE ON CHRONIC RESPIRATORY FAILURE WITH HYPOXIA AND HYPERCAPNIA (HCC): Status: ACTIVE | Noted: 2023-01-24

## 2023-05-02 LAB
2HR DELTA HS TROPONIN: 1 NG/L
4HR DELTA HS TROPONIN: 2 NG/L
ALBUMIN SERPL BCP-MCNC: 3.9 G/DL (ref 3.5–5)
ALP SERPL-CCNC: 59 U/L (ref 34–104)
ALT SERPL W P-5'-P-CCNC: 10 U/L (ref 7–52)
AMPHETAMINES SERPL QL SCN: NEGATIVE
ANION GAP SERPL CALCULATED.3IONS-SCNC: 2 MMOL/L (ref 4–13)
ANION GAP SERPL CALCULATED.3IONS-SCNC: 5 MMOL/L (ref 4–13)
ANION GAP SERPL CALCULATED.3IONS-SCNC: 6 MMOL/L (ref 4–13)
ARTERIAL PATENCY WRIST A: YES
AST SERPL W P-5'-P-CCNC: 9 U/L (ref 13–39)
ATRIAL RATE: 73 BPM
BACTERIA UR QL AUTO: NORMAL /HPF
BARBITURATES UR QL: NEGATIVE
BASE EX.OXY STD BLDV CALC-SCNC: 82.8 % (ref 60–80)
BASE EXCESS BLDA CALC-SCNC: 1.8 MMOL/L
BASE EXCESS BLDV CALC-SCNC: 0.3 MMOL/L
BASOPHILS # BLD AUTO: 0.08 THOUSANDS/ÂΜL (ref 0–0.1)
BASOPHILS NFR BLD AUTO: 1 % (ref 0–1)
BENZODIAZ UR QL: NEGATIVE
BILIRUB SERPL-MCNC: 0.17 MG/DL (ref 0.2–1)
BILIRUB UR QL STRIP: NEGATIVE
BNP SERPL-MCNC: 538 PG/ML (ref 0–100)
BODY TEMPERATURE: 98.3 DEGREES FEHRENHEIT
BUN SERPL-MCNC: 24 MG/DL (ref 5–25)
BUN SERPL-MCNC: 26 MG/DL (ref 5–25)
BUN SERPL-MCNC: 27 MG/DL (ref 5–25)
CALCIUM SERPL-MCNC: 8.4 MG/DL (ref 8.4–10.2)
CALCIUM SERPL-MCNC: 8.6 MG/DL (ref 8.4–10.2)
CALCIUM SERPL-MCNC: 9 MG/DL (ref 8.4–10.2)
CARDIAC TROPONIN I PNL SERPL HS: 10 NG/L
CARDIAC TROPONIN I PNL SERPL HS: 11 NG/L
CARDIAC TROPONIN I PNL SERPL HS: 9 NG/L
CHLORIDE SERPL-SCNC: 100 MMOL/L (ref 96–108)
CHLORIDE SERPL-SCNC: 100 MMOL/L (ref 96–108)
CHLORIDE SERPL-SCNC: 99 MMOL/L (ref 96–108)
CLARITY UR: CLEAR
CO2 SERPL-SCNC: 28 MMOL/L (ref 21–32)
CO2 SERPL-SCNC: 30 MMOL/L (ref 21–32)
CO2 SERPL-SCNC: 30 MMOL/L (ref 21–32)
COCAINE UR QL: NEGATIVE
COLOR UR: YELLOW
CREAT SERPL-MCNC: 1.4 MG/DL (ref 0.6–1.3)
CREAT SERPL-MCNC: 1.42 MG/DL (ref 0.6–1.3)
CREAT SERPL-MCNC: 1.45 MG/DL (ref 0.6–1.3)
EOSINOPHIL # BLD AUTO: 0.08 THOUSAND/ÂΜL (ref 0–0.61)
EOSINOPHIL NFR BLD AUTO: 1 % (ref 0–6)
ERYTHROCYTE [DISTWIDTH] IN BLOOD BY AUTOMATED COUNT: 15.9 % (ref 11.6–15.1)
FLUAV RNA RESP QL NAA+PROBE: NEGATIVE
FLUBV RNA RESP QL NAA+PROBE: NEGATIVE
GFR SERPL CREATININE-BSD FRML MDRD: 34 ML/MIN/1.73SQ M
GFR SERPL CREATININE-BSD FRML MDRD: 34 ML/MIN/1.73SQ M
GFR SERPL CREATININE-BSD FRML MDRD: 35 ML/MIN/1.73SQ M
GLUCOSE SERPL-MCNC: 222 MG/DL (ref 65–140)
GLUCOSE SERPL-MCNC: 223 MG/DL (ref 65–140)
GLUCOSE SERPL-MCNC: 268 MG/DL (ref 65–140)
GLUCOSE SERPL-MCNC: 295 MG/DL (ref 65–140)
GLUCOSE SERPL-MCNC: 297 MG/DL (ref 65–140)
GLUCOSE SERPL-MCNC: 336 MG/DL (ref 65–140)
GLUCOSE UR STRIP-MCNC: NEGATIVE MG/DL
HCO3 BLDA-SCNC: 28.6 MMOL/L (ref 22–28)
HCO3 BLDV-SCNC: 27.7 MMOL/L (ref 24–30)
HCT VFR BLD AUTO: 31.7 % (ref 34.8–46.1)
HGB BLD-MCNC: 9.3 G/DL (ref 11.5–15.4)
HGB UR QL STRIP.AUTO: NEGATIVE
IMM GRANULOCYTES # BLD AUTO: 0.21 THOUSAND/UL (ref 0–0.2)
IMM GRANULOCYTES NFR BLD AUTO: 1 % (ref 0–2)
KETONES UR STRIP-MCNC: NEGATIVE MG/DL
LACTATE SERPL-SCNC: 1.6 MMOL/L (ref 0.5–2)
LEUKOCYTE ESTERASE UR QL STRIP: NEGATIVE
LIPASE SERPL-CCNC: <6 U/L (ref 11–82)
LYMPHOCYTES # BLD AUTO: 1.57 THOUSANDS/ÂΜL (ref 0.6–4.47)
LYMPHOCYTES NFR BLD AUTO: 10 % (ref 14–44)
MAGNESIUM SERPL-MCNC: 2.8 MG/DL (ref 1.9–2.7)
MAGNESIUM SERPL-MCNC: 2.8 MG/DL (ref 1.9–2.7)
MCH RBC QN AUTO: 27.3 PG (ref 26.8–34.3)
MCHC RBC AUTO-ENTMCNC: 29.3 G/DL (ref 31.4–37.4)
MCV RBC AUTO: 93 FL (ref 82–98)
METHADONE UR QL: NEGATIVE
MONOCYTES # BLD AUTO: 0.74 THOUSAND/ÂΜL (ref 0.17–1.22)
MONOCYTES NFR BLD AUTO: 5 % (ref 4–12)
NASAL CANNULA: 3
NEUTROPHILS # BLD AUTO: 13.75 THOUSANDS/ÂΜL (ref 1.85–7.62)
NEUTS SEG NFR BLD AUTO: 82 % (ref 43–75)
NITRITE UR QL STRIP: NEGATIVE
NON-SQ EPI CELLS URNS QL MICRO: NORMAL /HPF
NRBC BLD AUTO-RTO: 0 /100 WBCS
O2 CT BLDA-SCNC: 12.9 ML/DL (ref 16–23)
O2 CT BLDV-SCNC: 12.2 ML/DL
OPIATES UR QL SCN: POSITIVE
OXYCODONE+OXYMORPHONE UR QL SCN: POSITIVE
OXYHGB MFR BLDA: 93.1 % (ref 94–97)
P AXIS: -7 DEGREES
PCO2 BLDA: 57.1 MM HG (ref 36–44)
PCO2 BLDV: 58.9 MM HG (ref 42–50)
PCO2 TEMP ADJ BLDA: 56.6 MM HG (ref 36–44)
PCP UR QL: NEGATIVE
PH BLD: 7.32 [PH] (ref 7.35–7.45)
PH BLDA: 7.32 [PH] (ref 7.35–7.45)
PH BLDV: 7.29 [PH] (ref 7.3–7.4)
PH UR STRIP.AUTO: 5.5 [PH]
PHOSPHATE SERPL-MCNC: 2.9 MG/DL (ref 2.3–4.1)
PLATELET # BLD AUTO: 324 THOUSANDS/UL (ref 149–390)
PMV BLD AUTO: 9.9 FL (ref 8.9–12.7)
PO2 BLD: 67.5 MM HG (ref 75–129)
PO2 BLDA: 68.4 MM HG (ref 75–129)
PO2 BLDV: 49.8 MM HG (ref 35–45)
POTASSIUM SERPL-SCNC: 5.4 MMOL/L (ref 3.5–5.3)
POTASSIUM SERPL-SCNC: 6 MMOL/L (ref 3.5–5.3)
POTASSIUM SERPL-SCNC: 6.4 MMOL/L (ref 3.5–5.3)
POTASSIUM SERPL-SCNC: 6.9 MMOL/L (ref 3.5–5.3)
PR INTERVAL: 178 MS
PROCALCITONIN SERPL-MCNC: 0.07 NG/ML
PROT SERPL-MCNC: 6.4 G/DL (ref 6.4–8.4)
PROT UR STRIP-MCNC: ABNORMAL MG/DL
QRS AXIS: 22 DEGREES
QRSD INTERVAL: 88 MS
QT INTERVAL: 404 MS
QTC INTERVAL: 445 MS
RBC # BLD AUTO: 3.41 MILLION/UL (ref 3.81–5.12)
RBC #/AREA URNS AUTO: NORMAL /HPF
RSV RNA RESP QL NAA+PROBE: NEGATIVE
SARS-COV-2 RNA RESP QL NAA+PROBE: NEGATIVE
SODIUM SERPL-SCNC: 131 MMOL/L (ref 135–147)
SODIUM SERPL-SCNC: 134 MMOL/L (ref 135–147)
SODIUM SERPL-SCNC: 135 MMOL/L (ref 135–147)
SP GR UR STRIP.AUTO: 1.01 (ref 1–1.03)
SPECIMEN SOURCE: ABNORMAL
T WAVE AXIS: 29 DEGREES
THC UR QL: NEGATIVE
UROBILINOGEN UR QL STRIP.AUTO: 0.2 E.U./DL
VENTRICULAR RATE: 73 BPM
WBC # BLD AUTO: 16.43 THOUSAND/UL (ref 4.31–10.16)
WBC #/AREA URNS AUTO: NORMAL /HPF

## 2023-05-02 PROCEDURE — 83690 ASSAY OF LIPASE: CPT | Performed by: EMERGENCY MEDICINE

## 2023-05-02 PROCEDURE — 87154 CUL TYP ID BLD PTHGN 6+ TRGT: CPT | Performed by: EMERGENCY MEDICINE

## 2023-05-02 PROCEDURE — 0241U HB NFCT DS VIR RESP RNA 4 TRGT: CPT | Performed by: EMERGENCY MEDICINE

## 2023-05-02 PROCEDURE — 99291 CRITICAL CARE FIRST HOUR: CPT | Performed by: STUDENT IN AN ORGANIZED HEALTH CARE EDUCATION/TRAINING PROGRAM

## 2023-05-02 PROCEDURE — 80307 DRUG TEST PRSMV CHEM ANLYZR: CPT | Performed by: EMERGENCY MEDICINE

## 2023-05-02 PROCEDURE — 99291 CRITICAL CARE FIRST HOUR: CPT

## 2023-05-02 PROCEDURE — 84132 ASSAY OF SERUM POTASSIUM: CPT | Performed by: EMERGENCY MEDICINE

## 2023-05-02 PROCEDURE — 94760 N-INVAS EAR/PLS OXIMETRY 1: CPT

## 2023-05-02 PROCEDURE — 84484 ASSAY OF TROPONIN QUANT: CPT | Performed by: EMERGENCY MEDICINE

## 2023-05-02 PROCEDURE — 80048 BASIC METABOLIC PNL TOTAL CA: CPT | Performed by: NURSE PRACTITIONER

## 2023-05-02 PROCEDURE — 83735 ASSAY OF MAGNESIUM: CPT | Performed by: STUDENT IN AN ORGANIZED HEALTH CARE EDUCATION/TRAINING PROGRAM

## 2023-05-02 PROCEDURE — 96365 THER/PROPH/DIAG IV INF INIT: CPT

## 2023-05-02 PROCEDURE — 82948 REAGENT STRIP/BLOOD GLUCOSE: CPT

## 2023-05-02 PROCEDURE — 94640 AIRWAY INHALATION TREATMENT: CPT

## 2023-05-02 PROCEDURE — 70450 CT HEAD/BRAIN W/O DYE: CPT

## 2023-05-02 PROCEDURE — 94002 VENT MGMT INPAT INIT DAY: CPT

## 2023-05-02 PROCEDURE — 87081 CULTURE SCREEN ONLY: CPT | Performed by: STUDENT IN AN ORGANIZED HEALTH CARE EDUCATION/TRAINING PROGRAM

## 2023-05-02 PROCEDURE — 80053 COMPREHEN METABOLIC PANEL: CPT | Performed by: EMERGENCY MEDICINE

## 2023-05-02 PROCEDURE — 94003 VENT MGMT INPAT SUBQ DAY: CPT

## 2023-05-02 PROCEDURE — 96367 TX/PROPH/DG ADDL SEQ IV INF: CPT

## 2023-05-02 PROCEDURE — G1004 CDSM NDSC: HCPCS

## 2023-05-02 PROCEDURE — 82805 BLOOD GASES W/O2 SATURATION: CPT | Performed by: EMERGENCY MEDICINE

## 2023-05-02 PROCEDURE — 83605 ASSAY OF LACTIC ACID: CPT | Performed by: EMERGENCY MEDICINE

## 2023-05-02 PROCEDURE — 71045 X-RAY EXAM CHEST 1 VIEW: CPT

## 2023-05-02 PROCEDURE — 80048 BASIC METABOLIC PNL TOTAL CA: CPT | Performed by: STUDENT IN AN ORGANIZED HEALTH CARE EDUCATION/TRAINING PROGRAM

## 2023-05-02 PROCEDURE — 85025 COMPLETE CBC W/AUTO DIFF WBC: CPT | Performed by: EMERGENCY MEDICINE

## 2023-05-02 PROCEDURE — 96366 THER/PROPH/DIAG IV INF ADDON: CPT

## 2023-05-02 PROCEDURE — 83735 ASSAY OF MAGNESIUM: CPT | Performed by: EMERGENCY MEDICINE

## 2023-05-02 PROCEDURE — 84100 ASSAY OF PHOSPHORUS: CPT | Performed by: STUDENT IN AN ORGANIZED HEALTH CARE EDUCATION/TRAINING PROGRAM

## 2023-05-02 PROCEDURE — 87449 NOS EACH ORGANISM AG IA: CPT | Performed by: STUDENT IN AN ORGANIZED HEALTH CARE EDUCATION/TRAINING PROGRAM

## 2023-05-02 PROCEDURE — 82805 BLOOD GASES W/O2 SATURATION: CPT | Performed by: NURSE PRACTITIONER

## 2023-05-02 PROCEDURE — 93005 ELECTROCARDIOGRAM TRACING: CPT

## 2023-05-02 PROCEDURE — 99291 CRITICAL CARE FIRST HOUR: CPT | Performed by: EMERGENCY MEDICINE

## 2023-05-02 PROCEDURE — 83880 ASSAY OF NATRIURETIC PEPTIDE: CPT | Performed by: EMERGENCY MEDICINE

## 2023-05-02 PROCEDURE — 1123F ACP DISCUSS/DSCN MKR DOCD: CPT | Performed by: EMERGENCY MEDICINE

## 2023-05-02 PROCEDURE — 87040 BLOOD CULTURE FOR BACTERIA: CPT | Performed by: EMERGENCY MEDICINE

## 2023-05-02 PROCEDURE — 81001 URINALYSIS AUTO W/SCOPE: CPT | Performed by: EMERGENCY MEDICINE

## 2023-05-02 PROCEDURE — 36415 COLL VENOUS BLD VENIPUNCTURE: CPT | Performed by: EMERGENCY MEDICINE

## 2023-05-02 PROCEDURE — 84145 PROCALCITONIN (PCT): CPT | Performed by: EMERGENCY MEDICINE

## 2023-05-02 PROCEDURE — 96375 TX/PRO/DX INJ NEW DRUG ADDON: CPT

## 2023-05-02 PROCEDURE — 94664 DEMO&/EVAL PT USE INHALER: CPT

## 2023-05-02 RX ORDER — LEVALBUTEROL INHALATION SOLUTION 1.25 MG/3ML
1.25 SOLUTION RESPIRATORY (INHALATION) EVERY 6 HOURS
Status: DISCONTINUED | OUTPATIENT
Start: 2023-05-02 | End: 2023-05-05

## 2023-05-02 RX ORDER — METOPROLOL TARTRATE 50 MG/1
50 TABLET, FILM COATED ORAL EVERY 12 HOURS SCHEDULED
Status: DISCONTINUED | OUTPATIENT
Start: 2023-05-02 | End: 2023-05-09

## 2023-05-02 RX ORDER — DEXTROSE MONOHYDRATE 25 G/50ML
50 INJECTION, SOLUTION INTRAVENOUS ONCE
Status: COMPLETED | OUTPATIENT
Start: 2023-05-02 | End: 2023-05-02

## 2023-05-02 RX ORDER — FUROSEMIDE 10 MG/ML
80 INJECTION INTRAMUSCULAR; INTRAVENOUS ONCE
Status: COMPLETED | OUTPATIENT
Start: 2023-05-02 | End: 2023-05-02

## 2023-05-02 RX ORDER — ATORVASTATIN CALCIUM 10 MG/1
10 TABLET, FILM COATED ORAL DAILY
Status: DISCONTINUED | OUTPATIENT
Start: 2023-05-03 | End: 2023-05-09 | Stop reason: HOSPADM

## 2023-05-02 RX ORDER — INSULIN GLARGINE 100 [IU]/ML
12 INJECTION, SOLUTION SUBCUTANEOUS
Status: DISCONTINUED | OUTPATIENT
Start: 2023-05-02 | End: 2023-05-09 | Stop reason: HOSPADM

## 2023-05-02 RX ORDER — CALCIUM GLUCONATE 20 MG/ML
1 INJECTION, SOLUTION INTRAVENOUS ONCE
Status: COMPLETED | OUTPATIENT
Start: 2023-05-02 | End: 2023-05-02

## 2023-05-02 RX ORDER — LEVALBUTEROL INHALATION SOLUTION 1.25 MG/3ML
1.25 SOLUTION RESPIRATORY (INHALATION) ONCE
Status: COMPLETED | OUTPATIENT
Start: 2023-05-02 | End: 2023-05-02

## 2023-05-02 RX ORDER — CEFTRIAXONE 1 G/50ML
1000 INJECTION, SOLUTION INTRAVENOUS ONCE
Status: COMPLETED | OUTPATIENT
Start: 2023-05-02 | End: 2023-05-02

## 2023-05-02 RX ORDER — CLOPIDOGREL BISULFATE 75 MG/1
75 TABLET ORAL DAILY
Status: DISCONTINUED | OUTPATIENT
Start: 2023-05-03 | End: 2023-05-09 | Stop reason: HOSPADM

## 2023-05-02 RX ORDER — HEPARIN SODIUM 5000 [USP'U]/ML
5000 INJECTION, SOLUTION INTRAVENOUS; SUBCUTANEOUS EVERY 8 HOURS SCHEDULED
Status: DISCONTINUED | OUTPATIENT
Start: 2023-05-02 | End: 2023-05-02

## 2023-05-02 RX ORDER — MIRTAZAPINE 15 MG/1
30 TABLET, FILM COATED ORAL
Status: DISCONTINUED | OUTPATIENT
Start: 2023-05-02 | End: 2023-05-09 | Stop reason: HOSPADM

## 2023-05-02 RX ORDER — TRAZODONE HYDROCHLORIDE 50 MG/1
50 TABLET ORAL
Status: DISCONTINUED | OUTPATIENT
Start: 2023-05-02 | End: 2023-05-09 | Stop reason: HOSPADM

## 2023-05-02 RX ORDER — CHLORHEXIDINE GLUCONATE 0.12 MG/ML
15 RINSE ORAL EVERY 12 HOURS SCHEDULED
Status: DISCONTINUED | OUTPATIENT
Start: 2023-05-02 | End: 2023-05-06

## 2023-05-02 RX ORDER — INSULIN LISPRO 100 [IU]/ML
1-6 INJECTION, SOLUTION INTRAVENOUS; SUBCUTANEOUS EVERY 6 HOURS SCHEDULED
Status: DISCONTINUED | OUTPATIENT
Start: 2023-05-02 | End: 2023-05-03

## 2023-05-02 RX ORDER — FLUTICASONE FUROATE AND VILANTEROL 200; 25 UG/1; UG/1
1 POWDER RESPIRATORY (INHALATION)
Status: DISCONTINUED | OUTPATIENT
Start: 2023-05-03 | End: 2023-05-09 | Stop reason: HOSPADM

## 2023-05-02 RX ORDER — ROPINIROLE 0.25 MG/1
0.5 TABLET, FILM COATED ORAL
Status: DISCONTINUED | OUTPATIENT
Start: 2023-05-02 | End: 2023-05-09 | Stop reason: HOSPADM

## 2023-05-02 RX ORDER — METHYLPREDNISOLONE SODIUM SUCCINATE 125 MG/2ML
125 INJECTION, POWDER, LYOPHILIZED, FOR SOLUTION INTRAMUSCULAR; INTRAVENOUS ONCE
Status: COMPLETED | OUTPATIENT
Start: 2023-05-02 | End: 2023-05-02

## 2023-05-02 RX ORDER — DOCUSATE SODIUM 100 MG/1
200 CAPSULE, LIQUID FILLED ORAL DAILY
Status: DISCONTINUED | OUTPATIENT
Start: 2023-05-03 | End: 2023-05-09 | Stop reason: HOSPADM

## 2023-05-02 RX ORDER — AMLODIPINE BESYLATE 5 MG/1
5 TABLET ORAL DAILY
Status: DISCONTINUED | OUTPATIENT
Start: 2023-05-03 | End: 2023-05-09 | Stop reason: HOSPADM

## 2023-05-02 RX ORDER — ACETAMINOPHEN 325 MG/1
650 TABLET ORAL EVERY 6 HOURS PRN
Status: DISCONTINUED | OUTPATIENT
Start: 2023-05-02 | End: 2023-05-09 | Stop reason: HOSPADM

## 2023-05-02 RX ADMIN — DEXTROSE MONOHYDRATE 50 ML: 25 INJECTION, SOLUTION INTRAVENOUS at 20:28

## 2023-05-02 RX ADMIN — CALCIUM GLUCONATE 1 G: 20 INJECTION, SOLUTION INTRAVENOUS at 15:27

## 2023-05-02 RX ADMIN — CEFTRIAXONE 1000 MG: 1 INJECTION, SOLUTION INTRAVENOUS at 13:56

## 2023-05-02 RX ADMIN — APIXABAN 5 MG: 5 TABLET, FILM COATED ORAL at 18:45

## 2023-05-02 RX ADMIN — IPRATROPIUM BROMIDE 0.5 MG: 0.5 SOLUTION RESPIRATORY (INHALATION) at 13:35

## 2023-05-02 RX ADMIN — LEVALBUTEROL HYDROCHLORIDE 1.25 MG: 1.25 SOLUTION RESPIRATORY (INHALATION) at 19:29

## 2023-05-02 RX ADMIN — LEVALBUTEROL HYDROCHLORIDE 1.25 MG: 1.25 SOLUTION RESPIRATORY (INHALATION) at 13:35

## 2023-05-02 RX ADMIN — INSULIN LISPRO 2 UNITS: 100 INJECTION, SOLUTION INTRAVENOUS; SUBCUTANEOUS at 19:05

## 2023-05-02 RX ADMIN — CHLORHEXIDINE GLUCONATE 0.12% ORAL RINSE 15 ML: 1.2 LIQUID ORAL at 20:29

## 2023-05-02 RX ADMIN — CALCIUM GLUCONATE 1 G: 20 INJECTION, SOLUTION INTRAVENOUS at 20:28

## 2023-05-02 RX ADMIN — FUROSEMIDE 80 MG: 10 INJECTION, SOLUTION INTRAVENOUS at 14:27

## 2023-05-02 RX ADMIN — AZITHROMYCIN MONOHYDRATE 500 MG: 500 INJECTION, POWDER, LYOPHILIZED, FOR SOLUTION INTRAVENOUS at 14:29

## 2023-05-02 RX ADMIN — INSULIN LISPRO 3 UNITS: 100 INJECTION, SOLUTION INTRAVENOUS; SUBCUTANEOUS at 23:43

## 2023-05-02 RX ADMIN — INSULIN GLARGINE 12 UNITS: 100 INJECTION, SOLUTION SUBCUTANEOUS at 21:25

## 2023-05-02 RX ADMIN — METHYLPREDNISOLONE SODIUM SUCCINATE 125 MG: 125 INJECTION, POWDER, FOR SOLUTION INTRAMUSCULAR; INTRAVENOUS at 13:38

## 2023-05-02 RX ADMIN — FUROSEMIDE 80 MG: 10 INJECTION, SOLUTION INTRAVENOUS at 20:28

## 2023-05-02 RX ADMIN — NITROGLYCERIN 0.5 INCH: 20 OINTMENT TOPICAL at 14:50

## 2023-05-02 RX ADMIN — IPRATROPIUM BROMIDE 0.5 MG: 0.5 SOLUTION RESPIRATORY (INHALATION) at 19:29

## 2023-05-02 RX ADMIN — INSULIN HUMAN 5 UNITS: 100 INJECTION, SOLUTION PARENTERAL at 15:25

## 2023-05-02 RX ADMIN — INSULIN HUMAN 10 UNITS: 100 INJECTION, SOLUTION PARENTERAL at 20:28

## 2023-05-02 NOTE — RESPIRATORY THERAPY NOTE
RT Protocol Note  Mary Ann Garcia [de-identified] y o  female MRN: [id number]  Unit/Bed#: ED 02 Encounter: [id number]    Assessment    Active Problems:    * No active hospital problems  *      Home Pulmonary Medications:? 3LPM [place], DuoNeb QID, Albuterol PRN       Past Medical History:   Diagnosis Date    Asthma     CHF (congestive heart failure) ([facility])     COPD (chronic obstructive pulmonary disease) ([facility])     Diabetes mellitus ([facility] )     DVT of lower limb, acute ([place] [address] )     Hypertension     Renal disorder      Social History     Socioeconomic History    Marital status:      Spouse name: None    Number of children: None    Years of education: None    Highest education level: None   Occupational History    None   Tobacco Use    Smoking status: Every Day     Packs/day: 0 50     Types: Cigarettes     Passive exposure: Past    Smokeless tobacco: Never    Tobacco comments:     Pt currently wearing nicotine patch    Vaping Use    Vaping Use: Never used   Substance and Sexual Activity    Alcohol use: Not Currently    Drug use: Never    Sexual activity: Not Currently   Other Topics Concern    None   Social History Narrative    None     Social Determinants of Health     Financial Resource Strain: Not on file   Food Insecurity: No Food Insecurity    Worried About Running Out of Food in the Last Year: Never true    Ran Out of Food in the Last Year: Never true   Transportation Needs: No Transportation Needs    Lack of Transportation (Medical): No    Lack of Transportation (Non-Medical):  No   Physical Activity: Not on file   Stress: Not on file   Social Connections: Not on file   Intimate Partner Violence: Not on file   Housing Stability: Low Risk     Unable to Pay for Housing in the Last Year: No    Number of Places Lived in the Last Year: 1    Unstable Housing in the Last Year: No       Subjective         Objective    Physical Exam:   Assessment Type: During-treatment  General Appearance: Alert, Awake  Respiratory Pattern: "Assisted, Spontaneous  Chest Assessment: Chest expansion symmetrical  Bilateral Breath Sounds: Diminished, Rales  Cough: None  O2 Device: bipap 14/8/30%    Vitals:  Blood pressure 141/66, pulse 68, temperature 98 3 °F (36 8 °C), temperature source Temporal, resp  rate 20, height 5' 6\" (1 676 m), weight 82 6 kg (182 lb), SpO2 94 %  Imaging and other studies: I have personally reviewed pertinent reports  O2 Device: bipap 14/8/30%     Plan             Resp Comments: Pt arrvied via EMS on cpap  Pt placed on bipap 14/8/30%  Pt smokes 0 5ppd currently, used to smoke 2 5ppd  Pt on 3LPM [place] ATC at baseline     "

## 2023-05-02 NOTE — RESPIRATORY THERAPY NOTE
RT Protocol Note  Chandni Polanco [de-identified] y o  female MRN: [id number]  Unit/Bed#: -01 Encounter: [id number]    Assessment    Active Problems:    * No active hospital problems  *      Home Pulmonary Medications:         Past Medical History:   Diagnosis Date    Asthma     CHF (congestive heart failure) (HCC)     COPD (chronic obstructive pulmonary disease) (HCC)     Diabetes mellitus ([facility] [place] [address] )     DVT of lower limb, acute ([facility] [address] )     Hypertension     Renal disorder      Social History     Socioeconomic History    Marital status:      Spouse name: None    Number of children: None    Years of education: None    Highest education level: None   Occupational History    None   Tobacco Use    Smoking status: Every Day     Packs/day: 0 50     Types: Cigarettes     Passive exposure: Past    Smokeless tobacco: Never    Tobacco comments:     Pt currently wearing nicotine patch    Vaping Use    Vaping Use: Never used   Substance and Sexual Activity    Alcohol use: Not Currently    Drug use: Never    Sexual activity: Not Currently   Other Topics Concern    None   Social History Narrative    None     Social Determinants of Health     Financial Resource Strain: Not on file   Food Insecurity: No Food Insecurity    Worried About Running Out of Food in the Last Year: Never true    Ran Out of Food in the Last Year: Never true   Transportation Needs: No Transportation Needs    Lack of Transportation (Medical): No    Lack of Transportation (Non-Medical):  No   Physical Activity: Not on file   Stress: Not on file   Social Connections: Not on file   Intimate Partner Violence: Not on file   Housing Stability: Low Risk     Unable to Pay for Housing in the Last Year: No    Number of Places Lived in the Last Year: 1    Unstable Housing in the Last Year: No       Subjective         Objective    Physical Exam:   Assessment Type: During-treatment  General Appearance: Alert, Awake  Respiratory Pattern: Assisted, Spontaneous  Chest "Assessment: Chest expansion symmetrical  Bilateral Breath Sounds: Diminished, Rales  Cough: None  O2 Device: 3LPM NC    Vitals:  Blood pressure 151/67, pulse 63, temperature 98 3 °F (36 8 °C), temperature source Temporal, resp  rate 17, height 5' 6\" (1 676 m), weight 77 8 kg (171 lb 8 3 oz), SpO2 93 %  Imaging and other studies: I have personally reviewed pertinent reports  O2 Device: 3LPM NC     Plan             Resp Comments: Pt taken off bipap and placed on baseline 3LPM NC  Pt transported to , pt remains stable on 3LPM NC     "

## 2023-05-02 NOTE — ED PROVIDER NOTES
History  Chief Complaint   Patient presents with   • Facial Swelling     Pt presents via EMS with facial swelling, apnea, altered mental status, and wheeze  EMS gave 4 mg of narcan with slight effect due to pin point pupils  HPI   82F w hx of CHF, COPD, hx of DVT on eliquis presenting with shortness of breath  Patient lives with her daughter  Daughter says today, patient appeared to be suddenly diaphoretic with difficulty breathing  Says her left eyelid appeared swollen as well, and it can get swollen when patient develops fluid overload  Daughter also reports patient has seemed confused since Friday  Normally, she is alert and oriented  She had [facility] 4 days ago because patient was confused and had slurring of words  Patient did not want to go to the hospital at that time  She continues to be on eliquis  Patient is DNR/DNI  Per EMS, patient was altered and apneic when they arrived  They placed her on CPAP and gave her 4mg of narcan which seemed to improve her mental status  They also gave her SL nitro prior to arrival      Patient and daughter are agreeable for patient to be treated today including with BiPap and to be admitted to the hospital      Prior to Admission Medications   Prescriptions Last Dose Informant Patient Reported? Taking?    Ipratropium-Albuterol (DUONEB IN)   Yes No   Sig: Inhale 2 5 mg every 4 (four) hours   Mirabegron (MYRBETRIQ PO)   Yes No   Sig: Take 25 mg by mouth daily   acetaminophen (TYLENOL) 325 mg tablet   No No   Sig: Take 2 tablets (650 mg total) by mouth every 6 (six) hours as needed for mild pain   albuterol (ACCUNEB) 0 63 MG/3ML nebulizer solution   Yes No   Sig: Take 1 ampule by nebulization 4 (four) times a day as needed for wheezing   allopurinol (ZYLOPRIM) 100 mg tablet   Yes No   Sig: Take 50 mg by mouth daily   amLODIPine (NORVASC) 5 mg tablet   Yes No   Sig: Take 5 mg by mouth daily   apixaban (ELIQUIS) 5 mg   Yes No   Sig: Take 5 mg by mouth 2 (two) times a day   atorvastatin (LIPITOR) 10 mg tablet   Yes No   Sig: Take 10 mg by mouth daily   clopidogrel (PLAVIX) 75 mg tablet   No No   Sig: Take 1 tablet (75 mg total) by mouth daily Do not start before February 5, 2023  docusate sodium (COLACE) 100 mg capsule   Yes No   Sig: Take 200 mg by mouth daily   fluticasone-salmeterol (ADVAIR, WIXELA) 500-50 mcg/dose inhaler   Yes No   Sig: Inhale 1 puff 2 (two) times a day Rinse mouth after use  furosemide (LASIX) 40 mg tablet   Yes No   Sig: Take 40 mg by mouth daily    gabapentin (NEURONTIN) 300 mg capsule   Yes No   Sig: Take 300 mg by mouth 3 (three) times a day   insulin glargine (LANTUS) 100 units/mL subcutaneous injection   Yes No   Sig: Inject 12 Units under the skin daily at bedtime   metoprolol tartrate (LOPRESSOR) 50 mg tablet   Yes No   Sig: Take 50 mg by mouth every 12 (twelve) hours   mirtazapine (REMERON) 30 mg tablet   Yes No   Sig: Take 30 mg by mouth daily at bedtime   nicotine (NICODERM CQ) 7 mg/24hr TD 24 hr patch   No No   Sig: Place 1 patch on the skin over 24 hours daily Do not start before February 5, 2023     nitroglycerin (NITROSTAT) 0 4 mg SL tablet   Yes No   Sig: Place 0 4 mg under the tongue every 5 (five) minutes as needed for chest pain   oxyCODONE (OxyCONTIN) 10 mg 12 hr tablet   Yes No   Sig: Take 10 mg by mouth every 6 (six) hours as needed   potassium chloride (MICRO-K) 10 MEQ CR capsule   Yes No   Sig: Take 10 mEq by mouth daily   predniSONE 2 5 mg tablet   Yes No   Sig: Take 2 5 mg by mouth daily   rOPINIRole (REQUIP) 0 25 mg tablet   Yes No   Sig: Take 0 5 mg by mouth daily at bedtime   sertraline (ZOLOFT) 50 mg tablet   Yes No   Sig: Take 50 mg by mouth daily at bedtime   sucralfate (CARAFATE) 1 g tablet   Yes No   Sig: Take 1 g by mouth 2 (two) times a day before meals   traZODone (DESYREL) 50 mg tablet   Yes No   Sig: Take 50 mg by mouth daily at bedtime      Facility-Administered Medications: None       Past Medical History:   Diagnosis Date   • Asthma    • CHF (congestive heart failure) ([facility])    • COPD (chronic obstructive pulmonary disease) ([facility])    • Diabetes mellitus ([facility] [address] )    • DVT of lower limb, acute ([facility] [address] )    • Hypertension    • Renal disorder        Past Surgical History:   Procedure Laterality Date   • APPENDECTOMY     •  SECTION     • HYSTERECTOMY     • IR LOWER EXTREMITY ANGIOGRAM  2023   • CT AMPUTATION METATARSAL W/TOE SINGLE Left 2023    Procedure: RAY RESECTION FOOT Left 1st;  Surgeon: Ron Lees DPM;  Location: [facility];  Service: Podiatry   • CT Aparna Petite 3RD+ ORD SLCTV ABDL PEL/LXTR [facility] N/A 2023    Procedure: R CFA access w/ 6F sheath and Mynx closure Aortogram LLE angiogram R EIA PTA w/ 6x60mm  L EIA PTA w/ 6x60mm  L SFA PTA w/ 5x776gr Kofi POBA and 9c957ce [place] DCB L SFA stents: 5x27mm Express LD (prox) and 6x60mm Innova (dist); Surgeon: Anastasiya Guzman MD;  Location: [facility];  Service: Vascular       Family History   Problem Relation Age of Onset   • Arthritis Father      I have reviewed and agree with the history as documented  E-Cigarette/Vaping   • E-Cigarette Use Never User      E-Cigarette/Vaping Substances   • Nicotine No    • THC No    • CBD No    • Flavoring No    • Other No    • Unknown No      Social History     Tobacco Use   • Smoking status: Every Day     Packs/day: 0 50     Types: Cigarettes     Passive exposure: Past   • Smokeless tobacco: Never   • Tobacco comments:     Pt currently wearing nicotine patch    Vaping Use   • Vaping Use: Never used   Substance Use Topics   • Alcohol use: Not Currently   • Drug use: Never       Review of Systems   Unable to perform ROS: Mental status change       Physical Exam  Physical Exam  Vitals and nursing note reviewed  Constitutional:       General: She is in acute distress  Appearance: She is well-developed  She is obese  She is ill-appearing     HENT:      Head: Normocephalic and atraumatic  Right Ear: External ear normal       Left Ear: External ear normal       Nose: Nose normal    Eyes:      Extraocular Movements: Extraocular movements intact  Right eye: Normal extraocular motion  Left eye: Normal extraocular motion  Conjunctiva/sclera: Conjunctivae normal       Pupils: Pupils are equal, round, and reactive to light  Comments: Left periorbital edema    Cardiovascular:      Rate and Rhythm: Normal rate and regular rhythm  Pulmonary:      Effort: Respiratory distress present  Breath sounds: Examination of the right-lower field reveals decreased breath sounds  Examination of the left-lower field reveals decreased breath sounds  Decreased breath sounds present  Abdominal:      Palpations: Abdomen is soft  Tenderness: There is no abdominal tenderness  Musculoskeletal:      Cervical back: Normal range of motion and neck supple  Skin:     General: Skin is warm and dry  Neurological:      General: No focal deficit present  Mental Status: She is alert  She is disoriented           Vital Signs  ED Triage Vitals   Temperature Pulse Respirations Blood Pressure SpO2   05/02/23 1323 05/02/23 1323 05/02/23 1323 05/02/23 1326 05/02/23 1322   98 3 °F (36 8 °C) 75 (!) 25 141/66 (!) 80 %      Temp Source Heart Rate Source Patient Position - Orthostatic VS BP Location FiO2 (%)   05/02/23 1323 05/02/23 1323 05/02/23 1326 05/02/23 1326 05/02/23 2045   Temporal Monitor Sitting Right arm 30      Pain Score       05/02/23 1414       8           Vitals:    05/02/23 1830 05/02/23 1900 05/02/23 2000 05/02/23 2100   BP: 159/70 136/64 141/66 142/62   Pulse: 68 71 71 70   Patient Position - Orthostatic VS:  Lying           Visual Acuity  Visual Acuity    Flowsheet Row Most Recent Value   L Pupil Size (mm) 3   R Pupil Size (mm) 3   L Pupil Shape Round   R Pupil Shape Round          ED Medications  Medications   chlorhexidine (PERIDEX) 0 12 % oral rinse 15 mL (15 mL Mouth/Throat Given 5/2/23 2029)   apixaban (ELIQUIS) tablet 5 mg (5 mg Oral Given 5/2/23 1845)   amLODIPine (NORVASC) tablet 5 mg (has no administration in time range)   atorvastatin (LIPITOR) tablet 10 mg (has no administration in time range)   clopidogrel (PLAVIX) tablet 75 mg (has no administration in time range)   docusate sodium (COLACE) capsule 200 mg (has no administration in time range)   insulin glargine (LANTUS) subcutaneous injection 12 Units 0 12 mL (12 Units Subcutaneous Given 5/2/23 2125)   levalbuterol (Joelene All) inhalation solution 1 25 mg (1 25 mg Nebulization Given 5/2/23 1929)   fluticasone-vilanterol 200-25 mcg/actuation 1 puff (has no administration in time range)   metoprolol tartrate (LOPRESSOR) tablet 50 mg (50 mg Oral Not Given 5/2/23 2029)   Mirabegron ER TB24 25 mg (has no administration in time range)   mirtazapine (REMERON) tablet 30 mg (30 mg Oral Not Given 5/2/23 2112)   nicotine (NICODERM CQ) 7 mg/24hr TD 24 hr patch 1 patch (has no administration in time range)   rOPINIRole (REQUIP) tablet 0 5 mg (0 5 mg Oral Not Given 5/2/23 2113)   sertraline (ZOLOFT) tablet 50 mg (50 mg Oral Not Given 5/2/23 2113)   traZODone (DESYREL) tablet 50 mg (50 mg Oral Not Given 5/2/23 2113)   ipratropium (ATROVENT) 0 02 % inhalation solution 0 5 mg (0 5 mg Nebulization Given 5/2/23 1929)   azithromycin (ZITHROMAX) 500 mg in sodium chloride 0 9 % 250 mL IVPB (has no administration in time range)   insulin lispro (HumaLOG) 100 units/mL subcutaneous injection 1-6 Units (2 Units Subcutaneous Given 5/2/23 1905)   levalbuterol (XOPENEX) inhalation solution 1 25 mg (1 25 mg Nebulization Given 5/2/23 1335)   ipratropium (ATROVENT) 0 02 % inhalation solution 0 5 mg (0 5 mg Nebulization Given 5/2/23 1335)   methylPREDNISolone sodium succinate (Solu-MEDROL) injection 125 mg (125 mg Intravenous Given 5/2/23 1338)   cefTRIAXone (ROCEPHIN) IVPB (premix in dextrose) 1,000 mg 50 mL (0 mg Intravenous Stopped 5/2/23 1443) azithromycin (ZITHROMAX) 500 mg in sodium chloride 0 9 % 250 mL IVPB (500 mg Intravenous New Bag 5/2/23 1429)   furosemide (LASIX) injection 80 mg (80 mg Intravenous Given 5/2/23 1427)   nitroglycerin (NITRO-BID) 2 % TD ointment 0 5 inch (0 5 inches Topical Given 5/2/23 1450)   insulin regular (HumuLIN R,NovoLIN R) injection 5 Units (5 Units Intravenous Given 5/2/23 1525)   calcium gluconate 1 g in sodium chloride 0 9% 50 mL (premix) (0 g Intravenous Stopped 5/2/23 1621)   insulin regular (HumuLIN R,NovoLIN R) injection 10 Units (10 Units Intravenous Given 5/2/23 2028)   dextrose 50 % IV solution 50 mL (50 mL Intravenous Given 5/2/23 2028)   furosemide (LASIX) injection 80 mg (80 mg Intravenous Given 5/2/23 2028)   calcium gluconate 1 g in sodium chloride 0 9% 50 mL (premix) (1 g Intravenous New Bag 5/2/23 2028)       Diagnostic Studies  Results Reviewed     Procedure Component Value Units Date/Time    HS Troponin I 4hr [[id number]]  (Normal) Collected: 05/02/23 1838    Lab Status: Final result Specimen: Blood from Arm, Left Updated: 05/02/23 1912     hs TnI 4hr 11 ng/L      Delta 4hr hsTnI 2 ng/L     Basic metabolic panel [[id number]]  (Abnormal) Collected: 05/02/23 1838    Lab Status: Final result Specimen: Blood from Arm, Left Updated: 05/02/23 1903     Sodium 135 mmol/L      Potassium 6 0 mmol/L      Chloride 100 mmol/L      CO2 30 mmol/L      ANION GAP 5 mmol/L      BUN 26 mg/dL      Creatinine 1 42 mg/dL      Glucose 223 mg/dL      Calcium 8 6 mg/dL      eGFR 34 ml/min/1 73sq m     Narrative:      [place] guidelines for Chronic Kidney Disease (CKD):   •  Stage 1 with normal or high GFR (GFR > 90 mL/min/1 73 square meters)  •  Stage 2 Mild CKD (GFR = 60-89 mL/min/1 73 square meters)  •  Stage 3A Moderate CKD (GFR = 45-59 mL/min/1 73 square meters)  •  Stage 3B Moderate CKD (GFR = 30-44 mL/min/1 73 square meters)  •  Stage 4 Severe CKD (GFR = 15-29 mL/min/1 73 square meters)  •  Stage 5 End Stage CKD (GFR <15 mL/min/1 73 square meters)  Note: GFR calculation is accurate only with a steady state creatinine    Magnesium [[id number]]  (Abnormal) Collected: 05/02/23 1838    Lab Status: Final result Specimen: Blood from Arm, Left Updated: 05/02/23 1903     Magnesium 2 8 mg/dL     Phosphorus [[id number]]  (Normal) Collected: 05/02/23 1838    Lab Status: Final result Specimen: Blood from Arm, Left Updated: 05/02/23 1903     Phosphorus 2 9 mg/dL     MRSA culture [[id number]] Collected: 05/02/23 1838    Lab Status: In process Specimen: Nares from Nose Updated: 05/02/23 1843    Strep Pneumoniae, Urine [[id number]] Collected: 05/02/23 1838    Lab Status: In process Specimen: Urine, Catheter Updated: 05/02/23 1843    Legionella antigen, urine [[id number]] Collected: 05/02/23 1838    Lab Status:  In process Specimen: Urine, Catheter Updated: 05/02/23 1843    HS Troponin I 2hr [[id number]]  (Normal) Collected: 05/02/23 1556    Lab Status: Final result Specimen: Blood from Arm, Right Updated: 05/02/23 1626     hs TnI 2hr 10 ng/L      Delta 2hr hsTnI 1 ng/L     Sputum culture and Gram stain [[id number]]     Lab Status: No result Specimen: Sputum     Potassium [[id number]]  (Abnormal) Collected: 05/02/23 1502    Lab Status: Final result Specimen: Blood Updated: 05/02/23 1531     Potassium 6 4 mmol/L     Urine Microscopic [[id number]]  (Normal) Collected: 05/02/23 1450    Lab Status: Final result Specimen: Urine, Indwelling Pastor Catheter Updated: 05/02/23 1520     RBC, UA None Seen /hpf      WBC, UA None Seen /hpf      Epithelial Cells Occasional /hpf      Bacteria, UA None Seen /hpf     Rapid drug screen, urine [[id number]]  (Abnormal) Collected: 05/02/23 1454    Lab Status: Final result Specimen: Urine Updated: 05/02/23 1515     Amph/Meth UR Negative     Barbiturate Ur Negative     Benzodiazepine Urine Negative     Cocaine Urine Negative     Methadone Urine Negative     Opiate Urine Positive     PCP Ur Negative     THC Urine Negative     Oxycodone Urine Positive    Narrative:      Presumptive report  If requested, specimen will be sent to reference lab for confirmation  FOR MEDICAL PURPOSES ONLY  IF CONFIRMATION NEEDED PLEASE CONTACT THE LAB WITHIN 5 DAYS      Drug Screen Cutoff Levels:  AMPHETAMINE/METHAMPHETAMINES  1000 ng/mL  BARBITURATES     200 ng/mL  BENZODIAZEPINES     200 ng/mL  COCAINE      300 ng/mL  METHADONE      300 ng/mL  OPIATES      300 ng/mL  PHENCYCLIDINE     25 ng/mL  THC       50 ng/mL  OXYCODONE      100 ng/mL    UA w Reflex to Microscopic w Reflex to Culture [[id number]]  (Abnormal) Collected: 05/02/23 1450    Lab Status: Final result Specimen: Urine, Indwelling Pastor Catheter Updated: 05/02/23 1501     Color, UA Yellow     Clarity, UA Clear     Specific Gravity, UA 1 015     pH, UA 5 5     Leukocytes, UA Negative     Nitrite, UA Negative     Protein, UA 30 (1+) mg/dl      Glucose, UA Negative mg/dl      Ketones, UA Negative mg/dl      Urobilinogen, UA 0 2 E U /dl      Bilirubin, UA Negative     Occult Blood, UA Negative    Comprehensive metabolic panel [[id number]]  (Abnormal) Collected: 05/02/23 1414    Lab Status: Final result Specimen: Blood from Arm, Right Updated: 05/02/23 1451     Sodium 131 mmol/L      Potassium 6 9 mmol/L      Chloride 99 mmol/L      CO2 30 mmol/L      ANION GAP 2 mmol/L      BUN 24 mg/dL      Creatinine 1 40 mg/dL      Glucose 297 mg/dL      Calcium 8 4 mg/dL      AST 9 U/L      ALT 10 U/L      Alkaline Phosphatase 59 U/L      Total Protein 6 4 g/dL      Albumin 3 9 g/dL      Total Bilirubin 0 17 mg/dL      eGFR 35 ml/min/1 73sq m     Narrative:      [place] guidelines for Chronic Kidney Disease (CKD):   •  Stage 1 with normal or high GFR (GFR > 90 mL/min/1 73 square meters)  •  Stage 2 Mild CKD (GFR = 60-89 mL/min/1 73 square meters)  •  Stage 3A Moderate CKD (GFR = 45-59 mL/min/1 73 square meters)  •  Stage 3B Moderate CKD (GFR = 30-44 mL/min/1 73 square meters)  •  Stage 4 Severe CKD (GFR = 15-29 mL/min/1 73 square meters)  •  Stage 5 End Stage CKD (GFR <15 mL/min/1 73 square meters)  Note: GFR calculation is accurate only with a steady state creatinine    Lipase [[id number]]  (Abnormal) Collected: 05/02/23 1414    Lab Status: Final result Specimen: Blood from Arm, Right Updated: 05/02/23 1449     Lipase <6 u/L     Magnesium [[id number]]  (Abnormal) Collected: 05/02/23 1414    Lab Status: Final result Specimen: Blood from Arm, Right Updated: 05/02/23 1449     Magnesium 2 8 mg/dL     FLU/RSV/COVID - if FLU/RSV clinically relevant [[id number]]  (Normal) Collected: 05/02/23 1329    Lab Status: Final result Specimen: Nares from Nose Updated: 05/02/23 1422     SARS-CoV-2 Negative     INFLUENZA A PCR Negative     INFLUENZA B PCR Negative     RSV PCR Negative    Narrative:      FOR PEDIATRIC PATIENTS - copy/paste COVID Guidelines URL to browser: [email or web address]    SARS-CoV-2 assay is a Nucleic Acid Amplification assay intended for the  qualitative detection of nucleic acid from SARS-CoV-2 in nasopharyngeal  swabs  Results are for the presumptive identification of SARS-CoV-2 RNA  Positive results are indicative of infection with SARS-CoV-2, the virus  causing COVID-19, but do not rule out bacterial infection or co-infection  with other viruses  Laboratories within the [place] and its  territories are required to report all positive results to the appropriate  public health authorities  Negative results do not preclude SARS-CoV-2  infection and should not be used as the sole basis for treatment or other  patient management decisions  Negative results must be combined with  clinical observations, patient history, and epidemiological information  This test has not been FDA cleared or approved  This test has been authorized by FDA under an Emergency Use Authorization  (EUA)   This test is only authorized for the duration of time the  declaration that circumstances exist justifying the authorization of the  emergency use of an in vitro diagnostic tests for detection of SARS-CoV-2  virus and/or diagnosis of COVID-19 infection under section 564(b)(1) of  the Act, 21 U  S C  775REA-0(M)(3), unless the authorization is terminated  or revoked sooner  The test has been validated but independent review by FDA  and CLIA is pending  Test performed using [employer] GeneXpert: This RT-PCR assay targets N2,  a region unique to SARS-CoV-2  A conserved region in the E-gene was chosen  for pan-Sarbecovirus detection which includes SARS-CoV-2  According to CMS-2020-01-R, this platform meets the definition of high-throughput technology  Fingerstick Glucose (POCT) [[id number]]  (Abnormal) Collected: 05/02/23 1324    Lab Status: Final result Updated: 05/02/23 1408     POC Glucose 268 mg/dl     B-Type Natriuretic Peptide(BNP) [[id number]]  (Abnormal) Collected: 05/02/23 1329    Lab Status: Final result Specimen: Blood from Arm, Left Updated: 05/02/23 1404      pg/mL     Procalcitonin [[id number]]  (Normal) Collected: 05/02/23 1329    Lab Status: Final result Specimen: Blood from Arm, Left Updated: 05/02/23 1404     Procalcitonin 0 07 ng/ml     HS Troponin 0hr (reflex protocol) [[id number]]  (Normal) Collected: 05/02/23 1329    Lab Status: Final result Specimen: Blood from Arm, Left Updated: 05/02/23 1401     hs TnI 0hr 9 ng/L     Lactic acid, plasma (w/reflex if result > 2 0) [[id number]]  (Normal) Collected: 05/02/23 1329    Lab Status: Final result Specimen: Blood from Arm, Left Updated: 05/02/23 1356     LACTIC ACID 1 6 mmol/L     Narrative:      Result may be elevated if tourniquet was used during collection      Blood gas, Venous [[id number]]  (Abnormal) Collected: 05/02/23 1350    Lab Status: Final result Specimen: Blood from Arm, Left Updated: 05/02/23 1351     pH, Tor 7 290     pCO2, Tor 58 9 mm Hg      pO2, Tor 49 8 mm Hg HCO3, Tor 27 7 mmol/L      Base Excess, Tor 0 3 mmol/L      O2 Content, Tor 12 2 ml/dL      O2 HGB, VENOUS 82 8 %     Blood culture #2 [[id number]] Collected: 05/02/23 1329    Lab Status: In process Specimen: Blood from Arm, Right Updated: 05/02/23 1344    Blood culture #1 [[id number]] Collected: 05/02/23 1329    Lab Status: In process Specimen: Blood from Arm, Left Updated: 05/02/23 1341    CBC and differential [[id number]]  (Abnormal) Collected: 05/02/23 1329    Lab Status: Final result Specimen: Blood from Arm, Left Updated: 05/02/23 1338     WBC 16 43 Thousand/uL      RBC 3 41 Million/uL      Hemoglobin 9 3 g/dL      Hematocrit 31 7 %      MCV 93 fL      MCH 27 3 pg      MCHC 29 3 g/dL      RDW 15 9 %      MPV 9 9 fL      Platelets 947 Thousands/uL      nRBC 0 /100 WBCs      Neutrophils Relative 82 %      Immat GRANS % 1 %      Lymphocytes Relative 10 %      Monocytes Relative 5 %      Eosinophils Relative 1 %      Basophils Relative 1 %      Neutrophils Absolute 13 75 Thousands/µL      Immature Grans Absolute 0 21 Thousand/uL      Lymphocytes Absolute 1 57 Thousands/µL      Monocytes Absolute 0 74 Thousand/µL      Eosinophils Absolute 0 08 Thousand/µL      Basophils Absolute 0 08 Thousands/µL                  CT head without contrast   Final Result by Jass Cordoba MD (05/02 1700)         1  No acute intracranial hemorrhage, mass effect or edema  2  Minimal, chronic microangiopathy  Workstation performed: [id number]         XR chest 1 view portable   Final Result by Isi Montoya MD (05/02 0569)      Pulmonary vascular congestion  No pneumothorax                    Workstation performed: [id number]                    Procedures  ECG 12 Lead Documentation Only    Date/Time: 5/2/2023 2:51 PM  Performed by: Obi Oliveira MD  Authorized by: Obi Oliveira MD     Interpretation:     Interpretation: non-specific    Rate:     ECG rate:  73    ECG rate assessment: normal    Rhythm:     Rhythm: sinus rhythm    Ectopy:     Ectopy: none    QRS:     QRS axis:  Normal  ST segments:     ST segments:  Non-specific  T waves:     T waves: non-specific      CriticalCare Time    Date/Time: 5/2/2023 4:00 PM  Performed by: Rose Mary Sutherland MD  Authorized by: Rose Mary Sutherland MD     Critical care provider statement:     Critical care time (minutes):  72    Critical care was necessary to treat or prevent imminent or life-threatening deterioration of the following conditions:  Circulatory failure, respiratory failure and metabolic crisis    Critical care was time spent personally by me on the following activities:  Obtaining history from patient or surrogate, development of treatment plan with patient or surrogate, ordering and performing treatments and interventions, ordering and review of laboratory studies, ordering and review of radiographic studies, re-evaluation of patient's condition, review of old charts, discussions with consultants, evaluation of patient's response to treatment and examination of patient      ED Course  ED Course as of 05/02/23 2145   Tue May 02, 2023   1346 WBC(!): 16 43  Will place on IV antibiotics for cover for COPD exacerbation  1346 Hemoglobin(!): 9 3   1426 CXR per my independent review and interpretation is concerning for vascular congestion  Will give lasix for diuresis  1449 BNP(!): 538   1449 LACTIC ACID: 1 6   1449 Procalcitonin: 0 07   1455 CXR  IMPRESSION:  Pulmonary vascular congestion  No pneumothorax  1459 Potassium(!!): 6 9  I called and spoke with lab  Potassium is not hemolyzed  Will administer calcium gluconate and insulin  Dextrose not needed as patient is hyperglycemic at 297  Patient already got lasix for pulmonary congestion; this can treat hyperkalemia as well  1532 Potassium(!!): 6 4  Redraw  Patient got hyperkalemia meds  1604 I discussed with Dr Zac Johnston, critical care physician  Accepts patient for admission         SBIRT 20yo+    Flowsheet Row Most Recent Value Initial Alcohol Screen: US AUDIT-C     1  How often do you have a drink containing alcohol? 0 Filed at: 05/02/2023 1323   2  How many drinks containing alcohol do you have on a typical day you are drinking? 0 Filed at: 05/02/2023 1323   3a  Male UNDER 65: How often do you have five or more drinks on one occasion? 0 Filed at: 05/02/2023 1323   3b  FEMALE Any Age, or MALE 65+: How often do you have 4 or more drinks on one occassion? 0 Filed at: 05/02/2023 1323   Audit-C Score 0 Filed at: 05/02/2023 1323   LUIS: How many times in the past year have you    Used an illegal drug or used a prescription medication for non-medical reasons? Never Filed at: 05/02/2023 1323        Medical Decision Making  82F presenting with respiratory distress on CPAP  Was apneic and altered by EMS report, but improved after narcan was given  Patient transitioned from CPAP to BiPAP and tolerating BiPAP well  As patient responded to narcan (per EMS), concerns are for possible opioid overdose  CHF exacerbation vs COPD exacerbation vs pneumonia are also possibilities  CBC significant for leukocytosis 16 43  With the elevated WBC and respiratory distress, patient was given breathing treatment, antibiotics and steroids to treat for COPD exacerbation  Her BNP was elevated at 538 and CXR concerning for vascular congestion, so patient was also given 80mg of IV lasix for diuresis (patient's home dose of lasix is 40mg)  Patient's blood pressures continued to be significantly hypertensive at 190-200s, therefore she was administered nitropaste for HTN and to aid w preload and afterload reduction to aid with treatment of CHF exacerbation  CMP was significant for hyponatremia 131 and hyperkalemia 6 9  Patient was given IV calcium and insulin for hyperkalemia; lasix also helps reduce K+ levels  Given patient on BiPap w significant metabolic derangements, I discussed w critical care medicine and patient was admitted       CHF exacerbation ([facility] [address] ): acute illness or injury  Hyperkalemia: acute illness or injury  Hypertension: chronic illness or injury  Respiratory distress: acute illness or injury  Amount and/or Complexity of Data Reviewed  Independent Historian:      Details: daughter  External Data Reviewed: notes  Labs: ordered  Decision-making details documented in ED Course  Radiology: ordered and independent interpretation performed  ECG/medicine tests: ordered and independent interpretation performed  Risk  OTC drugs  Prescription drug management  Drug therapy requiring intensive monitoring for toxicity  Decision regarding hospitalization  Disposition  Final diagnoses:   Hyperkalemia   CHF exacerbation ([place] [address] )   Hypertension   Respiratory distress     Time reflects when diagnosis was documented in both MDM as applicable and the Disposition within this note     Time User Action Codes Description Comment    5/2/2023  3:36 PM Frosty [place] Add [E87 5] Hyperkalemia     5/2/2023  3:36 PM Frosty [place] Add [I50 9] CHF exacerbation ([place] [address] )     5/2/2023  4:16 PM Luevenia Chol Modify [E87 5] Hyperkalemia     5/2/2023  4:16 PM Luevenia Chol Modify [I50 9] CHF exacerbation ([place] [address] )     5/2/2023  9:45 PM Tina, [address] [facility] Hypertension     5/2/2023  9:45 PM Frosty [place] Add [R06 03] Respiratory distress       ED Disposition     ED Disposition   Admit    Condition   Stable    Date/Time   Tue May 2, 2023  4:06 PM    Comment   Case was discussed with Dr Andres Barreto and the patient's admission status was agreed to be Admission Status: inpatient status to the service of Dr Andres Barreto             Follow-up Information    None         Current Discharge Medication List      CONTINUE these medications which have NOT CHANGED    Details   acetaminophen (TYLENOL) 325 mg tablet Take 2 tablets (650 mg total) by mouth every 6 (six) hours as needed for mild pain  Refills: 0    Associated Diagnoses: Acute hematogenous osteomyelitis of left foot (HCC)      albuterol (ACCUNEB) 0 63 MG/3ML nebulizer solution Take 1 ampule by nebulization 4 (four) times a day as needed for wheezing      allopurinol (ZYLOPRIM) 100 mg tablet Take 50 mg by mouth daily      amLODIPine (NORVASC) 5 mg tablet Take 5 mg by mouth daily      apixaban (ELIQUIS) 5 mg Take 5 mg by mouth 2 (two) times a day      atorvastatin (LIPITOR) 10 mg tablet Take 10 mg by mouth daily      clopidogrel (PLAVIX) 75 mg tablet Take 1 tablet (75 mg total) by mouth daily Do not start before February 5, 2023  Qty: 30 tablet, Refills: 0    Associated Diagnoses: PAD (peripheral artery disease) ([facility])      docusate sodium (COLACE) 100 mg capsule Take 200 mg by mouth daily      fluticasone-salmeterol (ADVAIR, WIXELA) 500-50 mcg/dose inhaler Inhale 1 puff 2 (two) times a day Rinse mouth after use  Comments: Substitution to a formulary equivalent within the same pharmaceutical class is authorized  furosemide (LASIX) 40 mg tablet Take 40 mg by mouth daily       gabapentin (NEURONTIN) 300 mg capsule Take 300 mg by mouth 3 (three) times a day      insulin glargine (LANTUS) 100 units/mL subcutaneous injection Inject 12 Units under the skin daily at bedtime      Ipratropium-Albuterol (DUONEB IN) Inhale 2 5 mg every 4 (four) hours      metoprolol tartrate (LOPRESSOR) 50 mg tablet Take 50 mg by mouth every 12 (twelve) hours      Mirabegron (MYRBETRIQ PO) Take 25 mg by mouth daily      mirtazapine (REMERON) 30 mg tablet Take 30 mg by mouth daily at bedtime      nicotine (NICODERM CQ) 7 mg/24hr TD 24 hr patch Place 1 patch on the skin over 24 hours daily Do not start before February 5, 2023    Qty: 28 patch, Refills: 0    Associated Diagnoses: Smoking      nitroglycerin (NITROSTAT) 0 4 mg SL tablet Place 0 4 mg under the tongue every 5 (five) minutes as needed for chest pain      oxyCODONE (OxyCONTIN) 10 mg 12 hr tablet Take 10 mg by mouth every 6 (six) hours as needed      potassium chloride (MICRO-K) 10 MEQ CR capsule Take 10 mEq by mouth daily predniSONE 2 5 mg tablet Take 2 5 mg by mouth daily      rOPINIRole (REQUIP) 0 25 mg tablet Take 0 5 mg by mouth daily at bedtime      sertraline (ZOLOFT) 50 mg tablet Take 50 mg by mouth daily at bedtime      sucralfate (CARAFATE) 1 g tablet Take 1 g by mouth 2 (two) times a day before meals      traZODone (DESYREL) 50 mg tablet Take 50 mg by mouth daily at bedtime             No discharge procedures on file      PDMP Review       Value Time User    PDMP Reviewed  Yes 1/29/2023  5:49 AM Idris Lee PA-C          ED Provider  Electronically Signed by           Maryruth Hammans, MD  05/02/23 9195

## 2023-05-02 NOTE — H&P
"Critical Care Admission Note    I have personally seen and examined the patient on 5/2/2023(date) at 1445 (time)  I discussed the patient with the Advanced Practitioner (AIYANA or ADELIA)  including, but not limited to, verifying findings; reviewing labs and x-rays; discussing with consultants; developing the plan of care with the bedside nurse; and discussing treatment plan with patient or surrogate  I have reviewed the note and assessment performed by the Advanced Practitioner (AIYANA or ADELIA), and agree with the documented findings and plan of care with the following additions/exceptions  Please see my following comments for details and adjustments  Critical care time, excluding procedures, teaching, family meetings, and excludes any prior time recorded by providers other than myself, (45) minutes (1400 to [phone])  Alondra Hall MD PhD  [phone]  Anesthesiology and Critical Care  Staff Physician, [facility] [address] [place] [address]  May 2, 2023    --------------------------------------------------------  HISTORY & PHYSICAL  - 82F, Hx COPD/Asthma (On 3 L at home, Duoneb QID, albuterol PRN), CHF, T2DM, DVT of lower limb, HTN, CKD, PVD, chronic opioid use  - She presents with SOB, diaphoresis, facial swelling x3-4 days in duration and worsening in severity  According to he patient, she has been hospitalized several times before for shortness of breath related to \"my heart\"  She clearly indicates that she is DNR/I    - In the ED, she received empiric treatment for both CHF and a COPD flair  She was placed on Azithro/CTX, lasix 80mg IV x1 (w/ good urine output), Xopenex, Ipratropium, methyleprednisolone 125, and Nitro paste for HTN  CXR was notable for bilateral pulmonary edema  CT head without contrast was unremarkable  BNP was elevated  Potassium was markedly elevated but responded to dosing with Lasix    - The patient's history is drawn from the patient's own report as well " as the medical record  - The patient denies recent infection, N/V, no changes in urination  Endorses fatigue, swelling, increased SOB especially over the last several days  --------------------------------------------------------  ASSESSMENT & PLAN  This is a [de-identified] y o  female, with a has a past medical history of COPD/Asthma (On 3 L at home, Duoneb QID, albuterol PRN), CHF, T2DM, DVT of lower limb, HTN, CKD (Cr 1-1 2 range), PVD, chronic opioid use ,  who presents with SOB requiring BiPAP support likely related to volume overload/CHF exacerbation  -- and is now requiring ICU or step down care for Acute respiratory failure requiring non-invasive ventilatory support  - Goal 2 L negative or more as tolerated by pressures  - BID BMP while being diuresed  - Continue Azithro, nebs  - DC steroids and CTX given low c/f infection-related COPD  - Rx for hyper kalemia          Neuro:  # Encephalopathy: Likely Cardiopulmonary  - w/u: Vitals (hypertensive), Basic labs/Blood glucose (hyperK and elevate blood glucose), UDS (pending), CT head (unremarkable), Procalcitonin (negative)  CV:  # CHF:  - BNP elevated  - TTE for evaluate for HFrEF vs  HFpEF  - Diuresis w/ goal minus 2 liters or more over the next 24 hrs  # HTN  - Continue anti-HTN meds    Pulm:  # Acute on Chronic Hypoxic Respiratory Failure: requiring non-invasive ventilation   - Wean BiPAP as tolerated  - Diuresis as above    GI/Nutrition:  - Diet: No diet orders on file  - No critical issues    Renal/:  # Acute Kidney Injury (b/l Cr 1-1 2): Likely Cardiorenal with volume overload  - Diuresis as above    # Hyperkalemia  - S/p temporizing measures  - SSI  - Continue diuresis with Furosemide; holding home potassium    Endo/Metabolic:  # I6XU  - SSI    Heme/ID:  # Sepsis/SIRS physiology: DDX Infx sources (pulmonary, urinary/renal, endocarditis, wound/soft tissue), Sepsis mimics (Acute bleed, PE/DVT, Drug fever, DKA)  - w/u: Cultures (Blood: pending, Urine: "pending, Sputum: pending), CXR: pulmonary edema, MRSA: pending, COVID/RSV/Flu Panel: negative, Legionella & Streptococcal urine antigens: pending, Procalcitonin: nml   - Rx: Antibiotics (Azithromycin x 5 days; CTX x 1 dose on 2023)    MSK/Skin/Prophylaxis:  # PPX: Statin, Apixiban, Lines/Devices in place (Pastor)  # Devices to remove?: none  # Dispo: n/a          --------------------------------------------------------  OBJECTIVE EVALUATION & DATA  Visit Vitals  BP (!) 197/81   Pulse 68   Temp 98 3 °F (36 8 °C) (Temporal)   Resp 17   Ht 5' 6\" (1 676 m)   Wt 82 6 kg (182 lb)   SpO2 94%   BMI 29 38 kg/m²   OB Status Hysterectomy   Smoking Status Every Day   BSA 1 92 m²     Temp (24hrs), Av 3 °F (36 8 °C), Min:98 3 °F (36 8 °C), Max:98 3 °F (36 8 °C)        Intake/Output Summary (Last 24 hours) at 2023 1506  Last data filed at 2023 1443  Gross per 24 hour   Intake 50 ml   Output --   Net 50 ml     - Diet: No diet orders on file    Physical Exam  General: Elderly woman sitting in bed  Appears comfortable on the BiPAP mask and is able to converse in short sentences through the mask  Neurologic: AOx2; inattentive  HEENT: Normocephalic and atraumatic  Sclera are mildly icteric  Mucous membranes are moist   Lungs & Thorax: Work of breathing shows no dyssynchrony with the BiPAP  Cardiovascular: The JVP is grossly distended  Lower extremities show trace edema  Cap refill is <2sec  Abdomen: Non-tender  Extremities / MSK: Musculature is grossly diminished     Skin: The skin is warm and dry      DATA  Results from last 7 days   Lab Units 23  1329   LACTIC ACID mmol/L 1 6     Results from last 7 days   Lab Units 23  1414   SODIUM mmol/L 131*   POTASSIUM mmol/L 6 9*   CHLORIDE mmol/L 99   CO2 mmol/L 30   ANION GAP mmol/L 2*   BUN mg/dL 24   CREATININE mg/dL 1 40*   GLUCOSE RANDOM mg/dL 297*     Results from last 7 days   Lab Units 23  1414   CALCIUM mg/dL 8 4   MAGNESIUM mg/dL 2 8*     Results " from last 7 days   Lab Units 05/02/23  1414   AST U/L 9*   ALT U/L 10   ALK PHOS U/L 59   TOTAL BILIRUBIN mg/dL 0 17*   ALBUMIN g/dL 3 9     Results from last 7 days   Lab Units 05/02/23  1329   WBC Thousand/uL 16 43*   HEMOGLOBIN g/dL 9 3*   HEMATOCRIT % 31 7*   PLATELETS Thousands/uL 324         Results from last 7 days   Lab Units 05/02/23  1329   PROCALCITONIN ng/ml 0 07

## 2023-05-02 NOTE — ED NOTES
Respiratory aware of need to take pt to CT  Pt remains on bipap at this time        Latanya Young RN  05/02/23 1686

## 2023-05-03 ENCOUNTER — APPOINTMENT (INPATIENT)
Dept: RADIOLOGY | Facility: HOSPITAL | Age: 83
DRG: 291 | End: 2023-05-03
Payer: COMMERCIAL

## 2023-05-03 ENCOUNTER — APPOINTMENT (INPATIENT)
Dept: NON INVASIVE DIAGNOSTICS | Facility: HOSPITAL | Age: 83
DRG: 291 | End: 2023-05-03
Payer: COMMERCIAL

## 2023-05-03 PROBLEM — R78.81 BACTEREMIA: Status: ACTIVE | Noted: 2023-05-03

## 2023-05-03 LAB
ALBUMIN SERPL BCP-MCNC: 3.5 G/DL (ref 3.5–5)
ALP SERPL-CCNC: 52 U/L (ref 34–104)
ALT SERPL W P-5'-P-CCNC: 17 U/L (ref 7–52)
ANION GAP SERPL CALCULATED.3IONS-SCNC: 5 MMOL/L (ref 4–13)
ANION GAP SERPL CALCULATED.3IONS-SCNC: 6 MMOL/L (ref 4–13)
AORTIC ROOT: 2.9 CM
APICAL FOUR CHAMBER EJECTION FRACTION: 74 %
ASCENDING AORTA: 3.6 CM
AST SERPL W P-5'-P-CCNC: 13 U/L (ref 13–39)
ATRIAL RATE: 83 BPM
BASOPHILS # BLD AUTO: 0.01 THOUSANDS/ÂΜL (ref 0–0.1)
BASOPHILS NFR BLD AUTO: 0 % (ref 0–1)
BILIRUB SERPL-MCNC: 0.2 MG/DL (ref 0.2–1)
BUN SERPL-MCNC: 30 MG/DL (ref 5–25)
BUN SERPL-MCNC: 33 MG/DL (ref 5–25)
CA-I BLD-SCNC: 1.13 MMOL/L (ref 1.12–1.32)
CALCIUM SERPL-MCNC: 9 MG/DL (ref 8.4–10.2)
CALCIUM SERPL-MCNC: 9.1 MG/DL (ref 8.4–10.2)
CHLORIDE SERPL-SCNC: 100 MMOL/L (ref 96–108)
CHLORIDE SERPL-SCNC: 101 MMOL/L (ref 96–108)
CO2 SERPL-SCNC: 31 MMOL/L (ref 21–32)
CO2 SERPL-SCNC: 34 MMOL/L (ref 21–32)
CREAT SERPL-MCNC: 1.24 MG/DL (ref 0.6–1.3)
CREAT SERPL-MCNC: 1.42 MG/DL (ref 0.6–1.3)
E WAVE DECELERATION TIME: 219 MS
EOSINOPHIL # BLD AUTO: 0 THOUSAND/ÂΜL (ref 0–0.61)
EOSINOPHIL NFR BLD AUTO: 0 % (ref 0–6)
ERYTHROCYTE [DISTWIDTH] IN BLOOD BY AUTOMATED COUNT: 15.9 % (ref 11.6–15.1)
FRACTIONAL SHORTENING: 47 % (ref 28–44)
GFR SERPL CREATININE-BSD FRML MDRD: 34 ML/MIN/1.73SQ M
GFR SERPL CREATININE-BSD FRML MDRD: 41 ML/MIN/1.73SQ M
GLUCOSE SERPL-MCNC: 121 MG/DL (ref 65–140)
GLUCOSE SERPL-MCNC: 121 MG/DL (ref 65–140)
GLUCOSE SERPL-MCNC: 129 MG/DL (ref 65–140)
GLUCOSE SERPL-MCNC: 166 MG/DL (ref 65–140)
GLUCOSE SERPL-MCNC: 242 MG/DL (ref 65–140)
GLUCOSE SERPL-MCNC: 269 MG/DL (ref 65–140)
GLUCOSE SERPL-MCNC: 282 MG/DL (ref 65–140)
HCT VFR BLD AUTO: 28.3 % (ref 34.8–46.1)
HGB BLD-MCNC: 8.6 G/DL (ref 11.5–15.4)
IMM GRANULOCYTES # BLD AUTO: 0.09 THOUSAND/UL (ref 0–0.2)
IMM GRANULOCYTES NFR BLD AUTO: 1 % (ref 0–2)
INR PPP: 1.43 (ref 0.84–1.19)
INTERVENTRICULAR SEPTUM IN DIASTOLE (PARASTERNAL SHORT AXIS VIEW): 1.4 CM
INTERVENTRICULAR SEPTUM: 1.4 CM (ref 0.6–1.1)
L PNEUMO1 AG UR QL IA.RAPID: NEGATIVE
LAAS-AP2: 20.5 CM2
LAAS-AP4: 20.8 CM2
LEFT ATRIUM SIZE: 4.3 CM
LEFT INTERNAL DIMENSION IN SYSTOLE: 2.4 CM (ref 2.1–4)
LEFT VENTRICLE DIASTOLIC VOLUME (MOD BIPLANE): 48 ML
LEFT VENTRICLE SYSTOLIC VOLUME (MOD BIPLANE): 12 ML
LEFT VENTRICULAR INTERNAL DIMENSION IN DIASTOLE: 4.5 CM (ref 3.5–6)
LEFT VENTRICULAR POSTERIOR WALL IN END DIASTOLE: 1.4 CM
LEFT VENTRICULAR STROKE VOLUME: 72 ML
LV EF: 75 %
LVSV (TEICH): 72 ML
LYMPHOCYTES # BLD AUTO: 0.93 THOUSANDS/ÂΜL (ref 0.6–4.47)
LYMPHOCYTES NFR BLD AUTO: 9 % (ref 14–44)
MAGNESIUM SERPL-MCNC: 2.6 MG/DL (ref 1.9–2.7)
MCH RBC QN AUTO: 27.3 PG (ref 26.8–34.3)
MCHC RBC AUTO-ENTMCNC: 30.4 G/DL (ref 31.4–37.4)
MCV RBC AUTO: 90 FL (ref 82–98)
MONOCYTES # BLD AUTO: 0.33 THOUSAND/ÂΜL (ref 0.17–1.22)
MONOCYTES NFR BLD AUTO: 3 % (ref 4–12)
MV E'TISSUE VEL-LAT: 10 CM/S
MV E'TISSUE VEL-SEP: 6 CM/S
MV PEAK A VEL: 1.8 M/S
MV PEAK E VEL: 112 CM/S
MV STENOSIS PRESSURE HALF TIME: 63 MS
MV VALVE AREA P 1/2 METHOD: 3.49 CM2
NEUTROPHILS # BLD AUTO: 8.58 THOUSANDS/ÂΜL (ref 1.85–7.62)
NEUTS SEG NFR BLD AUTO: 87 % (ref 43–75)
NRBC BLD AUTO-RTO: 0 /100 WBCS
P AXIS: 44 DEGREES
PHOSPHATE SERPL-MCNC: 3.5 MG/DL (ref 2.3–4.1)
PLATELET # BLD AUTO: 249 THOUSANDS/UL (ref 149–390)
PMV BLD AUTO: 9.8 FL (ref 8.9–12.7)
POTASSIUM SERPL-SCNC: 4.8 MMOL/L (ref 3.5–5.3)
POTASSIUM SERPL-SCNC: 5.5 MMOL/L (ref 3.5–5.3)
PR INTERVAL: 180 MS
PROT SERPL-MCNC: 5.9 G/DL (ref 6.4–8.4)
PROTHROMBIN TIME: 17.5 SECONDS (ref 11.6–14.5)
QRS AXIS: 45 DEGREES
QRSD INTERVAL: 88 MS
QT INTERVAL: 420 MS
QTC INTERVAL: 493 MS
RA PRESSURE ESTIMATED: 15 MMHG
RBC # BLD AUTO: 3.15 MILLION/UL (ref 3.81–5.12)
RIGHT ATRIAL 2D VOLUME: 21 ML
RIGHT ATRIUM AREA SYSTOLE A4C: 11.4 CM2
S PNEUM AG UR QL: NEGATIVE
SL CV LEFT ATRIUM LENGTH A2C: 5.7 CM
SL CV LV EF: 75
SL CV PED ECHO LEFT VENTRICLE DIASTOLIC VOLUME (MOD BIPLANE) 2D: 92 ML
SL CV PED ECHO LEFT VENTRICLE SYSTOLIC VOLUME (MOD BIPLANE) 2D: 20 ML
SODIUM SERPL-SCNC: 138 MMOL/L (ref 135–147)
SODIUM SERPL-SCNC: 139 MMOL/L (ref 135–147)
T WAVE AXIS: 63 DEGREES
TRICUSPID ANNULAR PLANE SYSTOLIC EXCURSION: 2.1 CM
VENTRICULAR RATE: 83 BPM
WBC # BLD AUTO: 9.94 THOUSAND/UL (ref 4.31–10.16)

## 2023-05-03 PROCEDURE — 94760 N-INVAS EAR/PLS OXIMETRY 1: CPT

## 2023-05-03 PROCEDURE — 97110 THERAPEUTIC EXERCISES: CPT

## 2023-05-03 PROCEDURE — 83735 ASSAY OF MAGNESIUM: CPT | Performed by: STUDENT IN AN ORGANIZED HEALTH CARE EDUCATION/TRAINING PROGRAM

## 2023-05-03 PROCEDURE — 99233 SBSQ HOSP IP/OBS HIGH 50: CPT | Performed by: STUDENT IN AN ORGANIZED HEALTH CARE EDUCATION/TRAINING PROGRAM

## 2023-05-03 PROCEDURE — 94664 DEMO&/EVAL PT USE INHALER: CPT

## 2023-05-03 PROCEDURE — 94640 AIRWAY INHALATION TREATMENT: CPT

## 2023-05-03 PROCEDURE — 93005 ELECTROCARDIOGRAM TRACING: CPT

## 2023-05-03 PROCEDURE — 84100 ASSAY OF PHOSPHORUS: CPT | Performed by: STUDENT IN AN ORGANIZED HEALTH CARE EDUCATION/TRAINING PROGRAM

## 2023-05-03 PROCEDURE — 97163 PT EVAL HIGH COMPLEX 45 MIN: CPT

## 2023-05-03 PROCEDURE — 94003 VENT MGMT INPAT SUBQ DAY: CPT

## 2023-05-03 PROCEDURE — 80053 COMPREHEN METABOLIC PANEL: CPT | Performed by: STUDENT IN AN ORGANIZED HEALTH CARE EDUCATION/TRAINING PROGRAM

## 2023-05-03 PROCEDURE — 85610 PROTHROMBIN TIME: CPT | Performed by: STUDENT IN AN ORGANIZED HEALTH CARE EDUCATION/TRAINING PROGRAM

## 2023-05-03 PROCEDURE — 80048 BASIC METABOLIC PNL TOTAL CA: CPT | Performed by: NURSE PRACTITIONER

## 2023-05-03 PROCEDURE — 97167 OT EVAL HIGH COMPLEX 60 MIN: CPT

## 2023-05-03 PROCEDURE — 93306 TTE W/DOPPLER COMPLETE: CPT

## 2023-05-03 PROCEDURE — 71045 X-RAY EXAM CHEST 1 VIEW: CPT

## 2023-05-03 PROCEDURE — 82330 ASSAY OF CALCIUM: CPT | Performed by: NURSE PRACTITIONER

## 2023-05-03 PROCEDURE — 82948 REAGENT STRIP/BLOOD GLUCOSE: CPT

## 2023-05-03 PROCEDURE — 85025 COMPLETE CBC W/AUTO DIFF WBC: CPT | Performed by: STUDENT IN AN ORGANIZED HEALTH CARE EDUCATION/TRAINING PROGRAM

## 2023-05-03 RX ORDER — ASPIRIN 81 MG/1
81 TABLET, CHEWABLE ORAL DAILY
Status: DISCONTINUED | OUTPATIENT
Start: 2023-05-04 | End: 2023-05-09 | Stop reason: HOSPADM

## 2023-05-03 RX ORDER — GUAIFENESIN 600 MG/1
600 TABLET, EXTENDED RELEASE ORAL DAILY
COMMUNITY
Start: 2022-11-28 | End: 2023-05-16

## 2023-05-03 RX ORDER — BENZONATATE 100 MG/1
100 CAPSULE ORAL EVERY 8 HOURS PRN
COMMUNITY
Start: 2023-04-14 | End: 2023-05-16

## 2023-05-03 RX ORDER — FOLIC ACID 0.8 MG
500 TABLET ORAL DAILY
COMMUNITY
End: 2023-05-16

## 2023-05-03 RX ORDER — FUROSEMIDE 10 MG/ML
80 INJECTION INTRAMUSCULAR; INTRAVENOUS EVERY 12 HOURS
Status: DISCONTINUED | OUTPATIENT
Start: 2023-05-03 | End: 2023-05-04

## 2023-05-03 RX ORDER — INSULIN LISPRO 100 [IU]/ML
4-20 INJECTION, SOLUTION INTRAVENOUS; SUBCUTANEOUS EVERY 6 HOURS SCHEDULED
Status: DISCONTINUED | OUTPATIENT
Start: 2023-05-03 | End: 2023-05-03

## 2023-05-03 RX ORDER — AZITHROMYCIN 250 MG/1
250 TABLET, FILM COATED ORAL EVERY 24 HOURS
Status: DISCONTINUED | OUTPATIENT
Start: 2023-05-03 | End: 2023-05-07

## 2023-05-03 RX ORDER — METOLAZONE 2.5 MG/1
TABLET ORAL
COMMUNITY
Start: 2023-02-21 | End: 2023-05-16

## 2023-05-03 RX ORDER — INSULIN LISPRO 100 [IU]/ML
4-20 INJECTION, SOLUTION INTRAVENOUS; SUBCUTANEOUS
Status: DISCONTINUED | OUTPATIENT
Start: 2023-05-03 | End: 2023-05-03

## 2023-05-03 RX ORDER — VANCOMYCIN HYDROCHLORIDE 1 G/200ML
1000 INJECTION, SOLUTION INTRAVENOUS EVERY 24 HOURS
Status: DISCONTINUED | OUTPATIENT
Start: 2023-05-04 | End: 2023-05-04

## 2023-05-03 RX ORDER — FUROSEMIDE 10 MG/ML
80 INJECTION INTRAMUSCULAR; INTRAVENOUS EVERY 8 HOURS
Status: DISCONTINUED | OUTPATIENT
Start: 2023-05-03 | End: 2023-05-03

## 2023-05-03 RX ORDER — FERROUS SULFATE 325(65) MG
325 TABLET ORAL EVERY OTHER DAY
Status: DISCONTINUED | OUTPATIENT
Start: 2023-05-04 | End: 2023-05-09 | Stop reason: HOSPADM

## 2023-05-03 RX ORDER — BACLOFEN 5 MG/1
1 TABLET ORAL EVERY 8 HOURS PRN
COMMUNITY
Start: 2023-04-25 | End: 2023-05-18 | Stop reason: DRUGHIGH

## 2023-05-03 RX ORDER — DEXTROSE MONOHYDRATE 25 G/50ML
25 INJECTION, SOLUTION INTRAVENOUS ONCE
Status: COMPLETED | OUTPATIENT
Start: 2023-05-03 | End: 2023-05-03

## 2023-05-03 RX ORDER — INSULIN LISPRO 100 [IU]/ML
4-20 INJECTION, SOLUTION INTRAVENOUS; SUBCUTANEOUS
Status: DISCONTINUED | OUTPATIENT
Start: 2023-05-03 | End: 2023-05-09 | Stop reason: HOSPADM

## 2023-05-03 RX ORDER — ASPIRIN 81 MG/1
81 TABLET, CHEWABLE ORAL DAILY
COMMUNITY
End: 2023-05-16

## 2023-05-03 RX ORDER — SELENIUM 50 MCG
1 TABLET ORAL DAILY
COMMUNITY
End: 2023-05-16

## 2023-05-03 RX ORDER — INSULIN ASPART 100 [IU]/ML
5 INJECTION, SUSPENSION SUBCUTANEOUS DAILY
COMMUNITY
End: 2023-05-16

## 2023-05-03 RX ORDER — MORPHINE SULFATE 100 MG/5ML
10 SOLUTION ORAL EVERY 6 HOURS PRN
COMMUNITY
Start: 2023-04-21

## 2023-05-03 RX ORDER — LORAZEPAM 2 MG/ML
0.5 CONCENTRATE ORAL EVERY 8 HOURS PRN
COMMUNITY
Start: 2023-03-30

## 2023-05-03 RX ADMIN — ROPINIROLE 0.5 MG: 0.25 TABLET, FILM COATED ORAL at 21:19

## 2023-05-03 RX ADMIN — DOCUSATE SODIUM 200 MG: 100 CAPSULE ORAL at 09:19

## 2023-05-03 RX ADMIN — LEVALBUTEROL HYDROCHLORIDE 1.25 MG: 1.25 SOLUTION RESPIRATORY (INHALATION) at 01:45

## 2023-05-03 RX ADMIN — IPRATROPIUM BROMIDE 0.5 MG: 0.5 SOLUTION RESPIRATORY (INHALATION) at 14:35

## 2023-05-03 RX ADMIN — FLUTICASONE FUROATE AND VILANTEROL TRIFENATATE 1 PUFF: 200; 25 POWDER RESPIRATORY (INHALATION) at 09:22

## 2023-05-03 RX ADMIN — SERTRALINE HYDROCHLORIDE 50 MG: 50 TABLET ORAL at 21:19

## 2023-05-03 RX ADMIN — CLOPIDOGREL BISULFATE 75 MG: 75 TABLET ORAL at 09:19

## 2023-05-03 RX ADMIN — APIXABAN 5 MG: 5 TABLET, FILM COATED ORAL at 09:19

## 2023-05-03 RX ADMIN — VANCOMYCIN HYDROCHLORIDE 2000 MG: 1 INJECTION, POWDER, LYOPHILIZED, FOR SOLUTION INTRAVENOUS at 15:38

## 2023-05-03 RX ADMIN — INSULIN GLARGINE 12 UNITS: 100 INJECTION, SOLUTION SUBCUTANEOUS at 21:19

## 2023-05-03 RX ADMIN — LEVALBUTEROL HYDROCHLORIDE 1.25 MG: 1.25 SOLUTION RESPIRATORY (INHALATION) at 14:35

## 2023-05-03 RX ADMIN — FUROSEMIDE 80 MG: 10 INJECTION, SOLUTION INTRAVENOUS at 16:28

## 2023-05-03 RX ADMIN — ACETAMINOPHEN 650 MG: 325 TABLET ORAL at 16:28

## 2023-05-03 RX ADMIN — IPRATROPIUM BROMIDE 0.5 MG: 0.5 SOLUTION RESPIRATORY (INHALATION) at 01:45

## 2023-05-03 RX ADMIN — IPRATROPIUM BROMIDE 0.5 MG: 0.5 SOLUTION RESPIRATORY (INHALATION) at 08:45

## 2023-05-03 RX ADMIN — DEXTROSE MONOHYDRATE 25 ML: 25 INJECTION, SOLUTION INTRAVENOUS at 05:13

## 2023-05-03 RX ADMIN — METOPROLOL TARTRATE 50 MG: 50 TABLET, FILM COATED ORAL at 20:54

## 2023-05-03 RX ADMIN — INSULIN LISPRO 4 UNITS: 100 INJECTION, SOLUTION INTRAVENOUS; SUBCUTANEOUS at 11:55

## 2023-05-03 RX ADMIN — ATORVASTATIN CALCIUM 10 MG: 10 TABLET, FILM COATED ORAL at 09:19

## 2023-05-03 RX ADMIN — LEVALBUTEROL HYDROCHLORIDE 1.25 MG: 1.25 SOLUTION RESPIRATORY (INHALATION) at 20:12

## 2023-05-03 RX ADMIN — AZITHROMYCIN MONOHYDRATE 250 MG: 250 TABLET ORAL at 14:27

## 2023-05-03 RX ADMIN — FUROSEMIDE 80 MG: 10 INJECTION, SOLUTION INTRAVENOUS at 05:13

## 2023-05-03 RX ADMIN — IPRATROPIUM BROMIDE 0.5 MG: 0.5 SOLUTION RESPIRATORY (INHALATION) at 20:12

## 2023-05-03 RX ADMIN — TRAZODONE HYDROCHLORIDE 50 MG: 50 TABLET ORAL at 21:19

## 2023-05-03 RX ADMIN — NICOTINE 1 PATCH: 7 PATCH, EXTENDED RELEASE TRANSDERMAL at 08:37

## 2023-05-03 RX ADMIN — LEVALBUTEROL HYDROCHLORIDE 1.25 MG: 1.25 SOLUTION RESPIRATORY (INHALATION) at 08:45

## 2023-05-03 RX ADMIN — ACETAMINOPHEN 650 MG: 325 TABLET ORAL at 00:24

## 2023-05-03 RX ADMIN — APIXABAN 5 MG: 5 TABLET, FILM COATED ORAL at 17:42

## 2023-05-03 RX ADMIN — MIRTAZAPINE 30 MG: 15 TABLET, FILM COATED ORAL at 21:19

## 2023-05-03 RX ADMIN — AMLODIPINE BESYLATE 5 MG: 5 TABLET ORAL at 09:19

## 2023-05-03 RX ADMIN — METOPROLOL TARTRATE 50 MG: 50 TABLET, FILM COATED ORAL at 09:19

## 2023-05-03 RX ADMIN — CHLORHEXIDINE GLUCONATE 0.12% ORAL RINSE 15 ML: 1.2 LIQUID ORAL at 20:54

## 2023-05-03 RX ADMIN — CHLORHEXIDINE GLUCONATE 0.12% ORAL RINSE 15 ML: 1.2 LIQUID ORAL at 09:19

## 2023-05-03 RX ADMIN — INSULIN HUMAN 10 UNITS: 100 INJECTION, SOLUTION PARENTERAL at 05:13

## 2023-05-03 RX ADMIN — INSULIN LISPRO 4 UNITS: 100 INJECTION, SOLUTION INTRAVENOUS; SUBCUTANEOUS at 05:45

## 2023-05-03 NOTE — CASE MANAGEMENT
Case Management Assessment & Discharge Planning Note    Patient name Raul Velazquez  Location /-53 MRN [id number]  : 1940 Date 5/3/2023       Current Admission Date: 2023  Current Admission Diagnosis:Acute on chronic congestive heart failure [facility])   Patient Active Problem List    Diagnosis Date Noted   • Bacteremia 2023   • Acute encephalopathy 2023   • Acute kidney injury superimposed on chronic kidney disease ([facility] [address] ) 2023   • Hyperkalemia 2023   • Hypertension 2023   • Constipation 2023   • History of DVT (deep vein thrombosis) 2023   • Coronary artery disease involving native coronary artery 2023   • Chronic anemia 2023   • Stage 3b chronic kidney disease ([facility] ) 2023   • Acute on chronic congestive heart failure ([facility] ) 2023   • Smoking 2023   • Oxygen dependent 2023   • PAD (peripheral artery disease) ([facility] [address] ) 2023   • Acute hematogenous osteomyelitis of left foot ([facility] ) 2023   • Type 2 diabetes mellitus with skin complication, with long-term current use of insulin ([facility] ) 2023   • COPD (chronic obstructive pulmonary disease) ([facility] ) 2023   • Acute on chronic respiratory failure with hypoxia and hypercapnia ([facility] ) 2023   • Chronic, continuous use of opioids 2023      LOS (days): 1  Geometric Mean LOS (GMLOS) (days): 3 90  Days to GMLOS:2 9     OBJECTIVE:    Risk of Unplanned Readmission Score: 46 2         Current admission status: Inpatient  Referral Reason:  (Disposition planning)    Preferred Pharmacy:   [address] [place] [address] [id number] Manuel Modi/ Noe Ramos [facility]/ Noe Ramos   [address] [facility] [address]  Phone: [phone] Fax: [phone]    Primary Care Provider: Gisel Llanes MD    Primary Insurance: [facility]  Secondary Insurance:     ASSESSMENT:  [address] Proxies    There are no active Health Care Proxies on file         Advance Directives  Does patient have a [address] [place] [address] Avenue?: Yes  Does patient have Advance Directives?: Yes  Advance Directives: Living will  Primary Contact: Fara Gill, daughter         Readmission Root Cause  30 Day Readmission: No    Patient Information  Admitted from[de-identified] Home  Mental Status: Other (Comment)  During Assessment patient was accompanied by: Daughter  Assessment information provided by[de-identified] Daughter  Primary Caregiver: Family  Caregiver's Name[de-identified] Fara Gill, daughter  John Hazard Relationship to Patient[de-identified] Family Member  Caregiver's Telephone Number[de-identified] [phone]  Support Systems: Daughter, Family members  [place] of Residence: [facility]  What city do you live in?: [address] [employer] entry access options  Select all that apply : Stairs  Number of steps to enter home : 4  Do the steps have railings?: Yes  Type of Current Residence: [address] home  Upon entering residence, is there a bedroom on the main floor (no further steps)?: Yes  Upon entering residence, is there a bathroom on the main floor (no further steps)?: Yes  In the last 12 months, was there a time when you were not able to pay the mortgage or rent on time?: No  In the last 12 months, how many places have you lived?: 2  In the last 12 months, was there a time when you did not have a steady place to sleep or slept in a shelter (including now)?: No  Homeless/housing insecurity resource given?: N/A  Living Arrangements: Lives w/ Daughter  Is patient a ?: No    Activities of Daily Living Prior to Admission  Functional Status: Assistance  Completes ADLs independently?: No  Level of ADL dependence: Assistance  Ambulates independently?: No  Level of ambulatory dependence: Assistance  Does patient use assisted devices?: Yes  Assisted Devices (DME) used:  Other (Comment), Rollator, Hospital Bed, Home Oxygen concentrator, Portable Oxygen tanks, Shower Chair (transport chair)  [facility] Name (respiratory supplies): [address] [place] [address] [phone]  O2 Rate(s): 3L  Does patient currently own DME?: Yes  What DME does the patient currently own?: Home Oxygen concentrator, Hospital Bed, Rollator, Portable Oxygen tanks, Other (Comment), Shower Chair, Nebulizer (transport chair)  Does patient have a history of Outpatient Therapy (PT/OT)?: No  Does the patient have a history of Short-Term Rehab?: No  Does patient have a history of HHC?: Yes [facility] [place] [address])  Does patient currently have [place] [address]?: No ([facility])         Patient Information Continued  Income Source: SSI/SSD  Does patient have prescription coverage?: Yes  Within the past 12 months, you worried that your food would run out before you got the money to buy more : Never true  Within the past 12 months, the food you bought just didn't last and you didn't have money to get more : Never true  Food insecurity resource given?: N/A  Does patient receive dialysis treatments?: No  Does patient have a history of substance abuse?: No  Does patient have a history of Mental Health Diagnosis?: No         Means of Transportation  Means of Transport to Appts[de-identified] Family transport  In the past 12 months, has lack of transportation kept you from medical appointments or from getting medications?: No  In the past 12 months, has lack of transportation kept you from meetings, work, or from getting things needed for daily living?: No  Was application for public transport provided?: N/A        DISCHARGE DETAILS:    Discharge planning discussed with[de-identified] daughterCarlin of Choice: Yes  Comments - Freedom of Choice: return home with [place] at [address] contacted family/caregiver?: Yes             Contacts  Patient Contacts: Howard Coronel  Relationship to Patient[de-identified] Family  Contact Method: Phone  Phone Number: [phone]  Reason/Outcome: Continuity of Care, Discharge Planning                        Treatment Team Recommendation: Home  Discharge Destination Plan[de-identified] Home  Transport at Discharge : Family                     CM met with patient and daughter, Jonathan White, at the bedside, baseline information was obtained  CM discussed the role of CM in helping the patient develop a discharge plan and assist the patient in carry out their plan  Patient lives with daughter in 2 story home  Patient lives on first floor of home with DME to assist with ambulation  Patient has O2 at home through [facility]  Patient has [address] at Home with VN and doctor tele health visits  Patient previously had [facility] for wound care  CM to follow for CM discharge referral needs, if any

## 2023-05-03 NOTE — UTILIZATION REVIEW
"Initial Clinical Review    Admission: Date/Time/Statement:   Admission Orders (From admission, onward)     Ordered        05/02/23 1617  Inpatient Admission  Once                      Orders Placed This Encounter   Procedures   • Inpatient Admission     Standing Status:   Standing     Number of Occurrences:   1     Order Specific Question:   Level of Care     Answer:   Level 1 Stepdown [13]     Order Specific Question:   Estimated length of stay     Answer:   More than 2 Midnights     Order Specific Question:   Certification     Answer:   I certify that inpatient services are medically necessary for this patient for a duration of greater than two midnights  See H&P and MD Progress Notes for additional information about the patient's course of treatment  ED Arrival Information     Expected   5/2/2023     Arrival   5/2/2023 13:19    Acuity   Immediate            Means of arrival   Ambulance    Escorted by   [address] [place] [address]/ICU    Admission type   Emergency            Arrival complaint   -           Chief Complaint   Patient presents with   • Facial Swelling     Pt presents via EMS with facial swelling, apnea, altered mental status, and wheeze  EMS gave 4 mg of narcan with slight effect due to pin point pupils  Initial Presentation: [de-identified] y o  female to ED via EMS from home  Present with a PMHX of COPD/Asthma (On 3 L at home, Duoneb QID, albuterol PRN), CHF, T2DM, DVT of lower limb, HTN, CKD, PVD, chronic opioid use to ED presents with SOB, diaphoresis, facial swelling x3-4 days in duration and worsening in severity  According to he patient, she has been hospitalized several times before for shortness of breath related to \"my heart\"  Endorses fatigue, swelling, increased SOB especially over the last several days  Admitted to ICU with DX: Acute on chronic congestive heart failure   on exam: 80% ra - SOB requiring BiPAP support; The JVP is grossly distended   able to converse in " short sentences; tachypnea; HTN; K 6 9; Cr 1 40 (baseline 1-1 2); Gluc 297; Mg 2 8; WBC 16 43; H/H 9 3 / 31 7;   VBG: pH 7 290; pCO2 58 9; pO2 49 8  CXR was notable for bilateral pulmonary edema  Given in ED xopenex neb; atroven neb; solumedrol iv; rocephin iv; zithromax iv; lasix iv; insulin iv; Ca Gluc iv   PLAN: cont iv lasix; monitor labs; cont nebs; Accu-checks ACHS  Neuro checks; I/O; Daily wts; Cardiopulmonary monitoring; titrate O2; rec'd additional Ca Gluc iv; f/u blood / sputum cx      Date: 5/3/23    Day 2  Cont BiPAP; tachypnea; cont Diuresis w/ goal minus 1-2 liters or more over the next 24 hrs; I/O Net -1800; K 5 5; BUN 30; Cr 1 42; Gluc 242; H/H 8 6 / 28 3  Plan: cont iv lasix; monitor labs; cont nebs; Accu-checks ACHS   Neuro checks; I/O; Daily wts; Cardiopulmonary monitoring; titrate O2; rec'd additional Ca Gluc iv; f/u blood / sputum cx; f/u echo    ED Triage Vitals   Temperature Pulse Respirations Blood Pressure SpO2   05/02/23 1323 05/02/23 1323 05/02/23 1323 05/02/23 1326 05/02/23 1322   98 3 °F (36 8 °C) 75 (!) 25 141/66 (!) 80 %      Temp Source Heart Rate Source Patient Position - Orthostatic VS BP Location FiO2 (%)   05/02/23 1323 05/02/23 1323 05/02/23 1326 05/02/23 1326 05/02/23 2045   Temporal Monitor Sitting Right arm 30      Pain Score       05/02/23 1414       8          Wt Readings from Last 1 Encounters:   05/03/23 76 7 kg (169 lb)     Wt Readings from Last 3 Encounters:   05/02/23 77 8 kg (171 lb 8 3 oz)   02/04/23 82 9 kg (182 lb 12 2 oz)   01/27/23 75 8 kg (167 lb 3 2 oz)         Additional Vital Signs:   Date/Time Temp Pulse Resp BP MAP (mmHg) SpO2 FiO2 (%) Calculated FIO2 (%) - Nasal Cannula Nasal Cannula O2 Flow Rate (L/min) O2 Device O2 Interface Device Patient Position - Orthostatic VS   05/03/23 1100 -- 72 21 150/63 90 96 % -- -- -- -- -- --   05/03/23 1000 -- 79 27 Abnormal  158/112 Abnormal  128 98 % -- -- -- -- -- --   05/03/23 0900 -- 75 17 163/70 100 97 % -- -- -- -- -- --   05/03/23 0847 -- -- -- -- -- -- -- 32 3 L/min Nasal cannula -- --   05/03/23 0845 -- -- 24 Abnormal  -- -- -- -- -- -- -- -- --   05/03/23 0800 -- 77 20 153/70 100 97 % -- -- -- -- -- --   05/03/23 0700 97 7 °F (36 5 °C) 83 21 147/70 100 99 % -- -- -- BiPAP -- Lying   05/03/23 0600 -- 77 14 137/62 89 97 % -- -- -- -- -- --   05/03/23 0500 -- 78 12 155/67 97 97 % -- -- -- -- -- --   05/03/23 0400 -- 74 13 132/58 83 96 % -- -- -- -- -- --   05/03/23 0347 -- -- -- -- -- -- -- -- -- -- Face mask --   05/03/23 0300 -- 80 16 137/61 88 96 % -- -- -- -- -- --   05/03/23 0200 -- 70 14 133/60 86 98 % -- -- -- -- -- --   05/03/23 0145 -- -- -- -- -- 97 % -- -- -- -- -- --   05/03/23 0100 -- 73 15 144/63 91 97 % -- -- -- -- -- --   05/03/23 0000 -- 70 17 136/63 90 96 % -- -- -- -- -- --   05/02/23 2300 98 °F (36 7 °C) 70 24 Abnormal  159/66 95 96 % -- -- -- BiPAP -- Lying   05/02/23 2200 -- 75 16 136/63 90 95 % -- -- -- -- -- --   05/02/23 2114 -- -- -- -- -- -- -- -- -- -- Face mask --   05/02/23 2100 -- 70 23 Abnormal  142/62 89 95 % -- -- -- -- -- --   05/02/23 2045 -- -- -- -- -- 97 % 30 -- -- BiPAP -- --   05/02/23 2000 -- 71 16 141/66 95 98 % -- -- -- -- -- --   05/02/23 1929 -- -- -- -- -- 98 % -- -- -- -- -- --   05/02/23 1900 98 2 °F (36 8 °C) 71 14 136/64 92 98 % -- 32 3 L/min Nasal cannula -- Lying   05/02/23 1830 98 3 °F (36 8 °C) 68 15 159/70 101 95 % -- -- -- -- -- --   05/02/23 1820 -- -- -- -- -- -- -- 32 3 L/min Nasal cannula -- --   05/02/23 1545 -- 63 17 151/67 97 93 % -- -- -- -- -- --   05/02/23 1530 -- 60 16 155/67 96 94 % -- -- -- -- -- --   05/02/23 1515 -- 64 16 160/72 104 95 % -- -- -- -- -- --   05/02/23 1500 -- 68 17 197/81 Abnormal  117 94 % -- -- -- -- -- --   05/02/23 1415 -- 68 20 219/86 Abnormal  123 95 % -- -- -- -- -- --   05/02/23 1414 -- -- 18 219/86 Abnormal  123 95 % -- -- -- BiPAP -- Lying   05/02/23 1335 -- 68 20 -- -- 94 % -- -- -- -- Face mask --   05/02/23 1326 -- -- -- 141/66 -- -- -- -- -- -- -- Sitting   05/02/23 1323 98 3 °F (36 8 °C) 75 25 Abnormal  -- -- -- -- -- -- -- -- --   05/02/23 1322 -- -- -- -- -- 80 % Abnormal  -- -- -- -- -- --           EKG: Normal sinus rhythm  Inferior infarct (cited on or before 10-MAR-2020)  Anterolateral infarct , age undetermined  Abnormal ECG  When compared with ECG of 24-JAN-2023 22:03,  Anterior infarct is now Present  Anterolateral infarct is now Present    Pertinent Labs/Diagnostic Test Results:   CT head without contrast   Final Result by Vane Kirkpatrick MD (05/02 1700)         1  No acute intracranial hemorrhage, mass effect or edema  2  Minimal, chronic microangiopathy  Workstation performed: [id number]         XR chest 1 view portable   Final Result by Derian Pantoja MD (05/02 1451)      Pulmonary vascular congestion  No pneumothorax                    Workstation performed: [id number]         XR chest portable ICU    (Results Pending)     Results from last 7 days   Lab Units 05/02/23  1329   SARS-COV-2  Negative     Results from last 7 days   Lab Units 05/03/23  0434 05/02/23  1329   WBC Thousand/uL 9 94 16 43*   HEMOGLOBIN g/dL 8 6* 9 3*   HEMATOCRIT % 28 3* 31 7*   PLATELETS Thousands/uL 249 324   NEUTROS ABS Thousands/µL 8 58* 13 75*         Results from last 7 days   Lab Units 05/03/23  0434 05/02/23  2129 05/02/23  1838 05/02/23  1502 05/02/23  1414   SODIUM mmol/L 138 134* 135  --  131*   POTASSIUM mmol/L 5 5* 5 4* 6 0* 6 4* 6 9*   CHLORIDE mmol/L 101 100 100  --  99   CO2 mmol/L 31 28 30  --  30   ANION GAP mmol/L 6 6 5  --  2*   BUN mg/dL 30* 27* 26*  --  24   CREATININE mg/dL 1 42* 1 45* 1 42*  --  1 40*   EGFR ml/min/1 73sq m 34 34 34  --  35   CALCIUM mg/dL 9 0 9 0 8 6  --  8 4   CALCIUM, IONIZED mmol/L 1 13  --   --   --   --    MAGNESIUM mg/dL 2 6  --  2 8*  --  2 8*   PHOSPHORUS mg/dL 3 5  --  2 9  --   --      Results from last 7 days   Lab Units 05/03/23  0434 05/02/23  1414   AST U/L 13 9* ALT U/L 17 10   ALK PHOS U/L 52 59   TOTAL PROTEIN g/dL 5 9* 6 4   ALBUMIN g/dL 3 5 3 9   TOTAL BILIRUBIN mg/dL 0 20 0 17*     Results from last 7 days   Lab Units 05/03/23  0545 05/02/23  2343 05/02/23  2101 05/02/23  1830 05/02/23  1324   POC GLUCOSE mg/dl 282* 269* 336* 222* 268*     Results from last 7 days   Lab Units 05/03/23  0434 05/02/23  2129 05/02/23  1838 05/02/23  1414   GLUCOSE RANDOM mg/dL 242* 295* 223* 297*        Results from last 7 days   Lab Units 05/02/23  2024   PH ART  7 318*   PCO2 ART mm Hg 57 1*   PO2 ART mm Hg 68 4*   HCO3 ART mmol/L 28 6*   BASE EXC ART mmol/L 1 8   O2 CONTENT ART mL/dL 12 9*   O2 HGB, ARTERIAL % 93 1*   ABG SOURCE  Radial, Right     Results from last 7 days   Lab Units 05/02/23  1350   PH JOE  7 290*   PCO2 JOE mm Hg 58 9*   PO2 JOE mm Hg 49 8*   HCO3 JOE mmol/L 27 7   BASE EXC JOE mmol/L 0 3   O2 CONTENT JOE ml/dL 12 2   O2 HGB, VENOUS % 82 8*       Results from last 7 days   Lab Units 05/02/23  1838 05/02/23  1556 05/02/23  1329   HS TNI 0HR ng/L  --   --  9   HS TNI 2HR ng/L  --  10  --    HSTNI D2 ng/L  --  1  --    HS TNI 4HR ng/L 11  --   --    HSTNI D4 ng/L 2  --   --          Results from last 7 days   Lab Units 05/03/23  0434   PROTIME seconds 17 5*   INR  1 43*         Results from last 7 days   Lab Units 05/02/23  1329   PROCALCITONIN ng/ml 0 07     Results from last 7 days   Lab Units 05/02/23  1329   LACTIC ACID mmol/L 1 6       Results from last 7 days   Lab Units 05/02/23  1329   BNP pg/mL 538*       Results from last 7 days   Lab Units 05/02/23  1414   LIPASE u/L <6*       Results from last 7 days   Lab Units 05/02/23  1450   CLARITY UA  Clear   COLOR UA  Yellow   SPEC GRAV UA  1 015   PH UA  5 5   GLUCOSE UA mg/dl Negative   KETONES UA mg/dl Negative   BLOOD UA  Negative   PROTEIN UA mg/dl 30 (1+)*   NITRITE UA  Negative   BILIRUBIN UA  Negative   UROBILINOGEN UA E U /dl 0 2   LEUKOCYTES UA  Negative   WBC UA /hpf None Seen   RBC UA /hpf None Seen BACTERIA UA /hpf None Seen   EPITHELIAL CELLS WET PREP /hpf Occasional     Results from last 7 days   Lab Units 05/02/23  1838 05/02/23  1329   STREP PNEUMONIAE ANTIGEN, URINE  Negative  --    LEGIONELLA URINARY ANTIGEN  Negative  --    INFLUENZA A PCR   --  Negative   INFLUENZA B PCR   --  Negative   RSV PCR   --  Negative         Results from last 7 days   Lab Units 05/02/23  1454   AMPH/METH  Negative   BARBITURATE UR  Negative   BENZODIAZEPINE UR  Negative   COCAINE UR  Negative   METHADONE URINE  Negative   OPIATE UR  Positive*   PCP UR  Negative   THC UR  Negative       ED Treatment:   Medication Administration from 05/02/2023 1319 to 05/02/2023 1810       Date/Time Order Dose Route Action     05/02/2023 1335 EDT levalbuterol (XOPENEX) inhalation solution 1 25 mg 1 25 mg Nebulization Given     05/02/2023 1335 EDT ipratropium (ATROVENT) 0 02 % inhalation solution 0 5 mg 0 5 mg Nebulization Given     05/02/2023 1338 EDT methylPREDNISolone sodium succinate (Solu-MEDROL) injection 125 mg 125 mg Intravenous Given     05/02/2023 1356 EDT cefTRIAXone (ROCEPHIN) IVPB (premix in dextrose) 1,000 mg 50 mL 1,000 mg Intravenous New Bag     05/02/2023 1429 EDT azithromycin (ZITHROMAX) 500 mg in sodium chloride 0 9 % 250 mL IVPB 500 mg Intravenous New Bag     05/02/2023 1427 EDT furosemide (LASIX) injection 80 mg 80 mg Intravenous Given     05/02/2023 1450 EDT nitroglycerin (NITRO-BID) 2 % TD ointment 0 5 inch 0 5 inch Topical Given     05/02/2023 1525 EDT insulin regular (HumuLIN R,NovoLIN R) injection 5 Units 5 Units Intravenous Given     05/02/2023 1527 EDT calcium gluconate 1 g in sodium chloride 0 9% 50 mL (premix) 1 g Intravenous New Bag          Present on Admission:  • Acute on chronic congestive heart failure (HCC)  • Hypertension  • Acute on chronic respiratory failure with hypoxia and hypercapnia (HCC)  • Chronic anemia      Admitting Diagnosis: Hyperkalemia [E87 5]  Facial swelling [R22 0]  CHF exacerbation [facility]) [I50 9]     Age/Sex: [de-identified] y o  female     Admission Orders: SCDs; Consistent Carbohydrate Diet  Accu-checks ACHS  Neuro checks; I/O; Daily wts; Cardiopulmonary monitoring; fluid restriction 1800    Scheduled Medications:  amLODIPine, 5 mg, Oral, Daily  apixaban, 5 mg, Oral, BID  atorvastatin, 10 mg, Oral, Daily  azithromycin, 250 mg, Oral, Q24H  chlorhexidine, 15 mL, Mouth/Throat, Q12H SOULEYMANE  clopidogrel, 75 mg, Oral, Daily  docusate sodium, 200 mg, Oral, Daily  fluticasone-vilanterol, 1 puff, Inhalation, Daily  furosemide, 80 mg, Intravenous, Q12H  insulin glargine, 12 Units, Subcutaneous, HS  insulin lispro, 4-20 Units, Subcutaneous, TID With Meals  ipratropium, 0 5 mg, Nebulization, Q6H  levalbuterol, 1 25 mg, Nebulization, Q6H  metoprolol tartrate, 50 mg, Oral, Q12H SOULEYMANE  Mirabegron ER, 25 mg, Oral, Daily  mirtazapine, 30 mg, Oral, HS  nicotine, 1 patch, Transdermal, Daily  rOPINIRole, 0 5 mg, Oral, HS  sertraline, 50 mg, Oral, HS  traZODone, 50 mg, Oral, HS    calcium gluconate 1 g in sodium chloride 0 9% 50 mL (premix)  Dose: 1 g  Freq: Once Route: IV  Last Dose: Stopped (05/02/23 2130)  Start: 05/02/23 2030 End: 05/02/23 2130    dextrose 50 % IV solution 50 mL  Dose: 50 mL  Freq: Once Route: IV  Start: 05/02/23 2030 End: 05/02/23 2028      Continuous IV Infusions: none     PRN Meds:  acetaminophen, 650 mg, Oral, Q6H PRN        IP CONSULT TO CASE MANAGEMENT    Network Utilization Review Department  ATTENTION: Please call with any questions or concerns to [phone] and carefully listen to the prompts so that you are directed to the right person  All voicemails are confidential   Benoit Danii all requests for admission clinical reviews, approved or denied determinations and any other requests to dedicated fax number below belonging to the campus where the patient is receiving treatment   List of dedicated fax numbers for the Facilities:  FACILITY NAME UR FAX NUMBER   ADMISSION DENIALS ([facility]) [phone]   [address]  (Maternity/NICU/Pediatrics) Adela Pruitt [address] Elizabeth Yuen  [phone]   [address] [place] [id number] [place] [address] [address] [facility] [place] [address] [facility] [phone]

## 2023-05-03 NOTE — ASSESSMENT & PLAN NOTE
Potassium 6 9 on presentation, without EKG changes    · Lasix 80 mg IV x2  · Calcium, insulin and dextrose given  · Trend potassium: 6 9 > 6 4 > 6 0 > 5 4

## 2023-05-03 NOTE — PLAN OF CARE
Problem: PHYSICAL THERAPY ADULT  Goal: Performs mobility at highest level of function for planned discharge setting  See evaluation for individualized goals  Description: Treatment/Interventions: ADL retraining, Functional transfer training, LE strengthening/ROM, Elevations, Therapeutic exercise, Endurance training, Patient/family training, Equipment eval/education, Bed mobility, Compensatory technique education, Gait training, Spoke to nursing, Spoke to case management, OT  Equipment Recommended:  (walker-pt has)       See flowsheet documentation for full assessment, interventions and recommendations  [id number] by Shelley Pearl PT  Note: Prognosis: Good  Problem List: Decreased strength, Decreased endurance, Impaired balance, Decreased mobility, Decreased safety awareness, Impaired sensation, Decreased skin integrity, Obesity, Pain  Pt tolerated poorly with quick onset fatigue limiting completion of LE TE  She requires cues for proper completion and AAROM for hip flexion  She will continue to benefit from PT services to maximize LOF  Barriers to Discharge: Inaccessible home environment  Barriers to Discharge Comments: MANJULA home, but has assistance from family and transport chair she can use to access home as needed  PT Discharge Recommendation: Home with home health rehabilitation (antiicpate being able to retunr to home with HHPT and use of transport chair prn to access home  should family not be able to provide assistance with mobility as they have prior, consider PAR)    See flowsheet documentation for full assessment

## 2023-05-03 NOTE — PHYSICAL THERAPY NOTE
"PHYSICAL THERAPY EVALUATION  NAME:  Delfino Gaspar  DATE: 05/03/23    AGE:   [de-identified] y o  Mrn:   [id number]  ADMIT DX:  Hyperkalemia [E87 5]  Facial swelling [R22 0]  CHF exacerbation (HCC) [I50 9]    Past Medical History:   Diagnosis Date   • Asthma    • CHF (congestive heart failure) ([facility])    • COPD (chronic obstructive pulmonary disease) ([facility] )    • Diabetes mellitus ([place] [address] )    • DVT of lower limb, acute ([place] [address] )    • Hypertension    • Renal disorder      Length Of Stay: 1  Performed at least 2 patient identifiers during session: Name and Birthday  PHYSICAL THERAPY EVALUATION :    05/03/23 1037   PT Last Visit   PT Visit Date 05/03/23   Note Type   Note type Evaluation   Pain Assessment   Pain Assessment Tool FLACC   Pain Score 7   Pain Location/Orientation Orientation: Lower; Location: Back   Pain Rating: FLACC (Rest) - Face 0   Pain Rating: FLACC (Rest) - Legs 0   Pain Rating: FLACC (Rest) - Activity 0   Pain Rating: FLACC (Rest) - Cry 1   Pain Rating: FLACC (Rest) - Consolability 0   Score: FLACC (Rest) 1   Pain Rating: FLACC (Activity) - Face 1   Pain Rating: FLACC (Activity) - Legs 0   Pain Rating: FLACC (Activity) - Activity 0   Pain Rating: FLACC (Activity) - Cry 1   Pain Rating: FLACC (Activity) - Consolability 0   Score: FLACC (Activity) 2   Restrictions/Precautions   Other Precautions Chair Alarm; Bed Alarm;Multiple lines;Telemetry;O2;Fall Risk;Pain  (3 lpm NC chronically)   Home Living   Type of Home House  (4 MANJULA R HR)   Home Layout One level;Performs ADLs on one level; Able to live on main level with bedroom/bathroom   Bathroom Shower/Tub Walk-in shower   Bathroom Toilet Standard   Bathroom Equipment Shower chair;Grab bars in [address] bed  (rollator, transport chair)   Additional Comments Reports living in a [facility] with 4 MANJULA R HR and uses rollator for mobility  Stated \"I don't walk much\"   Prior Function   Level of [place] Needs assistance with ADLs; Needs assistance with " "functional mobility; Needs assistance with IADLS   Lives With Daughter  Simeon Poag)   [place] [address] Help From Family   IADLs Family/Friend/Other provides transportation; Family/Friend/Other provides meals; Family/Friend/Other provides medication management   Falls in the last 6 months 0   Comments Reports someone is always home with her and assists her with mobility, ADLs and IADLs  General   Additional Pertinent History Pt is s/p Agram 1/30/23 and 1st ray resection left foot on 2/1/23  Was WBAT with darco wedge shoe  Admitted to this facility 1/24/23 to 1/28/23 then transferred to [facility] for vascular services  Was at [address] from 1/29/23 to 2/4/23 and discharged to home with Alanmelissarigoberto   pt requiring increased O2 needs overnight wiht non-invasive ventilation using BiPAP  currently on 3 lpm upon arrival    Cognition   Arousal/Participation Responsive   Orientation Level Oriented X4   Following Commands Follows one step commands without difficulty   Subjective   Subjective \"I'm tired\"   RLE Assessment   RLE Assessment WFL  (strength 3+/5 except hip flexion 3-/5)   LLE Assessment   LLE Assessment WFL  (strength 3+/5 except hip flexion 3-/5)   Coordination   Rapid Alternating Movements Intact   Light Touch   RLE Light Touch Impaired   RLE Light Touch Comments diinihshed distally   LLE Light Touch Impaired   LLE Light Touch Comments diminished distally   Bed Mobility   Supine to Sit 5  Supervision   Additional items Increased time required;HOB elevated; Bedrails;Verbal cues   Additional Comments HOB elevated > 30 degrees as she reports sleeping in hospital bed  increased time and effort to complete with min verbal cues for technique     Transfers   Sit to Stand   (steadying assistance)   Additional items Increased time required;Verbal cues   Stand to Sit   (steadying assistance)   Additional items Increased time required;Verbal cues   Stand pivot   (steadying assistance)   Additional items Increased time required;Verbal cues   Additional " "Comments use of RW  sit<>stand with steadying assistance with inc time with min cues for hand placement for safety with RW  spt with RW with min cues for turning completely prior to sitting  steadying assistance for balance  Ambulation/Elevation   Gait pattern Wide MATY; Short stride; Excessively slow   Gait Assistance   (steadying assistance)   Additional items Assist x 1;Verbal cues   Assistive Device Rolling walker   Distance ambulated 5'x1 wiht chair follow with steadying assistance for balance with cues for inc step length and foot clearance  further ambulation limited by fatigue   Balance   Static Sitting Good   Dynamic Sitting Fair +   Static Standing Fair   Dynamic Standing Fair -   Ambulatory Fair -   Endurance Deficit   Endurance Deficit Yes   Endurance Deficit Description min HOLLIS  quick onset fatigue  SpO2 >/= 97% throughout on 3 lpm, HR in 70s  Activity Tolerance   Activity Tolerance Patient limited by fatigue   Medical Staff Made Aware Wally SETH   Nurse Made Aware RNGwen   Assessment   Prognosis Good   Problem List Decreased strength;Decreased endurance; Impaired balance;Decreased mobility; Decreased safety awareness; Impaired sensation;Decreased skin integrity;Obesity;Pain   Barriers to Discharge Inaccessible home environment   Barriers to Discharge Comments MANJULA home, but has assistance from family and transport chair she can use to access home as needed  Goals   Patient Goals \"Go home\"   STG Expiration Date 05/17/23   PT Treatment Day 1   Plan   Treatment/Interventions ADL retraining;Functional transfer training;LE strengthening/ROM; Elevations; Therapeutic exercise; Endurance training;Patient/family training;Equipment eval/education; Bed mobility; Compensatory technique education;Gait training;Spoke to nursing;Spoke to case management;OT   PT Frequency 3-5x/wk   Recommendation   PT Discharge Recommendation Home with home health rehabilitation  (antiicpate being able to retunr to home with HHPT and " use of transport chair prn to access home  should family not be able to provide assistance with mobility as they have prior, consider PAR)   Equipment Recommended   (walker-pt has)   Additional Comments pt will require assistance to enter home from family or via BLS transport  AM-PAC Basic Mobility Inpatient   Turning in Flat Bed Without Bedrails 4   Lying on Back to Sitting on Edge of Flat Bed Without Bedrails 3   Moving Bed to Chair 3   Standing Up From Chair Using Arms 3   Walk in Room 3   Climb 3-5 Stairs With Railing 1   Basic Mobility Inpatient Raw Score 17   Basic Mobility Standardized Score 39 67   Highest Level Of Mobility   JH-HLM Goal 5: Stand one or more mins   JH-HLM Achieved 4: Move to chair/commode   Additional Treatment Session   Start Time 1057   End Time 1106   Treatment Assessment Pt tolerated poorly with quick onset fatigue limiting completion of LE TE  She requires cues for proper completion and AAROM for hip flexion  She will continue to benefit from PT services to maximize LOF  Equipment Use sitting in recliner, completed 2x10 ankle DF/PF AROM and isometric hip adduction  then completed 1x10 LAQ AROM and hip flexion AAROM with cues for proper completion  inc rest between exercises  further exercises limited by fatigue  End of Consult   Patient Position at End of Consult Bedside chair;Bed/Chair alarm activated; All needs within reach        Pt requires PT/OT co-eval due to medical complexity, signficant assistance with mobility and/or cognitive-behavioral impairments  (Please find full objective findings from PT assessment regarding body systems outlined above)  Assessment: Pt is a [de-identified] y o  female seen for PT evaluation s/p admission to [address] [place] [address] on 5/2/2023 with Acute on chronic congestive heart failure ([facility] )  Order placed for PT services    Upon evaluation: Pt is presenting with impaired functional mobility due to pain, decreased strength, decreased endurance, impaired balance, gait deviations, altered sensation, decreased safety awareness, fall risk and impaired skin integrity requiring supervision assistance for bed mobility, steadying assistance for transfers and steadying assistance for ambulation with RW  Pt's clinical presentation is currently unpredictable given the functional mobility deficits above, especially weakness, decreased skin integrity, decreased endurance, gait deviations, pain, decreased activity tolerance, decreased functional mobility tolerance, altered sensation, decreased safety awareness, impaired judgement and SOB upon exertion, coupled with fall risks as indicated by AM-PAC 6-Clicks: 24/72 as well as impaired balance, polypharmacy, decreased safety awareness and limited sensation/neuropathy and combined with medical complications of abnormal renal lab values, abnormal H&H, abnormal WBCs, abnormal blood sugars, abnormal sodium values, abnormal potassium values, low SpO2 values, abnormal CO2 values, multiple readmissions, need for input for mobility technique/safety and need for BiPAP on admission, acute CHF, acute on chronic respiratory failure with hypoxia and hypercapnia, acute encephalopathy, CONSTANCE, sepsis  Pt's PMHx and comorbidities that may affect physical performance and progress include: CHF, COPD on 3 lpm NC chronically, asthma, CKD, DM, HTN and chronic anemia, DVT LE, PVD, chronic opioid use  Personal factors affecting pt at time of IE include: inaccessible home environment, step(s) to enter environment, advanced age, inability to perform IADLs, inability to perform ADLs, inability to navigate level surfaces without external assistance and inability to ambulate household distances  Pt will benefit from continued skilled PT services to address deficits as defined above and to maximize level of functional mobility to facilitate return toward PLOF and improved QOL   From PT/mobility standpoint, recommendation at time of d/c would be Home PT, home with family support, with walker and transport chair prn pending progress in order to reduce fall risk and maximize pt's functional independence and consistency with mobility in order to facilitate return to PLOF  Recommend trial with walker next 1-2 sessions and ther ex next 1-2 sessions  The patient's AM-PAC Basic Mobility Inpatient Short Form Raw Score is 17  A Raw score of greater than 16 suggests the patient may benefit from discharge to home  Please also refer to the recommendation of the Physical Therapist for safe discharge planning  Goals: Pt will: Perform bed mobility tasks with modified Independent to reposition in bed and prepare for transfers  Pt will perform transfers with modified Independent to decrease burden of care, decrease risk for falls and improve activity tolerance and prepare for ambulation  Pt will ambulate with RW for >/= 48' with  supervision  to decrease burden of care, decrease risk for falls, improve activity tolerance and improve gait quality and to access home environment  Pt will complete >/= 4 steps with unilateral handrail with steadying assistance to decrease burden of care, return to home with MANJULA, decrease risk for falls and improve activity tolerance  Pt will participate in objective balance assessment to determine baseline fall risk  Pt will increase B LE strength >/= 1/2 MMT grade to facilitate functional mobility        Charlene Soto, PT,DPT

## 2023-05-03 NOTE — ASSESSMENT & PLAN NOTE
Lab Results   Component Value Date    HGBA1C 7 5 (H) 01/23/2023       Recent Labs     05/02/23  2101 05/02/23  2343 05/03/23  0545 05/03/23  1204   POCGLU 336* 269* 282* 166*       Blood Sugar Average: Last 72 hrs:  (P) 866 [id number]     · SSI, increased to Alg #5 on 5/3  · Lantus 12 U HS  · Goal glucose 140-180

## 2023-05-03 NOTE — ASSESSMENT & PLAN NOTE
Likely Cardiopulmonary etiology  Presented from with AMS  Daughter reported she has been confused since Friday  · Cardiopulmonary monitoring, VS per protocol  · Trend labs - BMP  · Hyperkalemia improve but remains 6 0 despite lasix  Calcium, insulin, dextrose and additional lasix given  · UDS +opiates  · UA negative  · Blood cultures, MRSA, urine strep/legionella pending  · CTH unremarkable  · Trend PCT - negative  · ABG 7 32/57/68/29 on 3L NC  · Accuchecks with SSI   Avoid hypo/hyperglycemia

## 2023-05-03 NOTE — ASSESSMENT & PLAN NOTE
H/o COPD and CHF, baseline oxygen 3L NC  Presented with worsening SOB over past 3-4 days  CXR with pulmonary vascular congestion  Require BiPAP in ED  Ceftriaxone and azithromycin started in ED  Methylprednisolone 125 mg IV x1 in ED  · Weaned to nasal cannula 3 L after admission  Returned to BiPAP early evening due to AMS  ABG with CO2 57  · Continue BiPAP, wean as able  · Continue treatment plan for acute on chronic CHF with diuresis  · Does not meet SIRS criteria  Low suspicion for COPD exacerbation/infection  · Continue azithromycin for 5 day course  · Hold off on further steroids  · Rule out other source of infection - blood, urine, MRSA sent  COVID/Flu/RSV negative  PCT negative

## 2023-05-03 NOTE — PLAN OF CARE
Problem: OCCUPATIONAL THERAPY ADULT  Goal: Performs self-care activities at highest level of function for planned discharge setting  See evaluation for individualized goals  Description: Treatment Interventions: ADL retraining, Functional transfer training, UE strengthening/ROM, Endurance training, Patient/family training, Equipment evaluation/education, Neuromuscular reeducation, Compensatory technique education, Continued evaluation, Energy conservation, Activityengagement          See flowsheet documentation for full assessment, interventions and recommendations  Note: Limitation: Decreased ADL status, Decreased UE strength, Decreased Safe judgement during ADL, Decreased endurance, Decreased self-care trans, Decreased high-level ADLs  Prognosis: Good  Assessment: Pt is a [de-identified] y o  female, admitted to [address] [place] [address] 5/2/2023 d/t experiencing SOB and facial swelling  Dx: acute on chronic CHF  Pt with PMHx impacting their performance during ADL tasks, including: COPD, Asthma, CHD, DM, HTN, CKD, PVD, chronic opoid use,   Prior to admission to the hospital Pt was performing ADLs with physical assistance  IADLs with physical assistance  Functional transfers/ambulation with physical assistance  Cognitive status was PTA was intact  OT order placed to assess Pt's ADLs, cognitive status, and performance during functional tasks in order to maximize safety and independence while making most appropriate d/c recommendations   Pt's clinical presentation is currently unstable/unpredictable given new onset deficits that effect Pt's occupational performance and ability to safely return to OF including decrease activity tolerance, decrease standing balance, decrease sitting balance, decrease performance during ADL tasks, decrease safety awareness , decrease UB MS, decrease generalized strength, decrease activity engagement, decrease performance during functional transfers, high fall risk and limited insight to deficits combined with medical complications of hypertension , abnormal renal lab values, abnormal H&H, abnormal CBC, abnormal potassium values, low SpO2 values, new onset O2 use, edema/swelling, increased RR, decreased skin integrity and need for input for mobility technique/safety  Pt was on bipap although has been weened back to baseline 3L O2  Personal factors affecting Pt at time of initial evaluation include: advanced age, inability to perform current job functions, inability to perform IADLs, inability to perform ADLs, inability to ambulate household distances, inability to navigate community distances, limited insight into impairments, decreased initiation and engagement, recent fall(s)/fall history and questionable non-compliance  Pt will benefit from continued skilled OT services to address deficits as defined above and to maximize level independence/participation during ADLs and functional tasks to facilitate return toward PLOF and improved quality of life  From an occupational therapy standpoint, recommendation at time of d/c would be HHOT       OT Discharge Recommendation: Home with home health rehabilitation

## 2023-05-03 NOTE — ASSESSMENT & PLAN NOTE
Baseline hgb 8-9  Lab Results   Component Value Date    IRON 47 (L) 01/30/2023    FERRITIN 90 01/30/2023    TIBC 220 (L) 01/30/2023    CONCFE 21 01/30/2023         · Currently at baseline  · Trend CBC  · Transfuse for hgb < 7 0  · Start Ferrous Sulfate every other day

## 2023-05-03 NOTE — PROGRESS NOTES
[address]  Progress Note  Name: Chandni Polanco I  MRN: [id number]  Unit/Bed#: -01 I Date of Admission: 5/2/2023   Date of Service: 5/3/2023 I Hospital Day: 1    Assessment/Plan   * Acute on chronic congestive heart failure [facility])  Assessment & Plan  Wt Readings from Last 3 Encounters:   05/02/23 77 8 kg (171 lb 8 3 oz)   02/04/23 82 9 kg (182 lb 12 2 oz)   01/27/23 75 8 kg (167 lb 3 2 oz)       Presented with SOB, diaphoresis, facial swellinging x 3-4 days  All worsening in severity  Home lasix dose 40 mg Po daily  Last ECHO unknown, no records available in Epic    JVD present on initial exam  Lasix 80 mg IV given in ED    · Check ECHO  · Continue diuresis with goal negative 2 liters over next 24 hours  · Daily weight  · Fluid restriction when diet started  · Strict I&O  · Pastor for accurate output    Acute on chronic respiratory failure with hypoxia and hypercapnia (HCC)  Assessment & Plan  H/o COPD and CHF, baseline oxygen 3L NC  Presented with worsening SOB over past 3-4 days  CXR with pulmonary vascular congestion  Require BiPAP in ED  Ceftriaxone and azithromycin started in ED  Methylprednisolone 125 mg IV x1 in ED  · Weaned to nasal cannula 3 L after admission  Returned to BiPAP early evening due to AMS  ABG with CO2 57  · Continue BiPAP, wean as able  · Continue treatment plan for acute on chronic CHF with diuresis  · Does not meet SIRS criteria  Low suspicion for COPD exacerbation/infection  · Continue azithromycin for 5 day course  · Hold off on further steroids  · Rule out other source of infection - blood, urine, MRSA sent  COVID/Flu/RSV negative  PCT negative  Acute encephalopathy  Assessment & Plan  Likely Cardiopulmonary etiology  Presented from with AMS  Daughter reported she has been confused since Friday  · Cardiopulmonary monitoring, VS per protocol  · Trend labs - BMP  · Hyperkalemia improve but remains 6 0 despite lasix   Calcium, insulin, dextrose and additional lasix given  · UDS +opiates  · UA negative  · Blood cultures, MRSA, urine strep/legionella pending  · CTH unremarkable  · Trend PCT - negative  · ABG 7 32/57/68/29 on 3L NC  · Accuchecks with SSI  Avoid hypo/hyperglycemia    Hyperkalemia  Assessment & Plan  Potassium 6 9 on presentation, without EKG changes    · Lasix 80 mg IV x2  · Calcium, insulin and dextrose given  · Trend potassium: 6 9 > 6 4 > 6 0 > 5 4    Acute kidney injury superimposed on chronic kidney disease [facility])  Assessment & Plan  Lab Results   Component Value Date    EGFR 34 05/02/2023    EGFR 35 05/02/2023    EGFR 43 02/04/2023    CREATININE 1 42 (H) 05/02/2023    CREATININE 1 40 (H) 05/02/2023    CREATININE 1 18 02/04/2023     Baseline creatine 1 -1 2  CONSTANCE suspected due to cardiorenal syndrome/fluid overload    · Continue diuresis plan as above  · Trend BMP  · Strict I&O  · Avoid nephrotoxins    Hypertension  Assessment & Plan  Home amlodipine 5 mg daily and metoprolol tartrate 50 mg Q 12 hours    · Continue home medications    Chronic anemia  Assessment & Plan  Baseline hgb 8-9    · Currently at baseline  · Trend CBC  · Transfuse for hgb < 7 0    Type 2 diabetes mellitus with skin complication, with long-term current use of insulin [facility])  Assessment & Plan  Lab Results   Component Value Date    HGBA1C 7 5 (H) 01/23/2023       Recent Labs     05/02/23  1324 05/02/23  1830 05/02/23  2101   POCGLU 268* 222* 336*       Blood Sugar Average: Last 72 hrs:  (P) 731 [id number]     · SSI  · Lantus 12 U HS  · Goal glucose 140-180           ICU Core Measures     A: Assess, Prevent, and Manage Pain · Has pain been assessed? Yes  · Need for changes to pain regimen? No   B: Both SAT/SAT  · N/A   C: Choice of Sedation · RASS Goal: N/A patient not on sedation  · Need for changes to sedation or analgesia regimen? NA   D: Delirium · CAM-ICU: Negative   E: Early Mobility  · Plan for early mobility?  Yes   F: Family Engagement · Plan for family engagement today? Yes       Antibiotic Review: Awaiting culture results  Review of Invasive Devices: Darby Plan: Continue for accurate I/O monitoring for 48 hours        Prophylaxis:  VTE VTE covered by:  apixaban, Oral, 5 mg at 05/02/23 1845       Stress Ulcer  not ordered       Subjective   HPI/24hr events: Presented yesterday with progressively worsening AMS and SOB  Requiring BiPAP at time of admission  Started on diuresis with lasix as patient appears fluid overloaded  Weaned from BiPAP to 3L NC yesterday afternoon however returned to BiPAP in early evening for worsening mental status  ABG with CO2 57  Mental status improved  Hyperkalemia treated with improvement in K to 5 4  Continued on lasix for goal net negative 2 L  Patient reports shortness of breath which has improved and generalized body aches  Denies chest pain, nausea, vomiting  Objective                            Vitals I/O      Most Recent Min/Max in 24hrs   Temp 98 °F (36 7 °C) Temp  Min: 98 °F (36 7 °C)  Max: 98 3 °F (36 8 °C)   Pulse 70 Pulse  Min: 60  Max: 75   Resp (!) 24 Resp  Min: 14  Max: 25   /66 BP  Min: 136/63  Max: 219/86   O2 Sat 96 % SpO2  Min: 80 %  Max: 98 %      Intake/Output Summary (Last 24 hours) at 5/3/2023 0012  Last data filed at 5/2/2023 2346  Gross per 24 hour   Intake 100 ml   Output 925 ml   Net -825 ml         Diet NPO     Invasive Monitoring Physical exam    Physical Exam  Vitals reviewed  Constitutional:       General: She is not in acute distress  Appearance: She is ill-appearing  Comments: BiPAP   HENT:      Mouth/Throat:      Mouth: Mucous membranes are moist       Pharynx: Oropharynx is clear  Eyes:      General: No scleral icterus  Conjunctiva/sclera: Conjunctivae normal       Pupils: Pupils are equal, round, and reactive to light  Cardiovascular:      Rate and Rhythm: Normal rate and regular rhythm  Pulses: Normal pulses     Pulmonary:      Effort: Pulmonary effort is normal  Tachypnea present  Breath sounds: Decreased air movement present  Rales present  Abdominal:      General: Bowel sounds are normal  There is no distension  Palpations: Abdomen is soft  Musculoskeletal:      Cervical back: Normal range of motion  Right lower leg: Edema present  Left lower leg: Edema present  Skin:     General: Skin is warm and dry  Capillary Refill: Capillary refill takes less than 2 seconds  Neurological:      General: No focal deficit present  Mental Status: She is alert and oriented to person, place, and time  Psychiatric:         Mood and Affect: Mood normal             Diagnostic Studies      EKG: NSR  Imaging:   CT head without contrast   Final Result by Rohit Casillas MD (05/02 1700)         1  No acute intracranial hemorrhage, mass effect or edema  2  Minimal, chronic microangiopathy  Workstation performed: [id number]         XR chest 1 view portable   Final Result by Melisa Salazar MD (05/02 8941)      Pulmonary vascular congestion  No pneumothorax  Workstation performed: [id number]            I have personally reviewed pertinent reports     and I have personally reviewed pertinent films in PACS     Medications:  Scheduled PRN   amLODIPine, 5 mg, Daily  apixaban, 5 mg, BID  atorvastatin, 10 mg, Daily  azithromycin, 500 mg, Q24H  chlorhexidine, 15 mL, Q12H [facility] long[facility]term  clopidogrel, 75 mg, Daily  docusate sodium, 200 mg, Daily  fluticasone-vilanterol, 1 puff, Daily  insulin glargine, 12 Units, HS  insulin lispro, 1-6 Units, Q6H SOULEYMANE  ipratropium, 0 5 mg, Q6H  levalbuterol, 1 25 mg, Q6H  metoprolol tartrate, 50 mg, Q12H SOULEYMANE  Mirabegron ER, 25 mg, Daily  mirtazapine, 30 mg, HS  nicotine, 1 patch, Daily  rOPINIRole, 0 5 mg, HS  sertraline, 50 mg, HS  traZODone, 50 mg, HS      acetaminophen, 650 mg, Q6H PRN       Continuous          Labs:    CBC    Recent Labs     05/02/23  1329   WBC 16 43* HGB 9 3*   HCT 31 7*        BMP    Recent Labs     05/02/23  1838 05/02/23 2129   SODIUM 135 134*   K 6 0* 5 4*    100   CO2 30 28   AGAP 5 6   BUN 26* 27*   CREATININE 1 42* 1 45*   CALCIUM 8 6 9 0       Coags    No recent results     Additional Electrolytes  Recent Labs     05/02/23  1414 05/02/23  1838   MG 2 8* 2 8*   PHOS  --  2 9          Blood Gas    Recent Labs     05/02/23 2024   PHART 7 318*   [id number] 57 1*   PO2ART 68 4*   QAP5EMJ 28 6*   BEART 1 8   SOURCE Radial, Right     Recent Labs     05/02/23  1350 05/02/23 2024   PHVEN 7 290*  --    [id number] 58 9*  --    PO2VEN 49 8*  --    SYN8HGE 27 7  --    BEVEN 0 3  --    SOURCE  --  Radial, Right    LFTs  Recent Labs     05/02/23  1414   ALT 10   AST 9*   ALKPHOS 59   ALB 3 9   TBILI 0 17*       Infectious  Recent Labs     05/02/23  1329   PROCALCITONI 0 07     Glucose  Recent Labs     05/02/23  1414 05/02/23  1838 05/02/23  2129   GLUC 297* [address], CRNP

## 2023-05-03 NOTE — OCCUPATIONAL THERAPY NOTE
Occupational Therapy Evaluation     Evaluation: 7611-7208  Treatment: 7176-2375    Patient Name: Gabino Henriquez  MTOB'Q Date: 5/3/2023  Problem List  Principal Problem:    Acute on chronic congestive heart failure ([facility] )  Active Problems:    Type 2 diabetes mellitus with skin complication, with long-term current use of insulin (HCC)    Acute on chronic respiratory failure with hypoxia and hypercapnia ([facility])    Chronic anemia    Hypertension    Acute encephalopathy    Acute kidney injury superimposed on chronic kidney disease ([facility])    Hyperkalemia    Past Medical History  Past Medical History:   Diagnosis Date    Asthma     CHF (congestive heart failure) ([facility])     COPD (chronic obstructive pulmonary disease) ([facility] )     Diabetes mellitus ([facility] )     DVT of lower limb, acute ([facility] )     Hypertension     Renal disorder      Past Surgical History  Past Surgical History:   Procedure Laterality Date    APPENDECTOMY       SECTION      HYSTERECTOMY      IR LOWER EXTREMITY ANGIOGRAM  2023    TN AMPUTATION METATARSAL W/TOE SINGLE Left 2023    Procedure: RAY RESECTION FOOT Left 1st;  Surgeon: Jorge Gomez DPM;  Location: [facility];  Service: Podiatry    TN [facility] 3RD+ ORD SLCTV ABDL PEL/LXTR [facility] N/A 2023    Procedure: R CFA access w/ 6F sheath and Mynx closure Aortogram LLE angiogram R EIA PTA w/ 6x60mm  L EIA PTA w/ 6x60mm  L SFA PTA w/ 6j019ig Kofi POBA and 3m940sf Morse DCB L SFA stents: 5x27mm Express LD (prox) and 6x60mm Innova (dist); Surgeon: Natalie Guzman MD;  Location: [facility];  Service: Vascular         23 1038   Note Type   Note type Evaluation   Pain Assessment   Pain Assessment Tool FLACC   Pain Score 7   Pain Location/Orientation Orientation: Lower; Location: Back   Pain Rating: FLACC (Rest) - Face 0   Pain Rating: FLACC (Rest) - Legs 0   Pain Rating: FLACC (Rest) - Activity 0   Pain Rating: FLACC (Rest) - Cry 1   Pain Rating: FLACC (Rest) - Consolability 0   Score: FLACC (Rest) 1   Pain Rating: FLACC (Activity) - Face 1   Pain Rating: FLACC (Activity) - Legs 0   Pain Rating: FLACC (Activity) - Activity 0   Pain Rating: FLACC (Activity) - Cry 1   Pain Rating: FLACC (Activity) - Consolability 0   Score: FLACC (Activity) 2   Restrictions/Precautions   Other Precautions Chair Alarm; Bed Alarm;Telemetry;Multiple lines;O2;Fall Risk;Pain  (3L baseline)   Home Living   Type of Home House  (4 MANJULA R HR)   Home Layout One level;Performs ADLs on one level; Able to live on main level with bedroom/bathroom   Bathroom Shower/Tub Walk-in shower   Bathroom Toilet Standard   Bathroom Equipment Shower chair;Grab bars in [facility] bed  (uses rollator  transport chair)   Additional Comments Pt reports living with her daughter in a 1 story home with 4 MANJULA  Pt ambulates with rollator at baseline   Prior Function   Level of [place] Needs assistance with ADLs; Needs assistance with functional mobility; Needs assistance with IADLS   Lives With Daughter  Lalo Kaye   Carlyn 70 Help From Family   IADLs Family/Friend/Other provides transportation; Family/Friend/Other provides meals; Family/Friend/Other provides medication management   Falls in the last 6 months 0   Comments Pt reports having assistance from family for all ADLs, IADLs, and Pt reprots someone is always with her for ambulation and transfers  ADL   Where Assessed Edge of bed   Grooming Assistance 5  Supervision/Setup   Grooming Deficit Setup;Verbal cueing;Supervision/safety; Increased time to complete   UB Dressing Assistance 5  Supervision/Setup   UB Dressing Deficit Setup;Verbal cueing;Supervision/safety; Increased time to complete   LB Dressing Assistance 2  Maximal Assistance   LB Dressing Deficit Steadying; Requires assistive device for steadying;Verbal cueing;Supervision/safety; Increased time to complete; Don/doff R sock; Don/doff L sock; Thread RLE into pants; Thread LLE into pants;Pull up over hips   Additional Comments Pt completing ADL tasks while seated at EOB  UB Dressing and grooming tasks @ S after set-up  LB Dressing @ Max A for doning socks/pants around feet and for CM aroudn waist with BUE supported from RW   Bed Mobility   Supine to Sit 5  Supervision   Additional items HOB elevated; Bedrails; Increased time required;Verbal cues   Additional Comments Pt reports having a hospital  bed at home   Transfers   Sit to Stand   [facility] Assist)   Additional items Increased time required;Verbal cues   Stand to Sit   [facility] Assist)   Additional items Increased time required;Verbal cues   Stand pivot   (Steadying Assist)   Additional items Increased time required;Verbal cues   Additional Comments Pt completing functional transfers with use of RW for UB support  Steadying Assist for STS and SPT with increased time and occasional cues for tyler dplacement and safety   Activity Tolerance   Activity Tolerance Patient limited by fatigue   Medical Staff Made Aware Spoke with PT, Nohelia Human   Nurse Made Aware Spoke with RN, Nola Allison Assessment   RUE Assessment WFL   LUE Assessment   LUE Assessment WFL   Hand Function   Gross Motor Coordination Functional   Fine Motor Coordination Functional   Sensation   Light Touch No apparent deficits   Cognition   Overall Cognitive Status WFL   Arousal/Participation Alert; Responsive; Cooperative   Attention Attends with cues to redirect   Orientation Level Oriented X4   Memory Within functional limits   Following Commands Follows one step commands without difficulty   Comments Pt wiht increased fatigue and lethargy during session although good participation noted   Assessment   Limitation Decreased ADL status; Decreased UE strength;Decreased Safe judgement during ADL;Decreased endurance;Decreased self-care trans;Decreased high-level ADLs   Prognosis Good   Assessment Pt is a [de-identified] y o  female, admitted to [address] [place] [address] 5/2/2023 d/t experiencing SOB and facial swelling   Dx: acute on chronic CHF  Pt with PMHx impacting their performance during ADL tasks, including: COPD, Asthma, CHD, DM, HTN, CKD, PVD, chronic opoid use,   Prior to admission to the hospital Pt was performing ADLs with physical assistance  IADLs with physical assistance  Functional transfers/ambulation with physical assistance  Cognitive status was PTA was intact  OT order placed to assess Pt's ADLs, cognitive status, and performance during functional tasks in order to maximize safety and independence while making most appropriate d/c recommendations  Pt's clinical presentation is currently unstable/unpredictable given new onset deficits that effect Pt's occupational performance and ability to safely return to OF including decrease activity tolerance, decrease standing balance, decrease sitting balance, decrease performance during ADL tasks, decrease safety awareness , decrease UB MS, decrease generalized strength, decrease activity engagement, decrease performance during functional transfers, high fall risk and limited insight to deficits combined with medical complications of hypertension , abnormal renal lab values, abnormal H&H, abnormal CBC, abnormal potassium values, low SpO2 values, new onset O2 use, edema/swelling, increased RR, decreased skin integrity and need for input for mobility technique/safety  Pt was on bipap although has been weened back to baseline 3L O2  Personal factors affecting Pt at time of initial evaluation include: advanced age, inability to perform current job functions, inability to perform IADLs, inability to perform ADLs, inability to ambulate household distances, inability to navigate community distances, limited insight into impairments, decreased initiation and engagement, recent fall(s)/fall history and questionable non-compliance   Pt will benefit from continued skilled OT services to address deficits as defined above and to maximize level independence/participation during ADLs and functional tasks to facilitate return toward PLOF and improved quality of life  From an occupational therapy standpoint, recommendation at time of d/c would be HHOT  Plan   Treatment Interventions ADL retraining;Functional transfer training;UE strengthening/ROM; Endurance training;Patient/family training;Equipment evaluation/education; Neuromuscular reeducation; Compensatory technique education;Continued evaluation; Energy conservation; Activityengagement   Goal Expiration Date 05/17/23   OT Treatment Day 1   OT Frequency 3-5x/wk   Recommendation   OT Discharge Recommendation Home with home health rehabilitation   Additional Comments  (S)  Should family not be able to continue to provide assistance, please consider post acute rehab   AM-PAC Daily Activity Inpatient   Lower Body Dressing 2   Bathing 2   Toileting 2   Upper Body Dressing 3   Grooming 3   Eating 4   Daily Activity Raw Score 16   Daily Activity Standardized Score (Calc for Raw Score >=11) 35 96   AM-PAC Applied Cognition Inpatient   Following a Speech/Presentation 3   Understanding Ordinary Conversation 4   Taking Medications 2   Remembering Where Things Are Placed or Put Away 3   Remembering List of 4-5 Errands 3   Taking Care of Complicated Tasks 2   Applied Cognition Raw Score 17   Applied Cognition Standardized Score 36 52   Additional Treatment Session   Start Time 1101   End Time 1110   Treatment Assessment Pt competing BUE HEP while seated upright in recliner chair  Pt completing 2 sets of 10 with exercises focusing on biceps, triceps, and shoulder/chest  Pt tolerating exercises well with Min vc'ing for maintaining proper movements and speed  Exercises completed this date to maximize overall BUE MS and increased safety and independence during ADLs and functional transfers  Pt verbalizes understanding and reports they will complete daily  Pt would benefit from continued review to ensure proper carryover upon d/c from [address] [place] [address]   At end of session, Pt seated OOB in recliner chair with call bell in reach, chair alarm intact and all needs met at this time  Additional Treatment Day 1     The patient's raw score on the AM-PAC Daily Activity Inpatient Short Form is 16  A raw score of less than 19 suggests the patient may benefit from discharge to post-acute rehabilitation services  Please refer to the recommendation of the Occupational Therapist for safe discharge planning  Pt goals to be met by 5/17/2023    Pt will demonstrate ability to complete LB dressing @ Min A in order to increase safety and independence during meaningful tasks  Pt will demonstrate ability to complete toileting tasks including CM and pericare @ S in order to increase safety and independence during meaningful tasks  Pt will demonstrate ability to complete EOB, chair, toilet/commode transfers @ S in order to increase performance and participation during functional tasks  Pt will demonstrate ability to stand for 3-4 minutes while maintaining F+ balance with use of RW for UB support PRN  Pt will demonstrate ability to tolerate 30-35 minute OT session with no vc'ing for deep breathing or use of energy conservation techniques in order to increase activity tolerance during functional tasks  Pt will demonstrate Good carryover of use of energy conservation/compensatory strategies during ADLs and functional tasks in order to increase safety and reduce risk for falls  Pt will demonstrate Good attention and participation in continued evaluation of functional ambulation house hold distances in order to assist with safe d/c planning  Pt will attend to continued cognitive assessments 100% of the time in order to provide most appropriate d/c recommendations  Pt will follow 100% simple 2-step commands and be A&O x4 consistently with environmental cues to increase participation in functional activities     Pt will identify 3 areas of interest/hobbies and 1 intervention on how to incorporate into daily life in order to increase interaction with environment and peers as well as increase participation in meaningful tasks  Pt will demonstrate 100% carryover of BUE HEP in order to increase BUE MS and increase performance during functional tasks upon d/c home      Abelino Hernandez OTR/L

## 2023-05-03 NOTE — PLAN OF CARE
Problem: PHYSICAL THERAPY ADULT  Goal: Performs mobility at highest level of function for planned discharge setting  See evaluation for individualized goals  Description: Treatment/Interventions: ADL retraining, Functional transfer training, LE strengthening/ROM, Elevations, Therapeutic exercise, Endurance training, Patient/family training, Equipment eval/education, Bed mobility, Compensatory technique education, Gait training, Spoke to nursing, Spoke to case management, OT  Equipment Recommended:  (walker-pt has)       See flowsheet documentation for full assessment, interventions and recommendations  Note: Prognosis: Good  Problem List: Decreased strength, Decreased endurance, Impaired balance, Decreased mobility, Decreased safety awareness, Impaired sensation, Decreased skin integrity, Obesity, Pain  Assessment: Pt is a [de-identified] y o  female seen for PT evaluation s/p admission to [address] [place] [address] on 5/2/2023 with Acute on chronic congestive heart failure ([facility] )  Order placed for PT services    Upon evaluation: Pt is presenting with impaired functional mobility due to pain, decreased strength, decreased endurance, impaired balance, gait deviations, altered sensation, decreased safety awareness, fall risk and impaired skin integrity requiring supervision assistance for bed mobility, steadying assistance for transfers and steadying assistance for ambulation with RW  Pt's clinical presentation is currently unpredictable given the functional mobility deficits above, especially weakness, decreased skin integrity, decreased endurance, gait deviations, pain, decreased activity tolerance, decreased functional mobility tolerance, altered sensation, decreased safety awareness, impaired judgement and SOB upon exertion, coupled with fall risks as indicated by AM-PAC 6-Clicks: 81/33 as well as impaired balance, polypharmacy, decreased safety awareness and limited sensation/neuropathy and combined with medical complications of abnormal renal lab values, abnormal H&H, abnormal WBCs, abnormal blood sugars, abnormal sodium values, abnormal potassium values, low SpO2 values, abnormal CO2 values, multiple readmissions, need for input for mobility technique/safety and need for BiPAP on admission, acute CHF, acute on chronic respiratory failure with hypoxia and hypercapnia, acute encephalopathy, CONSTANCE, sepsis  Pt's PMHx and comorbidities that may affect physical performance and progress include: CHF, COPD on 3 lpm NC chronically, asthma, CKD, DM, HTN and chronic anemia, DVT LE, PVD, chronic opioid use  Personal factors affecting pt at time of IE include: inaccessible home environment, step(s) to enter environment, advanced age, inability to perform IADLs, inability to perform ADLs, inability to navigate level surfaces without external assistance and inability to ambulate household distances  Pt will benefit from continued skilled PT services to address deficits as defined above and to maximize level of functional mobility to facilitate return toward PLOF and improved QOL  From PT/mobility standpoint, recommendation at time of d/c would be Home PT, home with family support, with walker and transport chair prn pending progress in order to reduce fall risk and maximize pt's functional independence and consistency with mobility in order to fac  Barriers to Discharge: Inaccessible home environment  Barriers to Discharge Comments: MANJULA home, but has assistance from family and transport chair she can use to access home as needed  PT Discharge Recommendation: Home with home health rehabilitation (antiicpate being able to retunr to home with HHPT and use of transport chair prn to access home  should family not be able to provide assistance with mobility as they have prior, consider PAR)    See flowsheet documentation for full assessment

## 2023-05-03 NOTE — PLAN OF CARE
Problem: MOBILITY - ADULT  Goal: Maintain or return to baseline ADL function  Description: INTERVENTIONS:  -  Assess patient's ability to carry out ADLs; assess patient's baseline for ADL function and identify physical deficits which impact ability to perform ADLs (bathing, care of mouth/teeth, toileting, grooming, dressing, etc )  - Assess/evaluate cause of self-care deficits   - Assess range of motion  - Assess patient's mobility; develop plan if impaired  - Assess patient's need for assistive devices and provide as appropriate  - Encourage maximum independence but intervene and supervise when necessary  - Involve family in performance of ADLs  - Assess for home care needs following discharge   - Consider OT consult to assist with ADL evaluation and planning for discharge  - Provide patient education as appropriate  Outcome: Progressing  Goal: Maintains/Returns to pre admission functional level  Description: INTERVENTIONS:  - Perform BMAT or MOVE assessment daily    - Set and communicate daily mobility goal to care team and patient/family/caregiver  - Collaborate with rehabilitation services on mobility goals if consulted  - Perform Range of Motion 2 times a day  - Reposition patient every 2 hours    - Dangle patient 2 times a day  - Stand patient 2 times a day  - Ambulate patient 2 times a day  - Out of bed to chair 2 times a day   - Out of bed for meals 2 times a day  - Out of bed for toileting  - Record patient progress and toleration of activity level   Outcome: Progressing     Problem: Prexisting or High Potential for Compromised Skin Integrity  Goal: Skin integrity is maintained or improved  Description: INTERVENTIONS:  - Identify patients at risk for skin breakdown  - Assess and monitor skin integrity  - Assess and monitor nutrition and hydration status  - Monitor labs   - Assess for incontinence   - Turn and reposition patient  - Assist with mobility/ambulation  - Relieve pressure over bony prominences  - Avoid friction and shearing  - Provide appropriate hygiene as needed including keeping skin clean and dry  - Evaluate need for skin moisturizer/barrier cream  - Collaborate with interdisciplinary team   - Patient/family teaching  - Consider wound care consult   Outcome: Progressing     Problem: PAIN - ADULT  Goal: Verbalizes/displays adequate comfort level or baseline comfort level  Description: Interventions:  - Encourage patient to monitor pain and request assistance  - Assess pain using appropriate pain scale  - Administer analgesics based on type and severity of pain and evaluate response  - Implement non-pharmacological measures as appropriate and evaluate response  - Consider cultural and social influences on pain and pain management  - Notify physician/advanced practitioner if interventions unsuccessful or patient reports new pain  Outcome: Progressing     Problem: INFECTION - ADULT  Goal: Absence or prevention of progression during hospitalization  Description: INTERVENTIONS:  - Assess and monitor for signs and symptoms of infection  - Monitor lab/diagnostic results  - Monitor all insertion sites, i e  indwelling lines, tubes, and drains  - Monitor endotracheal if appropriate and nasal secretions for changes in amount and color  - [place] appropriate cooling/warming therapies per order  - Administer medications as ordered  - Instruct and encourage patient and family to use good hand hygiene technique  - Identify and instruct in appropriate isolation precautions for identified infection/condition  Outcome: Progressing  Goal: Absence of fever/infection during neutropenic period  Description: INTERVENTIONS:  - Monitor WBC    Outcome: Progressing     Problem: SAFETY ADULT  Goal: Maintain or return to baseline ADL function  Description: INTERVENTIONS:  -  Assess patient's ability to carry out ADLs; assess patient's baseline for ADL function and identify physical deficits which impact ability to perform ADLs (bathing, care of mouth/teeth, toileting, grooming, dressing, etc )  - Assess/evaluate cause of self-care deficits   - Assess range of motion  - Assess patient's mobility; develop plan if impaired  - Assess patient's need for assistive devices and provide as appropriate  - Encourage maximum independence but intervene and supervise when necessary  - Involve family in performance of ADLs  - Assess for home care needs following discharge   - Consider OT consult to assist with ADL evaluation and planning for discharge  - Provide patient education as appropriate  Outcome: Progressing  Goal: Maintains/Returns to pre admission functional level  Description: INTERVENTIONS:  - Perform BMAT or MOVE assessment daily    - Set and communicate daily mobility goal to care team and patient/family/caregiver  - Collaborate with rehabilitation services on mobility goals if consulted  - Perform Range of Motion 2 times a day  - Reposition patient every 2 hours    - Dangle patient 2 times a day  - Stand patient 2 times a day  - Ambulate patient 2 times a day  - Out of bed to chair 2 times a day   - Out of bed for meals 2 times a day  - Out of bed for toileting  - Record patient progress and toleration of activity level   Outcome: Progressing  Goal: Patient will remain free of falls  Description: INTERVENTIONS:  - Educate patient/family on patient safety including physical limitations  - Instruct patient to call for assistance with activity   - Consult OT/PT to assist with strengthening/mobility   - Keep Call bell within reach  - Keep bed low and locked with side rails adjusted as appropriate  - Keep care items and personal belongings within reach  - Initiate and maintain comfort rounds  - Make Fall Risk Sign visible to staff  - Offer Toileting every 2 Hours, in advance of need  - Initiate/Maintain bed alarm  - Obtain necessary fall risk management equipment: na  - Apply yellow socks and bracelet for high fall risk patients  - Consider moving patient to room near nurses station  Outcome: Progressing     Problem: DISCHARGE PLANNING  Goal: Discharge to home or other facility with appropriate resources  Description: INTERVENTIONS:  - Identify barriers to discharge w/patient and caregiver  - Arrange for needed discharge resources and transportation as appropriate  - Identify discharge learning needs (meds, wound care, etc )  - Arrange for interpretive services to assist at discharge as needed  - Refer to Case Management Department for coordinating discharge planning if the patient needs post-hospital services based on physician/advanced practitioner order or complex needs related to functional status, cognitive ability, or social support system  Outcome: Progressing     Problem: Knowledge Deficit  Goal: Patient/family/caregiver demonstrates understanding of disease process, treatment plan, medications, and discharge instructions  Description: Complete learning assessment and assess knowledge base    Interventions:  - Provide teaching at level of understanding  - Provide teaching via preferred learning methods  Outcome: Progressing

## 2023-05-03 NOTE — ASSESSMENT & PLAN NOTE
Wt Readings from Last 3 Encounters:   05/03/23 76 7 kg (169 lb)   02/04/23 82 9 kg (182 lb 12 2 oz)   01/27/23 75 8 kg (167 lb 3 2 oz)       Presented with SOB, diaphoresis, facial swellinging x 3-4 days  All worsening in severity    Home lasix dose 40 mg Po daily  Last ECHO unknown, no records available in Epic    JVD present on initial exam  Lasix 80 mg IV given in ED    · 5/3 ECHO: EF 75 %, grade 1 DD, RV Nl  · Lasix 80 mg IV BID  · Continue diuresis with goal negative 1-2 liters over next 24 hours  · Daily weight  · Fluid restriction when diet started  · Strict I&O  · Pastor for accurate output

## 2023-05-03 NOTE — ASSESSMENT & PLAN NOTE
5/3 Improved  Likely Cardiopulmonary etiology  Presented from with AMS  Daughter reported she has been confused since Friday  · Cardiopulmonary monitoring, VS per protocol  · Trend labs - BMP  · UDS +opiates  · UA negative  · Blood cultures, MRSA, urine strep/legionella negative  · CTH unremarkable  · Trend PCT - negative  · ABG 7 32/57/68/29 on 3L NC  · Accuchecks with SSI   Avoid hypo/hyperglycemia  · Continue to hold home Gabapentin at this time

## 2023-05-03 NOTE — ASSESSMENT & PLAN NOTE
Lab Results   Component Value Date    EGFR 34 05/02/2023    EGFR 35 05/02/2023    EGFR 43 02/04/2023    CREATININE 1 42 (H) 05/02/2023    CREATININE 1 40 (H) 05/02/2023    CREATININE 1 18 02/04/2023     Baseline creatine 1 -1 2  CONSTANCE suspected due to cardiorenal syndrome/fluid overload    · Continue diuresis plan as above  · Trend BMP  · Strict I&O  · Avoid nephrotoxins

## 2023-05-03 NOTE — ASSESSMENT & PLAN NOTE
Lab Results   Component Value Date    HGBA1C 7 5 (H) 01/23/2023       Recent Labs     05/02/23  1324 05/02/23  1830 05/02/23  2101   POCGLU 268* 222* 336*       Blood Sugar Average: Last 72 hrs:  (P) 372 [id number]     · SSI  · Lantus 12 U HS  · Goal glucose 140-180

## 2023-05-03 NOTE — ASSESSMENT & PLAN NOTE
Potassium 6 9 on presentation, without EKG changes    · Improving  · Lasix 80 mg IV BID  · Calcium, insulin and dextrose given 5/3 AM  · Trend potassium: 6 9 > 6 4 > 6 0 > 5 4->4 8  · Daily BMP

## 2023-05-03 NOTE — ASSESSMENT & PLAN NOTE
5/2: blood cultures 1/2 gram positive cocci in clusters  5/3 Vancomycin started, pending speciation and sensitivities

## 2023-05-03 NOTE — ASSESSMENT & PLAN NOTE
Lab Results   Component Value Date    EGFR 34 05/03/2023    EGFR 34 05/02/2023    EGFR 34 05/02/2023    CREATININE 1 42 (H) 05/03/2023    CREATININE 1 45 (H) 05/02/2023    CREATININE 1 42 (H) 05/02/2023     Baseline creatine 1 -1 2  CONSTANCE suspected due to cardiorenal syndrome/fluid overload    · Continue diuresis plan as above  · Trend BMP  · Strict I&O  · Avoid nephrotoxins

## 2023-05-03 NOTE — UTILIZATION REVIEW
NOTIFICATION OF ADMISSION DISCHARGE   This is a Notification of Discharge from [address]  Please be advised that this patient has been discharge from our facility  Below you will find the admission and discharge date and time including the patient’s disposition  UTILIZATION REVIEW CONTACT:  [address] [place]  Utilization   Network Utilization Review Department  Phone: [phone] x carefully listen to the prompts  All voicemails are confidential   Email: [email or web address]     ADMISSION INFORMATION  PRESENTATION DATE: 5/2/2023  1:19 PM  OBERVATION ADMISSION DATE:   INPATIENT ADMISSION DATE: 5/2/23  4:17 PM   DISCHARGE DATE: No discharge date for patient encounter  DISPOSITION:Home with Home Health Care    IMPORTANT INFORMATION:  Send all requests for admission clinical reviews, approved or denied determinations and any other requests to dedicated fax number below belonging to the campus where the patient is receiving treatment   List of dedicated fax numbers:  [address] DENIALS (Administrative/Medical Necessity) [phone]   [address] (Maternity/NICU/Pediatrics) [phone]   [facility] [phone]   [facility] [phone]   [address] [phone]   220 [facility] [phone] [address] [facility] [phone]   [address] [place] [address] [phone]   [facility]  [phone]   [facility] [facility] [phone]   [address] [place] [address] [address] [facility] [phone]

## 2023-05-03 NOTE — PLAN OF CARE
Problem: MOBILITY - ADULT  Goal: Maintain or return to baseline ADL function  Description: INTERVENTIONS:  -  Assess patient's ability to carry out ADLs; assess patient's baseline for ADL function and identify physical deficits which impact ability to perform ADLs (bathing, care of mouth/teeth, toileting, grooming, dressing, etc )  - Assess/evaluate cause of self-care deficits   - Assess range of motion  - Assess patient's mobility; develop plan if impaired  - Assess patient's need for assistive devices and provide as appropriate  - Encourage maximum independence but intervene and supervise when necessary  - Involve family in performance of ADLs  - Assess for home care needs following discharge   - Consider OT consult to assist with ADL evaluation and planning for discharge  - Provide patient education as appropriate  Outcome: Progressing  Goal: Maintains/Returns to pre admission functional level  Description: INTERVENTIONS:  - Perform BMAT or MOVE assessment daily    - Set and communicate daily mobility goal to care team and patient/family/caregiver  - Collaborate with rehabilitation services on mobility goals if consulted  - Perform Range of Motion 2 times a day  - Reposition patient every 2 hours    - Dangle patient 2 times a day  - Stand patient 2 times a day  - Ambulate patient 2 times a day  - Out of bed to chair 2 times a day   - Out of bed for meals 2 times a day  - Out of bed for toileting  - Record patient progress and toleration of activity level   Outcome: Progressing     Problem: Prexisting or High Potential for Compromised Skin Integrity  Goal: Skin integrity is maintained or improved  Description: INTERVENTIONS:  - Identify patients at risk for skin breakdown  - Assess and monitor skin integrity  - Assess and monitor nutrition and hydration status  - Monitor labs   - Assess for incontinence   - Turn and reposition patient  - Assist with mobility/ambulation  - Relieve pressure over bony prominences  - Avoid friction and shearing  - Provide appropriate hygiene as needed including keeping skin clean and dry  - Evaluate need for skin moisturizer/barrier cream  - Collaborate with interdisciplinary team   - Patient/family teaching  - Consider wound care consult   Outcome: Progressing     Problem: PAIN - ADULT  Goal: Verbalizes/displays adequate comfort level or baseline comfort level  Description: Interventions:  - Encourage patient to monitor pain and request assistance  - Assess pain using appropriate pain scale  - Administer analgesics based on type and severity of pain and evaluate response  - Implement non-pharmacological measures as appropriate and evaluate response  - Consider cultural and social influences on pain and pain management  - Notify physician/advanced practitioner if interventions unsuccessful or patient reports new pain  Outcome: Progressing     Problem: INFECTION - ADULT  Goal: Absence or prevention of progression during hospitalization  Description: INTERVENTIONS:  - Assess and monitor for signs and symptoms of infection  - Monitor lab/diagnostic results  - Monitor all insertion sites, i e  indwelling lines, tubes, and drains  - Monitor endotracheal if appropriate and nasal secretions for changes in amount and color  - [place] appropriate cooling/warming therapies per order  - Administer medications as ordered  - Instruct and encourage patient and family to use good hand hygiene technique  - Identify and instruct in appropriate isolation precautions for identified infection/condition  Outcome: Progressing  Goal: Absence of fever/infection during neutropenic period  Description: INTERVENTIONS:  - Monitor WBC    Outcome: Progressing     Problem: SAFETY ADULT  Goal: Maintain or return to baseline ADL function  Description: INTERVENTIONS:  -  Assess patient's ability to carry out ADLs; assess patient's baseline for ADL function and identify physical deficits which impact ability to perform ADLs (bathing, care of mouth/teeth, toileting, grooming, dressing, etc )  - Assess/evaluate cause of self-care deficits   - Assess range of motion  - Assess patient's mobility; develop plan if impaired  - Assess patient's need for assistive devices and provide as appropriate  - Encourage maximum independence but intervene and supervise when necessary  - Involve family in performance of ADLs  - Assess for home care needs following discharge   - Consider OT consult to assist with ADL evaluation and planning for discharge  - Provide patient education as appropriate  Outcome: Progressing  Goal: Maintains/Returns to pre admission functional level  Description: INTERVENTIONS:  - Perform BMAT or MOVE assessment daily    - Set and communicate daily mobility goal to care team and patient/family/caregiver  - Collaborate with rehabilitation services on mobility goals if consulted  - Perform Range of Motion 2 times a day  - Reposition patient every 2 hours    - Dangle patient 2 times a day  - Stand patient 2 times a day  - Ambulate patient 2 times a day  - Out of bed to chair 2 times a day   - Out of bed for meals 2 times a day  - Out of bed for toileting  - Record patient progress and toleration of activity level   Outcome: Progressing  Goal: Patient will remain free of falls  Description: INTERVENTIONS:  - Educate patient/family on patient safety including physical limitations  - Instruct patient to call for assistance with activity   - Consult OT/PT to assist with strengthening/mobility   - Keep Call bell within reach  - Keep bed low and locked with side rails adjusted as appropriate  - Keep care items and personal belongings within reach  - Initiate and maintain comfort rounds  - Make Fall Risk Sign visible to staff  - Offer Toileting every 2 Hours, in advance of need  - Initiate/Maintain bed alarm  - Obtain necessary fall risk management equipment: na  - Apply yellow socks and bracelet for high fall risk patients  - Consider moving patient to room near nurses station  Outcome: Progressing     Problem: DISCHARGE PLANNING  Goal: Discharge to home or other facility with appropriate resources  Description: INTERVENTIONS:  - Identify barriers to discharge w/patient and caregiver  - Arrange for needed discharge resources and transportation as appropriate  - Identify discharge learning needs (meds, wound care, etc )  - Arrange for interpretive services to assist at discharge as needed  - Refer to Case Management Department for coordinating discharge planning if the patient needs post-hospital services based on physician/advanced practitioner order or complex needs related to functional status, cognitive ability, or social support system  Outcome: Progressing     Problem: Knowledge Deficit  Goal: Patient/family/caregiver demonstrates understanding of disease process, treatment plan, medications, and discharge instructions  Description: Complete learning assessment and assess knowledge base    Interventions:  - Provide teaching at level of understanding  - Provide teaching via preferred learning methods  Outcome: Progressing

## 2023-05-03 NOTE — PROGRESS NOTES
Raul Velazquez is a [de-identified] y o  female who is currently ordered Vancomycin IV with management by the Pharmacy Consult [id number]  Relevant clinical data and objective / subjective history reviewed  Vancomycin Assessment:  Indication and Goal AUC/Trough: Bacteremia (goal -600, trough >10), -600, trough >10  Clinical Status:  new start  Micro:     Renal Function:  SCr: 1 42 mg/dL  CrCl: 33 1 mL/min  Renal replacement: Not on dialysis  Days of Therapy: 1  Current Dose: 2000mg iv once loading dose  Vancomycin Plan:  New Dosinmg iv q 24 hours  Estimated AUC: 544 mcg*hr/mL  Estimated Trough: 17 8 mcg/mL  Next Level: 23 @ 0600  Renal Function Monitoring: Daily BMP and [place] will continue to follow closely for s/sx of nephrotoxicity, infusion reactions and appropriateness of therapy  BMP and CBC will be ordered per protocol  We will continue to follow the patient’s culture results and clinical progress daily      Hernan Woo, Pharmacist

## 2023-05-03 NOTE — RESPIRATORY THERAPY NOTE
RT Ventilator Management Note  Raul Velazquez [de-identified] y o  female MRN: [id number]  Unit/Bed#: -01 Encounter: [id number]      Daily Screen    No data found in the last 10 encounters  RT assumed care of 81yo pt requiring bipap support for CHF exacerbation with facial swelling  Pt received on 3L NC - pt returned to bipap 14/8 @ 30% due to somnolence  Pt tolerating well with adequate mechanics  Plan to maintain bipap HS as appropriate

## 2023-05-03 NOTE — ASSESSMENT & PLAN NOTE
Wt Readings from Last 3 Encounters:   05/02/23 77 8 kg (171 lb 8 3 oz)   02/04/23 82 9 kg (182 lb 12 2 oz)   01/27/23 75 8 kg (167 lb 3 2 oz)       Presented with SOB, diaphoresis, facial swellinging x 3-4 days  All worsening in severity    Home lasix dose 40 mg Po daily  Last ECHO unknown, no records available in Epic    JVD present on initial exam  Lasix 80 mg IV given in ED    · Check ECHO  · Continue diuresis with goal negative 2 liters over next 24 hours  · Daily weight  · Fluid restriction when diet started  · Strict I&O  · Pastor for accurate output

## 2023-05-04 PROBLEM — E87.5 HYPERKALEMIA: Status: RESOLVED | Noted: 2023-05-02 | Resolved: 2023-05-04

## 2023-05-04 PROBLEM — G93.40 ACUTE ENCEPHALOPATHY: Status: RESOLVED | Noted: 2023-05-02 | Resolved: 2023-05-04

## 2023-05-04 LAB
ANION GAP SERPL CALCULATED.3IONS-SCNC: 6 MMOL/L (ref 4–13)
BUN SERPL-MCNC: 34 MG/DL (ref 5–25)
CALCIUM SERPL-MCNC: 9.1 MG/DL (ref 8.4–10.2)
CHLORIDE SERPL-SCNC: 98 MMOL/L (ref 96–108)
CO2 SERPL-SCNC: 38 MMOL/L (ref 21–32)
CREAT SERPL-MCNC: 1.2 MG/DL (ref 0.6–1.3)
ERYTHROCYTE [DISTWIDTH] IN BLOOD BY AUTOMATED COUNT: 16.4 % (ref 11.6–15.1)
GFR SERPL CREATININE-BSD FRML MDRD: 42 ML/MIN/1.73SQ M
GLUCOSE SERPL-MCNC: 138 MG/DL (ref 65–140)
GLUCOSE SERPL-MCNC: 157 MG/DL (ref 65–140)
GLUCOSE SERPL-MCNC: 173 MG/DL (ref 65–140)
GLUCOSE SERPL-MCNC: 228 MG/DL (ref 65–140)
HCT VFR BLD AUTO: 30.7 % (ref 34.8–46.1)
HGB BLD-MCNC: 9.5 G/DL (ref 11.5–15.4)
MAGNESIUM SERPL-MCNC: 2.1 MG/DL (ref 1.9–2.7)
MCH RBC QN AUTO: 27.9 PG (ref 26.8–34.3)
MCHC RBC AUTO-ENTMCNC: 30.9 G/DL (ref 31.4–37.4)
MCV RBC AUTO: 90 FL (ref 82–98)
MRSA NOSE QL CULT: NORMAL
PHOSPHATE SERPL-MCNC: 4.1 MG/DL (ref 2.3–4.1)
PLATELET # BLD AUTO: 289 THOUSANDS/UL (ref 149–390)
PMV BLD AUTO: 9.5 FL (ref 8.9–12.7)
POTASSIUM SERPL-SCNC: 4 MMOL/L (ref 3.5–5.3)
RBC # BLD AUTO: 3.41 MILLION/UL (ref 3.81–5.12)
SODIUM SERPL-SCNC: 142 MMOL/L (ref 135–147)
VANCOMYCIN SERPL-MCNC: 19.9 UG/ML (ref 10–20)
WBC # BLD AUTO: 12.77 THOUSAND/UL (ref 4.31–10.16)

## 2023-05-04 PROCEDURE — 94760 N-INVAS EAR/PLS OXIMETRY 1: CPT

## 2023-05-04 PROCEDURE — 80048 BASIC METABOLIC PNL TOTAL CA: CPT | Performed by: NURSE PRACTITIONER

## 2023-05-04 PROCEDURE — 94640 AIRWAY INHALATION TREATMENT: CPT

## 2023-05-04 PROCEDURE — 80202 ASSAY OF VANCOMYCIN: CPT | Performed by: STUDENT IN AN ORGANIZED HEALTH CARE EDUCATION/TRAINING PROGRAM

## 2023-05-04 PROCEDURE — 82948 REAGENT STRIP/BLOOD GLUCOSE: CPT

## 2023-05-04 PROCEDURE — 83735 ASSAY OF MAGNESIUM: CPT | Performed by: NURSE PRACTITIONER

## 2023-05-04 PROCEDURE — 85027 COMPLETE CBC AUTOMATED: CPT | Performed by: NURSE PRACTITIONER

## 2023-05-04 PROCEDURE — 84100 ASSAY OF PHOSPHORUS: CPT | Performed by: NURSE PRACTITIONER

## 2023-05-04 PROCEDURE — 99233 SBSQ HOSP IP/OBS HIGH 50: CPT | Performed by: STUDENT IN AN ORGANIZED HEALTH CARE EDUCATION/TRAINING PROGRAM

## 2023-05-04 RX ORDER — ATORVASTATIN CALCIUM 10 MG/1
10 TABLET, FILM COATED ORAL
Status: DISCONTINUED | OUTPATIENT
Start: 2023-05-04 | End: 2023-05-04

## 2023-05-04 RX ORDER — OXYCODONE HYDROCHLORIDE 5 MG/1
5 TABLET ORAL EVERY 4 HOURS PRN
Status: DISCONTINUED | OUTPATIENT
Start: 2023-05-04 | End: 2023-05-09 | Stop reason: HOSPADM

## 2023-05-04 RX ORDER — FUROSEMIDE 40 MG/1
40 TABLET ORAL DAILY
Status: DISCONTINUED | OUTPATIENT
Start: 2023-05-05 | End: 2023-05-06

## 2023-05-04 RX ORDER — ONDANSETRON 2 MG/ML
4 INJECTION INTRAMUSCULAR; INTRAVENOUS EVERY 4 HOURS PRN
Status: DISCONTINUED | OUTPATIENT
Start: 2023-05-04 | End: 2023-05-09 | Stop reason: HOSPADM

## 2023-05-04 RX ORDER — GABAPENTIN 300 MG/1
300 CAPSULE ORAL 3 TIMES DAILY
Status: DISCONTINUED | OUTPATIENT
Start: 2023-05-04 | End: 2023-05-09 | Stop reason: HOSPADM

## 2023-05-04 RX ORDER — OXYCODONE HYDROCHLORIDE 5 MG/1
5 TABLET ORAL ONCE
Status: COMPLETED | OUTPATIENT
Start: 2023-05-04 | End: 2023-05-04

## 2023-05-04 RX ADMIN — APIXABAN 5 MG: 5 TABLET, FILM COATED ORAL at 08:56

## 2023-05-04 RX ADMIN — LEVALBUTEROL HYDROCHLORIDE 1.25 MG: 1.25 SOLUTION RESPIRATORY (INHALATION) at 02:12

## 2023-05-04 RX ADMIN — ASPIRIN 81 MG CHEWABLE TABLET 81 MG: 81 TABLET CHEWABLE at 08:56

## 2023-05-04 RX ADMIN — METOPROLOL TARTRATE 50 MG: 50 TABLET, FILM COATED ORAL at 22:15

## 2023-05-04 RX ADMIN — LEVALBUTEROL HYDROCHLORIDE 1.25 MG: 1.25 SOLUTION RESPIRATORY (INHALATION) at 14:15

## 2023-05-04 RX ADMIN — DOCUSATE SODIUM 200 MG: 100 CAPSULE ORAL at 08:56

## 2023-05-04 RX ADMIN — GABAPENTIN 300 MG: 300 CAPSULE ORAL at 15:55

## 2023-05-04 RX ADMIN — CHLORHEXIDINE GLUCONATE 0.12% ORAL RINSE 15 ML: 1.2 LIQUID ORAL at 22:17

## 2023-05-04 RX ADMIN — INSULIN LISPRO 4 UNITS: 100 INJECTION, SOLUTION INTRAVENOUS; SUBCUTANEOUS at 08:59

## 2023-05-04 RX ADMIN — ROPINIROLE 0.5 MG: 0.25 TABLET, FILM COATED ORAL at 22:12

## 2023-05-04 RX ADMIN — SERTRALINE HYDROCHLORIDE 50 MG: 50 TABLET ORAL at 22:12

## 2023-05-04 RX ADMIN — AMLODIPINE BESYLATE 5 MG: 5 TABLET ORAL at 08:56

## 2023-05-04 RX ADMIN — ONDANSETRON 4 MG: 2 INJECTION INTRAMUSCULAR; INTRAVENOUS at 11:36

## 2023-05-04 RX ADMIN — INSULIN LISPRO 8 UNITS: 100 INJECTION, SOLUTION INTRAVENOUS; SUBCUTANEOUS at 11:39

## 2023-05-04 RX ADMIN — MIRTAZAPINE 30 MG: 15 TABLET, FILM COATED ORAL at 22:12

## 2023-05-04 RX ADMIN — GABAPENTIN 300 MG: 300 CAPSULE ORAL at 22:15

## 2023-05-04 RX ADMIN — FLUTICASONE FUROATE AND VILANTEROL TRIFENATATE 1 PUFF: 200; 25 POWDER RESPIRATORY (INHALATION) at 08:56

## 2023-05-04 RX ADMIN — APIXABAN 5 MG: 5 TABLET, FILM COATED ORAL at 15:54

## 2023-05-04 RX ADMIN — LEVALBUTEROL HYDROCHLORIDE 1.25 MG: 1.25 SOLUTION RESPIRATORY (INHALATION) at 19:39

## 2023-05-04 RX ADMIN — OXYCODONE HYDROCHLORIDE 5 MG: 5 TABLET ORAL at 02:46

## 2023-05-04 RX ADMIN — GABAPENTIN 300 MG: 300 CAPSULE ORAL at 11:36

## 2023-05-04 RX ADMIN — IPRATROPIUM BROMIDE 0.5 MG: 0.5 SOLUTION RESPIRATORY (INHALATION) at 14:15

## 2023-05-04 RX ADMIN — OXYCODONE HYDROCHLORIDE 5 MG: 5 TABLET ORAL at 08:56

## 2023-05-04 RX ADMIN — INSULIN GLARGINE 12 UNITS: 100 INJECTION, SOLUTION SUBCUTANEOUS at 22:13

## 2023-05-04 RX ADMIN — FERROUS SULFATE TAB 325 MG (65 MG ELEMENTAL FE) 325 MG: 325 (65 FE) TAB at 08:56

## 2023-05-04 RX ADMIN — TRAZODONE HYDROCHLORIDE 50 MG: 50 TABLET ORAL at 22:12

## 2023-05-04 RX ADMIN — CHLORHEXIDINE GLUCONATE 0.12% ORAL RINSE 15 ML: 1.2 LIQUID ORAL at 08:56

## 2023-05-04 RX ADMIN — IPRATROPIUM BROMIDE 0.5 MG: 0.5 SOLUTION RESPIRATORY (INHALATION) at 02:12

## 2023-05-04 RX ADMIN — IPRATROPIUM BROMIDE 0.5 MG: 0.5 SOLUTION RESPIRATORY (INHALATION) at 07:18

## 2023-05-04 RX ADMIN — OXYCODONE HYDROCHLORIDE 5 MG: 5 TABLET ORAL at 22:12

## 2023-05-04 RX ADMIN — FUROSEMIDE 80 MG: 10 INJECTION, SOLUTION INTRAVENOUS at 04:38

## 2023-05-04 RX ADMIN — METOPROLOL TARTRATE 50 MG: 50 TABLET, FILM COATED ORAL at 08:56

## 2023-05-04 RX ADMIN — AZITHROMYCIN MONOHYDRATE 250 MG: 250 TABLET ORAL at 11:36

## 2023-05-04 RX ADMIN — CLOPIDOGREL BISULFATE 75 MG: 75 TABLET ORAL at 08:56

## 2023-05-04 RX ADMIN — IPRATROPIUM BROMIDE 0.5 MG: 0.5 SOLUTION RESPIRATORY (INHALATION) at 19:39

## 2023-05-04 RX ADMIN — ATORVASTATIN CALCIUM 10 MG: 10 TABLET, FILM COATED ORAL at 08:56

## 2023-05-04 RX ADMIN — INSULIN LISPRO 4 UNITS: 100 INJECTION, SOLUTION INTRAVENOUS; SUBCUTANEOUS at 22:16

## 2023-05-04 RX ADMIN — LEVALBUTEROL HYDROCHLORIDE 1.25 MG: 1.25 SOLUTION RESPIRATORY (INHALATION) at 07:18

## 2023-05-04 RX ADMIN — NICOTINE 1 PATCH: 7 PATCH, EXTENDED RELEASE TRANSDERMAL at 08:56

## 2023-05-04 RX ADMIN — OXYCODONE HYDROCHLORIDE 5 MG: 5 TABLET ORAL at 15:54

## 2023-05-04 NOTE — NURSING NOTE
Received patient from ICU, AAOx4, VSS  Patient is settled in her room, daughter at bedside  Will continue to monitor

## 2023-05-04 NOTE — ASSESSMENT & PLAN NOTE
Lab Results   Component Value Date    HGBA1C 7 5 (H) 01/23/2023       Recent Labs     05/03/23  1204 05/03/23  1628 05/03/23  2123 05/04/23  1138   POCGLU 166* 121 129 228*       Blood Sugar Average: Last 72 hrs:  (P) 444 [id number]     · SSI, increased to Alg #5 on 5/3  · Lantus 12 U HS  · Goal glucose 140-180

## 2023-05-04 NOTE — PHYSICAL THERAPY NOTE
05/04/23 1100   PT Last Visit   PT Visit Date 05/04/23   Note Type   Note Type Cancelled Session   Cancel Reasons Refusal     Physical Therapy Cancellation Note    PT treatment offered this morning but upon entering room patient was visiting with family, therapist explained that patient has 4 steps to enter home so treatment would include stair training to ensure safety and proper technique as well as ensure patient was able to get into and out of home safely upon discharge from hospital   Family interjected and reported that they just lift her up and down the stairs in the wheelchair so there would be no need for patient to practice stair climbing  Patient continued to decline treatment all together reporting that she is just not up to it  Patient and family were educated on the benefits of PT, but patient continued to decline  Will continue to offer PT treatment as ordered and progress as tolerated when willing to participate                                               Dilip Lares [place], [place] SCD+ HSQ

## 2023-05-04 NOTE — PLAN OF CARE
Problem: MOBILITY - ADULT  Goal: Maintain or return to baseline ADL function  Description: INTERVENTIONS:  -  Assess patient's ability to carry out ADLs; assess patient's baseline for ADL function and identify physical deficits which impact ability to perform ADLs (bathing, care of mouth/teeth, toileting, grooming, dressing, etc )  - Assess/evaluate cause of self-care deficits   - Assess range of motion  - Assess patient's mobility; develop plan if impaired  - Assess patient's need for assistive devices and provide as appropriate  - Encourage maximum independence but intervene and supervise when necessary  - Involve family in performance of ADLs  - Assess for home care needs following discharge   - Consider OT consult to assist with ADL evaluation and planning for discharge  - Provide patient education as appropriate  Outcome: Progressing     Problem: MOBILITY - ADULT  Goal: Maintains/Returns to pre admission functional level  Description: INTERVENTIONS:  - Perform BMAT or MOVE assessment daily    - Set and communicate daily mobility goal to care team and patient/family/caregiver  - Collaborate with rehabilitation services on mobility goals if consulted  - Perform Range of Motion 2 times a day  - Reposition patient every 2 hours    - Dangle patient 2 times a day  - Stand patient 2 times a day  - Ambulate patient 2 times a day  - Out of bed to chair 2 times a day   - Out of bed for meals 2 times a day  - Out of bed for toileting  - Record patient progress and toleration of activity level   Outcome: Progressing     Problem: Prexisting or High Potential for Compromised Skin Integrity  Goal: Skin integrity is maintained or improved  Description: INTERVENTIONS:  - Identify patients at risk for skin breakdown  - Assess and monitor skin integrity  - Assess and monitor nutrition and hydration status  - Monitor labs   - Assess for incontinence   - Turn and reposition patient  - Assist with mobility/ambulation  - Relieve pressure over bony prominences  - Avoid friction and shearing  - Provide appropriate hygiene as needed including keeping skin clean and dry  - Evaluate need for skin moisturizer/barrier cream  - Collaborate with interdisciplinary team   - Patient/family teaching  - Consider wound care consult   Outcome: Progressing     Problem: PAIN - ADULT  Goal: Verbalizes/displays adequate comfort level or baseline comfort level  Description: Interventions:  - Encourage patient to monitor pain and request assistance  - Assess pain using appropriate pain scale  - Administer analgesics based on type and severity of pain and evaluate response  - Implement non-pharmacological measures as appropriate and evaluate response  - Consider cultural and social influences on pain and pain management  - Notify physician/advanced practitioner if interventions unsuccessful or patient reports new pain  Outcome: Progressing     Problem: INFECTION - ADULT  Goal: Absence or prevention of progression during hospitalization  Description: INTERVENTIONS:  - Assess and monitor for signs and symptoms of infection  - Monitor lab/diagnostic results  - Monitor all insertion sites, i e  indwelling lines, tubes, and drains  - Monitor endotracheal if appropriate and nasal secretions for changes in amount and color  - [place] appropriate cooling/warming therapies per order  - Administer medications as ordered  - Instruct and encourage patient and family to use good hand hygiene technique  - Identify and instruct in appropriate isolation precautions for identified infection/condition  Outcome: Progressing

## 2023-05-04 NOTE — ASSESSMENT & PLAN NOTE
Wt Readings from Last 3 Encounters:   05/04/23 73 7 kg (162 lb 7 7 oz)   02/04/23 82 9 kg (182 lb 12 2 oz)   01/27/23 75 8 kg (167 lb 3 2 oz)       Presented with SOB, diaphoresis, facial swellinging x 3-4 days  All worsening in severity    Home lasix dose 40 mg Po daily  Last ECHO unknown, no records available in Epic    JVD present on initial exam  Lasix 80 mg IV given in ED    · 5/3 ECHO: EF 75 %, grade 1 DD, RV Nl  · Lasix 80 mg IV BID- then daily on 5/4, and resume home 40 mg PO lasix daily 5/5  · Continue diuresis with goal even to slightly negative over next 24 hours  · Daily weight  · Fluid restriction when diet started  · Strict I&O  · Pastor for accurate output

## 2023-05-04 NOTE — PROGRESS NOTES
Cheli Rivera is a [de-identified] y o  female who is currently ordered Vancomycin IV with management by the Pharmacy Consult service  Relevant clinical data and objective / subjective history reviewed  Vancomycin Assessment:  Indication and Goal AUC/Trough: Bacteremia (goal -600, trough >10), -600, trough >10  Clinical Status: stable  Micro:     Renal Function:  SCr: 1 2 mg/dL  CrCl: 38 4 mL/min  Renal replacement: Not on dialysis  Days of Therapy: 2  Current Dose: 1000mg IV q 24 hours  Vancomycin Plan:  New Dosing: continue 1000mg IV q 24 hours  Estimated AUC: 535 mcg*hr/mL  Estimated Trough: 16 3 mcg/mL  Next Level: 5/11/23 @ 0600  Renal Function Monitoring: Daily BMP and [place] will continue to follow closely for s/sx of nephrotoxicity, infusion reactions and appropriateness of therapy  BMP and CBC will be ordered per protocol  We will continue to follow the patient’s culture results and clinical progress daily      Jazmyne Mantilla, Pharmacist

## 2023-05-04 NOTE — ASSESSMENT & PLAN NOTE
Lab Results   Component Value Date    EGFR 42 05/04/2023    EGFR 41 05/03/2023    EGFR 34 05/03/2023    CREATININE 1 20 05/04/2023    CREATININE 1 24 05/03/2023    CREATININE 1 42 (H) 05/03/2023     Baseline creatine 1 -1 2  CONSTANCE suspected due to cardiorenal syndrome/fluid overload    · Continue diuresis plan as above  · Trend BMP  · Strict I&O  · Avoid nephrotoxins

## 2023-05-04 NOTE — ASSESSMENT & PLAN NOTE
H/o COPD and CHF, baseline oxygen 3L NC  Presented with worsening SOB over past 3-4 days  CXR with pulmonary vascular congestion  Require BiPAP in ED  Ceftriaxone and azithromycin started in ED  Methylprednisolone 125 mg IV x1 in ED  · Weaned to nasal cannula 3 L after admission  Returned to BiPAP early evening due to AMS  ABG with CO2 57  · Continue BiPAP, wean as able  · Continue treatment plan for acute on chronic CHF with diuresis  · Does not meet SIRS criteria  Low suspicion for COPD exacerbation/infection  · Continue azithromycin day 3 of 5 day course  · Hold off on further steroids  · Rule out other source of infection - blood contaminated and urine, MRSA negative  COVID/Flu/RSV negative  PCT negative    · D/C Vanco 5/4

## 2023-05-04 NOTE — ASSESSMENT & PLAN NOTE
5/2: blood cultures 1/2 gram positive cocci in clusters  5/3 Vancomycin started and D/C'd 5/4- likely contaminant

## 2023-05-04 NOTE — PROGRESS NOTES
[address] [facility]  Progress Note  Name: Alondra Echols  MRN: [id number]  Unit/Bed#: -01 I Date of Admission: 5/2/2023   Date of Service: 5/4/2023 I Hospital Day: 2    Assessment/Plan   * Acute on chronic congestive heart failure [facility])  Assessment & Plan  Wt Readings from Last 3 Encounters:   05/03/23 76 7 kg (169 lb)   02/04/23 82 9 kg (182 lb 12 2 oz)   01/27/23 75 8 kg (167 lb 3 2 oz)       Presented with SOB, diaphoresis, facial swellinging x 3-4 days  All worsening in severity  Home lasix dose 40 mg Po daily  Last ECHO unknown, no records available in Epic    JVD present on initial exam  Lasix 80 mg IV given in ED    · 5/3 ECHO: EF 75 %, grade 1 DD, RV Nl  · Lasix 80 mg IV BID  · Continue diuresis with goal negative 1-2 liters over next 24 hours  · Daily weight  · Fluid restriction when diet started  · Strict I&O  · Pastor for accurate output    Acute on chronic respiratory failure with hypoxia and hypercapnia (HCC)  Assessment & Plan  H/o COPD and CHF, baseline oxygen 3L NC  Presented with worsening SOB over past 3-4 days  CXR with pulmonary vascular congestion  Require BiPAP in ED  Ceftriaxone and azithromycin started in ED  Methylprednisolone 125 mg IV x1 in ED  · Weaned to nasal cannula 3 L after admission  Returned to BiPAP early evening due to AMS  ABG with CO2 57  · Continue BiPAP, wean as able  · Continue treatment plan for acute on chronic CHF with diuresis  · Does not meet SIRS criteria  Low suspicion for COPD exacerbation/infection  · Continue azithromycin for 5 day course  · Hold off on further steroids  · Rule out other source of infection - blood, urine, MRSA sent  COVID/Flu/RSV negative  PCT negative  Acute encephalopathy  Assessment & Plan  5/3 Improved  Likely Cardiopulmonary etiology  Presented from with AMS  Daughter reported she has been confused since Friday      · Cardiopulmonary monitoring, VS per protocol  · Trend labs - BMP  · UDS +opiates  · UA negative  · Blood cultures, MRSA, urine strep/legionella negative  · CTH unremarkable  · Trend PCT - negative  · ABG 7 32/57/68/29 on 3L NC  · Accuchecks with SSI   Avoid hypo/hyperglycemia  · Continue to hold home Gabapentin at this time    Bacteremia  Assessment & Plan  5/2: blood cultures 1/2 gram positive cocci in clusters  5/3 Vancomycin started, pending speciation and sensitivities    Hyperkalemia  Assessment & Plan  Potassium 6 9 on presentation, without EKG changes    · Improving  · Lasix 80 mg IV BID  · Calcium, insulin and dextrose given 5/3 AM  · Trend potassium: 6 9 > 6 4 > 6 0 > 5 4->4 8  · Daily BMP    Acute kidney injury superimposed on chronic kidney disease [facility])  Assessment & Plan  Lab Results   Component Value Date    EGFR 34 05/03/2023    EGFR 34 05/02/2023    EGFR 34 05/02/2023    CREATININE 1 42 (H) 05/03/2023    CREATININE 1 45 (H) 05/02/2023    CREATININE 1 42 (H) 05/02/2023     Baseline creatine 1 -1 2  CONSTANCE suspected due to cardiorenal syndrome/fluid overload    · Continue diuresis plan as above  · Trend BMP  · Strict I&O  · Avoid nephrotoxins    Hypertension  Assessment & Plan  Home amlodipine 5 mg daily and metoprolol tartrate 50 mg Q 12 hours    · Continue home medications    Chronic anemia  Assessment & Plan  Baseline hgb 8-9  Lab Results   Component Value Date    IRON 47 (L) 01/30/2023    FERRITIN 90 01/30/2023    TIBC 220 (L) 01/30/2023    CONCFE 21 01/30/2023         · Currently at baseline  · Trend CBC  · Transfuse for hgb < 7 0  · Start Ferrous Sulfate every other day    Type 2 diabetes mellitus with skin complication, with long-term current use of insulin [facility])  Assessment & Plan  Lab Results   Component Value Date    HGBA1C 7 5 (H) 01/23/2023       Recent Labs     05/02/23  2101 05/02/23  2343 05/03/23  0545 05/03/23  1204   POCGLU 336* 269* 282* 166*       Blood Sugar Average: Last 72 hrs:  (P) 778 [id number]     · SSI, increased to Alg #5 on 5/3  · Lantus 12 U HS  · Goal glucose 140-180             ICU Core Measures     A: Assess, Prevent, and Manage Pain · Has pain been assessed? Yes  · Need for changes to pain regimen? No   B: Both SAT/SAT  · N/A   C: Choice of Sedation · RASS Goal: N/A patient not on sedation  · Need for changes to sedation or analgesia regimen? NA   D: Delirium · CAM-ICU: Negative   E: Early Mobility  · Plan for early mobility? Yes   F: Family Engagement · Plan for family engagement today? Yes       Antibiotic Review: Awaiting culture results  Review of Invasive Devices: [place] Plan: Continue for accurate I/O monitoring for 48 hours        Prophylaxis:  VTE VTE covered by:  apixaban, Oral, 5 mg at 05/03/23 1742       Stress Ulcer  not ordered       Subjective   HPI/24hr events: Patient weaned from BiPAP to baseline 3L NC yesterday  Encephalopathy resolved  Continued on aggressive diuresis with lasix 80 mg IV BID and meeting goal net negative of 2L  Decline BiPAP at bedtime  Offers no complaints  Objective                            Vitals I/O      Most Recent Min/Max in 24hrs   Temp 97 9 °F (36 6 °C) Temp  Min: 97 4 °F (36 3 °C)  Max: 98 5 °F (36 9 °C)   Pulse 68 Pulse  Min: 66  Max: 83   Resp 14 Resp  Min: 12  Max: 27   /61 BP  Min: 132/58  Max: 163/70   O2 Sat 99 % SpO2  Min: 95 %  Max: 99 %      Intake/Output Summary (Last 24 hours) at 5/4/2023 0113  Last data filed at 5/4/2023 0026  Gross per 24 hour   Intake 780 ml   Output 3850 ml   Net -3070 ml         Diet Cardiovascular; Sodium 2 GM; Consistent Carbohydrate Diet Level 2 (5 carb servings/75 grams CHO/meal), Sodium 2 GM, Fluid Restriction 1800 ML, Dysphagia 3-Dental Soft; Thin Liquid     Invasive Monitoring Physical exam    Physical Exam  Vitals reviewed  Constitutional:       General: She is not in acute distress  Appearance: She is not ill-appearing  Interventions: Nasal cannula in place  HENT:      Head: Normocephalic        Mouth/Throat:      Mouth: Mucous membranes are moist       Pharynx: Oropharynx is clear  Eyes:      Conjunctiva/sclera: Conjunctivae normal       Pupils: Pupils are equal, round, and reactive to light  Cardiovascular:      Rate and Rhythm: Normal rate and regular rhythm  Pulses: Normal pulses  Pulmonary:      Effort: Pulmonary effort is normal  No respiratory distress  Breath sounds: Normal breath sounds  Abdominal:      General: Bowel sounds are normal  There is no distension  Palpations: Abdomen is soft  Musculoskeletal:      Cervical back: Neck supple  Right lower leg: Edema present  Left lower leg: Edema present  Skin:     General: Skin is warm and dry  Capillary Refill: Capillary refill takes less than 2 seconds  Neurological:      General: No focal deficit present  Mental Status: She is alert and oriented to person, place, and time  Mental status is at baseline  Psychiatric:         Mood and Affect: Mood normal          Behavior: Behavior is cooperative  Diagnostic Studies      EKG:   Imaging:  I have personally reviewed pertinent reports     and I have personally reviewed pertinent films in PACS     Medications:  Scheduled PRN   amLODIPine, 5 mg, Daily  apixaban, 5 mg, BID  aspirin, 81 mg, Daily  atorvastatin, 10 mg, Daily  azithromycin, 250 mg, Q24H  chlorhexidine, 15 mL, Q12H SOULEYMANE  clopidogrel, 75 mg, Daily  docusate sodium, 200 mg, Daily  ferrous sulfate, 325 mg, Every Other Day  fluticasone-vilanterol, 1 puff, Daily  furosemide, 80 mg, Q12H  insulin glargine, 12 Units, HS  insulin lispro, 4-20 Units, 4x Daily (AC & HS)  ipratropium, 0 5 mg, Q6H  levalbuterol, 1 25 mg, Q6H  metoprolol tartrate, 50 mg, Q12H SOULEYMANE  Mirabegron ER, 25 mg, Daily  mirtazapine, 30 mg, HS  nicotine, 1 patch, Daily  rOPINIRole, 0 5 mg, HS  sertraline, 50 mg, HS  traZODone, 50 mg, HS  vancomycin, 1,000 mg, Q24H      acetaminophen, 650 mg, Q6H PRN       Continuous          Labs:    CBC    Recent Labs 05/02/23  1329 05/03/23  0434   WBC 16 43* 9 94   HGB 9 3* 8 6*   HCT 31 7* 28 3*    249     BMP    Recent Labs     05/03/23  0434 05/03/23  1620   SODIUM 138 139   K 5 5* 4 8    100   CO2 31 34*   AGAP 6 5   BUN 30* 33*   CREATININE 1 42* 1 24   CALCIUM 9 0 9 1       Coags    Recent Labs     05/03/23  0434   INR 1 43*        Additional Electrolytes  Recent Labs     05/02/23  1838 05/03/23  0434   MG 2 8* 2 6   PHOS 2 9 3 5   CAIONIZED  --  1 13          Blood Gas    Recent Labs     05/02/23 2024   PHART 7 318*   RYJ0ZDT 57 1*   PO2ART 68 4*   [id number] 28 6*   BEART 1 8   SOURCE Radial, Right     Recent Labs     05/02/23  1350 05/02/23 2024   PHVEN 7 290*  --    EPX2GOL 58 9*  --    PO2VEN 49 8*  --    TLF9ASZ 27 7  --    BEVEN 0 3  --    SOURCE  --  Radial, Right    LFTs  Recent Labs     05/02/23  1414 05/03/23  0434   ALT 10 17   AST 9* 13   ALKPHOS 59 52   ALB 3 9 3 5   TBILI 0 17* 0 20       Infectious  Recent Labs     05/02/23  1329   PROCALCITONI 0 07     Glucose  Recent Labs     05/02/23  1838 05/02/23 2129 05/03/23  0434 05/03/23  1620   GLUC 223* [address] Izora Loser

## 2023-05-04 NOTE — PLAN OF CARE
Problem: MOBILITY - ADULT  Goal: Maintain or return to baseline ADL function  Description: INTERVENTIONS:  -  Assess patient's ability to carry out ADLs; assess patient's baseline for ADL function and identify physical deficits which impact ability to perform ADLs (bathing, care of mouth/teeth, toileting, grooming, dressing, etc )  - Assess/evaluate cause of self-care deficits   - Assess range of motion  - Assess patient's mobility; develop plan if impaired  - Assess patient's need for assistive devices and provide as appropriate  - Encourage maximum independence but intervene and supervise when necessary  - Involve family in performance of ADLs  - Assess for home care needs following discharge   - Consider OT consult to assist with ADL evaluation and planning for discharge  - Provide patient education as appropriate  Outcome: Progressing  Goal: Maintains/Returns to pre admission functional level  Description: INTERVENTIONS:  - Perform BMAT or MOVE assessment daily    - Set and communicate daily mobility goal to care team and patient/family/caregiver  - Collaborate with rehabilitation services on mobility goals if consulted  - Perform Range of Motion 2 times a day  - Reposition patient every 2 hours    - Dangle patient 2 times a day  - Stand patient 2 times a day  - Ambulate patient 2 times a day  - Out of bed to chair 2 times a day   - Out of bed for meals 2 times a day  - Out of bed for toileting  - Record patient progress and toleration of activity level   Outcome: Progressing     Problem: Prexisting or High Potential for Compromised Skin Integrity  Goal: Skin integrity is maintained or improved  Description: INTERVENTIONS:  - Identify patients at risk for skin breakdown  - Assess and monitor skin integrity  - Assess and monitor nutrition and hydration status  - Monitor labs   - Assess for incontinence   - Turn and reposition patient  - Assist with mobility/ambulation  - Relieve pressure over bony prominences  - Avoid friction and shearing  - Provide appropriate hygiene as needed including keeping skin clean and dry  - Evaluate need for skin moisturizer/barrier cream  - Collaborate with interdisciplinary team   - Patient/family teaching  - Consider wound care consult   Outcome: Progressing     Problem: PAIN - ADULT  Goal: Verbalizes/displays adequate comfort level or baseline comfort level  Description: Interventions:  - Encourage patient to monitor pain and request assistance  - Assess pain using appropriate pain scale  - Administer analgesics based on type and severity of pain and evaluate response  - Implement non-pharmacological measures as appropriate and evaluate response  - Consider cultural and social influences on pain and pain management  - Notify physician/advanced practitioner if interventions unsuccessful or patient reports new pain  Outcome: Progressing     Problem: INFECTION - ADULT  Goal: Absence or prevention of progression during hospitalization  Description: INTERVENTIONS:  - Assess and monitor for signs and symptoms of infection  - Monitor lab/diagnostic results  - Monitor all insertion sites, i e  indwelling lines, tubes, and drains  - Monitor endotracheal if appropriate and nasal secretions for changes in amount and color  - [place] appropriate cooling/warming therapies per order  - Administer medications as ordered  - Instruct and encourage patient and family to use good hand hygiene technique  - Identify and instruct in appropriate isolation precautions for identified infection/condition  Outcome: Progressing  Goal: Absence of fever/infection during neutropenic period  Description: INTERVENTIONS:  - Monitor WBC    Outcome: Progressing     Problem: SAFETY ADULT  Goal: Maintain or return to baseline ADL function  Description: INTERVENTIONS:  -  Assess patient's ability to carry out ADLs; assess patient's baseline for ADL function and identify physical deficits which impact ability to perform ADLs (bathing, care of mouth/teeth, toileting, grooming, dressing, etc )  - Assess/evaluate cause of self-care deficits   - Assess range of motion  - Assess patient's mobility; develop plan if impaired  - Assess patient's need for assistive devices and provide as appropriate  - Encourage maximum independence but intervene and supervise when necessary  - Involve family in performance of ADLs  - Assess for home care needs following discharge   - Consider OT consult to assist with ADL evaluation and planning for discharge  - Provide patient education as appropriate  Outcome: Progressing  Goal: Maintains/Returns to pre admission functional level  Description: INTERVENTIONS:  - Perform BMAT or MOVE assessment daily    - Set and communicate daily mobility goal to care team and patient/family/caregiver  - Collaborate with rehabilitation services on mobility goals if consulted  - Perform Range of Motion 2 times a day  - Reposition patient every 2 hours    - Dangle patient 2 times a day  - Stand patient 2 times a day  - Ambulate patient 2 times a day  - Out of bed to chair 2 times a day   - Out of bed for meals 2 times a day  - Out of bed for toileting  - Record patient progress and toleration of activity level   Outcome: Progressing  Goal: Patient will remain free of falls  Description: INTERVENTIONS:  - Educate patient/family on patient safety including physical limitations  - Instruct patient to call for assistance with activity   - Consult OT/PT to assist with strengthening/mobility   - Keep Call bell within reach  - Keep bed low and locked with side rails adjusted as appropriate  - Keep care items and personal belongings within reach  - Initiate and maintain comfort rounds  - Make Fall Risk Sign visible to staff  - Offer Toileting every 2 Hours, in advance of need  - Initiate/Maintain alarm  - Obtain necessary fall risk management equipment:   - Apply yellow socks and bracelet for high fall risk patients  - Consider moving patient to room near nurses station  Outcome: Progressing     Problem: DISCHARGE PLANNING  Goal: Discharge to home or other facility with appropriate resources  Description: INTERVENTIONS:  - Identify barriers to discharge w/patient and caregiver  - Arrange for needed discharge resources and transportation as appropriate  - Identify discharge learning needs (meds, wound care, etc )  - Arrange for interpretive services to assist at discharge as needed  - Refer to Case Management Department for coordinating discharge planning if the patient needs post-hospital services based on physician/advanced practitioner order or complex needs related to functional status, cognitive ability, or social support system  Outcome: Progressing     Problem: Knowledge Deficit  Goal: Patient/family/caregiver demonstrates understanding of disease process, treatment plan, medications, and discharge instructions  Description: Complete learning assessment and assess knowledge base    Interventions:  - Provide teaching at level of understanding  - Provide teaching via preferred learning methods  Outcome: Progressing

## 2023-05-05 PROBLEM — I10 HYPERTENSION: Status: RESOLVED | Noted: 2023-02-03 | Resolved: 2023-05-05

## 2023-05-05 LAB
ANION GAP SERPL CALCULATED.3IONS-SCNC: 9 MMOL/L (ref 4–13)
BACTERIA BLD CULT: ABNORMAL
BASOPHILS # BLD AUTO: 0.07 THOUSANDS/ÂΜL (ref 0–0.1)
BASOPHILS NFR BLD AUTO: 1 % (ref 0–1)
BUN SERPL-MCNC: 45 MG/DL (ref 5–25)
CALCIUM SERPL-MCNC: 9.1 MG/DL (ref 8.4–10.2)
CHLORIDE SERPL-SCNC: 96 MMOL/L (ref 96–108)
CO2 SERPL-SCNC: 35 MMOL/L (ref 21–32)
CREAT SERPL-MCNC: 1.47 MG/DL (ref 0.6–1.3)
EOSINOPHIL # BLD AUTO: 0.3 THOUSAND/ÂΜL (ref 0–0.61)
EOSINOPHIL NFR BLD AUTO: 3 % (ref 0–6)
ERYTHROCYTE [DISTWIDTH] IN BLOOD BY AUTOMATED COUNT: 16.5 % (ref 11.6–15.1)
GFR SERPL CREATININE-BSD FRML MDRD: 33 ML/MIN/1.73SQ M
GLUCOSE SERPL-MCNC: 134 MG/DL (ref 65–140)
GLUCOSE SERPL-MCNC: 142 MG/DL (ref 65–140)
GLUCOSE SERPL-MCNC: 154 MG/DL (ref 65–140)
GLUCOSE SERPL-MCNC: 160 MG/DL (ref 65–140)
GLUCOSE SERPL-MCNC: 297 MG/DL (ref 65–140)
GLUCOSE SERPL-MCNC: 309 MG/DL (ref 65–140)
GLUCOSE SERPL-MCNC: 403 MG/DL (ref 65–140)
GRAM STN SPEC: ABNORMAL
HCT VFR BLD AUTO: 33.7 % (ref 34.8–46.1)
HGB BLD-MCNC: 10.3 G/DL (ref 11.5–15.4)
IMM GRANULOCYTES # BLD AUTO: 0.07 THOUSAND/UL (ref 0–0.2)
IMM GRANULOCYTES NFR BLD AUTO: 1 % (ref 0–2)
LYMPHOCYTES # BLD AUTO: 2.34 THOUSANDS/ÂΜL (ref 0.6–4.47)
LYMPHOCYTES NFR BLD AUTO: 22 % (ref 14–44)
MAGNESIUM SERPL-MCNC: 2.3 MG/DL (ref 1.9–2.7)
MCH RBC QN AUTO: 27.2 PG (ref 26.8–34.3)
MCHC RBC AUTO-ENTMCNC: 30.6 G/DL (ref 31.4–37.4)
MCV RBC AUTO: 89 FL (ref 82–98)
MONOCYTES # BLD AUTO: 0.93 THOUSAND/ÂΜL (ref 0.17–1.22)
MONOCYTES NFR BLD AUTO: 9 % (ref 4–12)
NEUTROPHILS # BLD AUTO: 7.05 THOUSANDS/ÂΜL (ref 1.85–7.62)
NEUTS SEG NFR BLD AUTO: 64 % (ref 43–75)
NRBC BLD AUTO-RTO: 0 /100 WBCS
PLATELET # BLD AUTO: 322 THOUSANDS/UL (ref 149–390)
PMV BLD AUTO: 9.4 FL (ref 8.9–12.7)
POTASSIUM SERPL-SCNC: 4.2 MMOL/L (ref 3.5–5.3)
RBC # BLD AUTO: 3.79 MILLION/UL (ref 3.81–5.12)
S AUREUS+CONS DNA BLD POS NAA+NON-PROBE: DETECTED
SODIUM SERPL-SCNC: 140 MMOL/L (ref 135–147)
WBC # BLD AUTO: 10.76 THOUSAND/UL (ref 4.31–10.16)

## 2023-05-05 PROCEDURE — 80048 BASIC METABOLIC PNL TOTAL CA: CPT | Performed by: PHYSICIAN ASSISTANT

## 2023-05-05 PROCEDURE — 97116 GAIT TRAINING THERAPY: CPT

## 2023-05-05 PROCEDURE — 94640 AIRWAY INHALATION TREATMENT: CPT

## 2023-05-05 PROCEDURE — 94760 N-INVAS EAR/PLS OXIMETRY 1: CPT

## 2023-05-05 PROCEDURE — 85025 COMPLETE CBC W/AUTO DIFF WBC: CPT | Performed by: PHYSICIAN ASSISTANT

## 2023-05-05 PROCEDURE — 82948 REAGENT STRIP/BLOOD GLUCOSE: CPT

## 2023-05-05 PROCEDURE — 99233 SBSQ HOSP IP/OBS HIGH 50: CPT | Performed by: FAMILY MEDICINE

## 2023-05-05 PROCEDURE — 97535 SELF CARE MNGMENT TRAINING: CPT

## 2023-05-05 PROCEDURE — 83735 ASSAY OF MAGNESIUM: CPT | Performed by: PHYSICIAN ASSISTANT

## 2023-05-05 RX ORDER — LEVALBUTEROL INHALATION SOLUTION 1.25 MG/3ML
1.25 SOLUTION RESPIRATORY (INHALATION) EVERY 6 HOURS PRN
Status: DISCONTINUED | OUTPATIENT
Start: 2023-05-05 | End: 2023-05-09 | Stop reason: HOSPADM

## 2023-05-05 RX ADMIN — LEVALBUTEROL HYDROCHLORIDE 1.25 MG: 1.25 SOLUTION RESPIRATORY (INHALATION) at 13:55

## 2023-05-05 RX ADMIN — IPRATROPIUM BROMIDE 0.5 MG: 0.5 SOLUTION RESPIRATORY (INHALATION) at 02:20

## 2023-05-05 RX ADMIN — LEVALBUTEROL HYDROCHLORIDE 1.25 MG: 1.25 SOLUTION RESPIRATORY (INHALATION) at 07:53

## 2023-05-05 RX ADMIN — GABAPENTIN 300 MG: 300 CAPSULE ORAL at 15:12

## 2023-05-05 RX ADMIN — CHLORHEXIDINE GLUCONATE 0.12% ORAL RINSE 15 ML: 1.2 LIQUID ORAL at 21:03

## 2023-05-05 RX ADMIN — GABAPENTIN 300 MG: 300 CAPSULE ORAL at 21:04

## 2023-05-05 RX ADMIN — AZITHROMYCIN MONOHYDRATE 250 MG: 250 TABLET ORAL at 15:12

## 2023-05-05 RX ADMIN — NICOTINE 1 PATCH: 7 PATCH, EXTENDED RELEASE TRANSDERMAL at 09:07

## 2023-05-05 RX ADMIN — FUROSEMIDE 40 MG: 40 TABLET ORAL at 09:07

## 2023-05-05 RX ADMIN — INSULIN GLARGINE 12 UNITS: 100 INJECTION, SOLUTION SUBCUTANEOUS at 21:13

## 2023-05-05 RX ADMIN — MIRTAZAPINE 30 MG: 15 TABLET, FILM COATED ORAL at 21:04

## 2023-05-05 RX ADMIN — INSULIN LISPRO 4 UNITS: 100 INJECTION, SOLUTION INTRAVENOUS; SUBCUTANEOUS at 08:50

## 2023-05-05 RX ADMIN — CLOPIDOGREL BISULFATE 75 MG: 75 TABLET ORAL at 09:06

## 2023-05-05 RX ADMIN — ATORVASTATIN CALCIUM 10 MG: 10 TABLET, FILM COATED ORAL at 09:07

## 2023-05-05 RX ADMIN — TRAZODONE HYDROCHLORIDE 50 MG: 50 TABLET ORAL at 21:03

## 2023-05-05 RX ADMIN — AMLODIPINE BESYLATE 5 MG: 5 TABLET ORAL at 09:07

## 2023-05-05 RX ADMIN — LEVALBUTEROL HYDROCHLORIDE 1.25 MG: 1.25 SOLUTION RESPIRATORY (INHALATION) at 02:20

## 2023-05-05 RX ADMIN — APIXABAN 5 MG: 5 TABLET, FILM COATED ORAL at 09:07

## 2023-05-05 RX ADMIN — IPRATROPIUM BROMIDE 0.5 MG: 0.5 SOLUTION RESPIRATORY (INHALATION) at 13:55

## 2023-05-05 RX ADMIN — DOCUSATE SODIUM 200 MG: 100 CAPSULE ORAL at 09:06

## 2023-05-05 RX ADMIN — OXYCODONE HYDROCHLORIDE 5 MG: 5 TABLET ORAL at 15:12

## 2023-05-05 RX ADMIN — FLUTICASONE FUROATE AND VILANTEROL TRIFENATATE 1 PUFF: 200; 25 POWDER RESPIRATORY (INHALATION) at 09:07

## 2023-05-05 RX ADMIN — OXYCODONE HYDROCHLORIDE 5 MG: 5 TABLET ORAL at 07:41

## 2023-05-05 RX ADMIN — SERTRALINE HYDROCHLORIDE 50 MG: 50 TABLET ORAL at 21:04

## 2023-05-05 RX ADMIN — OXYCODONE HYDROCHLORIDE 5 MG: 5 TABLET ORAL at 03:23

## 2023-05-05 RX ADMIN — ROPINIROLE 0.5 MG: 0.25 TABLET, FILM COATED ORAL at 21:04

## 2023-05-05 RX ADMIN — GABAPENTIN 300 MG: 300 CAPSULE ORAL at 09:06

## 2023-05-05 RX ADMIN — CHLORHEXIDINE GLUCONATE 0.12% ORAL RINSE 15 ML: 1.2 LIQUID ORAL at 09:07

## 2023-05-05 RX ADMIN — INSULIN LISPRO 12 UNITS: 100 INJECTION, SOLUTION INTRAVENOUS; SUBCUTANEOUS at 21:13

## 2023-05-05 RX ADMIN — METOPROLOL TARTRATE 50 MG: 50 TABLET, FILM COATED ORAL at 09:06

## 2023-05-05 RX ADMIN — IPRATROPIUM BROMIDE 0.5 MG: 0.5 SOLUTION RESPIRATORY (INHALATION) at 07:53

## 2023-05-05 RX ADMIN — ASPIRIN 81 MG CHEWABLE TABLET 81 MG: 81 TABLET CHEWABLE at 09:06

## 2023-05-05 RX ADMIN — APIXABAN 5 MG: 5 TABLET, FILM COATED ORAL at 15:12

## 2023-05-05 RX ADMIN — OXYCODONE HYDROCHLORIDE 5 MG: 5 TABLET ORAL at 21:13

## 2023-05-05 RX ADMIN — METOPROLOL TARTRATE 50 MG: 50 TABLET, FILM COATED ORAL at 21:04

## 2023-05-05 NOTE — PROGRESS NOTES
[address] [facility]  Progress Note  Name: Jackie Bernard  MRN: [id number]  Unit/Bed#: -01 I Date of Admission: 5/2/2023   Date of Service: 5/5/2023 I Hospital Day: 3    Assessment/Plan   Bacteremia  Assessment & Plan  Based on report, seems contaminated since 1 set of blood culture shows coagulase-negative staph, second set blood culture shows no growth  Patient also received antibiotic, monitor off of antibiotic at this time    Acute on chronic congestive heart failure [facility])  Assessment & Plan  Wt Readings from Last 3 Encounters:   05/05/23 74 8 kg (164 lb 14 5 oz)   02/04/23 82 9 kg (182 lb 12 2 oz)   01/27/23 75 8 kg (167 lb 3 2 oz)     Presented with SOB, diaphoresis, facial swellinging x 3-4 days  Patient was receiving IV Lasix, transition to home dose Lasix 40 mg-creatinine is trending up, continue current treatment and monitor renal function in next 24 hours-remained stable, can plan for discharge      • 5/3 ECHO: EF 75 %, grade 1 DD, RV Nl  • Lasix 80 mg IV BID  • Continue diuresis with goal negative 1-2 liters over next 24 hours  • Daily weight  • Fluid restriction when diet started  • Strict I&O        * Acute on chronic respiratory failure with hypoxia and hypercapnia (HCC)  Assessment & Plan  H/o COPD and CHF, baseline oxygen 3L NC  Presented with worsening SOB over past 3-4 days  CXR with pulmonary vascular congestion  Require BiPAP in ED  Ceftriaxone and azithromycin started in ED  Methylprednisolone 125 mg IV x1 in ED      • Patient currently back to baseline of oxygen recommend, 3 L  • Continue treatment plan for acute on chronic CHF with diuresis  ? Urine Legionella, pneumonia negative  ? 1 set of blood culture shows coagulase-negative staph, second set of blood culture shows no growth-mostly contaminated  ? Continue respiratory protocol  ? Continue p o   Lasix and monitor renal function    Acute encephalopathy  Assessment & Plan  Improved  Likely Cardiopulmonary etiology  Presented from with [facility]  Daughter reported she has been confused since Friday  • UDS +opiates  • UA negative  , MRSA, urine strep/legionella negative  -Blood culture seems contaminated since 1 set of blood culture shows coagulase-negative staph, second set of blood culture shows no growth  • CTH unremarkable  • Trend PCT - negative  • ABG 7 32/57/68/29 on 3L NC  • Accuchecks with SSI  Avoid hypo/hyperglycemia  • Continue to hold home Gabapentin at this time    Acute kidney injury superimposed on chronic kidney disease [facility])  Assessment & Plan  Lab Results   Component Value Date    EGFR 33 05/05/2023    EGFR 42 05/04/2023    EGFR 41 05/03/2023    CREATININE 1 47 (H) 05/05/2023    CREATININE 1 20 05/04/2023    CREATININE 1 24 05/03/2023   Baseline creatinine varies from 1-1 2  Initially patient creatinine was improving, today creatinine trended up to 1 7 patient remained on p o  Lasix, continue to monitor  Check renal function in 24 hours, if stable, can plan for discharge  Follow retention      Chronic anemia  Assessment & Plan  Baseline hgb 8-9        Lab Results   Component Value Date     IRON 47 (L) 01/30/2023     FERRITIN 90 01/30/2023     TIBC 220 (L) 01/30/2023     CONCFE 21 01/30/2023            • Currently at baseline  • Trend CBC  • Transfuse for hgb < 7 0  • Start Ferrous Sulfate every other day           Type 2 diabetes mellitus with skin complication, with long-term current use of insulin [facility])  Assessment & Plan  Lab Results   Component Value Date    HGBA1C 7 5 (H) 01/23/2023       Recent Labs     05/04/23  1138 05/04/23  1554 05/04/23  2134 05/05/23  0733   POCGLU 228* 138 157* 160*       Blood Sugar Average: Last 72 hrs:  (P) 438 [id number]   Continue sliding scale, follow hypoglycemia precaution  While patient remained in the hospital, target blood sugar range in between 1 40-1 80           VTE Pharmacologic Prophylaxis: VTE Score: 11 High Risk (Score >/= 5) - Pharmacological DVT Prophylaxis Ordered: apixaban (Eliquis)  Sequential Compression Devices Ordered  Patient Centered Rounds: I performed bedside rounds with nursing staff today  Discussions with Specialists or Other Care Team Provider: None    Education and Discussions with Family / Patient: Updated  (daughter) via phone  Total Time Spent on Date of Encounter in care of patient: 10 minutes This time was spent on one or more of the following: performing physical exam; counseling and coordination of care; obtaining or reviewing history; documenting in the medical record; reviewing/ordering tests, medications or procedures; communicating with other healthcare professionals and discussing with patient's family/caregivers  Current Length of Stay: 3 day(s)  Current Patient Status: Inpatient   Certification Statement: The patient will continue to require additional inpatient hospital stay due to To monitor above conditions  Discharge Plan: Anticipate discharge in 24-48 hrs to home  Code Status: Level 3 - DNAR and DNI    Subjective:   Seen and evaluated during the rounds  Laying on the bedside, answer questions appropriately  Denies any significant complaint other than weakness  Objective:     Vitals:   Temp (24hrs), Av 3 °F (36 8 °C), Min:98 1 °F (36 7 °C), Max:98 6 °F (37 °C)    Temp:  [98 1 °F (36 7 °C)-98 6 °F (37 °C)] 98 1 °F (36 7 °C)  HR:  [64-75] 75  Resp:  [18-19] 18  BP: ()/(44-84) 136/55  SpO2:  [95 %-98 %] 96 %  Body mass index is 26 62 kg/m²  Input and Output Summary (last 24 hours): Intake/Output Summary (Last 24 hours) at 2023 1025  Last data filed at 2023 1754  Gross per 24 hour   Intake 480 ml   Output 275 ml   Net 205 ml       Physical Exam:   Physical Exam  Vitals and nursing note reviewed  HENT:      Mouth/Throat:      Mouth: Mucous membranes are moist       Pharynx: No oropharyngeal exudate  Eyes:      General: No scleral icterus  Left eye: No discharge  Extraocular Movements: Extraocular movements intact  Conjunctiva/sclera: Conjunctivae normal       Pupils: Pupils are equal, round, and reactive to light  Cardiovascular:      Rate and Rhythm: Normal rate  Pulses: Normal pulses  Heart sounds: Murmur heard  No gallop  Pulmonary:      Effort: Pulmonary effort is normal  No respiratory distress  Breath sounds: No stridor  Rales present  No wheezing or rhonchi  Abdominal:      General: Abdomen is flat  Bowel sounds are normal  There is no distension  Palpations: There is no mass  Hernia: No hernia is present  Musculoskeletal:         General: No swelling, tenderness, deformity or signs of injury  Normal range of motion  Skin:     General: Skin is warm  Coloration: Skin is not jaundiced or pale  Findings: No bruising  Neurological:      General: No focal deficit present  Mental Status: She is alert and oriented to person, place, and time  Cranial Nerves: No cranial nerve deficit  Sensory: No sensory deficit  Motor: No weakness  Coordination: Coordination normal          Additional Data:     Labs:  Results from last 7 days   Lab Units 05/05/23  0634   WBC Thousand/uL 10 76*   HEMOGLOBIN g/dL 10 3*   HEMATOCRIT % 33 7*   PLATELETS Thousands/uL 322   NEUTROS PCT % 64   LYMPHS PCT % 22   MONOS PCT % 9   EOS PCT % 3     Results from last 7 days   Lab Units 05/05/23  0634 05/03/23  1620 05/03/23  0434   SODIUM mmol/L 140   < > 138   POTASSIUM mmol/L 4 2   < > 5 5*   CHLORIDE mmol/L 96   < > 101   CO2 mmol/L 35*   < > 31   BUN mg/dL 45*   < > 30*   CREATININE mg/dL 1 47*   < > 1 42*   ANION GAP mmol/L 9   < > 6   CALCIUM mg/dL 9 1   < > 9 0   ALBUMIN g/dL  --   --  3 5   TOTAL BILIRUBIN mg/dL  --   --  0 20   ALK PHOS U/L  --   --  52   ALT U/L  --   --  17   AST U/L  --   --  13   GLUCOSE RANDOM mg/dL 154*   < > 242*    < > = values in this interval not displayed       Results from last 7 days Lab Units 05/03/23  0434   INR  1 43*     Results from last 7 days   Lab Units 05/05/23  0733 05/04/23  2134 05/04/23  1554 05/04/23  1138 05/03/23  2123 05/03/23  1628 05/03/23  1204 05/03/23  0545 05/02/23  2343 05/02/23  2101 05/02/23  1830 05/02/23  1324   POC GLUCOSE mg/dl 160* 157* 138 228* 129 121 166* 282* 269* 336* 222* 268*         Results from last 7 days   Lab Units 05/02/23  1329   LACTIC ACID mmol/L 1 6   PROCALCITONIN ng/ml 0 07       Lines/Drains:  Invasive Devices     Peripheral Intravenous Line  Duration           Peripheral IV 05/02/23 Left Antecubital 2 days    Peripheral IV 05/02/23 Right Antecubital 2 days                      Imaging: No pertinent imaging reviewed  Recent Cultures (last 7 days):   Results from last 7 days   Lab Units 05/02/23  1838 05/02/23  1329   BLOOD CULTURE   --  Staphylococcus coagulase negative*  No Growth at 24 hrs     GRAM STAIN RESULT   --  Gram positive cocci in clusters*   LEGIONELLA URINARY ANTIGEN  Negative  --        Last 24 Hours Medication List:   Current Facility-Administered Medications   Medication Dose Route Frequency Provider Last Rate   • acetaminophen  650 mg Oral Q6H PRN Dolly Johns PA-C     • amLODIPine  5 mg Oral Daily Linnea Dias PA-C     • apixaban  5 mg Oral BID Linnea Dias PA-C     • aspirin  81 mg Oral Daily Linnea Dias PA-C     • atorvastatin  10 mg Oral Daily Linnea Dias PA-C     • azithromycin  250 mg Oral Q24H Linnea Dias PA-C     • chlorhexidine  15 mL Mouth/Throat Q12H [address] Linnea Dias PA-C     • clopidogrel  75 mg Oral Daily Linnea Dias PA-C     • docusate sodium  200 mg Oral Daily Linnea Dias PA-C     • ferrous sulfate  325 mg Oral Every Other Day Linnea Dias PA-C     • fluticasone-vilanterol  1 puff Inhalation Daily Linnea Lombardo PA-C     • furosemide  40 mg Oral Daily Linnea Dias PA-C     • gabapentin  300 mg Oral TID Dolly Johns PA-C     • insulin glargine  12 Units Subcutaneous HS Linnea Dias PA-C     • insulin lispro  4-20 Units Subcutaneous 4x Daily (AC & HS) Vasu Rizzo PA-C     • ipratropium  0 5 mg Nebulization Q6H Linnea Dias PA-C     • levalbuterol  1 25 mg Nebulization Q6H Vasu Rizzo PA-C     • metoprolol tartrate  50 mg Oral Q12H [address] [place], [place]     • Mirabegron ER  25 mg Oral Daily Vasu Rizzo, [place]     • mirtazapine  30 mg Oral HS Vasu Rizzo PA-C     • nicotine  1 patch Transdermal Daily Linnea Caprice Adams PA-C     • ondansetron  4 mg Intravenous Q4H PRN Vasu Rizzo PA-C     • oxyCODONE  5 mg Oral Q4H PRN Vasu Rizzo PA-C     • rOPINIRole  0 5 mg Oral HS Linnea Dias PA-C     • sertraline  50 mg Oral HS Linnea Dias PA-C     • traZODone  50 mg Oral HS Vasu Rizzo PA-C          Today, Patient Was Seen By: Laura Rivera MD    **Please Note: This note may have been constructed using a voice recognition system  **

## 2023-05-05 NOTE — ASSESSMENT & PLAN NOTE
Lab Results   Component Value Date    HGBA1C 7 5 (H) 01/23/2023       Recent Labs     05/04/23  1138 05/04/23  1554 05/04/23  2134 05/05/23  0733   POCGLU 228* 138 157* 160*       Blood Sugar Average: Last 72 hrs:  (P) 336 [id number]   Continue sliding scale, follow hypoglycemia precaution  While patient remained in the hospital, target blood sugar range in between 1 40-1 80

## 2023-05-05 NOTE — ASSESSMENT & PLAN NOTE
H/o COPD and CHF, baseline oxygen 3L NC  Presented with worsening SOB over past 3-4 days  CXR with pulmonary vascular congestion  Require BiPAP in ED  Ceftriaxone and azithromycin started in ED  Methylprednisolone 125 mg IV x1 in ED      • Patient currently back to baseline of oxygen recommend, 3 L  • Continue treatment plan for acute on chronic CHF with diuresis  ? Urine Legionella, pneumonia negative  ? 1 set of blood culture shows coagulase-negative staph, second set of blood culture shows no growth-mostly contaminated  ? Continue respiratory protocol  ? Continue p o   Lasix and monitor renal function

## 2023-05-05 NOTE — PHYSICAL THERAPY NOTE
PHYSICAL THERAPY TREATMENT NOTE  NAME:  Alan Hickman  DATE: 05/05/23    Length Of Stay: 3  Performed at least 2 patient identifiers during session: Name and Birthday  Treatment time: 5472-9524  Treatment length: 8 min       05/05/23 1048   Note Type   Note Type Treatment   Pain Assessment   Pain Assessment Tool 0-10   Pain Score No Pain   Restrictions/Precautions   Other Precautions Chair Alarm; Bed Alarm; Fall Risk;O2  (3 lpm O2)   General   Chart Reviewed Yes   Response to Previous Treatment Patient with no complaints from previous session  Family/Caregiver Present Yes   Cognition   Overall Cognitive Status WFL   Orientation Level Oriented X4   Following Commands Follows one step commands without difficulty   Bed Mobility   Additional Comments Pt seated OOB on BSC at start of session; bed mobility not assessed   Transfers   Sit to Stand   (steadying assist)   Additional items Increased time required;Verbal cues   Stand to Sit   (steadying assist)   Additional items Increased time required;Verbal cues   Stand pivot   (steadying assist)   Additional items Increased time required;Verbal cues   Additional Comments RW for transfers, steadying assist with VC for hand placement and safety   Ambulation/Elevation   Gait pattern Wide MATY; Decreased foot clearance; Short stride; Excessively slow   Gait Assistance   (steadying assist)   Additional items Verbal cues   Assistive Device Rolling walker   Distance 4' with RW and steadying assist with chair follow  Pt limited by fatigue, no LOB   Pt declining to participate with therapy further reporting fatigue, education provided on benefits of increased exercise/mobility   Balance   Static Sitting Good   Dynamic Sitting Fair +   Static Standing Fair   Dynamic Standing Fair -   Ambulatory Fair -   Endurance Deficit   Endurance Deficit Yes   Endurance Deficit Description fatigue   Activity Tolerance   Activity Tolerance Patient limited by fatigue   Medical Staff Made Aware OT, Mega Thompson   Nurse Made Aware RN aware   Assessment   Prognosis Good   Problem List Decreased strength;Decreased endurance; Impaired balance;Decreased mobility; Impaired judgement;Decreased safety awareness; Impaired sensation;Decreased skin integrity   Barriers to Discharge Inaccessible home environment   Barriers to Discharge Comments limited activity tolerance   Goals   PT Treatment Day 2   Plan   Treatment/Interventions Functional transfer training;LE strengthening/ROM; Therapeutic exercise; Endurance training;Patient/family training;Equipment eval/education; Bed mobility;Gait training; Compensatory technique education;Spoke to nursing;OT   Progress Slow progress, decreased activity tolerance   PT Frequency 3-5x/wk   Recommendation   PT Discharge Recommendation Home with home health rehabilitation   Equipment Recommended   (pt owns RW)   Additional Comments Anticipate pt return home with continued family support and HHPT however if family unable to provide increased assistance, recommend PAR   AM-PAC Basic Mobility Inpatient   Turning in Flat Bed Without Bedrails 4   Lying on Back to Sitting on Edge of Flat Bed Without Bedrails 3   Moving Bed to Chair 3   Standing Up From Chair Using Arms 3   Walk in Room 3   Climb 3-5 Stairs With Railing 1   Basic Mobility Inpatient Raw Score 17   Basic Mobility Standardized Score 39 67   Highest Level Of Mobility   -HLM Goal 5: Stand one or more mins   -HLM Achieved 5: Stand (1 or more minutes)   Education   Education Provided Mobility training   Patient Demonstrates acceptance/verbal understanding;Reinforcement needed   End of Consult   Patient Position at End of Consult Bedside chair;Bed/Chair alarm activated; All needs within reach     The patient's AM-PAC Basic Mobility Inpatient Short Form Raw Score is 17  A Raw score of greater than 16 suggests the patient may benefit from discharge to home   Please also refer to the recommendation of the Physical Therapist for safe discharge planning  Assessment: Pt seen for PT treatment session this date with interventions consisting of gait training w/ emphasis on improving pt's ability to ambulate level surfaces x 4' with steadying assist provided by therapist with RW  Pt agreeable to PT treatment session upon arrival, pt found seated OOB on BSC with PCAs present, in no apparent distress  In comparison to previous session, pt with no improvements as evidenced by pt ambulating similar distance with RW and steadying assist, pt declining to participate further with therapy this session   Post session: pt returned back to recliner, chair alarm engaged, all needs in reach, and RN notified of session findings/recommendations Continue to recommend Home PT at time of d/c in order to maximize pt's functional independence and safety w/ mobility  Pt continues to be functioning below baseline level, and remains limited 2* factors listed above and including decreased strength, balance, mobility, and activity tolerance  PT will continue to see pt while here in order to address the deficits listed above and provide interventions consistent w/ POC in effort to achieve STGs       Pedro Ashley, PT,DPT

## 2023-05-05 NOTE — PLAN OF CARE
Problem: PHYSICAL THERAPY ADULT  Goal: Performs mobility at highest level of function for planned discharge setting  See evaluation for individualized goals  Description: Treatment/Interventions: ADL retraining, Functional transfer training, LE strengthening/ROM, Elevations, Therapeutic exercise, Endurance training, Patient/family training, Equipment eval/education, Bed mobility, Compensatory technique education, Gait training, Spoke to nursing, Spoke to case management, OT  Equipment Recommended:  (walker-pt has)       See flowsheet documentation for full assessment, interventions and recommendations  Outcome: Not Progressing  Note: Prognosis: Good  Problem List: Decreased strength, Decreased endurance, Impaired balance, Decreased mobility, Impaired judgement, Decreased safety awareness, Impaired sensation, Decreased skin integrity  Assessment: Pt seen for PT treatment session this date with interventions consisting of gait training w/ emphasis on improving pt's ability to ambulate level surfaces x 4' with steadying assist provided by therapist with RW  Pt agreeable to PT treatment session upon arrival, pt found seated OOB on BSC with PCAs present, in no apparent distress  In comparison to previous session, pt with no improvements as evidenced by pt ambulating similar distance with RW and steadying assist, pt declining to participate further with therapy this session   Post session: pt returned back to recliner, chair alarm engaged, all needs in reach, and RN notified of session findings/recommendations Continue to recommend Home PT at time of d/c in order to maximize pt's functional independence and safety w/ mobility  Pt continues to be functioning below baseline level, and remains limited 2* factors listed above and including decreased strength, balance, mobility, and activity tolerance   PT will continue to see pt while here in order to address the deficits listed above and provide interventions consistent w/ POC in effort to achieve STGs  Barriers to Discharge: Inaccessible home environment  Barriers to Discharge Comments: limited activity tolerance  PT Discharge Recommendation: Home with home health rehabilitation    See flowsheet documentation for full assessment

## 2023-05-05 NOTE — PLAN OF CARE
Problem: SAFETY ADULT  Goal: Patient will remain free of falls  Description: INTERVENTIONS:  - Educate patient/family on patient safety including physical limitations  - Instruct patient to call for assistance with activity   - Consult OT/PT to assist with strengthening/mobility   - Keep Call bell within reach  - Keep bed low and locked with side rails adjusted as appropriate  - Keep care items and personal belongings within reach  - Initiate and maintain comfort rounds  - Make Fall Risk Sign visible to staff  - Offer Toileting every 2 Hours, in advance of need  - Initiate/Maintain  bed alarm  - Apply yellow socks and bracelet for high fall risk patients  - Consider moving patient to room near nurses station  Outcome: Progressing

## 2023-05-05 NOTE — PLAN OF CARE
Problem: MOBILITY - ADULT  Goal: Maintain or return to baseline ADL function  Description: INTERVENTIONS:  -  Assess patient's ability to carry out ADLs; assess patient's baseline for ADL function and identify physical deficits which impact ability to perform ADLs (bathing, care of mouth/teeth, toileting, grooming, dressing, etc )  - Assess/evaluate cause of self-care deficits   - Assess range of motion  - Assess patient's mobility; develop plan if impaired  - Assess patient's need for assistive devices and provide as appropriate  - Encourage maximum independence but intervene and supervise when necessary  - Involve family in performance of ADLs  - Assess for home care needs following discharge   - Consider OT consult to assist with ADL evaluation and planning for discharge  - Provide patient education as appropriate  Outcome: Progressing  Goal: Maintains/Returns to pre admission functional level  Description: INTERVENTIONS:  - Perform BMAT or MOVE assessment daily    - Set and communicate daily mobility goal to care team and patient/family/caregiver  - Collaborate with rehabilitation services on mobility goals if consulted  - Perform Range of Motion 2 times a day  - Reposition patient every 2 hours    - Dangle patient 2 times a day  - Stand patient 2 times a day  - Ambulate patient 2 times a day  - Out of bed to chair 2 times a day   - Out of bed for meals 2 times a day  - Out of bed for toileting  - Record patient progress and toleration of activity level   Outcome: Progressing     Problem: Prexisting or High Potential for Compromised Skin Integrity  Goal: Skin integrity is maintained or improved  Description: INTERVENTIONS:  - Identify patients at risk for skin breakdown  - Assess and monitor skin integrity  - Assess and monitor nutrition and hydration status  - Monitor labs   - Assess for incontinence   - Turn and reposition patient  - Assist with mobility/ambulation  - Relieve pressure over bony prominences  - Avoid friction and shearing  - Provide appropriate hygiene as needed including keeping skin clean and dry  - Evaluate need for skin moisturizer/barrier cream  - Collaborate with interdisciplinary team   - Patient/family teaching  - Consider wound care consult   Outcome: Progressing     Problem: PAIN - ADULT  Goal: Verbalizes/displays adequate comfort level or baseline comfort level  Description: Interventions:  - Encourage patient to monitor pain and request assistance  - Assess pain using appropriate pain scale  - Administer analgesics based on type and severity of pain and evaluate response  - Implement non-pharmacological measures as appropriate and evaluate response  - Consider cultural and social influences on pain and pain management  - Notify physician/advanced practitioner if interventions unsuccessful or patient reports new pain  Outcome: Progressing     Problem: INFECTION - ADULT  Goal: Absence or prevention of progression during hospitalization  Description: INTERVENTIONS:  - Assess and monitor for signs and symptoms of infection  - Monitor lab/diagnostic results  - Monitor all insertion sites, i e  indwelling lines, tubes, and drains  - Monitor endotracheal if appropriate and nasal secretions for changes in amount and color  - [place] appropriate cooling/warming therapies per order  - Administer medications as ordered  - Instruct and encourage patient and family to use good hand hygiene technique  - Identify and instruct in appropriate isolation precautions for identified infection/condition  Outcome: Progressing  Goal: Absence of fever/infection during neutropenic period  Description: INTERVENTIONS:  - Monitor WBC    Outcome: Progressing     Problem: SAFETY ADULT  Goal: Maintain or return to baseline ADL function  Description: INTERVENTIONS:  -  Assess patient's ability to carry out ADLs; assess patient's baseline for ADL function and identify physical deficits which impact ability to perform ADLs (bathing, care of mouth/teeth, toileting, grooming, dressing, etc )  - Assess/evaluate cause of self-care deficits   - Assess range of motion  - Assess patient's mobility; develop plan if impaired  - Assess patient's need for assistive devices and provide as appropriate  - Encourage maximum independence but intervene and supervise when necessary  - Involve family in performance of ADLs  - Assess for home care needs following discharge   - Consider OT consult to assist with ADL evaluation and planning for discharge  - Provide patient education as appropriate  Outcome: Progressing  Goal: Maintains/Returns to pre admission functional level  Description: INTERVENTIONS:  - Perform BMAT or MOVE assessment daily    - Set and communicate daily mobility goal to care team and patient/family/caregiver  - Collaborate with rehabilitation services on mobility goals if consulted  - Perform Range of Motion 2 times a day  - Reposition patient every 2 hours    - Dangle patient 2 times a day  - Stand patient 2 times a day  - Ambulate patient 2 times a day  - Out of bed to chair 2 times a day   - Out of bed for meals 2 times a day  - Out of bed for toileting  - Record patient progress and toleration of activity level   Outcome: Progressing  Goal: Patient will remain free of falls  Description: INTERVENTIONS:  - Educate patient/family on patient safety including physical limitations  - Instruct patient to call for assistance with activity   - Consult OT/PT to assist with strengthening/mobility   - Keep Call bell within reach  - Keep bed low and locked with side rails adjusted as appropriate  - Keep care items and personal belongings within reach  - Initiate and maintain comfort rounds  - Make Fall Risk Sign visible to staff  - Offer Toileting every 2 Hours, in advance of need  - Initiate/Maintain bed alarm  - Obtain necessary fall risk management equipment  - Apply yellow socks and bracelet for high fall risk patients  - Consider moving patient to room near nurses station  Outcome: Progressing     Problem: DISCHARGE PLANNING  Goal: Discharge to home or other facility with appropriate resources  Description: INTERVENTIONS:  - Identify barriers to discharge w/patient and caregiver  - Arrange for needed discharge resources and transportation as appropriate  - Identify discharge learning needs (meds, wound care, etc )  - Arrange for interpretive services to assist at discharge as needed  - Refer to Case Management Department for coordinating discharge planning if the patient needs post-hospital services based on physician/advanced practitioner order or complex needs related to functional status, cognitive ability, or social support system  Outcome: Progressing     Problem: Knowledge Deficit  Goal: Patient/family/caregiver demonstrates understanding of disease process, treatment plan, medications, and discharge instructions  Description: Complete learning assessment and assess knowledge base  Interventions:  - Provide teaching at level of understanding  - Provide teaching via preferred learning methods  Outcome: Progressing     Problem: Nutrition/Hydration-ADULT  Goal: Nutrient/Hydration intake appropriate for improving, restoring or maintaining nutritional needs  Description: Monitor and assess patient's nutrition/hydration status for malnutrition  Collaborate with interdisciplinary team and initiate plan and interventions as ordered  Monitor patient's weight and dietary intake as ordered or per policy  Utilize nutrition screening tool and intervene as necessary  Determine patient's food preferences and provide high-protein, high-caloric foods as appropriate       INTERVENTIONS:  - Monitor oral intake, urinary output, labs, and treatment plans  - Assess nutrition and hydration status and recommend course of action  - Evaluate amount of meals eaten  - Assist patient with eating if necessary   - Allow adequate time for meals  - Recommend/ encourage appropriate diets, oral nutritional supplements, and vitamin/mineral supplements  - Order, calculate, and assess calorie counts as needed  - Recommend, monitor, and adjust tube feedings and TPN/PPN based on assessed needs  - Assess need for intravenous fluids  - Provide specific nutrition/hydration education as appropriate  - Include patient/family/caregiver in decisions related to nutrition  Outcome: Progressing

## 2023-05-05 NOTE — ASSESSMENT & PLAN NOTE
Wt Readings from Last 3 Encounters:   05/05/23 74 8 kg (164 lb 14 5 oz)   02/04/23 82 9 kg (182 lb 12 2 oz)   01/27/23 75 8 kg (167 lb 3 2 oz)     Presented with SOB, diaphoresis, facial swellinging x 3-4 days     Patient was receiving IV Lasix, transition to home dose Lasix 40 mg-creatinine is trending up, continue current treatment and monitor renal function in next 24 hours-remained stable, can plan for discharge      • 5/3 ECHO: EF 75 %, grade 1 DD, RV Nl  • Lasix 80 mg IV BID  • Continue diuresis with goal negative 1-2 liters over next 24 hours  • Daily weight  • Fluid restriction when diet started  • Strict I&O

## 2023-05-05 NOTE — PLAN OF CARE
Problem: OCCUPATIONAL THERAPY ADULT  Goal: Performs self-care activities at highest level of function for planned discharge setting  See evaluation for individualized goals  Description: Treatment Interventions: ADL retraining, Functional transfer training, UE strengthening/ROM, Endurance training, Patient/family training, Equipment evaluation/education, Neuromuscular reeducation, Compensatory technique education, Continued evaluation, Energy conservation, Activityengagement          See flowsheet documentation for full assessment, interventions and recommendations  Note: Limitation: Decreased ADL status, Decreased UE strength, Decreased Safe judgement during ADL, Decreased endurance, Decreased self-care trans, Decreased high-level ADLs  Prognosis: Good  Assessment: Pt completed OT tx session #2 focused on ADL performance and functional mobility  Pt alert and agreeable to participate  PT/OT co-treat completed d/t mobility deficits and safety concerns  Pt demonstrated improvements in endurance this session but continues to be limited by fatigue  Pt currently completing UB ADLs @ S/set-up, LB ADLs @ Max A, and functional mobility with RW @ Steadying assist  Pt maintained Good O2 sats on 3lpm throughout  Pt demonstrating Good participation and Good potential to achieve goals but is currently demonstrating deficits in decrease activity tolerance, decrease standing balance, decrease performance during ADL tasks, decrease UB MS, decrease generalized strength, decrease activity engagement and decrease performance during functional transfers  Continue to recommend HHOT with consistent family support upon discharge to increase safety and independence in ADL tasks and functional mobility       OT Discharge Recommendation: Home with home health rehabilitation

## 2023-05-05 NOTE — ASSESSMENT & PLAN NOTE
Lab Results   Component Value Date    EGFR 33 05/05/2023    EGFR 42 05/04/2023    EGFR 41 05/03/2023    CREATININE 1 47 (H) 05/05/2023    CREATININE 1 20 05/04/2023    CREATININE 1 24 05/03/2023   Baseline creatinine varies from 1-1 2  Initially patient creatinine was improving, today creatinine trended up to 1 7 patient remained on p o  Lasix, continue to monitor  Check renal function in 24 hours, if stable, can plan for discharge  Follow retention

## 2023-05-05 NOTE — PLAN OF CARE
Problem: MOBILITY - ADULT  Goal: Maintain or return to baseline ADL function  Description: INTERVENTIONS:  -  Assess patient's ability to carry out ADLs; assess patient's baseline for ADL function and identify physical deficits which impact ability to perform ADLs (bathing, care of mouth/teeth, toileting, grooming, dressing, etc )  - Assess/evaluate cause of self-care deficits   - Assess range of motion  - Assess patient's mobility; develop plan if impaired  - Assess patient's need for assistive devices and provide as appropriate  - Encourage maximum independence but intervene and supervise when necessary  - Involve family in performance of ADLs  - Assess for home care needs following discharge   - Consider OT consult to assist with ADL evaluation and planning for discharge  - Provide patient education as appropriate  Outcome: Progressing  Goal: Maintains/Returns to pre admission functional level  Description: INTERVENTIONS:  - Perform BMAT or MOVE assessment daily    - Set and communicate daily mobility goal to care team and patient/family/caregiver  - Collaborate with rehabilitation services on mobility goals if consulted  - Perform Range of Motion 2 times a day  - Reposition patient every 2 hours    - Dangle patient 2 times a day  - Stand patient 2 times a day  - Ambulate patient 2 times a day  - Out of bed to chair 2 times a day   - Out of bed for meals 2 times a day  - Out of bed for toileting  - Record patient progress and toleration of activity level   Outcome: Progressing     Problem: Prexisting or High Potential for Compromised Skin Integrity  Goal: Skin integrity is maintained or improved  Description: INTERVENTIONS:  - Identify patients at risk for skin breakdown  - Assess and monitor skin integrity  - Assess and monitor nutrition and hydration status  - Monitor labs   - Assess for incontinence   - Turn and reposition patient  - Assist with mobility/ambulation  - Relieve pressure over bony prominences  - Avoid friction and shearing  - Provide appropriate hygiene as needed including keeping skin clean and dry  - Evaluate need for skin moisturizer/barrier cream  - Collaborate with interdisciplinary team   - Patient/family teaching  - Consider wound care consult   Outcome: Progressing     Problem: PAIN - ADULT  Goal: Verbalizes/displays adequate comfort level or baseline comfort level  Description: Interventions:  - Encourage patient to monitor pain and request assistance  - Assess pain using appropriate pain scale  - Administer analgesics based on type and severity of pain and evaluate response  - Implement non-pharmacological measures as appropriate and evaluate response  - Consider cultural and social influences on pain and pain management  - Notify physician/advanced practitioner if interventions unsuccessful or patient reports new pain  Outcome: Progressing     Problem: INFECTION - ADULT  Goal: Absence or prevention of progression during hospitalization  Description: INTERVENTIONS:  - Assess and monitor for signs and symptoms of infection  - Monitor lab/diagnostic results  - Monitor all insertion sites, i e  indwelling lines, tubes, and drains  - Monitor endotracheal if appropriate and nasal secretions for changes in amount and color  - [place] appropriate cooling/warming therapies per order  - Administer medications as ordered  - Instruct and encourage patient and family to use good hand hygiene technique  - Identify and instruct in appropriate isolation precautions for identified infection/condition  Outcome: Progressing  Goal: Absence of fever/infection during neutropenic period  Description: INTERVENTIONS:  - Monitor WBC    Outcome: Progressing     Problem: SAFETY ADULT  Goal: Maintain or return to baseline ADL function  Description: INTERVENTIONS:  -  Assess patient's ability to carry out ADLs; assess patient's baseline for ADL function and identify physical deficits which impact ability to perform ADLs (bathing, care of mouth/teeth, toileting, grooming, dressing, etc )  - Assess/evaluate cause of self-care deficits   - Assess range of motion  - Assess patient's mobility; develop plan if impaired  - Assess patient's need for assistive devices and provide as appropriate  - Encourage maximum independence but intervene and supervise when necessary  - Involve family in performance of ADLs  - Assess for home care needs following discharge   - Consider OT consult to assist with ADL evaluation and planning for discharge  - Provide patient education as appropriate  Outcome: Progressing  Goal: Maintains/Returns to pre admission functional level  Description: INTERVENTIONS:  - Perform BMAT or MOVE assessment daily    - Set and communicate daily mobility goal to care team and patient/family/caregiver  - Collaborate with rehabilitation services on mobility goals if consulted  - Perform Range of Motion 2 times a day  - Reposition patient every 2 hours    - Dangle patient 2 times a day  - Stand patient 2 times a day  - Ambulate patient 2 times a day  - Out of bed to chair 2 times a day   - Out of bed for meals 2 times a day  - Out of bed for toileting  - Record patient progress and toleration of activity level   Outcome: Progressing  Goal: Patient will remain free of falls  Description: INTERVENTIONS:  - Educate patient/family on patient safety including physical limitations  - Instruct patient to call for assistance with activity   - Consult OT/PT to assist with strengthening/mobility   - Keep Call bell within reach  - Keep bed low and locked with side rails adjusted as appropriate  - Keep care items and personal belongings within reach  - Initiate and maintain comfort rounds  - Make Fall Risk Sign visible to staff  - Offer Toileting every  Hours, in advance of need  - Initiate/Maintain alarm  - Obtain necessary fall risk management equipment  - Apply yellow socks and bracelet for high fall risk patients  - Consider moving patient to room near nurses station  Outcome: Progressing     Problem: DISCHARGE PLANNING  Goal: Discharge to home or other facility with appropriate resources  Description: INTERVENTIONS:  - Identify barriers to discharge w/patient and caregiver  - Arrange for needed discharge resources and transportation as appropriate  - Identify discharge learning needs (meds, wound care, etc )  - Arrange for interpretive services to assist at discharge as needed  - Refer to Case Management Department for coordinating discharge planning if the patient needs post-hospital services based on physician/advanced practitioner order or complex needs related to functional status, cognitive ability, or social support system  Outcome: Progressing     Problem: Knowledge Deficit  Goal: Patient/family/caregiver demonstrates understanding of disease process, treatment plan, medications, and discharge instructions  Description: Complete learning assessment and assess knowledge base  Interventions:  - Provide teaching at level of understanding  - Provide teaching via preferred learning methods  Outcome: Progressing     Problem: Nutrition/Hydration-ADULT  Goal: Nutrient/Hydration intake appropriate for improving, restoring or maintaining nutritional needs  Description: Monitor and assess patient's nutrition/hydration status for malnutrition  Collaborate with interdisciplinary team and initiate plan and interventions as ordered  Monitor patient's weight and dietary intake as ordered or per policy  Utilize nutrition screening tool and intervene as necessary  Determine patient's food preferences and provide high-protein, high-caloric foods as appropriate       INTERVENTIONS:  - Monitor oral intake, urinary output, labs, and treatment plans  - Assess nutrition and hydration status and recommend course of action  - Evaluate amount of meals eaten  - Assist patient with eating if necessary   - Allow adequate time for meals  - Recommend/ encourage appropriate diets, oral nutritional supplements, and vitamin/mineral supplements  - Order, calculate, and assess calorie counts as needed  - Recommend, monitor, and adjust tube feedings and TPN/PPN based on assessed needs  - Assess need for intravenous fluids  - Provide specific nutrition/hydration education as appropriate  - Include patient/family/caregiver in decisions related to nutrition  Outcome: Progressing

## 2023-05-05 NOTE — ASSESSMENT & PLAN NOTE
Improved  Likely Cardiopulmonary etiology  Presented from with [facility]  Daughter reported she has been confused since Friday  • UDS +opiates  • UA negative  , MRSA, urine strep/legionella negative  -Blood culture seems contaminated since 1 set of blood culture shows coagulase-negative staph, second set of blood culture shows no growth  • CTH unremarkable  • Trend PCT - negative  • ABG 7 32/57/68/29 on 3L NC  • Accuchecks with SSI   Avoid hypo/hyperglycemia  • Continue to hold home Gabapentin at this time

## 2023-05-05 NOTE — OCCUPATIONAL THERAPY NOTE
Occupational Therapy Progress Note     Patient Name: Campos Shea  [id number] Date: 5/5/2023  Problem List  Principal Problem:    Acute on chronic respiratory failure with hypoxia and hypercapnia (HCC)  Active Problems:    Type 2 diabetes mellitus with skin complication, with long-term current use of insulin (HCC)    Acute on chronic congestive heart failure (HCC)    Chronic anemia    Acute encephalopathy    Acute kidney injury superimposed on chronic kidney disease ([facility] [address] )    Bacteremia     05/05/23 1049   Note Type   Note Type Treatment   Pain Assessment   Pain Assessment Tool 0-10   Pain Score No Pain   Restrictions/Precautions   Other Precautions Chair Alarm; Bed Alarm; Fall Risk;O2;Hard of hearing  (3lpm)   ADL   Toileting Comments Pt able to complete pericare while seated on BSC  Pt declined briefs but would require assist with CM and pericare while standing d/t BUE support on RW at tall times  Transfers   Sit to Stand   (Steadying assist)   Additional items Assist x 1; Increased time required;Verbal cues   Stand to Sit   (Steadying assist)   Additional items Assist x 1; Increased time required;Verbal cues   Stand pivot   (Steadying assist)   Additional items Assist x 1; Increased time required;Verbal cues   Additional Comments Pt seated on BSC with PCA at beginning of session  STS from [facility] to RW @ Steadying assist  Pt able to complete short distance functional mobility with RW @ Steadying assist  Pt seated in chair at end of session declining any other ADL task or functional mobility as family was present  Call bell within reach, chair alarm intact, call bell within reach and all needs met  Cognition   Overall Cognitive Status WFL   Arousal/Participation Alert; Responsive; Cooperative   Attention Attends with cues to redirect   Orientation Level Oriented X4   Memory Within functional limits   Following Commands Follows one step commands with increased time or repetition   Activity Tolerance   Activity Tolerance Patient limited by fatigue   Medical Staff Made Aware Spoke with PT Hannah   Assessment   Assessment Pt completed OT tx session #2 focused on ADL performance and functional mobility  Pt alert and agreeable to participate  PT/OT co-treat completed d/t mobility deficits and safety concerns  Pt demonstrated improvements in endurance this session but continues to be limited by fatigue  Pt currently completing UB ADLs @ S/set-up, LB ADLs @ Max A, and functional mobility with RW @ Steadying assist  Pt maintained Good O2 sats on 3lpm throughout  Pt demonstrating Good participation and Good potential to achieve goals but is currently demonstrating deficits in decrease activity tolerance, decrease standing balance, decrease performance during ADL tasks, decrease UB MS, decrease generalized strength, decrease activity engagement and decrease performance during functional transfers  Continue to recommend HHOT with consistent family support upon discharge to increase safety and independence in ADL tasks and functional mobility  Plan   Treatment Interventions ADL retraining;Functional transfer training   Goal Expiration Date 05/17/23   OT Treatment Day 2   OT Frequency 3-5x/wk   Recommendation   OT Discharge Recommendation Home with home health rehabilitation   [facility]   Lower Body Dressing 2   Bathing 2   Toileting 2   Upper Body Dressing 3   Grooming 3   Eating 4   Daily Activity Raw Score 16   Daily Activity Standardized Score (Calc for Raw Score >=11) 35 96   AM-PAC Applied Cognition Inpatient   Following a Speech/Presentation 3   Understanding Ordinary Conversation 4   Taking Medications 2   Remembering Where Things Are Placed or Put Away 3   Remembering List of 4-5 Errands 3   Taking Care of Complicated Tasks 2   Applied Cognition Raw Score 17   Applied Cognition Standardized Score 36 52     The patient's raw score on the AM-PAC Daily Activity Inpatient Short Form is 16   A raw score of less than 19 suggests the patient may benefit from discharge to post-acute rehabilitation services  Despite AM-PAC recommendation of post-acute rehabilitation services, family has been providing assist for patient with all ADL/IADL tasks and functional mobility  Please refer to the recommendation of the Occupational Therapist for safe discharge planning      Pt goals to be met by 5/17/2023     1  Pt will demonstrate ability to complete LB dressing @ Min A in order to increase safety and independence during meaningful tasks  2  Pt will demonstrate ability to complete toileting tasks including CM and pericare @ S in order to increase safety and independence during meaningful tasks  3  Pt will demonstrate ability to complete EOB, chair, toilet/commode transfers @ S in order to increase performance and participation during functional tasks  4  Pt will demonstrate ability to stand for 3-4 minutes while maintaining F+ balance with use of RW for UB support PRN  5  Pt will demonstrate ability to tolerate 30-35 minute OT session with no vc'ing for deep breathing or use of energy conservation techniques in order to increase activity tolerance during functional tasks  6  Pt will demonstrate Good carryover of use of energy conservation/compensatory strategies during ADLs and functional tasks in order to increase safety and reduce risk for falls  7  Pt will demonstrate Good attention and participation in continued evaluation of functional ambulation house hold distances in order to assist with safe d/c planning  8  Pt will attend to continued cognitive assessments 100% of the time in order to provide most appropriate d/c recommendations  9  Pt will follow 100% simple 2-step commands and be A&O x4 consistently with environmental cues to increase participation in functional activities     10  Pt will identify 3 areas of interest/hobbies and 1 intervention on how to incorporate into daily life in order to increase interaction with environment and peers as well as increase participation in meaningful tasks  11  Pt will demonstrate 100% carryover of BUE HEP in order to increase BUE MS and increase performance during functional tasks upon d/c home      Susan Ulloa OTR/L

## 2023-05-05 NOTE — ASSESSMENT & PLAN NOTE
Baseline hgb 8-9        Lab Results   Component Value Date     IRON 47 (L) 01/30/2023     FERRITIN 90 01/30/2023     TIBC 220 (L) 01/30/2023     CONCFE 21 01/30/2023            • Currently at baseline  • Trend CBC  • Transfuse for hgb < 7 0  • Start Ferrous Sulfate every other day

## 2023-05-06 ENCOUNTER — APPOINTMENT (INPATIENT)
Dept: ULTRASOUND IMAGING | Facility: HOSPITAL | Age: 83
DRG: 291 | End: 2023-05-06
Payer: COMMERCIAL

## 2023-05-06 LAB
ALBUMIN SERPL BCP-MCNC: 3.5 G/DL (ref 3.5–5)
ALP SERPL-CCNC: 52 U/L (ref 34–104)
ALT SERPL W P-5'-P-CCNC: 16 U/L (ref 7–52)
ANION GAP SERPL CALCULATED.3IONS-SCNC: 8 MMOL/L (ref 4–13)
AST SERPL W P-5'-P-CCNC: 15 U/L (ref 13–39)
BASOPHILS # BLD AUTO: 0.06 THOUSANDS/ÂΜL (ref 0–0.1)
BASOPHILS NFR BLD AUTO: 1 % (ref 0–1)
BILIRUB SERPL-MCNC: 0.25 MG/DL (ref 0.2–1)
BUN SERPL-MCNC: 49 MG/DL (ref 5–25)
CALCIUM SERPL-MCNC: 8.7 MG/DL (ref 8.4–10.2)
CHLORIDE SERPL-SCNC: 94 MMOL/L (ref 96–108)
CO2 SERPL-SCNC: 35 MMOL/L (ref 21–32)
CREAT SERPL-MCNC: 1.64 MG/DL (ref 0.6–1.3)
CREAT UR-MCNC: 84.5 MG/DL
EOSINOPHIL # BLD AUTO: 0.44 THOUSAND/ÂΜL (ref 0–0.61)
EOSINOPHIL NFR BLD AUTO: 3 % (ref 0–6)
ERYTHROCYTE [DISTWIDTH] IN BLOOD BY AUTOMATED COUNT: 16.2 % (ref 11.6–15.1)
GFR SERPL CREATININE-BSD FRML MDRD: 29 ML/MIN/1.73SQ M
GLUCOSE SERPL-MCNC: 111 MG/DL (ref 65–140)
GLUCOSE SERPL-MCNC: 151 MG/DL (ref 65–140)
GLUCOSE SERPL-MCNC: 151 MG/DL (ref 65–140)
GLUCOSE SERPL-MCNC: 181 MG/DL (ref 65–140)
GLUCOSE SERPL-MCNC: 223 MG/DL (ref 65–140)
HCT VFR BLD AUTO: 33 % (ref 34.8–46.1)
HGB BLD-MCNC: 10 G/DL (ref 11.5–15.4)
IMM GRANULOCYTES # BLD AUTO: 0.08 THOUSAND/UL (ref 0–0.2)
IMM GRANULOCYTES NFR BLD AUTO: 1 % (ref 0–2)
LYMPHOCYTES # BLD AUTO: 2.91 THOUSANDS/ÂΜL (ref 0.6–4.47)
LYMPHOCYTES NFR BLD AUTO: 22 % (ref 14–44)
MCH RBC QN AUTO: 27.3 PG (ref 26.8–34.3)
MCHC RBC AUTO-ENTMCNC: 30.3 G/DL (ref 31.4–37.4)
MCV RBC AUTO: 90 FL (ref 82–98)
MONOCYTES # BLD AUTO: 1.04 THOUSAND/ÂΜL (ref 0.17–1.22)
MONOCYTES NFR BLD AUTO: 8 % (ref 4–12)
NEUTROPHILS # BLD AUTO: 8.65 THOUSANDS/ÂΜL (ref 1.85–7.62)
NEUTS SEG NFR BLD AUTO: 65 % (ref 43–75)
NRBC BLD AUTO-RTO: 0 /100 WBCS
PLATELET # BLD AUTO: 323 THOUSANDS/UL (ref 149–390)
PMV BLD AUTO: 9.7 FL (ref 8.9–12.7)
POTASSIUM SERPL-SCNC: 3.8 MMOL/L (ref 3.5–5.3)
PROCALCITONIN SERPL-MCNC: 0.15 NG/ML
PROT SERPL-MCNC: 5.6 G/DL (ref 6.4–8.4)
RBC # BLD AUTO: 3.66 MILLION/UL (ref 3.81–5.12)
SODIUM 24H UR-SCNC: 31 MOL/L
SODIUM SERPL-SCNC: 137 MMOL/L (ref 135–147)
WBC # BLD AUTO: 13.18 THOUSAND/UL (ref 4.31–10.16)

## 2023-05-06 PROCEDURE — 84300 ASSAY OF URINE SODIUM: CPT

## 2023-05-06 PROCEDURE — 82948 REAGENT STRIP/BLOOD GLUCOSE: CPT

## 2023-05-06 PROCEDURE — 76775 US EXAM ABDO BACK WALL LIM: CPT

## 2023-05-06 PROCEDURE — 84145 PROCALCITONIN (PCT): CPT

## 2023-05-06 PROCEDURE — 99232 SBSQ HOSP IP/OBS MODERATE 35: CPT

## 2023-05-06 PROCEDURE — 80053 COMPREHEN METABOLIC PANEL: CPT | Performed by: FAMILY MEDICINE

## 2023-05-06 PROCEDURE — 82570 ASSAY OF URINE CREATININE: CPT

## 2023-05-06 PROCEDURE — 85025 COMPLETE CBC W/AUTO DIFF WBC: CPT | Performed by: FAMILY MEDICINE

## 2023-05-06 RX ORDER — BISACODYL 10 MG
10 SUPPOSITORY, RECTAL RECTAL ONCE
Status: COMPLETED | OUTPATIENT
Start: 2023-05-06 | End: 2023-05-06

## 2023-05-06 RX ORDER — POLYETHYLENE GLYCOL 3350 17 G/17G
17 POWDER, FOR SOLUTION ORAL DAILY PRN
Status: DISCONTINUED | OUTPATIENT
Start: 2023-05-06 | End: 2023-05-09 | Stop reason: HOSPADM

## 2023-05-06 RX ADMIN — CHLORHEXIDINE GLUCONATE 0.12% ORAL RINSE 15 ML: 1.2 LIQUID ORAL at 08:02

## 2023-05-06 RX ADMIN — SERTRALINE HYDROCHLORIDE 50 MG: 50 TABLET ORAL at 21:01

## 2023-05-06 RX ADMIN — POLYETHYLENE GLYCOL 3350 17 G: 17 POWDER, FOR SOLUTION ORAL at 07:55

## 2023-05-06 RX ADMIN — GABAPENTIN 300 MG: 300 CAPSULE ORAL at 17:21

## 2023-05-06 RX ADMIN — OXYCODONE HYDROCHLORIDE 5 MG: 5 TABLET ORAL at 01:30

## 2023-05-06 RX ADMIN — FERROUS SULFATE TAB 325 MG (65 MG ELEMENTAL FE) 325 MG: 325 (65 FE) TAB at 08:02

## 2023-05-06 RX ADMIN — GABAPENTIN 300 MG: 300 CAPSULE ORAL at 21:01

## 2023-05-06 RX ADMIN — METOPROLOL TARTRATE 50 MG: 50 TABLET, FILM COATED ORAL at 21:01

## 2023-05-06 RX ADMIN — CLOPIDOGREL BISULFATE 75 MG: 75 TABLET ORAL at 08:02

## 2023-05-06 RX ADMIN — ASPIRIN 81 MG CHEWABLE TABLET 81 MG: 81 TABLET CHEWABLE at 08:02

## 2023-05-06 RX ADMIN — FLUTICASONE FUROATE AND VILANTEROL TRIFENATATE 1 PUFF: 200; 25 POWDER RESPIRATORY (INHALATION) at 07:55

## 2023-05-06 RX ADMIN — GABAPENTIN 300 MG: 300 CAPSULE ORAL at 08:02

## 2023-05-06 RX ADMIN — INSULIN LISPRO 4 UNITS: 100 INJECTION, SOLUTION INTRAVENOUS; SUBCUTANEOUS at 07:55

## 2023-05-06 RX ADMIN — BISACODYL 10 MG: 10 SUPPOSITORY RECTAL at 05:01

## 2023-05-06 RX ADMIN — APIXABAN 5 MG: 5 TABLET, FILM COATED ORAL at 08:01

## 2023-05-06 RX ADMIN — INSULIN LISPRO 8 UNITS: 100 INJECTION, SOLUTION INTRAVENOUS; SUBCUTANEOUS at 17:23

## 2023-05-06 RX ADMIN — DOCUSATE SODIUM 200 MG: 100 CAPSULE ORAL at 08:01

## 2023-05-06 RX ADMIN — INSULIN GLARGINE 12 UNITS: 100 INJECTION, SOLUTION SUBCUTANEOUS at 21:26

## 2023-05-06 RX ADMIN — AMLODIPINE BESYLATE 5 MG: 5 TABLET ORAL at 08:02

## 2023-05-06 RX ADMIN — OXYCODONE HYDROCHLORIDE 5 MG: 5 TABLET ORAL at 17:21

## 2023-05-06 RX ADMIN — MIRTAZAPINE 30 MG: 15 TABLET, FILM COATED ORAL at 21:01

## 2023-05-06 RX ADMIN — ROPINIROLE 0.5 MG: 0.25 TABLET, FILM COATED ORAL at 21:01

## 2023-05-06 RX ADMIN — TRAZODONE HYDROCHLORIDE 50 MG: 50 TABLET ORAL at 21:01

## 2023-05-06 RX ADMIN — NICOTINE 1 PATCH: 7 PATCH, EXTENDED RELEASE TRANSDERMAL at 08:02

## 2023-05-06 RX ADMIN — APIXABAN 5 MG: 5 TABLET, FILM COATED ORAL at 17:21

## 2023-05-06 RX ADMIN — ATORVASTATIN CALCIUM 10 MG: 10 TABLET, FILM COATED ORAL at 08:02

## 2023-05-06 RX ADMIN — METOPROLOL TARTRATE 50 MG: 50 TABLET, FILM COATED ORAL at 08:02

## 2023-05-06 RX ADMIN — OXYCODONE HYDROCHLORIDE 5 MG: 5 TABLET ORAL at 21:25

## 2023-05-06 NOTE — ASSESSMENT & PLAN NOTE
Wt Readings from Last 3 Encounters:   05/05/23 74 8 kg (164 lb 14 5 oz)   02/04/23 82 9 kg (182 lb 12 2 oz)   01/27/23 75 8 kg (167 lb 3 2 oz)     Presented with SOB, diaphoresis, facial swellinging x 3-4 days     Patient was receiving IV Lasix, transition to home dose Lasix 40 mg-creatinine is trending up -held     • 5/3 ECHO: EF 75 %, grade 1 DD, RV Nl  • Lasix 80 mg IV BID initially now back onto Lasix 40 mg daily, now on hold starting this morning due to CONSTANCE  • Daily weight  • Fluid restriction  • Strict I&O

## 2023-05-06 NOTE — ASSESSMENT & PLAN NOTE
H/o COPD and CHF, baseline oxygen 3L NC - Presented with worsening SOB over past 3-4 days  CXR with pulmonary vascular congestion  Require BiPAP in ED  Ceftriaxone and azithromycin started in ED  Methylprednisolone 125 mg IV x1 in ED  Had no further need for steroids - azithromycin continued      • Patient currently back to baseline of oxygen recommend, 3 L  • Continue treatment plan for acute on chronic CHF with diuresis - on home lasix   ? Urine Legionella, pneumonia negative  ? 1 set of blood culture shows coagulase-negative staph, second set of blood culture shows no growth-mostly contaminated  ?  Continue respiratory protocol      Acute respiratory failure has resolved and patient is now back on her baseline oxygen requirements of 3 L

## 2023-05-06 NOTE — ASSESSMENT & PLAN NOTE
Lab Results   Component Value Date    HGBA1C 7 5 (H) 01/23/2023       Recent Labs     05/05/23 2059 05/05/23 2100 05/05/23 2148 05/06/23  0752   POCGLU 403* 309* 297* 151*       Blood Sugar Average: Last 72 hrs:  (P) 210 [id number]   Home regimen is Lantus at bedtime, NovoLog 70/30 5 units with supper  Continue sliding scale, continue Lantus, follow hypoglycemia precaution  While patient remained in the hospital, target blood sugar range in between 140-180 -improved this morning, monitor today

## 2023-05-06 NOTE — ASSESSMENT & PLAN NOTE
Based on report, seems contaminated since 1 set of blood culture shows coagulase-negative staph, second set blood culture shows no growth  Patient also received antibiotic, monitor off of antibiotic at this time

## 2023-05-06 NOTE — PROGRESS NOTES
[address]  Progress Note  Name: Michael Santana  MRN: [id number]  Unit/Bed#: -01 I Date of Admission: 5/2/2023   Date of Service: 5/6/2023 I Hospital Day: 4    Assessment/Plan   * Acute kidney injury superimposed on chronic kidney disease [facility])  Assessment & Plan  Lab Results   Component Value Date    EGFR 29 05/06/2023    EGFR 33 05/05/2023    EGFR 42 05/04/2023    CREATININE 1 64 (H) 05/06/2023    CREATININE 1 47 (H) 05/05/2023    CREATININE 1 20 05/04/2023   · Baseline creatinine varies from 1-1 2  · Initially patient creatinine was improving, then elevated to 1 4 yesterday and patient remained on p o  Lasix with subsequent elevation to 1 6 today  · Lasix on hold -does not examine hypervolemic  · Urine studies   · Follow retention -patient with minimal volume on bladder scan, minimal output   Bowel regimen to help with retention    Acute on chronic congestive heart failure [facility])  Assessment & Plan  Wt Readings from Last 3 Encounters:   05/05/23 74 8 kg (164 lb 14 5 oz)   02/04/23 82 9 kg (182 lb 12 2 oz)   01/27/23 75 8 kg (167 lb 3 2 oz)     Presented with SOB, diaphoresis, facial swellinging x 3-4 days  Patient was receiving IV Lasix, transition to home dose Lasix 40 mg-creatinine is trending up -held     • 5/3 ECHO: EF 75 %, grade 1 DD, RV Nl  • Lasix 80 mg IV BID initially now back onto Lasix 40 mg daily, now on hold starting this morning due to CONSTANCE  • Daily weight  • Fluid restriction  • Strict I&O        Chronic respiratory failure with hypoxia and hypercapnia (HCC)  Assessment & Plan  H/o COPD and CHF, baseline oxygen 3L NC - Presented with worsening SOB over past 3-4 days  CXR with pulmonary vascular congestion  Require BiPAP in ED  Ceftriaxone and azithromycin started in ED  Methylprednisolone 125 mg IV x1 in ED     Had no further need for steroids - azithromycin continued      • Patient currently back to baseline of oxygen recommend, 3 L  • Continue treatment plan for acute on chronic CHF with diuresis - on home lasix   ? Urine Legionella, pneumonia negative  ? 1 set of blood culture shows coagulase-negative staph, second set of blood culture shows no growth-mostly contaminated  ? Continue respiratory protocol      Acute respiratory failure has resolved and patient is now back on her baseline oxygen requirements of 3 L    Bacteremia  Assessment & Plan  Based on report, seems contaminated since 1 set of blood culture shows coagulase-negative staph, second set blood culture shows no growth  Patient also received antibiotic, monitor off of antibiotic at this time    Acute encephalopathy  Assessment & Plan  Improved  Likely Cardiopulmonary etiology  Presented from with AMS  Daughter reported she has been confused since Friday  • UDS +opiates  • UA negative  , MRSA, urine strep/legionella negative  -Blood culture seems contaminated since 1 set of blood culture shows coagulase-negative staph, second set of blood culture shows no growth  • CTH unremarkable  • Trend PCT - negative  • ABG 7 32/57/68/29 on 3L NC  • Accuchecks with SSI   Avoid hypo/hyperglycemia  • Continue to hold home Gabapentin at this time    Seems to have returned to baseline    Chronic anemia  Assessment & Plan  Baseline hgb 8-9        Lab Results   Component Value Date     IRON 47 (L) 01/30/2023     FERRITIN 90 01/30/2023     TIBC 220 (L) 01/30/2023     CONCFE 21 01/30/2023            • Currently at baseline  • Trend CBC  • Transfuse for hgb < 7 0  • Start Ferrous Sulfate every other day           Type 2 diabetes mellitus with skin complication, with long-term current use of insulin [facility])  Assessment & Plan  Lab Results   Component Value Date    HGBA1C 7 5 (H) 01/23/2023       Recent Labs     05/05/23 2059 05/05/23  2100 05/05/23  2148 05/06/23  0752   POCGLU 403* 309* 297* 151*       Blood Sugar Average: Last 72 hrs:  (P) 201 [id number]   Home regimen is Lantus at bedtime, NovoLog 70/30 5 units with supper  Continue sliding scale, continue Lantus, follow hypoglycemia precaution  While patient remained in the hospital, target blood sugar range in between 140-180 -improved this morning, monitor today         VTE Pharmacologic Prophylaxis: VTE Score: 11 High Risk (Score >/= 5) - Pharmacological DVT Prophylaxis Ordered: apixaban (Eliquis)  Sequential Compression Devices Ordered  Patient Centered Rounds: I performed bedside rounds with nursing staff today  Discussions with Specialists or Other Care Team Provider: CM    Education and Discussions with Family / Patient: Updated  (daughter) via phone  Total Time Spent on Date of Encounter in care of patient: 35 minutes This time was spent on one or more of the following: performing physical exam; counseling and coordination of care; obtaining or reviewing history; documenting in the medical record; reviewing/ordering tests, medications or procedures; communicating with other healthcare professionals and discussing with patient's family/caregivers  Current Length of Stay: 4 day(s)  Current Patient Status: Inpatient   Certification Statement: The patient will continue to require additional inpatient hospital stay due to CONSTANCE  Discharge Plan: Anticipate discharge in 24-48 hrs to home  Code Status: Level 3 - DNAR and DNI    Subjective:   Seen and examined today  Endorsing quite a bit of abdominal pain in the lower left area  States she has not had a bowel movement in about a week  Denies any headache, chest pain, shortness of breath  No nausea or vomiting  Able to tolerate oral intake  Objective:     Vitals:   Temp (24hrs), Av 3 °F (36 8 °C), Min:97 9 °F (36 6 °C), Max:98 8 °F (37 1 °C)    Temp:  [97 9 °F (36 6 °C)-98 8 °F (37 1 °C)] 97 9 °F (36 6 °C)  HR:  [66-82] 82  Resp:  [16-20] 20  BP: (128-156)/(53-75) 156/56  SpO2:  [94 %-97 %] 96 %  Body mass index is 26 62 kg/m²  Input and Output Summary (last 24 hours):      Intake/Output Summary (Last 24 hours) at 5/6/2023 1044  Last data filed at 5/6/2023 0926  Gross per 24 hour   Intake 600 ml   Output 275 ml   Net 325 ml       Physical Exam:   Physical Exam  Vitals and nursing note reviewed  Constitutional:       General: She is in acute distress (pain)  Appearance: Normal appearance  HENT:      Head: Normocephalic and atraumatic  Nose: No congestion  Mouth/Throat:      Mouth: Mucous membranes are moist    Eyes:      Conjunctiva/sclera: Conjunctivae normal    Cardiovascular:      Rate and Rhythm: Normal rate and regular rhythm  Pulses: Normal pulses  Heart sounds: Normal heart sounds  No murmur heard  Pulmonary:      Effort: Pulmonary effort is normal  No respiratory distress  Breath sounds: Normal breath sounds  Comments: 3L   Abdominal:      General: Bowel sounds are normal       Palpations: Abdomen is soft  Tenderness: There is abdominal tenderness  Musculoskeletal:         General: Normal range of motion  Right lower leg: No edema  Left lower leg: No edema  Skin:     General: Skin is warm and dry  Neurological:      Mental Status: She is alert and oriented to person, place, and time            Additional Data:     Labs:  Results from last 7 days   Lab Units 05/06/23  0501   WBC Thousand/uL 13 18*   HEMOGLOBIN g/dL 10 0*   HEMATOCRIT % 33 0*   PLATELETS Thousands/uL 323   NEUTROS PCT % 65   LYMPHS PCT % 22   MONOS PCT % 8   EOS PCT % 3     Results from last 7 days   Lab Units 05/06/23  0645   SODIUM mmol/L 137   POTASSIUM mmol/L 3 8   CHLORIDE mmol/L 94*   CO2 mmol/L 35*   BUN mg/dL 49*   CREATININE mg/dL 1 64*   ANION GAP mmol/L 8   CALCIUM mg/dL 8 7   ALBUMIN g/dL 3 5   TOTAL BILIRUBIN mg/dL 0 25   ALK PHOS U/L 52   ALT U/L 16   AST U/L 15   GLUCOSE RANDOM mg/dL 151*     Results from last 7 days   Lab Units 05/03/23  0434   INR  1 43*     Results from last 7 days   Lab Units 05/06/23  0752 05/05/23  2148 05/05/23  2100 05/05/23  2059 05/05/23  1546 05/05/23  1127 05/05/23  0733 05/04/23  2134 05/04/23  1554 05/04/23  1138 05/03/23  2123 05/03/23  1628   POC GLUCOSE mg/dl 151* 297* 309* 403* 134 142* 160* 157* 138 228* 129 121         Results from last 7 days   Lab Units 05/06/23  0645 05/02/23  1329   LACTIC ACID mmol/L  --  1 6   PROCALCITONIN ng/ml 0 15 0 07       Lines/Drains:  Invasive Devices     Peripheral Intravenous Line  Duration           Peripheral IV 05/06/23 Dorsal (posterior); Right Forearm <1 day                      Imaging: Reviewed radiology reports from this admission including: chest xray and CT head    Recent Cultures (last 7 days):   Results from last 7 days   Lab Units 05/02/23  1838 05/02/23  1329   BLOOD CULTURE   --  No Growth at 48 hrs    Staphylococcus coagulase negative*   GRAM STAIN RESULT   --  Gram positive cocci in clusters*   LEGIONELLA URINARY ANTIGEN  Negative  --        Last 24 Hours Medication List:   Current Facility-Administered Medications   Medication Dose Route Frequency Provider Last Rate   • acetaminophen  650 mg Oral Q6H PRN Omar Fowler PA-C     • amLODIPine  5 mg Oral Daily Linnea Dias PA-C     • apixaban  5 mg Oral BID Linnea Dias PA-C     • aspirin  81 mg Oral Daily Linnea Dias PA-C     • atorvastatin  10 mg Oral Daily Linnea Dias PA-C     • azithromycin  250 mg Oral Q24H Linnea Dias PA-C     • clopidogrel  75 mg Oral Daily Linnea Dias PA-C     • docusate sodium  200 mg Oral Daily Linnea Dias PA-C     • ferrous sulfate  325 mg Oral Every Other Day Linnea Dias PA-C     • fluticasone-vilanterol  1 puff Inhalation Daily Linnea Young PA-C     • gabapentin  300 mg Oral TID Linnea Dias PA-C     • insulin glargine  12 Units Subcutaneous HS Linnea Dias PA-C     • insulin lispro  4-20 Units Subcutaneous 4x Daily (AC & HS) Omar Fowler PA-C     • ipratropium  0 5 mg Nebulization Q6H PRN Agnieszka Ruelas, MD     • levalbuterol  1 25 mg Nebulization Q6H PRN Xavier Ruelas MD     • metoprolol tartrate  50 mg Oral Q12H [facility] Linnea Lidnicolas Hubbard PA-C     • Mirabegron ER  25 mg Oral Daily Linnea MARTÍNEZ [place], [place]     • mirtazapine  30 mg Oral HS Hemant Morris PA-C     • nicotine  1 patch Transdermal Daily Linnea Hubbard PA-C     • ondansetron  4 mg Intravenous Q4H PRN Hemant Morris PA-C     • oxyCODONE  5 mg Oral Q4H PRN Hemant Morris PA-C     • polyethylene glycol  17 g Oral Daily PRN AIYANA Harrington     • rOPINIRole  0 5 mg Oral HS Hemant Morris PA-C     • sertraline  50 mg Oral HS Hemant Morris PA-C     • traZODone  50 mg Oral HS Hemant Morris PA-C          Today, Patient Was Seen By: Shannon Spencer PA-C    **Please Note: This note may have been constructed using a voice recognition system  **

## 2023-05-06 NOTE — ASSESSMENT & PLAN NOTE
Improved  Likely Cardiopulmonary etiology  Presented from with AMS  Daughter reported she has been confused since Friday  • UDS +opiates  • UA negative  , MRSA, urine strep/legionella negative  -Blood culture seems contaminated since 1 set of blood culture shows coagulase-negative staph, second set of blood culture shows no growth  • CTH unremarkable  • Trend PCT - negative  • ABG 7 32/57/68/29 on 3L NC  • Accuchecks with SSI   Avoid hypo/hyperglycemia  • Continue to hold home Gabapentin at this time    Seems to have returned to baseline

## 2023-05-06 NOTE — PLAN OF CARE
Problem: MOBILITY - ADULT  Goal: Maintain or return to baseline ADL function  Description: INTERVENTIONS:  -  Assess patient's ability to carry out ADLs; assess patient's baseline for ADL function and identify physical deficits which impact ability to perform ADLs (bathing, care of mouth/teeth, toileting, grooming, dressing, etc )  - Assess/evaluate cause of self-care deficits   - Assess range of motion  - Assess patient's mobility; develop plan if impaired  - Assess patient's need for assistive devices and provide as appropriate  - Encourage maximum independence but intervene and supervise when necessary  - Involve family in performance of ADLs  - Assess for home care needs following discharge   - Consider OT consult to assist with ADL evaluation and planning for discharge  - Provide patient education as appropriate  Outcome: Progressing  Goal: Maintains/Returns to pre admission functional level  Description: INTERVENTIONS:  - Perform BMAT or MOVE assessment daily    - Set and communicate daily mobility goal to care team and patient/family/caregiver  - Collaborate with rehabilitation services on mobility goals if consulted  - Perform Range of Motion 2 times a day  - Reposition patient every 2 hours    - Dangle patient 2 times a day  - Stand patient 2 times a day  - Ambulate patient 2 times a day  - Out of bed to chair 2 times a day   - Out of bed for meals 2 times a day  - Out of bed for toileting  - Record patient progress and toleration of activity level   Outcome: Progressing     Problem: Prexisting or High Potential for Compromised Skin Integrity  Goal: Skin integrity is maintained or improved  Description: INTERVENTIONS:  - Identify patients at risk for skin breakdown  - Assess and monitor skin integrity  - Assess and monitor nutrition and hydration status  - Monitor labs   - Assess for incontinence   - Turn and reposition patient  - Assist with mobility/ambulation  - Relieve pressure over bony prominences  - Avoid friction and shearing  - Provide appropriate hygiene as needed including keeping skin clean and dry  - Evaluate need for skin moisturizer/barrier cream  - Collaborate with interdisciplinary team   - Patient/family teaching  - Consider wound care consult   Outcome: Progressing     Problem: PAIN - ADULT  Goal: Verbalizes/displays adequate comfort level or baseline comfort level  Description: Interventions:  - Encourage patient to monitor pain and request assistance  - Assess pain using appropriate pain scale  - Administer analgesics based on type and severity of pain and evaluate response  - Implement non-pharmacological measures as appropriate and evaluate response  - Consider cultural and social influences on pain and pain management  - Notify physician/advanced practitioner if interventions unsuccessful or patient reports new pain  Outcome: Progressing     Problem: INFECTION - ADULT  Goal: Absence or prevention of progression during hospitalization  Description: INTERVENTIONS:  - Assess and monitor for signs and symptoms of infection  - Monitor lab/diagnostic results  - Monitor all insertion sites, i e  indwelling lines, tubes, and drains  - Monitor endotracheal if appropriate and nasal secretions for changes in amount and color  - [place] appropriate cooling/warming therapies per order  - Administer medications as ordered  - Instruct and encourage patient and family to use good hand hygiene technique  - Identify and instruct in appropriate isolation precautions for identified infection/condition  Outcome: Progressing  Goal: Absence of fever/infection during neutropenic period  Description: INTERVENTIONS:  - Monitor WBC    Outcome: Progressing     Problem: SAFETY ADULT  Goal: Maintain or return to baseline ADL function  Description: INTERVENTIONS:  -  Assess patient's ability to carry out ADLs; assess patient's baseline for ADL function and identify physical deficits which impact ability to perform ADLs (bathing, care of mouth/teeth, toileting, grooming, dressing, etc )  - Assess/evaluate cause of self-care deficits   - Assess range of motion  - Assess patient's mobility; develop plan if impaired  - Assess patient's need for assistive devices and provide as appropriate  - Encourage maximum independence but intervene and supervise when necessary  - Involve family in performance of ADLs  - Assess for home care needs following discharge   - Consider OT consult to assist with ADL evaluation and planning for discharge  - Provide patient education as appropriate  Outcome: Progressing  Goal: Maintains/Returns to pre admission functional level  Description: INTERVENTIONS:  - Perform BMAT or MOVE assessment daily    - Set and communicate daily mobility goal to care team and patient/family/caregiver  - Collaborate with rehabilitation services on mobility goals if consulted  - Perform Range of Motion 2 times a day  - Reposition patient every 2 hours    - Dangle patient 2 times a day  - Stand patient 2 times a day  - Ambulate patient 2 times a day  - Out of bed to chair 2 times a day   - Out of bed for meals 2 times a day  - Out of bed for toileting  - Record patient progress and toleration of activity level   Outcome: Progressing  Goal: Patient will remain free of falls  Description: INTERVENTIONS:  - Educate patient/family on patient safety including physical limitations  - Instruct patient to call for assistance with activity   - Consult OT/PT to assist with strengthening/mobility   - Keep Call bell within reach  - Keep bed low and locked with side rails adjusted as appropriate  - Keep care items and personal belongings within reach  - Initiate and maintain comfort rounds  - Make Fall Risk Sign visible to staff  - Offer Toileting every 2 Hours, in advance of need  - Initiate/Maintain bed/chair alarm  - Obtain necessary fall risk management equipment: bed/chair alarm  - Apply yellow socks and bracelet for high fall risk patients  - Consider moving patient to room near nurses station  Outcome: Progressing     Problem: DISCHARGE PLANNING  Goal: Discharge to home or other facility with appropriate resources  Description: INTERVENTIONS:  - Identify barriers to discharge w/patient and caregiver  - Arrange for needed discharge resources and transportation as appropriate  - Identify discharge learning needs (meds, wound care, etc )  - Arrange for interpretive services to assist at discharge as needed  - Refer to Case Management Department for coordinating discharge planning if the patient needs post-hospital services based on physician/advanced practitioner order or complex needs related to functional status, cognitive ability, or social support system  Outcome: Progressing     Problem: Knowledge Deficit  Goal: Patient/family/caregiver demonstrates understanding of disease process, treatment plan, medications, and discharge instructions  Description: Complete learning assessment and assess knowledge base  Interventions:  - Provide teaching at level of understanding  - Provide teaching via preferred learning methods  Outcome: Progressing     Problem: Nutrition/Hydration-ADULT  Goal: Nutrient/Hydration intake appropriate for improving, restoring or maintaining nutritional needs  Description: Monitor and assess patient's nutrition/hydration status for malnutrition  Collaborate with interdisciplinary team and initiate plan and interventions as ordered  Monitor patient's weight and dietary intake as ordered or per policy  Utilize nutrition screening tool and intervene as necessary  Determine patient's food preferences and provide high-protein, high-caloric foods as appropriate       INTERVENTIONS:  - Monitor oral intake, urinary output, labs, and treatment plans  - Assess nutrition and hydration status and recommend course of action  - Evaluate amount of meals eaten  - Assist patient with eating if necessary   - Allow adequate time for meals  - Recommend/ encourage appropriate diets, oral nutritional supplements, and vitamin/mineral supplements  - Order, calculate, and assess calorie counts as needed  - Recommend, monitor, and adjust tube feedings and TPN/PPN based on assessed needs  - Assess need for intravenous fluids  - Provide specific nutrition/hydration education as appropriate  - Include patient/family/caregiver in decisions related to nutrition  Outcome: Progressing

## 2023-05-06 NOTE — ASSESSMENT & PLAN NOTE
Lab Results   Component Value Date    EGFR 29 05/06/2023    EGFR 33 05/05/2023    EGFR 42 05/04/2023    CREATININE 1 64 (H) 05/06/2023    CREATININE 1 47 (H) 05/05/2023    CREATININE 1 20 05/04/2023   · Baseline creatinine varies from 1-1 2  · Initially patient creatinine was improving, then elevated to 1 4 yesterday and patient remained on p o   Lasix with subsequent elevation to 1 6 today  · Lasix on hold -does not examine hypervolemic  · Urine studies   · Follow retention -patient with minimal volume on bladder scan, minimal output   Bowel regimen to help with retention

## 2023-05-07 LAB
ANION GAP SERPL CALCULATED.3IONS-SCNC: 9 MMOL/L (ref 4–13)
BUN SERPL-MCNC: 51 MG/DL (ref 5–25)
CALCIUM SERPL-MCNC: 8.6 MG/DL (ref 8.4–10.2)
CHLORIDE SERPL-SCNC: 95 MMOL/L (ref 96–108)
CO2 SERPL-SCNC: 32 MMOL/L (ref 21–32)
CREAT SERPL-MCNC: 1.62 MG/DL (ref 0.6–1.3)
ERYTHROCYTE [DISTWIDTH] IN BLOOD BY AUTOMATED COUNT: 15.9 % (ref 11.6–15.1)
GFR SERPL CREATININE-BSD FRML MDRD: 29 ML/MIN/1.73SQ M
GLUCOSE SERPL-MCNC: 133 MG/DL (ref 65–140)
GLUCOSE SERPL-MCNC: 147 MG/DL (ref 65–140)
GLUCOSE SERPL-MCNC: 167 MG/DL (ref 65–140)
GLUCOSE SERPL-MCNC: 176 MG/DL (ref 65–140)
GLUCOSE SERPL-MCNC: 229 MG/DL (ref 65–140)
HCT VFR BLD AUTO: 30.4 % (ref 34.8–46.1)
HGB BLD-MCNC: 9.4 G/DL (ref 11.5–15.4)
MCH RBC QN AUTO: 27.4 PG (ref 26.8–34.3)
MCHC RBC AUTO-ENTMCNC: 30.9 G/DL (ref 31.4–37.4)
MCV RBC AUTO: 89 FL (ref 82–98)
PLATELET # BLD AUTO: 291 THOUSANDS/UL (ref 149–390)
PMV BLD AUTO: 10.1 FL (ref 8.9–12.7)
POTASSIUM SERPL-SCNC: 3.8 MMOL/L (ref 3.5–5.3)
RBC # BLD AUTO: 3.43 MILLION/UL (ref 3.81–5.12)
SODIUM SERPL-SCNC: 136 MMOL/L (ref 135–147)
WBC # BLD AUTO: 10.88 THOUSAND/UL (ref 4.31–10.16)

## 2023-05-07 PROCEDURE — 99232 SBSQ HOSP IP/OBS MODERATE 35: CPT

## 2023-05-07 PROCEDURE — 80048 BASIC METABOLIC PNL TOTAL CA: CPT

## 2023-05-07 PROCEDURE — 82948 REAGENT STRIP/BLOOD GLUCOSE: CPT

## 2023-05-07 PROCEDURE — 85027 COMPLETE CBC AUTOMATED: CPT

## 2023-05-07 RX ADMIN — ATORVASTATIN CALCIUM 10 MG: 10 TABLET, FILM COATED ORAL at 07:53

## 2023-05-07 RX ADMIN — SERTRALINE HYDROCHLORIDE 50 MG: 50 TABLET ORAL at 21:21

## 2023-05-07 RX ADMIN — NICOTINE 1 PATCH: 7 PATCH, EXTENDED RELEASE TRANSDERMAL at 07:51

## 2023-05-07 RX ADMIN — POLYETHYLENE GLYCOL 3350 17 G: 17 POWDER, FOR SOLUTION ORAL at 07:51

## 2023-05-07 RX ADMIN — MIRTAZAPINE 30 MG: 15 TABLET, FILM COATED ORAL at 21:21

## 2023-05-07 RX ADMIN — INSULIN LISPRO 4 UNITS: 100 INJECTION, SOLUTION INTRAVENOUS; SUBCUTANEOUS at 12:09

## 2023-05-07 RX ADMIN — ASPIRIN 81 MG CHEWABLE TABLET 81 MG: 81 TABLET CHEWABLE at 07:53

## 2023-05-07 RX ADMIN — SODIUM CHLORIDE 250 ML: 0.9 INJECTION, SOLUTION INTRAVENOUS at 07:51

## 2023-05-07 RX ADMIN — CLOPIDOGREL BISULFATE 75 MG: 75 TABLET ORAL at 07:52

## 2023-05-07 RX ADMIN — INSULIN GLARGINE 12 UNITS: 100 INJECTION, SOLUTION SUBCUTANEOUS at 21:21

## 2023-05-07 RX ADMIN — ROPINIROLE 0.5 MG: 0.25 TABLET, FILM COATED ORAL at 21:21

## 2023-05-07 RX ADMIN — ACETAMINOPHEN 650 MG: 325 TABLET ORAL at 10:55

## 2023-05-07 RX ADMIN — INSULIN LISPRO 4 UNITS: 100 INJECTION, SOLUTION INTRAVENOUS; SUBCUTANEOUS at 21:22

## 2023-05-07 RX ADMIN — APIXABAN 5 MG: 5 TABLET, FILM COATED ORAL at 07:53

## 2023-05-07 RX ADMIN — OXYCODONE HYDROCHLORIDE 5 MG: 5 TABLET ORAL at 02:47

## 2023-05-07 RX ADMIN — INSULIN LISPRO 8 UNITS: 100 INJECTION, SOLUTION INTRAVENOUS; SUBCUTANEOUS at 16:58

## 2023-05-07 RX ADMIN — OXYCODONE HYDROCHLORIDE 5 MG: 5 TABLET ORAL at 07:51

## 2023-05-07 RX ADMIN — GABAPENTIN 300 MG: 300 CAPSULE ORAL at 16:57

## 2023-05-07 RX ADMIN — OXYCODONE HYDROCHLORIDE 5 MG: 5 TABLET ORAL at 18:14

## 2023-05-07 RX ADMIN — FLUTICASONE FUROATE AND VILANTEROL TRIFENATATE 1 PUFF: 200; 25 POWDER RESPIRATORY (INHALATION) at 07:58

## 2023-05-07 RX ADMIN — DOCUSATE SODIUM 200 MG: 100 CAPSULE ORAL at 07:52

## 2023-05-07 RX ADMIN — OXYCODONE HYDROCHLORIDE 5 MG: 5 TABLET ORAL at 12:06

## 2023-05-07 RX ADMIN — METOPROLOL TARTRATE 50 MG: 50 TABLET, FILM COATED ORAL at 07:52

## 2023-05-07 RX ADMIN — METOPROLOL TARTRATE 50 MG: 50 TABLET, FILM COATED ORAL at 21:21

## 2023-05-07 RX ADMIN — GABAPENTIN 300 MG: 300 CAPSULE ORAL at 21:21

## 2023-05-07 RX ADMIN — AMLODIPINE BESYLATE 5 MG: 5 TABLET ORAL at 07:53

## 2023-05-07 RX ADMIN — GABAPENTIN 300 MG: 300 CAPSULE ORAL at 07:52

## 2023-05-07 RX ADMIN — TRAZODONE HYDROCHLORIDE 50 MG: 50 TABLET ORAL at 21:21

## 2023-05-07 RX ADMIN — APIXABAN 5 MG: 5 TABLET, FILM COATED ORAL at 16:57

## 2023-05-07 NOTE — ASSESSMENT & PLAN NOTE
Lab Results   Component Value Date    EGFR 29 05/07/2023    EGFR 29 05/06/2023    EGFR 33 05/05/2023    CREATININE 1 62 (H) 05/07/2023    CREATININE 1 64 (H) 05/06/2023    CREATININE 1 47 (H) 05/05/2023   · Baseline creatinine varies from 1-1 2  · Initially patient creatinine was improving, then elevated to 1 4 yesterday and patient remained on p o   Lasix with subsequent elevation to 1 6 today  · Lasix on hold -does not examine hypervolemic  · Urine studies -suggest a prerenal cause -possible overdiuresis  · Give small bolus today  · Kidney bladder ultrasound without hydronephrosis

## 2023-05-07 NOTE — PLAN OF CARE
Problem: MOBILITY - ADULT  Goal: Maintain or return to baseline ADL function  Description: INTERVENTIONS:  -  Assess patient's ability to carry out ADLs; assess patient's baseline for ADL function and identify physical deficits which impact ability to perform ADLs (bathing, care of mouth/teeth, toileting, grooming, dressing, etc )  - Assess/evaluate cause of self-care deficits   - Assess range of motion  - Assess patient's mobility; develop plan if impaired  - Assess patient's need for assistive devices and provide as appropriate  - Encourage maximum independence but intervene and supervise when necessary  - Involve family in performance of ADLs  - Assess for home care needs following discharge   - Consider OT consult to assist with ADL evaluation and planning for discharge  - Provide patient education as appropriate  Outcome: Progressing  Goal: Maintains/Returns to pre admission functional level  Description: INTERVENTIONS:  - Perform BMAT or MOVE assessment daily    - Set and communicate daily mobility goal to care team and patient/family/caregiver  - Collaborate with rehabilitation services on mobility goals if consulted  - Perform Range of Motion 2 times a day  - Reposition patient every 2 hours    - Dangle patient 2 times a day  - Stand patient 2 times a day  - Ambulate patient 2 times a day  - Out of bed to chair 2 times a day   - Out of bed for meals 2 times a day  - Out of bed for toileting  - Record patient progress and toleration of activity level   Outcome: Progressing     Problem: Prexisting or High Potential for Compromised Skin Integrity  Goal: Skin integrity is maintained or improved  Description: INTERVENTIONS:  - Identify patients at risk for skin breakdown  - Assess and monitor skin integrity  - Assess and monitor nutrition and hydration status  - Monitor labs   - Assess for incontinence   - Turn and reposition patient  - Assist with mobility/ambulation  - Relieve pressure over bony prominences  - Avoid friction and shearing  - Provide appropriate hygiene as needed including keeping skin clean and dry  - Evaluate need for skin moisturizer/barrier cream  - Collaborate with interdisciplinary team   - Patient/family teaching  - Consider wound care consult   Outcome: Progressing     Problem: PAIN - ADULT  Goal: Verbalizes/displays adequate comfort level or baseline comfort level  Description: Interventions:  - Encourage patient to monitor pain and request assistance  - Assess pain using appropriate pain scale  - Administer analgesics based on type and severity of pain and evaluate response  - Implement non-pharmacological measures as appropriate and evaluate response  - Consider cultural and social influences on pain and pain management  - Notify physician/advanced practitioner if interventions unsuccessful or patient reports new pain  Outcome: Progressing     Problem: INFECTION - ADULT  Goal: Absence or prevention of progression during hospitalization  Description: INTERVENTIONS:  - Assess and monitor for signs and symptoms of infection  - Monitor lab/diagnostic results  - Monitor all insertion sites, i e  indwelling lines, tubes, and drains  - Monitor endotracheal if appropriate and nasal secretions for changes in amount and color  - [place] appropriate cooling/warming therapies per order  - Administer medications as ordered  - Instruct and encourage patient and family to use good hand hygiene technique  - Identify and instruct in appropriate isolation precautions for identified infection/condition  Outcome: Progressing  Goal: Absence of fever/infection during neutropenic period  Description: INTERVENTIONS:  - Monitor WBC    Outcome: Progressing     Problem: SAFETY ADULT  Goal: Maintain or return to baseline ADL function  Description: INTERVENTIONS:  -  Assess patient's ability to carry out ADLs; assess patient's baseline for ADL function and identify physical deficits which impact ability to perform ADLs (bathing, care of mouth/teeth, toileting, grooming, dressing, etc )  - Assess/evaluate cause of self-care deficits   - Assess range of motion  - Assess patient's mobility; develop plan if impaired  - Assess patient's need for assistive devices and provide as appropriate  - Encourage maximum independence but intervene and supervise when necessary  - Involve family in performance of ADLs  - Assess for home care needs following discharge   - Consider OT consult to assist with ADL evaluation and planning for discharge  - Provide patient education as appropriate  Outcome: Progressing  Goal: Maintains/Returns to pre admission functional level  Description: INTERVENTIONS:  - Perform BMAT or MOVE assessment daily    - Set and communicate daily mobility goal to care team and patient/family/caregiver  - Collaborate with rehabilitation services on mobility goals if consulted  - Perform Range of Motion 2 times a day  - Reposition patient every 2 hours    - Dangle patient 2 times a day  - Stand patient 2 times a day  - Ambulate patient 2 times a day  - Out of bed to chair 2 times a day   - Out of bed for meals 2 times a day  - Out of bed for toileting  - Record patient progress and toleration of activity level   Outcome: Progressing  Goal: Patient will remain free of falls  Description: INTERVENTIONS:  - Educate patient/family on patient safety including physical limitations  - Instruct patient to call for assistance with activity   - Consult OT/PT to assist with strengthening/mobility   - Keep Call bell within reach  - Keep bed low and locked with side rails adjusted as appropriate  - Keep care items and personal belongings within reach  - Initiate and maintain comfort rounds  - Make Fall Risk Sign visible to staff  - Offer Toileting every 2 Hours, in advance of need  - Initiate/Maintain bed/chair alarm  - Obtain necessary fall risk management equipment: bed/chair alarm  - Apply yellow socks and bracelet for high fall risk patients  - Consider moving patient to room near nurses station  Outcome: Progressing     Problem: DISCHARGE PLANNING  Goal: Discharge to home or other facility with appropriate resources  Description: INTERVENTIONS:  - Identify barriers to discharge w/patient and caregiver  - Arrange for needed discharge resources and transportation as appropriate  - Identify discharge learning needs (meds, wound care, etc )  - Arrange for interpretive services to assist at discharge as needed  - Refer to Case Management Department for coordinating discharge planning if the patient needs post-hospital services based on physician/advanced practitioner order or complex needs related to functional status, cognitive ability, or social support system  Outcome: Progressing     Problem: Knowledge Deficit  Goal: Patient/family/caregiver demonstrates understanding of disease process, treatment plan, medications, and discharge instructions  Description: Complete learning assessment and assess knowledge base  Interventions:  - Provide teaching at level of understanding  - Provide teaching via preferred learning methods  Outcome: Progressing     Problem: Nutrition/Hydration-ADULT  Goal: Nutrient/Hydration intake appropriate for improving, restoring or maintaining nutritional needs  Description: Monitor and assess patient's nutrition/hydration status for malnutrition  Collaborate with interdisciplinary team and initiate plan and interventions as ordered  Monitor patient's weight and dietary intake as ordered or per policy  Utilize nutrition screening tool and intervene as necessary  Determine patient's food preferences and provide high-protein, high-caloric foods as appropriate       INTERVENTIONS:  - Monitor oral intake, urinary output, labs, and treatment plans  - Assess nutrition and hydration status and recommend course of action  - Evaluate amount of meals eaten  - Assist patient with eating if necessary   - Allow adequate time for meals  - Recommend/ encourage appropriate diets, oral nutritional supplements, and vitamin/mineral supplements  - Order, calculate, and assess calorie counts as needed  - Recommend, monitor, and adjust tube feedings and TPN/PPN based on assessed needs  - Assess need for intravenous fluids  - Provide specific nutrition/hydration education as appropriate  - Include patient/family/caregiver in decisions related to nutrition  Outcome: Progressing

## 2023-05-07 NOTE — PROGRESS NOTES
[address]  Progress Note  Name: Elizabeth Arndt  MRN: [id number]  Unit/Bed#: -01 I Date of Admission: 5/2/2023   Date of Service: 5/7/2023 I Hospital Day: 5    Assessment/Plan   * Acute kidney injury superimposed on chronic kidney disease [facility])  Assessment & Plan  Lab Results   Component Value Date    EGFR 29 05/07/2023    EGFR 29 05/06/2023    EGFR 33 05/05/2023    CREATININE 1 62 (H) 05/07/2023    CREATININE 1 64 (H) 05/06/2023    CREATININE 1 47 (H) 05/05/2023   · Baseline creatinine varies from 1-1 2  · Initially patient creatinine was improving, then elevated to 1 4 yesterday and patient remained on p o  Lasix with subsequent elevation to 1 6 today  · Lasix on hold -does not examine hypervolemic  · Urine studies -suggest a prerenal cause -possible overdiuresis  · Give small bolus today  · Kidney bladder ultrasound without hydronephrosis    Acute on chronic congestive heart failure (HCC)  Assessment & Plan  Wt Readings from Last 3 Encounters:   05/07/23 72 2 kg (159 lb 2 8 oz)   02/04/23 82 9 kg (182 lb 12 2 oz)   01/27/23 75 8 kg (167 lb 3 2 oz)     Presented with SOB, diaphoresis, facial swellinging x 3-4 days  Patient was receiving IV Lasix, transition to home dose Lasix 40 mg-creatinine is trending up -held     • 5/3 ECHO: EF 75 %, grade 1 DD, RV Nl  • Lasix 80 mg IV BID initially now back onto Lasix 40 mg daily, now on hold starting 5/6 due to CONSTANCE  • Daily weight  • Fluid restriction  • Strict I&O        Chronic respiratory failure with hypoxia and hypercapnia (HCC)  Assessment & Plan  H/o COPD and CHF, baseline oxygen 3L NC - Presented with worsening SOB over past 3-4 days  CXR with pulmonary vascular congestion  Require BiPAP in ED  Ceftriaxone and azithromycin started in ED  Methylprednisolone 125 mg IV x1 in ED     Had no further need for steroids - azithromycin continued      • Patient currently back to baseline of oxygen recommend, 3 L  • Continue treatment plan for acute on chronic CHF with diuresis - on home lasix   ? Urine Legionella, pneumonia negative  ? 1 set of blood culture shows coagulase-negative staph, second set of blood culture shows no growth-mostly contaminated  ? Continue respiratory protocol      Acute respiratory failure has resolved and patient is now back on her baseline oxygen requirements of 3 L    Bacteremia  Assessment & Plan  Based on report, seems contaminated since 1 set of blood culture shows coagulase-negative staph, second set blood culture shows no growth  Patient also received antibiotic, monitor off of antibiotic at this time    Acute encephalopathy  Assessment & Plan  Improved  Likely Cardiopulmonary etiology  Presented from with AMS  Daughter reported she has been confused since Friday  • UDS +opiates  • UA negative  , MRSA, urine strep/legionella negative  -Blood culture seems contaminated since 1 set of blood culture shows coagulase-negative staph, second set of blood culture shows no growth  • CTH unremarkable  • Trend PCT - negative  • ABG 7 32/57/68/29 on 3L NC  • Accuchecks with SSI   Avoid hypo/hyperglycemia  • Continue to hold home Gabapentin at this time    Seems to have returned to baseline    Chronic anemia  Assessment & Plan  Baseline hgb 8-9        Lab Results   Component Value Date     IRON 47 (L) 01/30/2023     FERRITIN 90 01/30/2023     TIBC 220 (L) 01/30/2023     CONCFE 21 01/30/2023            • Currently at baseline  • Trend CBC  • Transfuse for hgb < 7 0  • Start Ferrous Sulfate every other day           Type 2 diabetes mellitus with skin complication, with long-term current use of insulin [facility])  Assessment & Plan  Lab Results   Component Value Date    HGBA1C 7 5 (H) 01/23/2023       Recent Labs     05/06/23  1125 05/06/23  1609 05/06/23  2105 05/07/23  0706   POCGLU 181* 223* 111 133       Blood Sugar Average: Last 72 hrs:  (P) 197 [id number]   Home regimen is Lantus at bedtime, NovoLog 70/30 5 units with supper  Continue sliding scale, continue Lantus, follow hypoglycemia precaution  While patient remained in the hospital, target blood sugar range in between 140-180 -improved this morning, monitor today           VTE Pharmacologic Prophylaxis: VTE Score: 11 High Risk (Score >/= 5) - Pharmacological DVT Prophylaxis Ordered: apixaban (Eliquis)  Sequential Compression Devices Ordered  Patient Centered Rounds: I performed bedside rounds with nursing staff today  Discussions with Specialists or Other Care Team Provider: Bigg    Education and Discussions with Family / Patient: Attempted to update  (daughter) via phone  Left voicemail  Total Time Spent on Date of Encounter in care of patient: 35 minutes This time was spent on one or more of the following: performing physical exam; counseling and coordination of care; obtaining or reviewing history; documenting in the medical record; reviewing/ordering tests, medications or procedures; communicating with other healthcare professionals and discussing with patient's family/caregivers  Current Length of Stay: 5 day(s)  Current Patient Status: Inpatient   Certification Statement: The patient will continue to require additional inpatient hospital stay due to CONSTANCE  Discharge Plan: Anticipate discharge in 24-48 hrs to home with home services  Code Status: Level 3 - DNAR and DNI    Subjective:   Seen and examined  Had large bowel movement yesterday and endorses no residual abdominal pain  Objective:     Vitals:   Temp (24hrs), Av 6 °F (36 4 °C), Min:97 2 °F (36 2 °C), Max:98 2 °F (36 8 °C)    Temp:  [97 2 °F (36 2 °C)-98 2 °F (36 8 °C)] 97 3 °F (36 3 °C)  HR:  [67-84] 70  Resp:  [17-20] 18  BP: (114-153)/(54-64) 153/64  SpO2:  [94 %-97 %] 97 %  Body mass index is 25 69 kg/m²  Input and Output Summary (last 24 hours):      Intake/Output Summary (Last 24 hours) at 2023 0977  Last data filed at 2023 0853  Gross per 24 hour   Intake 461 ml Output 200 ml   Net 261 ml       Physical Exam:   Physical Exam  Vitals and nursing note reviewed  Constitutional:       General: She is not in acute distress  Appearance: Normal appearance  She is ill-appearing  HENT:      Head: Normocephalic and atraumatic  Nose: No congestion  Mouth/Throat:      Mouth: Mucous membranes are moist    Eyes:      Conjunctiva/sclera: Conjunctivae normal    Cardiovascular:      Rate and Rhythm: Normal rate and regular rhythm  Pulses: Normal pulses  Heart sounds: Normal heart sounds  No murmur heard  Pulmonary:      Effort: Pulmonary effort is normal  No respiratory distress  Breath sounds: Normal breath sounds  Comments: 3L - diminished breath sounds   Abdominal:      General: Bowel sounds are normal       Palpations: Abdomen is soft  Tenderness: There is no abdominal tenderness  Musculoskeletal:         General: Normal range of motion  Right lower leg: No edema  Left lower leg: No edema  Skin:     General: Skin is warm and dry  Neurological:      Mental Status: She is alert  Mental status is at baseline            Additional Data:     Labs:  Results from last 7 days   Lab Units 05/07/23  0844 05/06/23  0501   WBC Thousand/uL 10 88* 13 18*   HEMOGLOBIN g/dL 9 4* 10 0*   HEMATOCRIT % 30 4* 33 0*   PLATELETS Thousands/uL 291 323   NEUTROS PCT %  --  65   LYMPHS PCT %  --  22   MONOS PCT %  --  8   EOS PCT %  --  3     Results from last 7 days   Lab Units 05/07/23  0546 05/06/23  0645   SODIUM mmol/L 136 137   POTASSIUM mmol/L 3 8 3 8   CHLORIDE mmol/L 95* 94*   CO2 mmol/L 32 35*   BUN mg/dL 51* 49*   CREATININE mg/dL 1 62* 1 64*   ANION GAP mmol/L 9 8   CALCIUM mg/dL 8 6 8 7   ALBUMIN g/dL  --  3 5   TOTAL BILIRUBIN mg/dL  --  0 25   ALK PHOS U/L  --  52   ALT U/L  --  16   AST U/L  --  15   GLUCOSE RANDOM mg/dL 147* 151*     Results from last 7 days   Lab Units 05/03/23  0434   INR  1 43*     Results from last 7 days   Lab Units 05/07/23  0706 05/06/23  2105 05/06/23  1609 05/06/23  1125 05/06/23  0752 05/05/23  2148 05/05/23  2100 05/05/23  2059 05/05/23  1546 05/05/23  1127 05/05/23  0733 05/04/23  2134   POC GLUCOSE mg/dl 133 111 223* 181* 151* 297* 309* 403* 134 142* 160* 157*         Results from last 7 days   Lab Units 05/06/23  0645 05/02/23  1329   LACTIC ACID mmol/L  --  1 6   PROCALCITONIN ng/ml 0 15 0 07       Lines/Drains:  Invasive Devices     Peripheral Intravenous Line  Duration           Peripheral IV 05/06/23 Dorsal (posterior); Right Forearm 1 day                      Imaging: Reviewed radiology reports from this admission including: ultrasound(s)    Recent Cultures (last 7 days):   Results from last 7 days   Lab Units 05/02/23  1838 05/02/23  1329   BLOOD CULTURE   --  No Growth at 72 hrs    Staphylococcus coagulase negative*   GRAM STAIN RESULT   --  Gram positive cocci in clusters*   LEGIONELLA URINARY ANTIGEN  Negative  --        Last 24 Hours Medication List:   Current Facility-Administered Medications   Medication Dose Route Frequency Provider Last Rate   • acetaminophen  650 mg Oral Q6H PRN Dolly Johns PA-C     • amLODIPine  5 mg Oral Daily Linnea Dias PA-C     • apixaban  5 mg Oral BID Linnea Dias PA-C     • aspirin  81 mg Oral Daily Linnea Dias PA-C     • atorvastatin  10 mg Oral Daily Dolly Johns PA-C     • clopidogrel  75 mg Oral Daily Linnea Dias PA-C     • docusate sodium  200 mg Oral Daily Linnea Dias PA-C     • ferrous sulfate  325 mg Oral Every Other Day Linnea Dias PA-C     • fluticasone-vilanterol  1 puff Inhalation Daily Linnea Lombardo PA-C     • gabapentin  300 mg Oral TID Linnea Dias PA-C     • insulin glargine  12 Units Subcutaneous HS Linnea Dias PA-C     • insulin lispro  4-20 Units Subcutaneous 4x Daily (AC & HS) Dolly Johns PA-C     • ipratropium  0 5 mg Nebulization Q6H PRN Luis Felipe Bui MD     • levalbuterol  1 25 mg Nebulization Q6H PRN Matthew Ruelas MD     • metoprolol tartrate  50 mg Oral Q12H [address] Linnea Lobato PA-C     • Mirabegron ER  25 mg Oral Daily Linnea Dias, [place]     • mirtazapine  30 mg Oral HS Ruta Severin, PA-C     • nicotine  1 patch Transdermal Daily Linnea Lobato PA-C     • ondansetron  4 mg Intravenous Q4H PRN Ruta Severin, PA-C     • oxyCODONE  5 mg Oral Q4H PRN Ruta Severin, PA-C     • polyethylene glycol  17 g Oral Daily PRN AIYANA Esparza     • rOPINIRole  0 5 mg Oral HS Ruta Severin, PA-C     • sertraline  50 mg Oral HS Linnea Dias PA-C     • sodium chloride  250 mL Intravenous Once Beth Lopez PA-C 250 mL (05/07/23 0751)   • traZODone  50 mg Oral HS Ruta Severin, PA-C          Today, Patient Was Seen By: Beth Lopez PA-C    **Please Note: This note may have been constructed using a voice recognition system  **

## 2023-05-07 NOTE — PLAN OF CARE
Problem: Knowledge Deficit  Goal: Patient/family/caregiver demonstrates understanding of disease process, treatment plan, medications, and discharge instructions  Description: Complete learning assessment and assess knowledge base    Interventions:  - Provide teaching at level of understanding  - Provide teaching via preferred learning methods  Outcome: Progressing     Problem: DISCHARGE PLANNING  Goal: Discharge to home or other facility with appropriate resources  Description: INTERVENTIONS:  - Identify barriers to discharge w/patient and caregiver  - Arrange for needed discharge resources and transportation as appropriate  - Identify discharge learning needs (meds, wound care, etc )  - Arrange for interpretive services to assist at discharge as needed  - Refer to Case Management Department for coordinating discharge planning if the patient needs post-hospital services based on physician/advanced practitioner order or complex needs related to functional status, cognitive ability, or social support system  Outcome: Progressing     Problem: SAFETY ADULT  Goal: Maintain or return to baseline ADL function  Description: INTERVENTIONS:  -  Assess patient's ability to carry out ADLs; assess patient's baseline for ADL function and identify physical deficits which impact ability to perform ADLs (bathing, care of mouth/teeth, toileting, grooming, dressing, etc )  - Assess/evaluate cause of self-care deficits   - Assess range of motion  - Assess patient's mobility; develop plan if impaired  - Assess patient's need for assistive devices and provide as appropriate  - Encourage maximum independence but intervene and supervise when necessary  - Involve family in performance of ADLs  - Assess for home care needs following discharge   - Consider OT consult to assist with ADL evaluation and planning for discharge  - Provide patient education as appropriate  Outcome: Progressing  Goal: Maintains/Returns to pre admission functional level  Description: INTERVENTIONS:  - Perform BMAT or MOVE assessment daily    - Set and communicate daily mobility goal to care team and patient/family/caregiver  - Collaborate with rehabilitation services on mobility goals if consulted  - Perform Range of Motion 2 times a day  - Reposition patient every 2 hours    - Dangle patient 2 times a day  - Stand patient 2 times a day  - Ambulate patient 2 times a day  - Out of bed to chair 2 times a day   - Out of bed for meals 2 times a day  - Out of bed for toileting  - Record patient progress and toleration of activity level   Outcome: Progressing  Goal: Patient will remain free of falls  Description: INTERVENTIONS:  - Educate patient/family on patient safety including physical limitations  - Instruct patient to call for assistance with activity   - Consult OT/PT to assist with strengthening/mobility   - Keep Call bell within reach  - Keep bed low and locked with side rails adjusted as appropriate  - Keep care items and personal belongings within reach  - Initiate and maintain comfort rounds  - Make Fall Risk Sign visible to staff  - Offer Toileting every 2 Hours, in advance of need  - Initiate/Maintain bed alarm  - Obtain necessary fall risk management equipment:   - Apply yellow socks and bracelet for high fall risk patients  - Consider moving patient to room near nurses station  Outcome: Progressing

## 2023-05-07 NOTE — ASSESSMENT & PLAN NOTE
Wt Readings from Last 3 Encounters:   05/07/23 72 2 kg (159 lb 2 8 oz)   02/04/23 82 9 kg (182 lb 12 2 oz)   01/27/23 75 8 kg (167 lb 3 2 oz)     Presented with SOB, diaphoresis, facial swellinging x 3-4 days     Patient was receiving IV Lasix, transition to home dose Lasix 40 mg-creatinine is trending up -held     • 5/3 ECHO: EF 75 %, grade 1 DD, RV Nl  • Lasix 80 mg IV BID initially now back onto Lasix 40 mg daily, now on hold starting 5/6 due to CONSTANCE  • Daily weight  • Fluid restriction  • Strict I&O

## 2023-05-07 NOTE — ASSESSMENT & PLAN NOTE
Lab Results   Component Value Date    HGBA1C 7 5 (H) 01/23/2023       Recent Labs     05/06/23  1125 05/06/23  1609 05/06/23  2105 05/07/23  0706   POCGLU 181* 223* 111 133       Blood Sugar Average: Last 72 hrs:  (P) 197 [id number]   Home regimen is Lantus at bedtime, NovoLog 70/30 5 units with supper  Continue sliding scale, continue Lantus, follow hypoglycemia precaution  While patient remained in the hospital, target blood sugar range in between 140-180 -improved this morning, monitor today

## 2023-05-08 ENCOUNTER — APPOINTMENT (INPATIENT)
Dept: CT IMAGING | Facility: HOSPITAL | Age: 83
DRG: 291 | End: 2023-05-08
Payer: COMMERCIAL

## 2023-05-08 ENCOUNTER — APPOINTMENT (INPATIENT)
Dept: MRI IMAGING | Facility: HOSPITAL | Age: 83
DRG: 291 | End: 2023-05-08
Payer: COMMERCIAL

## 2023-05-08 LAB
2HR DELTA HS TROPONIN: -1 NG/L
4HR DELTA HS TROPONIN: -2 NG/L
ANION GAP SERPL CALCULATED.3IONS-SCNC: 5 MMOL/L (ref 4–13)
BUN SERPL-MCNC: 46 MG/DL (ref 5–25)
CALCIUM SERPL-MCNC: 8.9 MG/DL (ref 8.4–10.2)
CARDIAC TROPONIN I PNL SERPL HS: 10 NG/L
CARDIAC TROPONIN I PNL SERPL HS: 11 NG/L
CARDIAC TROPONIN I PNL SERPL HS: 9 NG/L
CHLORIDE SERPL-SCNC: 97 MMOL/L (ref 96–108)
CO2 SERPL-SCNC: 37 MMOL/L (ref 21–32)
CREAT SERPL-MCNC: 1.59 MG/DL (ref 0.6–1.3)
ERYTHROCYTE [DISTWIDTH] IN BLOOD BY AUTOMATED COUNT: 16 % (ref 11.6–15.1)
GFR SERPL CREATININE-BSD FRML MDRD: 30 ML/MIN/1.73SQ M
GLUCOSE SERPL-MCNC: 101 MG/DL (ref 65–140)
GLUCOSE SERPL-MCNC: 101 MG/DL (ref 65–140)
GLUCOSE SERPL-MCNC: 122 MG/DL (ref 65–140)
GLUCOSE SERPL-MCNC: 187 MG/DL (ref 65–140)
GLUCOSE SERPL-MCNC: 225 MG/DL (ref 65–140)
HCT VFR BLD AUTO: 30.9 % (ref 34.8–46.1)
HGB BLD-MCNC: 9.4 G/DL (ref 11.5–15.4)
HIV 1+2 AB+HIV1 P24 AG SERPL QL IA: NORMAL
HIV1 P24 AG SER QL: NORMAL
MCH RBC QN AUTO: 27.2 PG (ref 26.8–34.3)
MCHC RBC AUTO-ENTMCNC: 30.4 G/DL (ref 31.4–37.4)
MCV RBC AUTO: 90 FL (ref 82–98)
PLATELET # BLD AUTO: 304 THOUSANDS/UL (ref 149–390)
PMV BLD AUTO: 10.1 FL (ref 8.9–12.7)
POTASSIUM SERPL-SCNC: 3.9 MMOL/L (ref 3.5–5.3)
RBC # BLD AUTO: 3.45 MILLION/UL (ref 3.81–5.12)
SODIUM SERPL-SCNC: 139 MMOL/L (ref 135–147)
TSH SERPL DL<=0.05 MIU/L-ACNC: 2.33 UIU/ML (ref 0.45–4.5)
WBC # BLD AUTO: 12.21 THOUSAND/UL (ref 4.31–10.16)

## 2023-05-08 PROCEDURE — 87806 HIV AG W/HIV1&2 ANTB W/OPTIC: CPT

## 2023-05-08 PROCEDURE — G1004 CDSM NDSC: HCPCS

## 2023-05-08 PROCEDURE — 86803 HEPATITIS C AB TEST: CPT

## 2023-05-08 PROCEDURE — 99232 SBSQ HOSP IP/OBS MODERATE 35: CPT

## 2023-05-08 PROCEDURE — 70551 MRI BRAIN STEM W/O DYE: CPT

## 2023-05-08 PROCEDURE — 70450 CT HEAD/BRAIN W/O DYE: CPT

## 2023-05-08 PROCEDURE — 93005 ELECTROCARDIOGRAM TRACING: CPT

## 2023-05-08 PROCEDURE — 87340 HEPATITIS B SURFACE AG IA: CPT

## 2023-05-08 PROCEDURE — 84443 ASSAY THYROID STIM HORMONE: CPT

## 2023-05-08 PROCEDURE — 85027 COMPLETE CBC AUTOMATED: CPT

## 2023-05-08 PROCEDURE — 82948 REAGENT STRIP/BLOOD GLUCOSE: CPT

## 2023-05-08 PROCEDURE — 83036 HEMOGLOBIN GLYCOSYLATED A1C: CPT

## 2023-05-08 PROCEDURE — 84484 ASSAY OF TROPONIN QUANT: CPT

## 2023-05-08 PROCEDURE — 80048 BASIC METABOLIC PNL TOTAL CA: CPT

## 2023-05-08 RX ORDER — TRAZODONE HYDROCHLORIDE 100 MG/1
50 TABLET ORAL
Qty: 15 TABLET | Refills: 2 | Status: SHIPPED | OUTPATIENT
Start: 2023-05-08 | End: 2023-08-08 | Stop reason: CLARIF

## 2023-05-08 RX ORDER — FUROSEMIDE 20 MG/1
40 TABLET ORAL DAILY
Qty: 60 TABLET | Refills: 2 | Status: SHIPPED | OUTPATIENT
Start: 2023-05-08 | End: 2023-08-08 | Stop reason: CLARIF

## 2023-05-08 RX ORDER — POLYETHYLENE GLYCOL 3350 17 G/17G
17 POWDER, FOR SOLUTION ORAL DAILY PRN
Qty: 30 EACH | Refills: 0 | Status: SHIPPED | OUTPATIENT
Start: 2023-05-08 | End: 2023-08-08 | Stop reason: CLARIF

## 2023-05-08 RX ORDER — CALCIUM CARBONATE 500 MG/1
500 TABLET, CHEWABLE ORAL DAILY PRN
Status: DISCONTINUED | OUTPATIENT
Start: 2023-05-08 | End: 2023-05-09 | Stop reason: HOSPADM

## 2023-05-08 RX ORDER — FERROUS SULFATE 325(65) MG
325 TABLET ORAL EVERY OTHER DAY
Qty: 15 TABLET | Refills: 2 | Status: SHIPPED | OUTPATIENT
Start: 2023-05-10 | End: 2023-05-16

## 2023-05-08 RX ADMIN — ROPINIROLE 0.5 MG: 0.25 TABLET, FILM COATED ORAL at 21:35

## 2023-05-08 RX ADMIN — TRAZODONE HYDROCHLORIDE 50 MG: 50 TABLET ORAL at 21:37

## 2023-05-08 RX ADMIN — FLUTICASONE FUROATE AND VILANTEROL TRIFENATATE 1 PUFF: 200; 25 POWDER RESPIRATORY (INHALATION) at 07:43

## 2023-05-08 RX ADMIN — GABAPENTIN 300 MG: 300 CAPSULE ORAL at 08:40

## 2023-05-08 RX ADMIN — DOCUSATE SODIUM 200 MG: 100 CAPSULE ORAL at 08:42

## 2023-05-08 RX ADMIN — METOPROLOL TARTRATE 50 MG: 50 TABLET, FILM COATED ORAL at 08:44

## 2023-05-08 RX ADMIN — ATORVASTATIN CALCIUM 10 MG: 10 TABLET, FILM COATED ORAL at 08:40

## 2023-05-08 RX ADMIN — FERROUS SULFATE TAB 325 MG (65 MG ELEMENTAL FE) 325 MG: 325 (65 FE) TAB at 08:40

## 2023-05-08 RX ADMIN — CLOPIDOGREL BISULFATE 75 MG: 75 TABLET ORAL at 08:40

## 2023-05-08 RX ADMIN — GABAPENTIN 300 MG: 300 CAPSULE ORAL at 16:23

## 2023-05-08 RX ADMIN — INSULIN LISPRO 4 UNITS: 100 INJECTION, SOLUTION INTRAVENOUS; SUBCUTANEOUS at 21:35

## 2023-05-08 RX ADMIN — NICOTINE 1 PATCH: 7 PATCH, EXTENDED RELEASE TRANSDERMAL at 08:40

## 2023-05-08 RX ADMIN — OXYCODONE HYDROCHLORIDE 5 MG: 5 TABLET ORAL at 16:22

## 2023-05-08 RX ADMIN — OXYCODONE HYDROCHLORIDE 5 MG: 5 TABLET ORAL at 21:35

## 2023-05-08 RX ADMIN — SERTRALINE HYDROCHLORIDE 50 MG: 50 TABLET ORAL at 21:35

## 2023-05-08 RX ADMIN — INSULIN GLARGINE 12 UNITS: 100 INJECTION, SOLUTION SUBCUTANEOUS at 21:34

## 2023-05-08 RX ADMIN — APIXABAN 5 MG: 5 TABLET, FILM COATED ORAL at 08:39

## 2023-05-08 RX ADMIN — INSULIN LISPRO 4 UNITS: 100 INJECTION, SOLUTION INTRAVENOUS; SUBCUTANEOUS at 16:26

## 2023-05-08 RX ADMIN — GABAPENTIN 300 MG: 300 CAPSULE ORAL at 21:35

## 2023-05-08 RX ADMIN — METOPROLOL TARTRATE 50 MG: 50 TABLET, FILM COATED ORAL at 21:35

## 2023-05-08 RX ADMIN — MIRTAZAPINE 30 MG: 15 TABLET, FILM COATED ORAL at 21:35

## 2023-05-08 RX ADMIN — AMLODIPINE BESYLATE 5 MG: 5 TABLET ORAL at 08:44

## 2023-05-08 RX ADMIN — APIXABAN 5 MG: 5 TABLET, FILM COATED ORAL at 17:33

## 2023-05-08 RX ADMIN — ASPIRIN 81 MG CHEWABLE TABLET 81 MG: 81 TABLET CHEWABLE at 08:40

## 2023-05-08 RX ADMIN — CALCIUM CARBONATE (ANTACID) CHEW TAB 500 MG 500 MG: 500 CHEW TAB at 13:13

## 2023-05-08 NOTE — ASSESSMENT & PLAN NOTE
Lab Results   Component Value Date    HGBA1C 7 5 (H) 01/23/2023       Recent Labs     05/07/23  1605 05/07/23  2035 05/08/23  0708 05/08/23  1134   POCGLU 229* 176* 101 122       Blood Sugar Average: Last 72 hrs:  (P) 189 2639   Home regimen is Lantus at bedtime, NovoLog 70/30 5 units with supper  Continue sliding scale, continue Lantus, follow hypoglycemia precaution  While patient remained in the hospital, target blood sugar range in between 140-180 -improved this morning, monitor today

## 2023-05-08 NOTE — ASSESSMENT & PLAN NOTE
Wt Readings from Last 3 Encounters:   05/08/23 73 3 kg (161 lb 9 6 oz)   02/04/23 82 9 kg (182 lb 12 2 oz)   01/27/23 75 8 kg (167 lb 3 2 oz)     Presented with SOB, diaphoresis, facial swellinging x 3-4 days     Patient was receiving IV Lasix, transition to home dose Lasix 40 mg-creatinine is trending up -held     • 5/3 ECHO: EF 75 %, grade 1 DD, RV Nl  • Lasix 80 mg IV BID initially now back onto Lasix 40 mg daily, now on hold starting 5/6 due to CONSTANCE  • Daily weight  • Fluid restriction  • Strict I&O  Continue to hold Lasix 1 more day, reevaluate tomorrow if needed

## 2023-05-08 NOTE — PROGRESS NOTES
Leatha Cooney tiger texted and patient is to follow stroke pathway vital signs  EKG results image sent via tiger text  Patient is alert and oriented  NIH assessment assessed after being ordered and patient returned from CT scan to med surg unit

## 2023-05-08 NOTE — ASSESSMENT & PLAN NOTE
Improved  Likely Cardiopulmonary etiology  Presented from with AMS  Daughter reported she has been confused since Friday  Of note has been having episodes of slurred speech as an outpatient that last a few minutes, by time of evaluations she is returned to baseline-her outpatient provider is concerned that she is having TIAs  • UDS +opiates  • UA negative  , MRSA, urine strep/legionella negative  -Blood culture seems contaminated since 1 set of blood culture shows coagulase-negative staph, second set of blood culture shows no growth  • CTH unremarkable  • Trend PCT - negative  • ABG 7 32/57/68/29 on 3L NC  • Accuchecks with SSI   Avoid hypo/hyperglycemia  • Continue to hold home Gabapentin at this time    · Has returned to baseline mentation  · Today noted by case management to be difficult to wake, family states that she had episode of slurred speech when they were at bedside, evaluated approximately 2 minutes after onset and patient has returned to normal, no dysarthria noted, no neurodeficits on exam  · Discussed with neurology -recommend MRI   · Echo this admission reviewed  · Monitor on telemetry while pending MRI and neurology consult  · Continue patient's aspirin, Plavix

## 2023-05-08 NOTE — PROGRESS NOTES
Patient alert during eating tolerated eating lunch while sitting fully upright in bed  Patient with complaints of indigestion  Patient sleeping in between disturbances awakes with verbal stimulation  Delwin Shone made aware via tiger text  Patients family verbalized they wanted to bring in [employer] to feed the patient  Family educated on the patients modified diet of dental soft and patient should not be eating [employer] burgers at this time

## 2023-05-08 NOTE — PROGRESS NOTES
[address]  Progress Note  Name: Jennifer Hall  MRN: [id number]  Unit/Bed#: -01 I Date of Admission: 5/2/2023   Date of Service: 5/8/2023 I Hospital Day: 6    Assessment/Plan   * Acute kidney injury superimposed on chronic kidney disease [facility])  Assessment & Plan  Lab Results   Component Value Date    EGFR 30 05/08/2023    EGFR 29 05/07/2023    EGFR 29 05/06/2023    CREATININE 1 59 (H) 05/08/2023    CREATININE 1 62 (H) 05/07/2023    CREATININE 1 64 (H) 05/06/2023   · Baseline creatinine varies from 1-1 2  · Initially patient creatinine was improving, then elevated to 1 4 yesterday and patient remained on p o  Lasix with subsequent elevation to 1 6 today  · Lasix on hold -does not examine hypervolemic  · Urine studies -suggest a prerenal cause -possible overdiuresis  · Give small bolus today  · Kidney bladder ultrasound without hydronephrosis    Improving    Acute encephalopathy  Assessment & Plan  Improved  Likely Cardiopulmonary etiology  Presented from with AMS  Daughter reported she has been confused since Friday  Of note has been having episodes of slurred speech as an outpatient that last a few minutes, by time of evaluations she is returned to baseline-her outpatient provider is concerned that she is having TIAs  • UDS +opiates  • UA negative  , MRSA, urine strep/legionella negative  -Blood culture seems contaminated since 1 set of blood culture shows coagulase-negative staph, second set of blood culture shows no growth  • CTH unremarkable  • Trend PCT - negative  • ABG 7 32/57/68/29 on 3L NC  • Accuchecks with SSI   Avoid hypo/hyperglycemia  • Continue to hold home Gabapentin at this time    · Has returned to baseline mentation  · Today noted by case management to be difficult to wake, family states that she had episode of slurred speech when they were at bedside, evaluated approximately 2 minutes after onset and patient has returned to normal, no dysarthria noted, no neurodeficits on exam  · Discussed with neurology -recommend MRI   · Echo this admission reviewed  · Monitor on telemetry while pending MRI and neurology consult  · Continue patient's aspirin, Plavix    Acute on chronic congestive heart failure (HCC)  Assessment & Plan  Wt Readings from Last 3 Encounters:   05/08/23 73 3 kg (161 lb 9 6 oz)   02/04/23 82 9 kg (182 lb 12 2 oz)   01/27/23 75 8 kg (167 lb 3 2 oz)     Presented with SOB, diaphoresis, facial swellinging x 3-4 days  Patient was receiving IV Lasix, transition to home dose Lasix 40 mg-creatinine is trending up -held     • 5/3 ECHO: EF 75 %, grade 1 DD, RV Nl  • Lasix 80 mg IV BID initially now back onto Lasix 40 mg daily, now on hold starting 5/6 due to CONSTANCE  • Daily weight  • Fluid restriction  • Strict I&O  Continue to hold Lasix 1 more day, reevaluate tomorrow if needed      Chronic respiratory failure with hypoxia and hypercapnia (HCC)  Assessment & Plan  H/o COPD and CHF, baseline oxygen 3L NC - Presented with worsening SOB over past 3-4 days  CXR with pulmonary vascular congestion  Require BiPAP in ED  Ceftriaxone and azithromycin started in ED  Methylprednisolone 125 mg IV x1 in ED  Had no further need for steroids - azithromycin continued      • Patient currently back to baseline of oxygen recommend, 3 L  • Continue treatment plan for acute on chronic CHF with diuresis - on home lasix   ? Urine Legionella, pneumonia negative  ? 1 set of blood culture shows coagulase-negative staph, second set of blood culture shows no growth-mostly contaminated  ?  Continue respiratory protocol      Acute respiratory failure has resolved and patient is now back on her baseline oxygen requirements of 3 L    Chronic anemia  Assessment & Plan  Baseline hgb 8-9        Lab Results   Component Value Date     IRON 47 (L) 01/30/2023     FERRITIN 90 01/30/2023     TIBC 220 (L) 01/30/2023     CONCFE 21 01/30/2023            • Currently at baseline  • Trend CBC  • Transfuse for hgb < 7 0  • Start Ferrous Sulfate every other day           Type 2 diabetes mellitus with skin complication, with long-term current use of insulin [facility])  Assessment & Plan  Lab Results   Component Value Date    HGBA1C 7 5 (H) 01/23/2023       Recent Labs     05/07/23  1605 05/07/23  2035 05/08/23  0708 05/08/23  1134   POCGLU 229* 176* 101 122       Blood Sugar Average: Last 72 hrs:  (P) 189 9375   Home regimen is Lantus at bedtime, NovoLog 70/30 5 units with supper  Continue sliding scale, continue Lantus, follow hypoglycemia precaution  While patient remained in the hospital, target blood sugar range in between 140-180 -improved this morning, monitor today           VTE Pharmacologic Prophylaxis: VTE Score: 11 High Risk (Score >/= 5) - Pharmacological DVT Prophylaxis Ordered: apixaban (Eliquis)  Sequential Compression Devices Ordered  Patient Centered Rounds: I performed bedside rounds with nursing staff today  Discussions with Specialists or Other Care Team Provider: CM, neurology     Education and Discussions with Family / Patient: Updated  (daughter) at bedside  Total Time Spent on Date of Encounter in care of patient: 45 minutes This time was spent on one or more of the following: performing physical exam; counseling and coordination of care; obtaining or reviewing history; documenting in the medical record; reviewing/ordering tests, medications or procedures; communicating with other healthcare professionals and discussing with patient's family/caregivers  Current Length of Stay: 6 day(s)  Current Patient Status: Inpatient   Certification Statement: The patient will continue to require additional inpatient hospital stay due to Transient slurred speech  Discharge Plan: Anticipate discharge in 24-48 hrs to home with home services  Code Status: Level 3 - DNAR and DNI    Subjective:   Seen and examined today earlier this morning    Was at her baseline mentation, feeling quite well and states she is anxious to go home as she is feeling better than she has been  Evaluated again around lunchtime after family noted they thought she was lethargic with slurred speech -by time of evaluation no slurred speech were noted, no neurodeficits on exam and patient was at her baseline mentation stating she feels well  Family also endorses that all the symptoms they still resolved  They state this has been going on now for a few weeks and her outpatient doctors had mentioned the possibility of TIAs however patient did not want to be worked up at that time  Talk to patient's daughter, though they are considering hospice in the outpatient setting she states her mom would not want to transition to hospice at this time and would want evaluation while she is here  Objective:     Vitals:   Temp (24hrs), Av 8 °F (36 6 °C), Min:97 7 °F (36 5 °C), Max:97 9 °F (36 6 °C)    Temp:  [97 7 °F (36 5 °C)-97 9 °F (36 6 °C)] 97 9 °F (36 6 °C)  HR:  [61-73] 73  Resp:  [17-19] 19  BP: (129-164)/(44-59) 129/44  SpO2:  [97 %-98 %] 97 %  Body mass index is 26 08 kg/m²  Input and Output Summary (last 24 hours): Intake/Output Summary (Last 24 hours) at 2023 1226  Last data filed at 2023 1400  Gross per 24 hour   Intake --   Output 450 ml   Net -450 ml       Physical Exam:   Physical Exam  Vitals and nursing note reviewed  Constitutional:       General: She is not in acute distress  Appearance: Normal appearance  HENT:      Head: Normocephalic and atraumatic  Nose: No congestion  Mouth/Throat:      Mouth: Mucous membranes are moist    Eyes:      Conjunctiva/sclera: Conjunctivae normal    Cardiovascular:      Rate and Rhythm: Normal rate and regular rhythm  Pulses: Normal pulses  Heart sounds: Normal heart sounds  No murmur heard  Pulmonary:      Effort: Pulmonary effort is normal  No respiratory distress  Breath sounds: Normal breath sounds  Comments: Diffusely diminished lung sounds, 3 L oxygen  Abdominal:      General: Bowel sounds are normal       Palpations: Abdomen is soft  Tenderness: There is no abdominal tenderness  Musculoskeletal:         General: Normal range of motion  Right lower leg: No edema  Left lower leg: No edema  Skin:     General: Skin is warm and dry  Neurological:      General: No focal deficit present  Mental Status: She is alert and oriented to person, place, and time  Mental status is at baseline  Cranial Nerves: No dysarthria or facial asymmetry  Sensory: Sensation is intact  Motor: Tremor (chronic) present  No weakness or pronator drift  Coordination: Coordination is intact  Additional Data:     Labs:  Results from last 7 days   Lab Units 05/08/23  0529 05/07/23  0844 05/06/23  0501   WBC Thousand/uL 12 21*   < > 13 18*   HEMOGLOBIN g/dL 9 4*   < > 10 0*   HEMATOCRIT % 30 9*   < > 33 0*   PLATELETS Thousands/uL 304   < > 323   NEUTROS PCT %  --   --  65   LYMPHS PCT %  --   --  22   MONOS PCT %  --   --  8   EOS PCT %  --   --  3    < > = values in this interval not displayed  Results from last 7 days   Lab Units 05/08/23  0529 05/07/23  0546 05/06/23  0645   SODIUM mmol/L 139   < > 137   POTASSIUM mmol/L 3 9   < > 3 8   CHLORIDE mmol/L 97   < > 94*   CO2 mmol/L 37*   < > 35*   BUN mg/dL 46*   < > 49*   CREATININE mg/dL 1 59*   < > 1 64*   ANION GAP mmol/L 5   < > 8   CALCIUM mg/dL 8 9   < > 8 7   ALBUMIN g/dL  --   --  3 5   TOTAL BILIRUBIN mg/dL  --   --  0 25   ALK PHOS U/L  --   --  52   ALT U/L  --   --  16   AST U/L  --   --  15   GLUCOSE RANDOM mg/dL 101   < > 151*    < > = values in this interval not displayed       Results from last 7 days   Lab Units 05/03/23  0434   INR  1 43*     Results from last 7 days   Lab Units 05/08/23  1134 05/08/23  0708 05/07/23  2035 05/07/23  1605 05/07/23  1102 05/07/23  0706 05/06/23  2105 05/06/23  1609 05/06/23  1125 05/06/23  0752 05/05/23  2148 05/05/23  2100   POC GLUCOSE mg/dl 122 101 176* 229* 167* 133 111 223* 181* 151* 297* 309*         Results from last 7 days   Lab Units 05/06/23  0645 05/02/23  1329   LACTIC ACID mmol/L  --  1 6   PROCALCITONIN ng/ml 0 15 0 07       Lines/Drains:  Invasive Devices     None                   Telemetry:  Telemetry Orders (From admission, onward)             24 Hour Telemetry Monitoring  Continuous x 24 Hours (Telem)        References:    Telemetry Guidelines   Question:  Reason for 24 Hour Telemetry  Answer:  Acute CVA (>24 hrs old)                          Imaging: Reviewed radiology reports from this admission including: CT head    Recent Cultures (last 7 days):   Results from last 7 days   Lab Units 05/02/23  1838 05/02/23  1329   BLOOD CULTURE   --  No Growth After 4 Days    Staphylococcus coagulase negative*   GRAM STAIN RESULT   --  Gram positive cocci in clusters*   LEGIONELLA URINARY ANTIGEN  Negative  --        Last 24 Hours Medication List:   Current Facility-Administered Medications   Medication Dose Route Frequency Provider Last Rate   • acetaminophen  650 mg Oral Q6H PRN Blima Angelucci, PA-C     • amLODIPine  5 mg Oral Daily Linnea Dias PA-C     • apixaban  5 mg Oral BID Linnea Dias PA-C     • aspirin  81 mg Oral Daily Linnea Dias PA-C     • atorvastatin  10 mg Oral Daily Blima Angelucci, PA-C     • clopidogrel  75 mg Oral Daily Linnea Dias PA-C     • docusate sodium  200 mg Oral Daily Linnea Dias PA-C     • ferrous sulfate  325 mg Oral Every Other Day Linnea Dias PA-C     • fluticasone-vilanterol  1 puff Inhalation Daily Linnea Silva PA-C     • gabapentin  300 mg Oral TID Linnea Dias PA-C     • insulin glargine  12 Units Subcutaneous HS Linnea Dias PA-C     • insulin lispro  4-20 Units Subcutaneous 4x Daily (AC & HS) Blima Angelucci, PA-C     • ipratropium  0 5 mg Nebulization Q6H PRN Tracey Liao MD • levalbuterol  1 25 mg Nebulization Q6H PRN Veronica Ruelas MD     • metoprolol tartrate  50 mg Oral Q12H [address] Linnea Guerrero PA-C     • Mirabegron ER  25 mg Oral Daily Linnea Dias PA-C     • mirtazapine  30 mg Oral HS Anders Lan PA-C     • nicotine  1 patch Transdermal Daily Linnea Guerrero PA-C     • ondansetron  4 mg Intravenous Q4H PRN Anders Lan PA-C     • oxyCODONE  5 mg Oral Q4H PRN Anders Lan PA-C     • polyethylene glycol  17 g Oral Daily PRN AIYANA Shafer     • rOPINIRole  0 5 mg Oral HS Anders Lan PA-C     • sertraline  50 mg Oral HS Anders Lan PA-C     • traZODone  50 mg Oral HS Anders Lan PA-C          Today, Patient Was Seen By: Eliud Ramirez PA-C    **Please Note: This note may have been constructed using a voice recognition system  **

## 2023-05-08 NOTE — NURSING NOTE
Found that pt had removed her IV over night  Pt is to discharge today or tomorrow and receives nothing IV   Provider made aware and no IV will be placed per provider approval

## 2023-05-08 NOTE — PLAN OF CARE
Problem: MOBILITY - ADULT  Goal: Maintain or return to baseline ADL function  Description: INTERVENTIONS:  -  Assess patient's ability to carry out ADLs; assess patient's baseline for ADL function and identify physical deficits which impact ability to perform ADLs (bathing, care of mouth/teeth, toileting, grooming, dressing, etc )  - Assess/evaluate cause of self-care deficits   - Assess range of motion  - Assess patient's mobility; develop plan if impaired  - Assess patient's need for assistive devices and provide as appropriate  - Encourage maximum independence but intervene and supervise when necessary  - Involve family in performance of ADLs  - Assess for home care needs following discharge   - Consider OT consult to assist with ADL evaluation and planning for discharge  - Provide patient education as appropriate  Outcome: Not Progressing  Goal: Maintains/Returns to pre admission functional level  Description: INTERVENTIONS:  - Perform BMAT or MOVE assessment daily    - Set and communicate daily mobility goal to care team and patient/family/caregiver  - Collaborate with rehabilitation services on mobility goals if consulted  - Perform Range of Motion 2 times a day  - Reposition patient every 2 hours    - Dangle patient 2 times a day  - Stand patient 2 times a day  - Ambulate patient 2 times a day  - Out of bed to chair 2 times a day   - Out of bed for meals 2 times a day  - Out of bed for toileting  - Record patient progress and toleration of activity level   Outcome: Not Progressing     Problem: SAFETY ADULT  Goal: Maintain or return to baseline ADL function  Description: INTERVENTIONS:  -  Assess patient's ability to carry out ADLs; assess patient's baseline for ADL function and identify physical deficits which impact ability to perform ADLs (bathing, care of mouth/teeth, toileting, grooming, dressing, etc )  - Assess/evaluate cause of self-care deficits   - Assess range of motion  - Assess patient's mobility; develop plan if impaired  - Assess patient's need for assistive devices and provide as appropriate  - Encourage maximum independence but intervene and supervise when necessary  - Involve family in performance of ADLs  - Assess for home care needs following discharge   - Consider OT consult to assist with ADL evaluation and planning for discharge  - Provide patient education as appropriate  Outcome: Not Progressing  Goal: Maintains/Returns to pre admission functional level  Description: INTERVENTIONS:  - Perform BMAT or MOVE assessment daily    - Set and communicate daily mobility goal to care team and patient/family/caregiver  - Collaborate with rehabilitation services on mobility goals if consulted  - Perform Range of Motion 2 times a day  - Reposition patient every 2 hours    - Dangle patient 2 times a day  - Stand patient 2 times a day  - Ambulate patient 2 times a day  - Out of bed to chair 2 times a day   - Out of bed for meals 2 times a day  - Out of bed for toileting  - Record patient progress and toleration of activity level   Outcome: Not Progressing

## 2023-05-08 NOTE — PROGRESS NOTES
Per Louie CERVANTES patient can have prn oxycodone as ordered as patient takes this chronically at home

## 2023-05-08 NOTE — DISCHARGE INSTR - AVS FIRST PAGE
Dear Robert Vo,     It was our pleasure to care for you here at [address]  For follow up as well as any medication refills, we recommend that you follow up with your primary care physician  Here are the most important instructions/ recommendations at discharge:     Notable Medication Adjustments -   Continue your home insulin regimen  Continue Lasix at 40 mg daily starting tomorrow 5/10  Has metoprolol to 25 mg twice daily  Limit use of morphine/oxy and Ativan -these have effects on your breathing as well as your mental status  Testing Required after Discharge -   Obtain BMP (labwork) by end of week  Important follow up information -   Follow-up with PCP  A referral is made to you for neurology-call office to make an appointment  Other Instructions -   Continue bowel regimen at at home-can use Colace stool softener and MiraLAX  Please review this entire after visit summary as additional general instructions including medication list, appointments, activity, diet, any pertinent wound care, and other additional recommendations from your care team that may be provided for you        Sincerely,     Cynthia Villavicencio PA-C

## 2023-05-08 NOTE — ASSESSMENT & PLAN NOTE
Lab Results   Component Value Date    EGFR 30 05/08/2023    EGFR 29 05/07/2023    EGFR 29 05/06/2023    CREATININE 1 59 (H) 05/08/2023    CREATININE 1 62 (H) 05/07/2023    CREATININE 1 64 (H) 05/06/2023   · Baseline creatinine varies from 1-1 2  · Initially patient creatinine was improving, then elevated to 1 4 yesterday and patient remained on p o   Lasix with subsequent elevation to 1 6 today  · Lasix on hold -does not examine hypervolemic  · Urine studies -suggest a prerenal cause -possible overdiuresis  · Give small bolus today  · Kidney bladder ultrasound without hydronephrosis    Improving No

## 2023-05-08 NOTE — CASE MANAGEMENT
Case Management Discharge Planning Note    Patient name Lluvia Parks  Location /-69 MRN [id number]  : 1940 Date 2023       Current Admission Date: 2023  Current Admission Diagnosis:Acute kidney injury superimposed on chronic kidney disease [facility])   Patient Active Problem List    Diagnosis Date Noted   • Bacteremia 2023   • Acute encephalopathy 2023   • Acute kidney injury superimposed on chronic kidney disease ([facility] [address] ) 2023   • Constipation 2023   • History of DVT (deep vein thrombosis) 2023   • Coronary artery disease involving native coronary artery 2023   • Chronic anemia 2023   • Stage 3b chronic kidney disease ([facility] [address] ) 2023   • Acute on chronic congestive heart failure ([facility] [address] ) 2023   • Smoking 2023   • Oxygen dependent 2023   • PAD (peripheral artery disease) ([facility] [address] ) 2023   • Acute hematogenous osteomyelitis of left foot ([facility] [address] ) 2023   • Type 2 diabetes mellitus with skin complication, with long-term current use of insulin ([facility] [address] ) 2023   • COPD (chronic obstructive pulmonary disease) ([facility] [address] ) 2023   • Chronic respiratory failure with hypoxia and hypercapnia ([facility] [address] ) 2023   • Chronic, continuous use of opioids 2023      LOS (days): 6  Geometric Mean LOS (GMLOS) (days): 3 90  Days to GMLOS:-1 9     OBJECTIVE:  Risk of Unplanned Readmission Score: 42 91         Current admission status: Inpatient   Preferred Pharmacy:   [address] [place] [address] #[id number] Manuel Delgadillo - [address]/ [address] [facility]/ [address]  [address] [facility] [address]  Phone: [phone] Fax: [phone]    Primary Care Provider: Sonia Mayorga MD    Primary Insurance: [facility]  Secondary Insurance:     DISCHARGE DETAILS:      CM was unaware of discharge today   CM spoke to patient at the bedside, reviewed DC IMM with patient and informed that patient can stay an additional 4 hours for reconsidering appealing the discharge as the medicare rights were review on the day of discharge  Pt verbalized understanding and feels ready to go home and does not intend to stay 4 hours to reconsider                                                                                      IMM Given (Date):: 05/08/23  IMM Given to[de-identified] Patient

## 2023-05-08 NOTE — PROGRESS NOTES
Patient is alert and oriented  Patient has increased alertness this evening  In between interactions patient does not require verbal stimulation to stay awake  Patient feeding herself dinner sitting fully upright in bed

## 2023-05-09 VITALS
DIASTOLIC BLOOD PRESSURE: 38 MMHG | WEIGHT: 154.54 LBS | OXYGEN SATURATION: 96 % | RESPIRATION RATE: 14 BRPM | SYSTOLIC BLOOD PRESSURE: 104 MMHG | HEIGHT: 66 IN | BODY MASS INDEX: 24.84 KG/M2 | TEMPERATURE: 97.3 F | HEART RATE: 71 BPM

## 2023-05-09 LAB
ANION GAP SERPL CALCULATED.3IONS-SCNC: 6 MMOL/L (ref 4–13)
BACTERIA BLD CULT: NORMAL
BUN SERPL-MCNC: 40 MG/DL (ref 5–25)
CALCIUM SERPL-MCNC: 8.8 MG/DL (ref 8.4–10.2)
CHLORIDE SERPL-SCNC: 98 MMOL/L (ref 96–108)
CHOLEST SERPL-MCNC: 131 MG/DL
CO2 SERPL-SCNC: 34 MMOL/L (ref 21–32)
CREAT SERPL-MCNC: 1.44 MG/DL (ref 0.6–1.3)
EST. AVERAGE GLUCOSE BLD GHB EST-MCNC: 131 MG/DL
GFR SERPL CREATININE-BSD FRML MDRD: 34 ML/MIN/1.73SQ M
GLUCOSE SERPL-MCNC: 113 MG/DL (ref 65–140)
GLUCOSE SERPL-MCNC: 114 MG/DL (ref 65–140)
HBA1C MFR BLD: 6.2 %
HBV SURFACE AG SER QL: NORMAL
HCV AB SER QL: NORMAL
HDLC SERPL-MCNC: 31 MG/DL
LDLC SERPL CALC-MCNC: 76 MG/DL (ref 0–100)
NONHDLC SERPL-MCNC: 100 MG/DL
POTASSIUM SERPL-SCNC: 4.1 MMOL/L (ref 3.5–5.3)
SODIUM SERPL-SCNC: 138 MMOL/L (ref 135–147)
TRIGL SERPL-MCNC: 119 MG/DL

## 2023-05-09 PROCEDURE — 80048 BASIC METABOLIC PNL TOTAL CA: CPT

## 2023-05-09 PROCEDURE — 99239 HOSP IP/OBS DSCHRG MGMT >30: CPT

## 2023-05-09 PROCEDURE — 80061 LIPID PANEL: CPT

## 2023-05-09 PROCEDURE — 82948 REAGENT STRIP/BLOOD GLUCOSE: CPT

## 2023-05-09 RX ORDER — METOPROLOL TARTRATE 50 MG/1
25 TABLET, FILM COATED ORAL EVERY 12 HOURS SCHEDULED
Qty: 30 TABLET | Refills: 2 | Status: SHIPPED | OUTPATIENT
Start: 2023-05-09 | End: 2023-08-08 | Stop reason: CLARIF

## 2023-05-09 RX ADMIN — ASPIRIN 81 MG CHEWABLE TABLET 81 MG: 81 TABLET CHEWABLE at 09:11

## 2023-05-09 RX ADMIN — ATORVASTATIN CALCIUM 10 MG: 10 TABLET, FILM COATED ORAL at 09:12

## 2023-05-09 RX ADMIN — NICOTINE 1 PATCH: 7 PATCH, EXTENDED RELEASE TRANSDERMAL at 09:14

## 2023-05-09 RX ADMIN — CLOPIDOGREL BISULFATE 75 MG: 75 TABLET ORAL at 09:12

## 2023-05-09 RX ADMIN — DOCUSATE SODIUM 200 MG: 100 CAPSULE ORAL at 09:11

## 2023-05-09 RX ADMIN — GABAPENTIN 300 MG: 300 CAPSULE ORAL at 09:12

## 2023-05-09 RX ADMIN — APIXABAN 5 MG: 5 TABLET, FILM COATED ORAL at 09:12

## 2023-05-09 RX ADMIN — FLUTICASONE FUROATE AND VILANTEROL TRIFENATATE 1 PUFF: 200; 25 POWDER RESPIRATORY (INHALATION) at 09:19

## 2023-05-09 RX ADMIN — OXYCODONE HYDROCHLORIDE 5 MG: 5 TABLET ORAL at 09:11

## 2023-05-09 RX ADMIN — OXYCODONE HYDROCHLORIDE 5 MG: 5 TABLET ORAL at 01:28

## 2023-05-09 NOTE — CASE MANAGEMENT
Case Management Discharge Planning Note    Patient name Joe Kaminski  Location /-20 MRN [id number]  : 1940 Date 2023       Current Admission Date: 2023  Current Admission Diagnosis:Acute kidney injury superimposed on chronic kidney disease [facility])   Patient Active Problem List    Diagnosis Date Noted   • Bacteremia 2023   • Acute encephalopathy 2023   • Acute kidney injury superimposed on chronic kidney disease ([facility] [address] ) 2023   • Constipation 2023   • History of DVT (deep vein thrombosis) 2023   • Coronary artery disease involving native coronary artery 2023   • Chronic anemia 2023   • Stage 3b chronic kidney disease ([facility] [address] ) 2023   • Acute on chronic congestive heart failure ([facility] [address] ) 2023   • Smoking 2023   • Oxygen dependent 2023   • PAD (peripheral artery disease) ([facility] [address] ) 2023   • Acute hematogenous osteomyelitis of left foot ([facility] [address] ) 2023   • Type 2 diabetes mellitus with skin complication, with long-term current use of insulin ([facility] [address] ) 2023   • COPD (chronic obstructive pulmonary disease) ([facility] [address] ) 2023   • Chronic respiratory failure with hypoxia and hypercapnia ([facility] [address] ) 2023   • Chronic, continuous use of opioids 2023      LOS (days): 7  Geometric Mean LOS (GMLOS) (days): 3 90  Days to GMLOS:-2 9     OBJECTIVE:  Risk of Unplanned Readmission Score: 44 26         Current admission status: Inpatient   Preferred Pharmacy:   [address] [place] [address] #[id number] Indira Escalera, [address]  / [address]  [address] [address] [place] [address]  Phone: [phone] Fax: [phone]    Primary Care Provider: Brittany Hannon MD    Primary Insurance: [facility]  Secondary Insurance:     DISCHARGE DETAILS:     AVS sent to [facility]

## 2023-05-09 NOTE — ASSESSMENT & PLAN NOTE
Improved  Likely Cardiopulmonary etiology  Presented from with AMS  Daughter reported she has been confused since Friday  Of note has been having episodes of slurred speech as an outpatient that last a few minutes, by time of evaluations she is returned to baseline-her outpatient provider is concerned that she is having TIAs  • UDS +opiates  • UA negative  , MRSA, urine strep/legionella negative  -Blood culture seems contaminated since 1 set of blood culture shows coagulase-negative staph, second set of blood culture shows no growth  • CTH unremarkable  • Trend PCT - negative  • ABG 7 32/57/68/29 on 3L NC  • Accuchecks with SSI   Avoid hypo/hyperglycemia  • Continue to hold home Gabapentin at this time    · Has returned to baseline mentation  · Today noted by case management to be difficult to wake, family states that she had episode of slurred speech when they were at bedside, evaluated approximately 2 minutes after onset and patient has returned to normal, no dysarthria noted, no neurodeficits on exam  · Discussed with neurology -recommend MRI   · CT head and MRI negative for acute pathology  · Echo this admission reviewed  · Monitor on telemetry -no arrhythmia  · Continue patient's aspirin, Plavix  · Discussed with neurology this morning-recommending outpatient follow-up, referral made  ·

## 2023-05-09 NOTE — ASSESSMENT & PLAN NOTE
Lab Results   Component Value Date    EGFR 34 05/09/2023    EGFR 30 05/08/2023    EGFR 29 05/07/2023    CREATININE 1 44 (H) 05/09/2023    CREATININE 1 59 (H) 05/08/2023    CREATININE 1 62 (H) 05/07/2023   · Baseline creatinine varies from 1-1 2  · Initially patient creatinine was improving, then elevated to 1 4 and patient remained on p o   Lasix with subsequent elevation to 1 6   · Lasix on hold -does not examine hypervolemic  · Urine studies -suggest a prerenal cause -possible overdiuresis  · Give small bolus   · Kidney bladder ultrasound without hydronephrosis    Improving - check labs at end of week

## 2023-05-09 NOTE — DISCHARGE SUMMARY
[address]  Discharge- Dominic Ortiz 1940, [de-identified] y o  female MRN: [id number]  Unit/Bed#: [facility] Encounter: [id number]  Primary Care Provider: Dez Dalton MD   Date and time admitted to hospital: 5/2/2023  1:19 PM    * Acute kidney injury superimposed on chronic kidney disease [facility])  Assessment & Plan  Lab Results   Component Value Date    EGFR 34 05/09/2023    EGFR 30 05/08/2023    EGFR 29 05/07/2023    CREATININE 1 44 (H) 05/09/2023    CREATININE 1 59 (H) 05/08/2023    CREATININE 1 62 (H) 05/07/2023   · Baseline creatinine varies from 1-1 2  · Initially patient creatinine was improving, then elevated to 1 4 and patient remained on p o  Lasix with subsequent elevation to 1 6   · Lasix on hold -does not examine hypervolemic  · Urine studies -suggest a prerenal cause -possible overdiuresis  · Give small bolus   · Kidney bladder ultrasound without hydronephrosis    Improving - check labs at end of week    Acute encephalopathy  Assessment & Plan  Improved  Likely Cardiopulmonary etiology  Presented from with AMS  Daughter reported she has been confused since Friday  Of note has been having episodes of slurred speech as an outpatient that last a few minutes, by time of evaluations she is returned to baseline-her outpatient provider is concerned that she is having TIAs  • UDS +opiates  • UA negative  , MRSA, urine strep/legionella negative  -Blood culture seems contaminated since 1 set of blood culture shows coagulase-negative staph, second set of blood culture shows no growth  • CTH unremarkable  • Trend PCT - negative  • ABG 7 32/57/68/29 on 3L NC  • Accuchecks with SSI   Avoid hypo/hyperglycemia  • Continue to hold home Gabapentin at this time    · Has returned to baseline mentation  · Today noted by case management to be difficult to wake, family states that she had episode of slurred speech when they were at bedside, evaluated approximately 2 minutes after onset and patient has returned to normal, no dysarthria noted, no neurodeficits on exam  · Discussed with neurology -recommend MRI   · CT head and MRI negative for acute pathology  · Echo this admission reviewed  · Monitor on telemetry -no arrhythmia  · Continue patient's aspirin, Plavix  · Discussed with neurology this morning-recommending outpatient follow-up, referral made  ·     Acute on chronic congestive heart failure ([address] )  Assessment & Plan  Wt Readings from Last 3 Encounters:   05/09/23 70 1 kg (154 lb 8 7 oz)   02/04/23 82 9 kg (182 lb 12 2 oz)   01/27/23 75 8 kg (167 lb 3 2 oz)     Presented with SOB, diaphoresis, facial swellinging x 3-4 days  Patient was receiving IV Lasix, transition to home dose Lasix 40 mg-creatinine is trending up -held     • 5/3 ECHO: EF 75 %, grade 1 DD, RV Nl  • Lasix 80 mg IV BID initially now back onto Lasix 40 mg daily, now on hold starting 5/6 due to CONSTANCE  • Daily weight  • Fluid restriction  • Strict I&O  Can resume lasix tomorrow       Chronic respiratory failure with hypoxia and hypercapnia (HCC)  Assessment & Plan  H/o COPD and CHF, baseline oxygen 3L NC - Presented with worsening SOB over past 3-4 days  CXR with pulmonary vascular congestion  Require BiPAP in ED  Ceftriaxone and azithromycin started in ED  Methylprednisolone 125 mg IV x1 in ED  Had no further need for steroids - azithromycin continued      • Patient currently back to baseline of oxygen recommend, 3 L  • Continue treatment plan for acute on chronic CHF with diuresis - on home lasix   ? Urine Legionella, pneumonia negative  ? 1 set of blood culture shows coagulase-negative staph, second set of blood culture shows no growth-mostly contaminated  ?  Continue respiratory protocol      Acute respiratory failure has resolved and patient is now back on her baseline oxygen requirements of 3 L    Bacteremia  Assessment & Plan  Based on report, seems contaminated since 1 set of blood culture shows coagulase-negative staph, second set blood culture shows no growth  Patient also received antibiotic, monitor off of antibiotic at this time    Chronic anemia  Assessment & Plan  Baseline hgb 8-9        Lab Results   Component Value Date     IRON 47 (L) 01/30/2023     FERRITIN 90 01/30/2023     TIBC 220 (L) 01/30/2023     CONCFE 21 01/30/2023            • Currently at baseline  • Trend CBC  • Transfuse for hgb < 7 0  • Start Ferrous Sulfate every other day           Type 2 diabetes mellitus with skin complication, with long-term current use of insulin [facility])  Assessment & Plan  Lab Results   Component Value Date    HGBA1C 6 2 (H) 05/08/2023       Recent Labs     05/08/23  1134 05/08/23  1611 05/08/23  2127 05/09/23  0721   POCGLU 122 187* 225* 113       Blood Sugar Average: Last 72 hrs:  (P) 163   Home regimen is Lantus at bedtime, NovoLog 70/30 5 units with supper  Continue sliding scale, continue Lantus, follow hypoglycemia precaution  While patient remained in the hospital, target blood sugar range in between 140-180 -improved this morning, monitor today    Medical Problems     Resolved Problems  Date Reviewed: 5/4/2023          Resolved    Hypertension 5/5/2023     Resolved by  Jewel Fletcher MD    Hyperkalemia 5/4/2023     Resolved by  Gretchen Mccann PA-C        Discharging Physician / Practitioner: Liam Marvin PA-C  PCP: Karol Davis MD  Admission Date:   Admission Orders (From admission, onward)     Ordered        05/02/23 1617  Inpatient Admission  Once                      Discharge Date: 05/09/23    Consultations During Hospital Stay:  · PT  · OT    Procedures Performed:   · None    Significant Findings / Test Results:   XR chest 1 view portable - Result Date: 5/2/2023  Pulmonary vascular congestion  No pneumothorax  CT head without contrast - Result Date: 5/2/2023  1  No acute intracranial hemorrhage, mass effect or edema  2  Minimal, chronic microangiopathy       XR chest portable ICU - Result Date: 5/4/2023  Interval improvement in pulmonary vascular congestion  Echo complete w/ contrast if indicated - Result Date: 5/3/2023  · •  Left Ventricle: Left ventricular cavity size is normal  There is mild to moderate concentric hypertrophy  The left ventricular ejection fraction is 75% by biplane measurement  Systolic function is hyperdynamic  Wall motion is normal  Diastolic function is mildly abnormal, consistent with grade I (abnormal) relaxation  •  Tricuspid Valve: Right ventricular systolic pressure could not be assessed  •  IVC/SVC: The right atrial pressure is estimated at 15 0 mmHg  The inferior vena cava is dilated  Respirophasic changes were blunted (less than 50% variation)  · CT head without contrast-5/8/2023  · No acute intracranial abnormality    · MRI brain without contrast-5/8/2023  · No acute intracranial pathology  Mild chronic microangiopathy  Incidental Findings:   · None  · I reviewed the above mentioned incidental findings with the patient and/or family and they expressed understanding  Test Results Pending at Discharge (will require follow up): · None     Outpatient Tests Requested:  · BMP    Complications:  None    Reason for Admission: Acute encephalopathy    Hospital Course:   Erica Tariq is a [de-identified] y o  female patient with history of COPD on 3 L nasal cannula at home, CHF, type 2 diabetes, previous DVT of lower limb on Eliquis, hypertension, CKD PVD and chronic opioid use who originally presented to the hospital on 5/2/2023 due to shortness of breath, diuresis, facial swelling for 3 to 4 days with confusion at home  She was started empirically on treatment for CHF and COPD in the ER  Steroids were discontinued and was treated with IV diuresis  [place] She was admitted to the ICU given need for BiPAP support  She continued to diurese well, was able to be taken off of Pastor catheter, on oxygen and weaned to home requirements    Her encephalopathy started to improve, did "have one positive blood culture however repeats were negative  She was downgraded to medical service on 5/4  Did develop slight increase in creatinine after being started on her home diuretics  Lasix was then held, had subsequent further increase and given small bolus of fluids with downtrend  On 5/8 did have episode of dysarthria and repeat CT head and MRI brain were ordered without acute findings  Patient within 1 minute returned back to her baseline and was feeling fine  This has been happening as an outpatient as well after discussion with daughter, neurology agrees for outpatient follow-up and continuing aspirin and Plavix  Today again she is at her baseline and creatinine kidney function continues to improve, will be discharged home with home health  Please see above list of diagnoses and related plan for additional information  Condition at Discharge: fair    Discharge Day Visit / Exam:   Subjective:  Seen and examined  States no recurrence of speech difficulty  Denies any headaches, dizziness  States she feels quite well and is hopeful to go home today  Vitals: Blood Pressure: (!) 104/38 (05/09/23 0924)  Pulse: 71 (05/09/23 0924)  Temperature: (!) 97 3 °F (36 3 °C) (05/1940)  Temp Source: Temporal (05/08/23 1725)  Respirations: 14 (05/08/23 2259)  Height: 5' 6\" (167 6 cm) (05/04/23 1441)  Weight - Scale: 70 1 kg (154 lb 8 7 oz) (05/09/23 0600)  SpO2: 96 % (05/09/23 0924)  Exam:   Physical Exam  Vitals and nursing note reviewed  Constitutional:       General: She is not in acute distress  Appearance: Normal appearance  HENT:      Head: Normocephalic and atraumatic  Nose: No congestion  Mouth/Throat:      Mouth: Mucous membranes are moist    Eyes:      Conjunctiva/sclera: Conjunctivae normal    Cardiovascular:      Rate and Rhythm: Normal rate and regular rhythm  Pulses: Normal pulses  Heart sounds: Normal heart sounds  No murmur heard    Pulmonary:      Effort: " Pulmonary effort is normal  No respiratory distress  Breath sounds: Normal breath sounds  Comments: 3 L nasal cannula, diffusely decreased breath sounds  Abdominal:      General: Bowel sounds are normal       Palpations: Abdomen is soft  Tenderness: There is no abdominal tenderness  Musculoskeletal:         General: Normal range of motion  Right lower leg: No edema  Left lower leg: No edema  Skin:     General: Skin is warm and dry  Neurological:      Mental Status: She is alert and oriented to person, place, and time  Mental status is at baseline  Discussion with Family: Updated  (daughter) via phone  Discharge instructions/Information to patient and family:   See after visit summary for information provided to patient and family  Provisions for Follow-Up Care:  See after visit summary for information related to follow-up care and any pertinent home health orders  Disposition:   Home with VNA Services (Reminder: Complete face to face encounter)    Planned Readmission: None     Discharge Statement:  I spent 45 minutes discharging the patient  This time was spent on the day of discharge  I had direct contact with the patient on the day of discharge  Greater than 50% of the total time was spent examining patient, answering all patient questions, arranging and discussing plan of care with patient as well as directly providing post-discharge instructions  Additional time then spent on discharge activities  Discharge Medications:  See after visit summary for reconciled discharge medications provided to patient and/or family        **Please Note: This note may have been constructed using a voice recognition system**

## 2023-05-09 NOTE — PLAN OF CARE
Problem: MOBILITY - ADULT  Goal: Maintain or return to baseline ADL function  Description: INTERVENTIONS:  -  Assess patient's ability to carry out ADLs; assess patient's baseline for ADL function and identify physical deficits which impact ability to perform ADLs (bathing, care of mouth/teeth, toileting, grooming, dressing, etc )  - Assess/evaluate cause of self-care deficits   - Assess range of motion  - Assess patient's mobility; develop plan if impaired  - Assess patient's need for assistive devices and provide as appropriate  - Encourage maximum independence but intervene and supervise when necessary  - Involve family in performance of ADLs  - Assess for home care needs following discharge   - Consider OT consult to assist with ADL evaluation and planning for discharge  - Provide patient education as appropriate  Outcome: Progressing  Goal: Maintains/Returns to pre admission functional level  Description: INTERVENTIONS:  - Perform BMAT or MOVE assessment daily    - Set and communicate daily mobility goal to care team and patient/family/caregiver  - Collaborate with rehabilitation services on mobility goals if consulted  - Perform Range of Motion 2 times a day  - Reposition patient every 2 hours    - Dangle patient 2 times a day  - Stand patient 2 times a day  - Ambulate patient 2 times a day  - Out of bed to chair 2 times a day   - Out of bed for meals 2 times a day  - Out of bed for toileting  - Record patient progress and toleration of activity level   Outcome: Progressing     Problem: Prexisting or High Potential for Compromised Skin Integrity  Goal: Skin integrity is maintained or improved  Description: INTERVENTIONS:  - Identify patients at risk for skin breakdown  - Assess and monitor skin integrity  - Assess and monitor nutrition and hydration status  - Monitor labs   - Assess for incontinence   - Turn and reposition patient  - Assist with mobility/ambulation  - Relieve pressure over bony prominences  - Avoid friction and shearing  - Provide appropriate hygiene as needed including keeping skin clean and dry  - Evaluate need for skin moisturizer/barrier cream  - Collaborate with interdisciplinary team   - Patient/family teaching  - Consider wound care consult   Outcome: Progressing     Problem: PAIN - ADULT  Goal: Verbalizes/displays adequate comfort level or baseline comfort level  Description: Interventions:  - Encourage patient to monitor pain and request assistance  - Assess pain using appropriate pain scale  - Administer analgesics based on type and severity of pain and evaluate response  - Implement non-pharmacological measures as appropriate and evaluate response  - Consider cultural and social influences on pain and pain management  - Notify physician/advanced practitioner if interventions unsuccessful or patient reports new pain  Outcome: Progressing     Problem: INFECTION - ADULT  Goal: Absence or prevention of progression during hospitalization  Description: INTERVENTIONS:  - Assess and monitor for signs and symptoms of infection  - Monitor lab/diagnostic results  - Monitor all insertion sites, i e  indwelling lines, tubes, and drains  - Monitor endotracheal if appropriate and nasal secretions for changes in amount and color  - [place] appropriate cooling/warming therapies per order  - Administer medications as ordered  - Instruct and encourage patient and family to use good hand hygiene technique  - Identify and instruct in appropriate isolation precautions for identified infection/condition  Outcome: Progressing  Goal: Absence of fever/infection during neutropenic period  Description: INTERVENTIONS:  - Monitor WBC    Outcome: Progressing     Problem: SAFETY ADULT  Goal: Maintain or return to baseline ADL function  Description: INTERVENTIONS:  -  Assess patient's ability to carry out ADLs; assess patient's baseline for ADL function and identify physical deficits which impact ability to perform ADLs (bathing, care of mouth/teeth, toileting, grooming, dressing, etc )  - Assess/evaluate cause of self-care deficits   - Assess range of motion  - Assess patient's mobility; develop plan if impaired  - Assess patient's need for assistive devices and provide as appropriate  - Encourage maximum independence but intervene and supervise when necessary  - Involve family in performance of ADLs  - Assess for home care needs following discharge   - Consider OT consult to assist with ADL evaluation and planning for discharge  - Provide patient education as appropriate  Outcome: Progressing  Goal: Maintains/Returns to pre admission functional level  Description: INTERVENTIONS:  - Perform BMAT or MOVE assessment daily    - Set and communicate daily mobility goal to care team and patient/family/caregiver  - Collaborate with rehabilitation services on mobility goals if consulted  - Perform Range of Motion 2 times a day  - Reposition patient every 2 hours    - Dangle patient 2 times a day  - Stand patient 2 times a day  - Ambulate patient 2 times a day  - Out of bed to chair 2 times a day   - Out of bed for meals 2 times a day  - Out of bed for toileting  - Record patient progress and toleration of activity level   Outcome: Progressing    Problem: DISCHARGE PLANNING  Goal: Discharge to home or other facility with appropriate resources  Description: INTERVENTIONS:  - Identify barriers to discharge w/patient and caregiver  - Arrange for needed discharge resources and transportation as appropriate  - Identify discharge learning needs (meds, wound care, etc )  - Arrange for interpretive services to assist at discharge as needed  - Refer to Case Management Department for coordinating discharge planning if the patient needs post-hospital services based on physician/advanced practitioner order or complex needs related to functional status, cognitive ability, or social support system  Outcome: Progressing     Problem: Knowledge Deficit  Goal: Patient/family/caregiver demonstrates understanding of disease process, treatment plan, medications, and discharge instructions  Description: Complete learning assessment and assess knowledge base  Interventions:  - Provide teaching at level of understanding  - Provide teaching via preferred learning methods  Outcome: Progressing     Problem: Nutrition/Hydration-ADULT  Goal: Nutrient/Hydration intake appropriate for improving, restoring or maintaining nutritional needs  Description: Monitor and assess patient's nutrition/hydration status for malnutrition  Collaborate with interdisciplinary team and initiate plan and interventions as ordered  Monitor patient's weight and dietary intake as ordered or per policy  Utilize nutrition screening tool and intervene as necessary  Determine patient's food preferences and provide high-protein, high-caloric foods as appropriate       INTERVENTIONS:  - Monitor oral intake, urinary output, labs, and treatment plans  - Assess nutrition and hydration status and recommend course of action  - Evaluate amount of meals eaten  - Assist patient with eating if necessary   - Allow adequate time for meals  - Recommend/ encourage appropriate diets, oral nutritional supplements, and vitamin/mineral supplements  - Order, calculate, and assess calorie counts as needed  - Recommend, monitor, and adjust tube feedings and TPN/PPN based on assessed needs  - Assess need for intravenous fluids  - Provide specific nutrition/hydration education as appropriate  - Include patient/family/caregiver in decisions related to nutrition  Outcome: Progressing

## 2023-05-09 NOTE — ASSESSMENT & PLAN NOTE
Wt Readings from Last 3 Encounters:   05/09/23 70 1 kg (154 lb 8 7 oz)   02/04/23 82 9 kg (182 lb 12 2 oz)   01/27/23 75 8 kg (167 lb 3 2 oz)     Presented with SOB, diaphoresis, facial swellinging x 3-4 days     Patient was receiving IV Lasix, transition to home dose Lasix 40 mg-creatinine is trending up -held     • 5/3 ECHO: EF 75 %, grade 1 DD, RV Nl  • Lasix 80 mg IV BID initially now back onto Lasix 40 mg daily, now on hold starting 5/6 due to CONSTANCE  • Daily weight  • Fluid restriction  • Strict I&O  Can resume lasix tomorrow

## 2023-05-09 NOTE — ASSESSMENT & PLAN NOTE
Lab Results   Component Value Date    HGBA1C 6 2 (H) 05/08/2023       Recent Labs     05/08/23  1134 05/08/23  1611 05/08/23  2127 05/09/23  0721   POCGLU 122 187* 225* 113       Blood Sugar Average: Last 72 hrs:  (P) 163   Home regimen is Lantus at bedtime, NovoLog 70/30 5 units with supper  Continue sliding scale, continue Lantus, follow hypoglycemia precaution  While patient remained in the hospital, target blood sugar range in between 140-180 -improved this morning, monitor today

## 2023-05-09 NOTE — ASSESSMENT & PLAN NOTE
Due to patient being non-English speaking/uses sign language, an  was used for this visit. Only for face-to-face interpretation by an external agency, date and length of interpretation can be found on the scanned worksheet.     name: Sherif Nuñez  Agency: [employer]  Language: [place]   Telephone number: [phone]  Type of interpretation: Face-to-face, spoken     H/o COPD and CHF, baseline oxygen 3L NC - Presented with worsening SOB over past 3-4 days  CXR with pulmonary vascular congestion  Require BiPAP in ED  Ceftriaxone and azithromycin started in ED  Methylprednisolone 125 mg IV x1 in ED  Had no further need for steroids - azithromycin continued      • Patient currently back to baseline of oxygen recommend, 3 L  • Continue treatment plan for acute on chronic CHF with diuresis - on home lasix   ? Urine Legionella, pneumonia negative  ? 1 set of blood culture shows coagulase-negative staph, second set of blood culture shows no growth-mostly contaminated  ?  Continue respiratory protocol      Acute respiratory failure has resolved and patient is now back on her baseline oxygen requirements of 3 L

## 2023-05-10 LAB
ATRIAL RATE: 59 BPM
P AXIS: 9 DEGREES
PR INTERVAL: 196 MS
QRS AXIS: 9 DEGREES
QRSD INTERVAL: 88 MS
QT INTERVAL: 456 MS
QTC INTERVAL: 451 MS
T WAVE AXIS: 11 DEGREES
VENTRICULAR RATE: 59 BPM

## 2023-05-10 PROCEDURE — 93010 ELECTROCARDIOGRAM REPORT: CPT | Performed by: INTERNAL MEDICINE

## 2023-05-10 NOTE — UTILIZATION REVIEW
NOTIFICATION OF ADMISSION DISCHARGE   This is a Notification of Discharge from [address]  Please be advised that this patient has been discharge from our facility  Below you will find the admission and discharge date and time including the patient’s disposition  UTILIZATION REVIEW CONTACT:  [address] [place]  Utilization   Network Utilization Review Department  Phone: [phone] x carefully listen to the prompts  All voicemails are confidential   Email: [email or web address]     ADMISSION INFORMATION  PRESENTATION DATE: 5/2/2023  1:19 PM  OBERVATION ADMISSION DATE:  INPATIENT ADMISSION DATE: 5/2/23  4:17 PM   DISCHARGE DATE: 5/9/2023 11:16 AM   DISPOSITION:Home with Home Health Care    IMPORTANT INFORMATION:  Send all requests for admission clinical reviews, approved or denied determinations and any other requests to dedicated fax number below belonging to the campus where the patient is receiving treatment   List of dedicated fax numbers:  [address] DENIALS (Administrative/Medical Necessity) [phone]   [address] (Maternity/NICU/Pediatrics) [phone]   [facility] [phone]   [place] [address] [phone]   [address] [phone]   220 [facility] [phone] [address] [facility] [phone]   [address] [place] [address] 119 [phone]   [facility]  [phone]   [address] [facility] [phone]   412 [facility] [address] [facility] [phone]

## 2023-05-13 ENCOUNTER — APPOINTMENT (EMERGENCY)
Dept: CT IMAGING | Facility: HOSPITAL | Age: 83
End: 2023-05-13

## 2023-05-13 ENCOUNTER — APPOINTMENT (EMERGENCY)
Dept: RADIOLOGY | Facility: HOSPITAL | Age: 83
End: 2023-05-13

## 2023-05-13 ENCOUNTER — HOSPITAL ENCOUNTER (INPATIENT)
Facility: HOSPITAL | Age: 83
LOS: 3 days | Discharge: HOSPICE | End: 2023-05-16
Attending: EMERGENCY MEDICINE | Admitting: FAMILY MEDICINE

## 2023-05-13 DIAGNOSIS — D64.9 ANEMIA: ICD-10-CM

## 2023-05-13 DIAGNOSIS — J96.11 CHRONIC RESPIRATORY FAILURE WITH HYPOXIA AND HYPERCAPNIA (HCC): ICD-10-CM

## 2023-05-13 DIAGNOSIS — R41.82 ALTERED MENTAL STATUS, UNSPECIFIED ALTERED MENTAL STATUS TYPE: ICD-10-CM

## 2023-05-13 DIAGNOSIS — J96.12 CHRONIC RESPIRATORY FAILURE WITH HYPOXIA AND HYPERCAPNIA (HCC): ICD-10-CM

## 2023-05-13 DIAGNOSIS — Z51.5 HOSPICE CARE: ICD-10-CM

## 2023-05-13 DIAGNOSIS — E87.5 HYPERKALEMIA: ICD-10-CM

## 2023-05-13 DIAGNOSIS — J18.9 PNEUMONIA: Primary | ICD-10-CM

## 2023-05-13 DIAGNOSIS — I50.9 CHF (CONGESTIVE HEART FAILURE) (HCC): ICD-10-CM

## 2023-05-13 DIAGNOSIS — K62.89 PROCTITIS: ICD-10-CM

## 2023-05-13 DIAGNOSIS — K92.1 MELENA: ICD-10-CM

## 2023-05-13 LAB
2HR DELTA HS TROPONIN: 18 NG/L
4HR DELTA HS TROPONIN: 48 NG/L
ALBUMIN SERPL BCP-MCNC: 3.5 G/DL (ref 3.5–5)
ALP SERPL-CCNC: 57 U/L (ref 34–104)
ALT SERPL W P-5'-P-CCNC: 11 U/L (ref 7–52)
AMPHETAMINES SERPL QL SCN: NEGATIVE
ANION GAP SERPL CALCULATED.3IONS-SCNC: 4 MMOL/L (ref 4–13)
APTT PPP: 40 SECONDS (ref 23–37)
AST SERPL W P-5'-P-CCNC: 19 U/L (ref 13–39)
BACTERIA UR QL AUTO: NORMAL /HPF
BARBITURATES UR QL: NEGATIVE
BASE EX.OXY STD BLDV CALC-SCNC: 54.6 % (ref 60–80)
BASE EXCESS BLDV CALC-SCNC: 6.3 MMOL/L
BASOPHILS # BLD AUTO: 0.04 THOUSANDS/ÂΜL (ref 0–0.1)
BASOPHILS NFR BLD AUTO: 0 % (ref 0–1)
BENZODIAZ UR QL: NEGATIVE
BILIRUB SERPL-MCNC: 0.27 MG/DL (ref 0.2–1)
BILIRUB UR QL STRIP: NEGATIVE
BNP SERPL-MCNC: 638 PG/ML (ref 0–100)
BUN SERPL-MCNC: 24 MG/DL (ref 5–25)
CALCIUM SERPL-MCNC: 8.2 MG/DL (ref 8.4–10.2)
CARDIAC TROPONIN I PNL SERPL HS: 42 NG/L
CARDIAC TROPONIN I PNL SERPL HS: 60 NG/L
CARDIAC TROPONIN I PNL SERPL HS: 90 NG/L
CHLORIDE SERPL-SCNC: 98 MMOL/L (ref 96–108)
CLARITY UR: CLEAR
CO2 SERPL-SCNC: 31 MMOL/L (ref 21–32)
COCAINE UR QL: NEGATIVE
COLOR UR: YELLOW
CREAT SERPL-MCNC: 1.17 MG/DL (ref 0.6–1.3)
EOSINOPHIL # BLD AUTO: 0.07 THOUSAND/ÂΜL (ref 0–0.61)
EOSINOPHIL NFR BLD AUTO: 0 % (ref 0–6)
ERYTHROCYTE [DISTWIDTH] IN BLOOD BY AUTOMATED COUNT: 15.2 % (ref 11.6–15.1)
GFR SERPL CREATININE-BSD FRML MDRD: 44 ML/MIN/1.73SQ M
GLUCOSE SERPL-MCNC: 149 MG/DL (ref 65–140)
GLUCOSE SERPL-MCNC: 151 MG/DL (ref 65–140)
GLUCOSE UR STRIP-MCNC: NEGATIVE MG/DL
HCO3 BLDV-SCNC: 34.4 MMOL/L (ref 24–30)
HCT VFR BLD AUTO: 27.5 % (ref 34.8–46.1)
HGB BLD-MCNC: 8.1 G/DL (ref 11.5–15.4)
HGB UR QL STRIP.AUTO: NEGATIVE
IMM GRANULOCYTES # BLD AUTO: 0.09 THOUSAND/UL (ref 0–0.2)
IMM GRANULOCYTES NFR BLD AUTO: 1 % (ref 0–2)
INR PPP: 1.45 (ref 0.84–1.19)
KETONES UR STRIP-MCNC: NEGATIVE MG/DL
LACTATE SERPL-SCNC: 1.4 MMOL/L (ref 0.5–2)
LEUKOCYTE ESTERASE UR QL STRIP: NEGATIVE
LYMPHOCYTES # BLD AUTO: 1.08 THOUSANDS/ÂΜL (ref 0.6–4.47)
LYMPHOCYTES NFR BLD AUTO: 7 % (ref 14–44)
MCH RBC QN AUTO: 27 PG (ref 26.8–34.3)
MCHC RBC AUTO-ENTMCNC: 29.5 G/DL (ref 31.4–37.4)
MCV RBC AUTO: 92 FL (ref 82–98)
METHADONE UR QL: NEGATIVE
MONOCYTES # BLD AUTO: 0.84 THOUSAND/ÂΜL (ref 0.17–1.22)
MONOCYTES NFR BLD AUTO: 5 % (ref 4–12)
NEUTROPHILS # BLD AUTO: 14.37 THOUSANDS/ÂΜL (ref 1.85–7.62)
NEUTS SEG NFR BLD AUTO: 87 % (ref 43–75)
NITRITE UR QL STRIP: NEGATIVE
NON-SQ EPI CELLS URNS QL MICRO: NORMAL /HPF
NRBC BLD AUTO-RTO: 0 /100 WBCS
O2 CT BLDV-SCNC: 7.4 ML/DL
OPIATES UR QL SCN: POSITIVE
OXYCODONE+OXYMORPHONE UR QL SCN: POSITIVE
PCO2 BLDV: 73 MM HG (ref 42–50)
PCP UR QL: NEGATIVE
PH BLDV: 7.29 [PH] (ref 7.3–7.4)
PH UR STRIP.AUTO: 5.5 [PH]
PLATELET # BLD AUTO: 344 THOUSANDS/UL (ref 149–390)
PMV BLD AUTO: 9.9 FL (ref 8.9–12.7)
PO2 BLDV: 28.1 MM HG (ref 35–45)
POTASSIUM SERPL-SCNC: 5.8 MMOL/L (ref 3.5–5.3)
POTASSIUM SERPL-SCNC: 6.2 MMOL/L (ref 3.5–5.3)
PROT SERPL-MCNC: 6 G/DL (ref 6.4–8.4)
PROT UR STRIP-MCNC: ABNORMAL MG/DL
PROTHROMBIN TIME: 17.8 SECONDS (ref 11.6–14.5)
RBC # BLD AUTO: 3 MILLION/UL (ref 3.81–5.12)
RBC #/AREA URNS AUTO: NORMAL /HPF
SODIUM SERPL-SCNC: 133 MMOL/L (ref 135–147)
SP GR UR STRIP.AUTO: 1.01 (ref 1–1.03)
THC UR QL: NEGATIVE
UROBILINOGEN UR QL STRIP.AUTO: 0.2 E.U./DL
WBC # BLD AUTO: 16.49 THOUSAND/UL (ref 4.31–10.16)
WBC #/AREA URNS AUTO: NORMAL /HPF

## 2023-05-13 RX ORDER — INSULIN LISPRO 100 [IU]/ML
1-6 INJECTION, SOLUTION INTRAVENOUS; SUBCUTANEOUS
Status: DISCONTINUED | OUTPATIENT
Start: 2023-05-13 | End: 2023-05-16 | Stop reason: HOSPADM

## 2023-05-13 RX ORDER — ROPINIROLE 0.25 MG/1
0.5 TABLET, FILM COATED ORAL
Status: DISCONTINUED | OUTPATIENT
Start: 2023-05-13 | End: 2023-05-16 | Stop reason: HOSPADM

## 2023-05-13 RX ORDER — ALLOPURINOL 100 MG/1
50 TABLET ORAL DAILY
Status: DISCONTINUED | OUTPATIENT
Start: 2023-05-14 | End: 2023-05-16 | Stop reason: HOSPADM

## 2023-05-13 RX ORDER — FUROSEMIDE 10 MG/ML
40 INJECTION INTRAMUSCULAR; INTRAVENOUS ONCE
Status: COMPLETED | OUTPATIENT
Start: 2023-05-13 | End: 2023-05-13

## 2023-05-13 RX ORDER — DOCUSATE SODIUM 100 MG/1
200 CAPSULE, LIQUID FILLED ORAL 2 TIMES DAILY
Status: DISCONTINUED | OUTPATIENT
Start: 2023-05-13 | End: 2023-05-16 | Stop reason: HOSPADM

## 2023-05-13 RX ORDER — AMLODIPINE BESYLATE 10 MG/1
10 TABLET ORAL DAILY
Status: DISCONTINUED | OUTPATIENT
Start: 2023-05-14 | End: 2023-05-16 | Stop reason: HOSPADM

## 2023-05-13 RX ORDER — ATORVASTATIN CALCIUM 10 MG/1
10 TABLET, FILM COATED ORAL
Status: DISCONTINUED | OUTPATIENT
Start: 2023-05-14 | End: 2023-05-16 | Stop reason: HOSPADM

## 2023-05-13 RX ORDER — FLUTICASONE FUROATE AND VILANTEROL 100; 25 UG/1; UG/1
1 POWDER RESPIRATORY (INHALATION)
Status: DISCONTINUED | OUTPATIENT
Start: 2023-05-14 | End: 2023-05-16 | Stop reason: HOSPADM

## 2023-05-13 RX ORDER — CEFTRIAXONE 1 G/50ML
1000 INJECTION, SOLUTION INTRAVENOUS ONCE
Status: COMPLETED | OUTPATIENT
Start: 2023-05-13 | End: 2023-05-13

## 2023-05-13 RX ORDER — MIRTAZAPINE 15 MG/1
30 TABLET, FILM COATED ORAL
Status: DISCONTINUED | OUTPATIENT
Start: 2023-05-14 | End: 2023-05-16 | Stop reason: HOSPADM

## 2023-05-13 RX ORDER — NALOXONE HYDROCHLORIDE 1 MG/ML
0.4 INJECTION INTRAMUSCULAR; INTRAVENOUS; SUBCUTANEOUS ONCE
Status: DISCONTINUED | OUTPATIENT
Start: 2023-05-13 | End: 2023-05-15

## 2023-05-13 RX ORDER — TRAZODONE HYDROCHLORIDE 50 MG/1
50 TABLET ORAL
Status: DISCONTINUED | OUTPATIENT
Start: 2023-05-14 | End: 2023-05-16 | Stop reason: HOSPADM

## 2023-05-13 RX ORDER — ACETAMINOPHEN 325 MG/1
650 TABLET ORAL EVERY 6 HOURS PRN
Status: DISCONTINUED | OUTPATIENT
Start: 2023-05-13 | End: 2023-05-16 | Stop reason: HOSPADM

## 2023-05-13 RX ORDER — ASPIRIN 81 MG/1
81 TABLET, CHEWABLE ORAL DAILY
Status: DISCONTINUED | OUTPATIENT
Start: 2023-05-14 | End: 2023-05-16 | Stop reason: HOSPADM

## 2023-05-13 RX ORDER — NICOTINE 21 MG/24HR
1 PATCH, TRANSDERMAL 24 HOURS TRANSDERMAL DAILY
Status: DISCONTINUED | OUTPATIENT
Start: 2023-05-14 | End: 2023-05-16 | Stop reason: HOSPADM

## 2023-05-13 RX ORDER — POLYETHYLENE GLYCOL 3350 17 G/17G
17 POWDER, FOR SOLUTION ORAL DAILY PRN
Status: DISCONTINUED | OUTPATIENT
Start: 2023-05-13 | End: 2023-05-16 | Stop reason: HOSPADM

## 2023-05-13 RX ORDER — ALBUTEROL SULFATE 2.5 MG/3ML
0.63 SOLUTION RESPIRATORY (INHALATION) 4 TIMES DAILY PRN
Status: DISCONTINUED | OUTPATIENT
Start: 2023-05-13 | End: 2023-05-16 | Stop reason: HOSPADM

## 2023-05-13 RX ORDER — NALOXONE HYDROCHLORIDE 0.4 MG/ML
INJECTION, SOLUTION INTRAMUSCULAR; INTRAVENOUS; SUBCUTANEOUS
Status: COMPLETED
Start: 2023-05-13 | End: 2023-05-13

## 2023-05-13 RX ORDER — CLOPIDOGREL BISULFATE 75 MG/1
75 TABLET ORAL DAILY
Status: DISCONTINUED | OUTPATIENT
Start: 2023-05-14 | End: 2023-05-16 | Stop reason: HOSPADM

## 2023-05-13 RX ORDER — GABAPENTIN 300 MG/1
300 CAPSULE ORAL 3 TIMES DAILY
Status: DISCONTINUED | OUTPATIENT
Start: 2023-05-14 | End: 2023-05-16 | Stop reason: HOSPADM

## 2023-05-13 RX ORDER — INSULIN LISPRO 100 [IU]/ML
1-6 INJECTION, SOLUTION INTRAVENOUS; SUBCUTANEOUS
Status: DISCONTINUED | OUTPATIENT
Start: 2023-05-14 | End: 2023-05-16 | Stop reason: HOSPADM

## 2023-05-13 RX ORDER — FUROSEMIDE 40 MG/1
40 TABLET ORAL DAILY
Status: DISCONTINUED | OUTPATIENT
Start: 2023-05-14 | End: 2023-05-16 | Stop reason: HOSPADM

## 2023-05-13 RX ORDER — BACLOFEN 10 MG/1
5 TABLET ORAL EVERY 8 HOURS PRN
Status: DISCONTINUED | OUTPATIENT
Start: 2023-05-13 | End: 2023-05-16 | Stop reason: HOSPADM

## 2023-05-13 RX ORDER — SUCRALFATE 1 G/1
1 TABLET ORAL
Status: DISCONTINUED | OUTPATIENT
Start: 2023-05-14 | End: 2023-05-16 | Stop reason: HOSPADM

## 2023-05-13 RX ORDER — PREDNISONE 1 MG/1
2.5 TABLET ORAL DAILY
Status: DISCONTINUED | OUTPATIENT
Start: 2023-05-14 | End: 2023-05-16 | Stop reason: HOSPADM

## 2023-05-13 RX ADMIN — FUROSEMIDE 40 MG: 10 INJECTION, SOLUTION INTRAVENOUS at 17:06

## 2023-05-13 RX ADMIN — NALOXONE HYDROCHLORIDE 0.4 MG: 0.4 INJECTION, SOLUTION INTRAMUSCULAR; INTRAVENOUS; SUBCUTANEOUS at 20:11

## 2023-05-13 RX ADMIN — CEFTRIAXONE 1000 MG: 1 INJECTION, SOLUTION INTRAVENOUS at 18:58

## 2023-05-13 RX ADMIN — FUROSEMIDE 40 MG: 10 INJECTION, SOLUTION INTRAVENOUS at 19:10

## 2023-05-13 NOTE — ED NOTES
Daughter, Opal Sheriff, called for update  Notified of admission and room assignment       Bessie Leiva RN  05/13/23 1928

## 2023-05-13 NOTE — ED PROVIDER NOTES
History  Chief Complaint   Patient presents with   • Weakness - Generalized     Pts daughter called ems due to her mom not acting like herself, more sleepy then normal, and not wanting to eat  30-year-old female presents to the emergency department via EMS from home for evaluation  Per EMS daughter called stating mother was not acting herself, more sleeping than normal  Patient was very fatigued on EMS arrival   EMS reports they were having to help patient's stand states she continued to fall asleep  Report patient states she did not eat today  Patient states she did not eat because she had just woken up  EMS reports patient did have her daily medications today despite not eating this includes Ativan, morphine, oxycodone  Patient had recent admission for CHF  There is no respiratory complaints made on arrival   Patient denies any leg swelling  She denies any shortness of breath  Oxygen saturation 96% on room air  Patient has no complaints at this time  She is a poor historian  Falls asleep easily but easily arousable and follows commands  Awake alert and oriented  She denies any recent falls or injury  Patient does admit to diarrhea this morning, states this was dark  EMS states patient was constipated and family gave stool softeners  Daughter now at bedside [phone]: states patient had admission on 5/2 for CHF  Was discharged home on Tuesday  States patient was her normal self on Wednesday and Thursday  Yesterday evening patient started to act more tired than usual   States she noticed some swelling around patient's eyes which were similar to symptoms patient experienced before admission for CHF  States this morning patient did not want to get up  Daughter reports she repeatedly went to wake the patient and patient stated she was tired  States swelling worsened around the eyes  Reports she called patient's at home nurse who recommended she be seen in the emergency department   Reports patient had mild cough  No fevers  History provided by:  Patient, EMS personnel and relative      Prior to Admission Medications   Prescriptions Last Dose Informant Patient Reported? Taking? Baclofen 5 MG TABS Unknown  Yes No   Sig: Take 1 tablet by mouth every 8 (eight) hours as needed   Cyanocobalamin ER 1000 MCG TBCR 5/12/2023  Yes Yes   Sig: Take 1,000 mcg by mouth daily   Ipratropium-Albuterol ([address]) 5/12/2023  Yes Yes   Sig: Inhale 2 5 mg every 4 (four) hours   LORazepam (ATIVAN) 2 mg/mL concentrated solution Unknown  Yes No   Sig: Take 0 5 mg by mouth every 8 (eight) hours as needed   Magnesium 500 MG CAPS 5/12/2023  Yes Yes   Sig: Take 500 mg by mouth daily   Mirabegron (MYRBETRIQ PO) 5/12/2023  Yes Yes   Sig: Take 25 mg by mouth daily   acetaminophen (TYLENOL) 325 mg tablet Unknown  No No   Sig: Take 2 tablets (650 mg total) by mouth every 6 (six) hours as needed for mild pain   albuterol (ACCUNEB) 0 63 MG/3ML nebulizer solution Unknown  Yes No   Sig: Take 1 ampule by nebulization 4 (four) times a day as needed for wheezing   allopurinol (ZYLOPRIM) 100 mg tablet 5/12/2023  Yes Yes   Sig: Take 50 mg by mouth daily   amLODIPine (NORVASC) 10 mg tablet 5/12/2023  Yes Yes   Sig: Take 10 mg by mouth daily   apixaban (ELIQUIS) 5 mg 5/12/2023  Yes Yes   Sig: Take 5 mg by mouth 2 (two) times a day   aspirin 81 mg chewable tablet 5/12/2023  Yes Yes   Sig: Chew 81 mg daily   atorvastatin (LIPITOR) 10 mg tablet 5/12/2023  Yes Yes   Sig: Take 10 mg by mouth daily   benzonatate (TESSALON PERLES) 100 mg capsule Not Taking  Yes No   Sig: Take 100 mg by mouth every 8 (eight) hours as needed   Patient not taking: Reported on 5/13/2023   clopidogrel (PLAVIX) 75 mg tablet 5/12/2023  No Yes   Sig: Take 1 tablet (75 mg total) by mouth daily Do not start before February 5, 2023     docusate sodium (COLACE) 100 mg capsule 5/12/2023  Yes Yes   Sig: Take 200 mg by mouth 2 (two) times a day   ferrous sulfate 325 (65 Fe) mg tablet Not Taking  No No   Sig: Take 1 tablet (325 mg total) by mouth every other day Do not start before May 10, 2023  Patient not taking: Reported on 5/13/2023   fluticasone-salmeterol (ADVAIR, Wallene Vincenzo) 500-50 mcg/dose inhaler 5/12/2023  Yes Yes   Sig: Inhale 1 puff 2 (two) times a day Rinse mouth after use  furosemide (LASIX) 20 mg tablet 5/12/2023  No Yes   Sig: Take 2 tablets (40 mg total) by mouth daily   gabapentin (NEURONTIN) 300 mg capsule 5/12/2023  Yes Yes   Sig: Take 300 mg by mouth 3 (three) times a day   guaiFENesin (MUCINEX) 600 mg 12 hr tablet 5/12/2023  Yes Yes   Sig: Take 600 mg by mouth daily   insulin aspart protamine-insulin aspart (NovoLOG 70/30) 100 units/mL injection 5/12/2023  Yes Yes   Sig: Inject 5 Units under the skin daily With supper   insulin glargine (LANTUS) 100 units/mL subcutaneous injection 5/12/2023  Yes Yes   Sig: Inject 12 Units under the skin daily at bedtime   lactobacillus acidophilus 5/12/2023  Yes Yes   Sig: Take 1 capsule by mouth daily   metolazone (ZAROXOLYN) 2 5 mg tablet Unknown  Yes No   Sig: Take one tab my mouth weekly as needed for edema   metoprolol tartrate (LOPRESSOR) 50 mg tablet 5/12/2023  No Yes   Sig: Take 0 5 tablets (25 mg total) by mouth every 12 (twelve) hours   mirtazapine (REMERON) 30 mg tablet 5/12/2023  Yes Yes   Sig: Take 30 mg by mouth daily at bedtime   morphine 20 mg/mL concentrated solution 5/12/2023  Yes Yes   Sig: Take 10 mg by mouth every 6 (six) hours as needed (air hunger) Takes twice a day   nicotine (NICODERM CQ) 7 mg/24hr TD 24 hr patch Not Taking  No No   Sig: Place 1 patch on the skin over 24 hours daily Do not start before February 5, 2023     Patient not taking: Reported on 5/13/2023   nitroglycerin (NITROSTAT) 0 4 mg SL tablet Unknown  Yes No   Sig: Place 0 4 mg under the tongue every 5 (five) minutes as needed for chest pain   oxyCODONE (OxyCONTIN) 10 mg 12 hr tablet 5/12/2023  Yes Yes   Sig: Take 10 mg by mouth every 6 (six) hours as needed   polyethylene glycol (MIRALAX) 17 g packet Unknown  No No   Sig: Take 17 g by mouth daily as needed (constipation)   potassium chloride (MICRO-K) 10 MEQ CR capsule 2023  Yes Yes   Sig: Take 10 mEq by mouth daily   predniSONE 2 5 mg tablet 2023  Yes Yes   Sig: Take 2 5 mg by mouth daily   rOPINIRole (REQUIP) 0 25 mg tablet 2023  Yes Yes   Sig: Take 0 5 mg by mouth daily at bedtime   sertraline (ZOLOFT) 50 mg tablet 2023  Yes Yes   Sig: Take 50 mg by mouth daily at bedtime   sucralfate (CARAFATE) 1 g tablet 2023  Yes Yes   Sig: Take 1 g by mouth 2 (two) times a day before meals   traZODone (DESYREL) 100 mg tablet 2023  No Yes   Sig: Take 0 5 tablets (50 mg total) by mouth daily at bedtime      Facility-Administered Medications: None       Past Medical History:   Diagnosis Date   • Asthma    • CHF (congestive heart failure) ([facility])    • COPD (chronic obstructive pulmonary disease) ([facility] [address] )    • Diabetes mellitus ([facility] [address] )    • DVT of lower limb, acute ([facility] [address] )    • Hypertension    • Renal disorder        Past Surgical History:   Procedure Laterality Date   • APPENDECTOMY     •  SECTION     • HYSTERECTOMY     • IR LOWER EXTREMITY ANGIOGRAM  2023   • RI AMPUTATION METATARSAL W/TOE SINGLE Left 2023    Procedure: RAY RESECTION FOOT Left 1st;  Surgeon: Rebecca Nguyen DPM;  Location: [facility];  Service: Podiatry   • RI Tj Salazar 3RD+ ORD SLCTV ABDL PEL/LXTR [facility] N/A 2023    Procedure: R CFA access w/ 6F sheath and Mynx closure Aortogram LLE angiogram R EIA PTA w/ 6x60mm  L EIA PTA w/ 6x60mm  L SFA PTA w/ [id number] Kofi POBA and [id number] Morganza DCB L SFA stents: 5x27mm Express LD (prox) and 6x60mm Innova (dist); Surgeon: Roberto Guzman MD;  Location: [facility];  Service: Vascular       Family History   Problem Relation Age of Onset   • Arthritis Father      I have reviewed and agree with the history as documented      E-Cigarette/Vaping   • E-Cigarette Use Never User      E-Cigarette/Vaping Substances   • Nicotine No    • THC No    • CBD No    • Flavoring No    • Other No    • Unknown No      Social History     Tobacco Use   • Smoking status: Every Day     Packs/day: 0 50     Types: Cigarettes     Passive exposure: Past   • Smokeless tobacco: Never   • Tobacco comments:     Pt currently wearing nicotine patch    Vaping Use   • Vaping Use: Never used   Substance Use Topics   • Alcohol use: Not Currently   • Drug use: Never       Review of Systems   Constitutional: Positive for activity change, appetite change and fatigue  Negative for fever  HENT: Positive for facial swelling  Eyes: Negative for visual disturbance  Respiratory: Positive for cough  Negative for shortness of breath  Cardiovascular: Negative for chest pain, palpitations and leg swelling  Gastrointestinal: Positive for blood in stool, constipation and diarrhea  Negative for abdominal pain  Genitourinary: Negative  Musculoskeletal: Negative  Skin: Negative  Neurological: Positive for weakness  Negative for dizziness, light-headedness and headaches  All other systems reviewed and are negative  Physical Exam  Physical Exam  Vitals and nursing note reviewed  Constitutional:       General: She is not in acute distress  Appearance: Normal appearance  She is ill-appearing  She is not toxic-appearing or diaphoretic  HENT:      Head: Normocephalic  Nose: Nose normal       Mouth/Throat:      Mouth: Mucous membranes are dry  Pharynx: No oropharyngeal exudate or posterior oropharyngeal erythema  Comments: Tongue has blue/green film, daughter states this is from grape juice drink last night  Eyes:      General:         Left eye: Discharge present  Extraocular Movements: Extraocular movements intact  Conjunctiva/sclera: Conjunctivae normal       Pupils: Pupils are equal, round, and reactive to light        Comments: Swelling around both eyes, left > right   Cardiovascular:      Rate and Rhythm: Normal rate and regular rhythm  Pulmonary:      Effort: Pulmonary effort is normal  No respiratory distress  Breath sounds: Decreased breath sounds and rales present  Chest:      Chest wall: No tenderness  Abdominal:      General: Abdomen is flat  Bowel sounds are normal  There is no distension  Palpations: Abdomen is soft  Tenderness: There is no abdominal tenderness  There is no right CVA tenderness, left CVA tenderness or guarding  Genitourinary:     Rectum: Guaiac result positive  Comments: Dark stool  Musculoskeletal:         General: Normal range of motion  Cervical back: Normal range of motion  Right lower leg: No edema  Left lower leg: No edema  Skin:     General: Skin is warm and dry  Neurological:      General: No focal deficit present  Mental Status: She is alert and oriented to person, place, and time  GCS: GCS eye subscore is 4  GCS verbal subscore is 5  GCS motor subscore is 6  Comments: Patient quickly falls asleep during exam   She is easily arousable  Alert and oriented  Follows commands           Vital Signs  ED Triage Vitals   Temperature Pulse Respirations Blood Pressure SpO2   05/13/23 1544 05/13/23 1544 05/13/23 1544 05/13/23 1547 05/13/23 1544   97 5 °F (36 4 °C) 76 18 (!) 178/74 96 %      Temp src Heart Rate Source Patient Position - Orthostatic VS BP Location FiO2 (%)   -- 05/13/23 1544 05/13/23 1600 05/13/23 1600 --    Monitor Lying Right arm       Pain Score       05/13/23 1544       No Pain           Vitals:    05/13/23 1730 05/13/23 1800 05/13/23 1830 05/13/23 1900   BP: 163/67 142/63 138/63 137/63   Pulse: 75 72 66 64   Patient Position - Orthostatic VS: Lying Lying Lying          Visual Acuity  Visual Acuity    Flowsheet Row Most Recent Value   L Pupil Size (mm) 3   R Pupil Size (mm) 3          ED Medications  Medications   cefTRIAXone (ROCEPHIN) IVPB (premix in dextrose) 1,000 mg 50 mL (1,000 mg Intravenous New Bag 5/13/23 1858)   furosemide (LASIX) injection 40 mg (40 mg Intravenous Given 5/13/23 1706)   furosemide (LASIX) injection 40 mg (40 mg Intravenous Given 5/13/23 1910)       Diagnostic Studies  Results Reviewed     Procedure Component Value Units Date/Time    Blood culture #1 [[id number]] Collected: 05/13/23 1854    Lab Status: In process Specimen: Blood from Arm, Right Updated: 05/13/23 1907    Blood culture #2 [[id number]] Collected: 05/13/23 1855    Lab Status:  In process Specimen: Blood from Arm, Right Updated: 05/13/23 1907    HS Troponin I 2hr [[id number]]  (Abnormal) Collected: 05/13/23 1808    Lab Status: Final result Specimen: Blood from Arm, Right Updated: 05/13/23 1836     hs TnI 2hr 60 ng/L      Delta 2hr hsTnI 18 ng/L     Potassium [[id number]]  (Abnormal) Collected: 05/13/23 1703    Lab Status: Final result Specimen: Blood from Arm, Right Updated: 05/13/23 1740     Potassium 5 8 mmol/L     Blood gas, venous [[id number]]  (Abnormal) Collected: 05/13/23 1636    Lab Status: Final result Specimen: Blood from Arm, Left Updated: 05/13/23 1709     pH, Tor 7 291     pCO2, Tor 73 0 mm Hg      pO2, Tor 28 1 mm Hg      HCO3, Tor 34 4 mmol/L      Base Excess, Tor 6 3 mmol/L      O2 Content, Tor 7 4 ml/dL      O2 HGB, VENOUS 54 6 %     Urine Microscopic [[id number]]  (Normal) Collected: 05/13/23 1554    Lab Status: Final result Specimen: Urine, Straight Cath Updated: 05/13/23 1644     RBC, UA None Seen /hpf      WBC, UA None Seen /hpf      Epithelial Cells Occasional /hpf      Bacteria, UA None Seen /hpf     Comprehensive metabolic panel [[id number]]  (Abnormal) Collected: 05/13/23 1555    Lab Status: Final result Specimen: Blood from Line, Venous Updated: 05/13/23 1631     Sodium 133 mmol/L      Potassium 6 2 mmol/L      Chloride 98 mmol/L      CO2 31 mmol/L      ANION GAP 4 mmol/L      BUN 24 mg/dL      Creatinine 1 17 mg/dL      Glucose 151 mg/dL      Calcium 8 2 mg/dL AST 19 U/L      ALT 11 U/L      Alkaline Phosphatase 57 U/L      Total Protein 6 0 g/dL      Albumin 3 5 g/dL      Total Bilirubin 0 27 mg/dL      eGFR 44 ml/min/1 73sq m     Narrative:      [place] guidelines for Chronic Kidney Disease (CKD):   •  Stage 1 with normal or high GFR (GFR > 90 mL/min/1 73 square meters)  •  Stage 2 Mild CKD (GFR = 60-89 mL/min/1 73 square meters)  •  Stage 3A Moderate CKD (GFR = 45-59 mL/min/1 73 square meters)  •  Stage 3B Moderate CKD (GFR = 30-44 mL/min/1 73 square meters)  •  Stage 4 Severe CKD (GFR = 15-29 mL/min/1 73 square meters)  •  Stage 5 End Stage CKD (GFR <15 mL/min/1 73 square meters)  Note: GFR calculation is accurate only with a steady state creatinine    Protime-INR [[id number]]  (Abnormal) Collected: 05/13/23 1556    Lab Status: Final result Specimen: Blood from Line, Venous Updated: 05/13/23 1631     Protime 17 8 seconds      INR 1 45    APTT [[id number]]  (Abnormal) Collected: 05/13/23 1556    Lab Status: Final result Specimen: Blood from Line, Venous Updated: 05/13/23 1631     PTT 40 seconds     HS Troponin I 4hr [[id number]]     Lab Status: No result Specimen: Blood     HS Troponin 0hr (reflex protocol) [[id number]]  (Normal) Collected: 05/13/23 1555    Lab Status: Final result Specimen: Blood from Line, Venous Updated: 05/13/23 1630     hs TnI 0hr 42 ng/L     B-Type Natriuretic Peptide(BNP) [[id number]]  (Abnormal) Collected: 05/13/23 1555    Lab Status: Final result Specimen: Blood from Line, Venous Updated: 05/13/23 1629      pg/mL     Rapid drug screen, urine [[id number]]  (Abnormal) Collected: 05/13/23 1556    Lab Status: Final result Specimen: Urine, Catheter Updated: 05/13/23 1627     Amph/Meth UR Negative     Barbiturate Ur Negative     Benzodiazepine Urine Negative     Cocaine Urine Negative     Methadone Urine Negative     Opiate Urine Positive     PCP Ur Negative     THC Urine Negative     Oxycodone Urine Positive Narrative:      Presumptive report  If requested, specimen will be sent to reference lab for confirmation  FOR MEDICAL PURPOSES ONLY  IF CONFIRMATION NEEDED PLEASE CONTACT THE LAB WITHIN 5 DAYS  Drug Screen Cutoff Levels:  AMPHETAMINE/METHAMPHETAMINES  1000 ng/mL  BARBITURATES     200 ng/mL  BENZODIAZEPINES     200 ng/mL  COCAINE      300 ng/mL  METHADONE      300 ng/mL  OPIATES      300 ng/mL  PHENCYCLIDINE     25 ng/mL  THC       50 ng/mL  OXYCODONE      100 ng/mL    UA w Reflex to Microscopic w Reflex to Culture [[id number]]  (Abnormal) Collected: 05/13/23 1558    Lab Status: Final result Specimen: Urine, Straight Cath Updated: 05/13/23 1622     Color, UA Yellow     Clarity, UA Clear     Specific Gravity, UA 1 015     pH, UA 5 5     Leukocytes, UA Negative     Nitrite, UA Negative     Protein, UA Trace mg/dl      Glucose, UA Negative mg/dl      Ketones, UA Negative mg/dl      Urobilinogen, UA 0 2 E U /dl      Bilirubin, UA Negative     Occult Blood, UA Negative    Lactic acid, plasma (w/reflex if result > 2 0) [[id number]]  (Normal) Collected: 05/13/23 1555    Lab Status: Final result Specimen: Blood from Line, Venous Updated: 05/13/23 1621     LACTIC ACID 1 4 mmol/L     Narrative:      Result may be elevated if tourniquet was used during collection      CBC and differential [[id number]]  (Abnormal) Collected: 05/13/23 1555    Lab Status: Final result Specimen: Blood from Line, Venous Updated: 05/13/23 1607     WBC 16 49 Thousand/uL      RBC 3 00 Million/uL      Hemoglobin 8 1 g/dL      Hematocrit 27 5 %      MCV 92 fL      MCH 27 0 pg      MCHC 29 5 g/dL      RDW 15 2 %      MPV 9 9 fL      Platelets 183 Thousands/uL      nRBC 0 /100 WBCs      Neutrophils Relative 87 %      Immat GRANS % 1 %      Lymphocytes Relative 7 %      Monocytes Relative 5 %      Eosinophils Relative 0 %      Basophils Relative 0 %      Neutrophils Absolute 14 37 Thousands/µL      Immature Grans Absolute 0 09 Thousand/uL "Lymphocytes Absolute 1 08 Thousands/µL      Monocytes Absolute 0 84 Thousand/µL      Eosinophils Absolute 0 07 Thousand/µL      Basophils Absolute 0 04 Thousands/µL                  CT chest abdomen pelvis wo contrast   Final Result by Sharri Scruggs MD (05/13 1829)      Groundglass airspace opacity in the left upper lobe consistent with pneumonia  Patchy consolidative opacities in the posterior lower lung zones which could represent atelectasis or pneumonia  Trace costophrenic angle effusions  Cardiomegaly and extensive coronary artery calcification  Uniform thickening of the wall of the rectum with perirectal inflammatory edema consistent with either infectious or inflammatory proctitis  No perirectal abscess  Cholelithiasis  This examination was marked \"immediate notification\" in Epic in order to begin the standard process by which the radiology reading room liaison alerts the referring practitioner           Workstation performed: [id number]         XR chest 1 view portable    (Results Pending)              Procedures  ECG 12 Lead Documentation Only    Date/Time: 5/13/2023 3:54 PM  Performed by: Lynda Clarke PA-C  Authorized by: Lynda Clarke PA-C     Patient location:  ED  Interpretation:     Interpretation: non-specific    Rate:     ECG rate:  77    ECG rate assessment: normal    Rhythm:     Rhythm: sinus rhythm    Ectopy:     Ectopy: none    QRS:     QRS axis:  Normal    QRS intervals:  Normal  Conduction:     Conduction: normal      CriticalCare Time    Date/Time: 5/13/2023 7:11 PM  Performed by: yLnda Clarke PA-C  Authorized by: Lynda Clarke PA-C     Critical care provider statement:     Critical care time (minutes):  35    Critical care was necessary to treat or prevent imminent or life-threatening deterioration of the following conditions:  Cardiac failure    Critical care was time spent personally by me on the following activities:  Obtaining history from patient or " surrogate, development of treatment plan with patient or surrogate, discussions with primary provider, evaluation of patient's response to treatment, examination of patient, review of old charts, re-evaluation of patient's condition, ordering and review of radiographic studies, ordering and review of laboratory studies and ordering and performing treatments and interventions             ED Course  ED Course as of 05/13/23 1915   Sat May 13, 2023   1611 WBC(!): 16 49   1611 Hemoglobin(!): 8 1  Decreased from previous    1622 Hemoccult positive, dark stool   1627 LACTIC ACID: 1 4   1635 Potassium(!): 6 2  Slightly hemolyzed potassium  Will recheck   1635 Sodium(!): 133   1642 BNP(!): 638  We will treat with IV Lasix   1643 hs TnI 0hr: 42   1800 Potassium(!): 5 8   1837 CT chest, abd, pelvis: Groundglass airspace opacity in the left upper lobe consistent with pneumonia  Patchy consolidative opacities in the posterior lower lung zones which could represent atelectasis or pneumonia  Trace costophrenic angle effusions      Cardiomegaly and extensive coronary artery calcification      Uniform thickening of the wall of the rectum with perirectal inflammatory edema consistent with either infectious or inflammatory proctitis  No perirectal abscess      Cholelithiasis  1837 hs TnI 2hr(!): 60  Patient has no chest pain  Suspected elevation due to demand  Will continue to trend   1846 TT sent to medicine requesting admission   1902 Accepted for admission                                SBIRT 22yo+    Flowsheet Row Most Recent Value   Initial Alcohol Screen: US AUDIT-C     1  How often do you have a drink containing alcohol? 0 Filed at: 05/13/2023 1550   2  How many drinks containing alcohol do you have on a typical day you are drinking? 0 Filed at: 05/13/2023 1550   3b  FEMALE Any Age, or MALE 65+: How often do you have 4 or more drinks on one occassion?  0 Filed at: 05/13/2023 1550   Audit-C Score 0 Filed at: 05/13/2023 1550 LUIS: How many times in the past year have you    Used an illegal drug or used a prescription medication for non-medical reasons? Never Filed at: 05/13/2023 Polina 59 Making  79-year-old female presented to the emergency department from home for evaluation  Vitals and medical record reviewed  On exam patient is fatigued, dry mucous membranes  Swelling around the eyes  Lungs decreased with noted rales  Abdomen was soft and nontender  Hemoccult positive with dark brown stool  Laboratory findings significant for leukocytosis and elevated BNP  CT concerning for CHF as well as pneumonia  She was started on Lasix and IV Rocephin  She had elevated potassium levels suspected to improve with Lasix  Elevated troponin likely due to demand, patient denied any chest pain  EKG was nonischemic  Lower concern for MI  She is also found to have a drop in hemoglobin, heme positive, CAT scan concerning for proctitis  I discussed with daughter and patient recommended admission, both are agreeable with this treatment plan  Patient was accepted to the medicine service  Anemia: acute illness or injury  CHF (congestive heart failure) ([facility] [address] ): chronic illness or injury with exacerbation, progression, or side effects of treatment  Hyperkalemia: acute illness or injury  Melena: acute illness or injury  Pneumonia: acute illness or injury  Proctitis: complicated acute illness or injury  Amount and/or Complexity of Data Reviewed  Independent Historian: caregiver and EMS  Labs: ordered  Decision-making details documented in ED Course  Radiology: ordered and independent interpretation performed  ECG/medicine tests: ordered and independent interpretation performed  Discussion of management or test interpretation with external provider(s): Discussed with medicine for admission    Risk  Prescription drug management  Decision regarding hospitalization            Disposition  Final diagnoses: Pneumonia   CHF (congestive heart failure) (HCC)   Proctitis   Melena   Hyperkalemia   Anemia     Time reflects when diagnosis was documented in both MDM as applicable and the Disposition within this note     Time User Action Codes Description Comment    5/13/2023  7:00 PM Wander Penny Add [J18 9] Pneumonia     5/13/2023  7:00 PM Wander Penny Add [I50 9] CHF (congestive heart failure) ([facility] [address] )     5/13/2023  7:01 PM Wander Penny Add [K62 89] Proctitis     5/13/2023  7:01 PM Otto Coratty [K92 1] Melena     5/13/2023  7:01 PM Wander Penny Add [E87 5] Hyperkalemia     5/13/2023  7:15 PM Wander Penny Add [D64 9] Anemia       ED Disposition     ED Disposition   Admit    Condition   Stable    Date/Time   Sat May 13, 2023  7:00 PM    Comment   Case was discussed with Linda and the patient's admission status was agreed to be Admission Status: inpatient status to the service of Dr Javed Connors             Follow-up Information    None         Patient's Medications   Discharge Prescriptions    No medications on file       No discharge procedures on file      PDMP Review       Value Time User    PDMP Reviewed  Yes 1/29/2023  5:49 AM Ruth Hawley PA-C          ED Provider  Electronically Signed by           Manolo Guillermo PA-C  05/13/23 2227

## 2023-05-13 NOTE — ED NOTES
Gave pt mouth swabs with water pt mouth was green and dry pt feeling better now rested with warm blankets and family in room        Sekou Ray  05/13/23 1029

## 2023-05-14 LAB
ALBUMIN SERPL BCP-MCNC: 3.2 G/DL (ref 3.5–5)
ALP SERPL-CCNC: 56 U/L (ref 34–104)
ALT SERPL W P-5'-P-CCNC: 8 U/L (ref 7–52)
ANION GAP SERPL CALCULATED.3IONS-SCNC: 7 MMOL/L (ref 4–13)
AST SERPL W P-5'-P-CCNC: 17 U/L (ref 13–39)
BASOPHILS # BLD AUTO: 0.04 THOUSANDS/ÂΜL (ref 0–0.1)
BASOPHILS NFR BLD AUTO: 0 % (ref 0–1)
BILIRUB SERPL-MCNC: 0.27 MG/DL (ref 0.2–1)
BUN SERPL-MCNC: 24 MG/DL (ref 5–25)
CALCIUM ALBUM COR SERPL-MCNC: 8.4 MG/DL (ref 8.3–10.1)
CALCIUM SERPL-MCNC: 7.8 MG/DL (ref 8.4–10.2)
CARDIAC TROPONIN I PNL SERPL HS: 77 NG/L (ref 8–18)
CHLORIDE SERPL-SCNC: 99 MMOL/L (ref 96–108)
CO2 SERPL-SCNC: 29 MMOL/L (ref 21–32)
CREAT SERPL-MCNC: 1.14 MG/DL (ref 0.6–1.3)
EOSINOPHIL # BLD AUTO: 0.26 THOUSAND/ÂΜL (ref 0–0.61)
EOSINOPHIL NFR BLD AUTO: 3 % (ref 0–6)
ERYTHROCYTE [DISTWIDTH] IN BLOOD BY AUTOMATED COUNT: 15.1 % (ref 11.6–15.1)
FERRITIN SERPL-MCNC: 177 NG/ML (ref 8–388)
GFR SERPL CREATININE-BSD FRML MDRD: 45 ML/MIN/1.73SQ M
GLUCOSE SERPL-MCNC: 145 MG/DL (ref 65–140)
GLUCOSE SERPL-MCNC: 168 MG/DL (ref 65–140)
GLUCOSE SERPL-MCNC: 174 MG/DL (ref 65–140)
GLUCOSE SERPL-MCNC: 181 MG/DL (ref 65–140)
GLUCOSE SERPL-MCNC: 194 MG/DL (ref 65–140)
HCT VFR BLD AUTO: 26.3 % (ref 34.8–46.1)
HGB BLD-MCNC: 7.7 G/DL (ref 11.5–15.4)
IMM GRANULOCYTES # BLD AUTO: 0.06 THOUSAND/UL (ref 0–0.2)
IMM GRANULOCYTES NFR BLD AUTO: 1 % (ref 0–2)
IRON SATN MFR SERPL: 10 % (ref 15–50)
IRON SERPL-MCNC: 35 UG/DL (ref 50–170)
LYMPHOCYTES # BLD AUTO: 1.41 THOUSANDS/ÂΜL (ref 0.6–4.47)
LYMPHOCYTES NFR BLD AUTO: 15 % (ref 14–44)
MAGNESIUM SERPL-MCNC: 3.1 MG/DL (ref 1.9–2.7)
MCH RBC QN AUTO: 26.6 PG (ref 26.8–34.3)
MCHC RBC AUTO-ENTMCNC: 29.3 G/DL (ref 31.4–37.4)
MCV RBC AUTO: 91 FL (ref 82–98)
MONOCYTES # BLD AUTO: 0.42 THOUSAND/ÂΜL (ref 0.17–1.22)
MONOCYTES NFR BLD AUTO: 5 % (ref 4–12)
NEUTROPHILS # BLD AUTO: 7.24 THOUSANDS/ÂΜL (ref 1.85–7.62)
NEUTS SEG NFR BLD AUTO: 76 % (ref 43–75)
NRBC BLD AUTO-RTO: 0 /100 WBCS
PHOSPHATE SERPL-MCNC: 3 MG/DL (ref 2.3–4.1)
PLATELET # BLD AUTO: 317 THOUSANDS/UL (ref 149–390)
PMV BLD AUTO: 10 FL (ref 8.9–12.7)
POTASSIUM SERPL-SCNC: 5.3 MMOL/L (ref 3.5–5.3)
PROCALCITONIN SERPL-MCNC: 0.22 NG/ML
PROT SERPL-MCNC: 5.7 G/DL (ref 6.4–8.4)
RBC # BLD AUTO: 2.9 MILLION/UL (ref 3.81–5.12)
SODIUM SERPL-SCNC: 135 MMOL/L (ref 135–147)
TIBC SERPL-MCNC: 336 UG/DL (ref 250–450)
WBC # BLD AUTO: 9.43 THOUSAND/UL (ref 4.31–10.16)

## 2023-05-14 RX ORDER — IPRATROPIUM BROMIDE AND ALBUTEROL SULFATE 2.5; .5 MG/3ML; MG/3ML
3 SOLUTION RESPIRATORY (INHALATION)
Status: DISCONTINUED | OUTPATIENT
Start: 2023-05-14 | End: 2023-05-16 | Stop reason: HOSPADM

## 2023-05-14 RX ORDER — IPRATROPIUM BROMIDE AND ALBUTEROL SULFATE 2.5; .5 MG/3ML; MG/3ML
3 SOLUTION RESPIRATORY (INHALATION) 3 TIMES DAILY
Status: DISCONTINUED | OUTPATIENT
Start: 2023-05-14 | End: 2023-05-14

## 2023-05-14 RX ADMIN — IPRATROPIUM BROMIDE AND ALBUTEROL SULFATE 3 ML: 2.5; .5 SOLUTION RESPIRATORY (INHALATION) at 19:31

## 2023-05-14 RX ADMIN — SUCRALFATE 1 G: 1 TABLET ORAL at 16:53

## 2023-05-14 RX ADMIN — DOCUSATE SODIUM 200 MG: 100 CAPSULE, LIQUID FILLED ORAL at 08:19

## 2023-05-14 RX ADMIN — ATORVASTATIN CALCIUM 10 MG: 10 TABLET, FILM COATED ORAL at 16:52

## 2023-05-14 RX ADMIN — INSULIN LISPRO 1 UNITS: 100 INJECTION, SOLUTION INTRAVENOUS; SUBCUTANEOUS at 11:46

## 2023-05-14 RX ADMIN — DOCUSATE SODIUM 200 MG: 100 CAPSULE, LIQUID FILLED ORAL at 16:52

## 2023-05-14 RX ADMIN — NICOTINE 1 PATCH: 21 PATCH, EXTENDED RELEASE TRANSDERMAL at 08:19

## 2023-05-14 RX ADMIN — ROPINIROLE 0.5 MG: 0.25 TABLET, FILM COATED ORAL at 21:23

## 2023-05-14 RX ADMIN — IPRATROPIUM BROMIDE AND ALBUTEROL SULFATE 3 ML: 2.5; .5 SOLUTION RESPIRATORY (INHALATION) at 07:51

## 2023-05-14 RX ADMIN — FLUTICASONE FUROATE AND VILANTEROL TRIFENATATE 1 PUFF: 100; 25 POWDER RESPIRATORY (INHALATION) at 08:18

## 2023-05-14 RX ADMIN — ALLOPURINOL 50 MG: 100 TABLET ORAL at 08:19

## 2023-05-14 RX ADMIN — BACLOFEN 5 MG: 10 TABLET ORAL at 16:59

## 2023-05-14 RX ADMIN — CYANOCOBALAMIN TAB 500 MCG 1000 MCG: 500 TAB at 08:19

## 2023-05-14 RX ADMIN — IPRATROPIUM BROMIDE AND ALBUTEROL SULFATE 3 ML: 2.5; .5 SOLUTION RESPIRATORY (INHALATION) at 13:51

## 2023-05-14 RX ADMIN — INSULIN LISPRO 1 UNITS: 100 INJECTION, SOLUTION INTRAVENOUS; SUBCUTANEOUS at 16:52

## 2023-05-14 RX ADMIN — APIXABAN 5 MG: 5 TABLET, FILM COATED ORAL at 08:20

## 2023-05-14 RX ADMIN — PREDNISONE 2.5 MG: 1 TABLET ORAL at 08:20

## 2023-05-14 RX ADMIN — AMLODIPINE BESYLATE 10 MG: 10 TABLET ORAL at 08:19

## 2023-05-14 RX ADMIN — ACETAMINOPHEN 650 MG: 325 TABLET ORAL at 14:18

## 2023-05-14 RX ADMIN — SERTRALINE HYDROCHLORIDE 50 MG: 50 TABLET ORAL at 21:24

## 2023-05-14 RX ADMIN — SUCRALFATE 1 G: 1 TABLET ORAL at 11:45

## 2023-05-14 RX ADMIN — MIRTAZAPINE 30 MG: 15 TABLET, FILM COATED ORAL at 21:23

## 2023-05-14 RX ADMIN — TRAZODONE HYDROCHLORIDE 50 MG: 50 TABLET ORAL at 21:23

## 2023-05-14 RX ADMIN — FUROSEMIDE 40 MG: 40 TABLET ORAL at 08:19

## 2023-05-14 RX ADMIN — GABAPENTIN 300 MG: 300 CAPSULE ORAL at 16:52

## 2023-05-14 RX ADMIN — ASPIRIN 81 MG 81 MG: 81 TABLET ORAL at 08:20

## 2023-05-14 RX ADMIN — INSULIN LISPRO 1 UNITS: 100 INJECTION, SOLUTION INTRAVENOUS; SUBCUTANEOUS at 21:26

## 2023-05-14 RX ADMIN — GABAPENTIN 300 MG: 300 CAPSULE ORAL at 21:23

## 2023-05-14 RX ADMIN — CLOPIDOGREL BISULFATE 75 MG: 75 TABLET ORAL at 08:19

## 2023-05-14 RX ADMIN — GABAPENTIN 300 MG: 300 CAPSULE ORAL at 08:19

## 2023-05-14 RX ADMIN — METOPROLOL TARTRATE 25 MG: 25 TABLET, FILM COATED ORAL at 21:23

## 2023-05-14 RX ADMIN — METOPROLOL TARTRATE 25 MG: 25 TABLET, FILM COATED ORAL at 08:19

## 2023-05-14 RX ADMIN — INSULIN LISPRO 2 UNITS: 100 INJECTION, SOLUTION INTRAVENOUS; SUBCUTANEOUS at 08:19

## 2023-05-14 NOTE — NURSING NOTE
BiPap removed per patient request, nasal cannula at 2L applied, SPO2 98% on nasal cannula  Patient alert and oriented x3 this morning, disoriented to situation that brought her into the hospital, patient reports not knowing why she was brought in  Reviewed hospital admission thus far with the patient  In completing admission information, reviewed alcohol/recreational drug use/etc with patient-patient denies alcohol and drug use  Patient reports that it wouldn't be possible to abuse her prescription drugs as she does not prepare them  Patient reports her daughter prepares the medications for her and if patient needs pain medication throughout the day, the patient's daughter will get it for her  Patient denies getting own medications while at home  This RN verbalized understanding

## 2023-05-14 NOTE — ASSESSMENT & PLAN NOTE
· Severe end-stage maintained on 3 L nasal cannula  · Continue Prednisone 2 5 mg daily, DuoNebs, as needed albuterol  · Patient daughter and family member at the bedside, discussed about goals of care-family want to proceed with hospice care  Referral placed    At this time, we will continue current management without any change

## 2023-05-14 NOTE — PROGRESS NOTES
[address] [facility]  Progress Note  Name: Alondra Echols  MRN: [id number]  Unit/Bed#: -85 I Date of Admission: 5/13/2023   Date of Service: 5/14/2023 I Hospital Day: 1    Assessment/Plan   Acute on chronic congestive heart failure [facility])  Assessment & Plan  Wt Readings from Last 3 Encounters:   05/14/23 76 2 kg (167 lb 15 9 oz)   05/09/23 70 1 kg (154 lb 8 7 oz)   02/04/23 82 9 kg (182 lb 12 2 oz)     · Hypervolemic on initial exam  · Improved with IV diuresis  · Resume oral furosemide in a m  · Continue metoprolol  · Monitor volume status  · Cardiac diet  Patient daughter and family member at the bedside, discussed about goals of care-family want to proceed with hospice care  Referral placed  At this time, we will continue current management without any change    Acute on chronic respiratory failure with hypoxia and hypercapnia (HCC)  Assessment & Plan  · Chronically maintained on home oxygen 3 L for severe COPD  · VBG with acute hypercarbia, coupled with altered mental status placed on BiPAP  · Discussed with daughter Beatriz Trinidad on telephone recurrent hypercarbia similar to presentation last admission this is progression of her pulmonary disease  · Already follows with palliative care  · Patient daughter and family member at the bedside, discussed about goals of care-family want to proceed with hospice care  Referral placed  At this time, we will continue current management without any change    Chronic anemia  Assessment & Plan  · Maintained on Eliquis for DVT history  · Hemoglobin 8 1 dropped to 7 7  · Check occult blood stools  · Add iron panel  · Received Venofer infusion in February for hemoglobin 7  Patient daughter and family member at the bedside, discussed about goals of care-family want to proceed with hospice care  Referral placed    At this time, we will continue current management without any change    Stage 3b chronic kidney disease [facility])  Assessment & Plan  Lab Results   Component Value Date    EGFR 45 05/14/2023    EGFR 44 05/13/2023    EGFR 34 05/09/2023    CREATININE 1 14 05/14/2023    CREATININE 1 17 05/13/2023    CREATININE 1 44 (H) 05/09/2023   · Creatinine baseline  · Avoid nephrotoxic agents  · Renally dose medications  · Urinalysis unremarkable  Patient daughter and family member at the bedside, discussed about goals of care-family want to proceed with hospice care  Referral placed  At this time, we will continue current management without any change    Chronic, continuous use of opioids  Assessment & Plan  · Followed by palliative care  · Prescribed liquid morphine, oxycodone and lorazepam and for air hunger  · Daughter states did not receive any of these medications today due to lethargy  · PDMP reviewed, no red flags  Patient daughter and family member at the bedside, discussed about goals of care-family want to proceed with hospice care  Referral placed  At this time, we will continue current management without any change    COPD (chronic obstructive pulmonary disease) (HCC)  Assessment & Plan  · Severe end-stage maintained on 3 L nasal cannula  · Continue Prednisone 2 5 mg daily, DuoNebs, as needed albuterol  · Patient daughter and family member at the bedside, discussed about goals of care-family want to proceed with hospice care  Referral placed    At this time, we will continue current management without any change      Type 2 diabetes mellitus with skin complication, with long-term current use of insulin [facility])  Assessment & Plan  Lab Results   Component Value Date    HGBA1C 6 2 (H) 05/08/2023       Recent Labs     05/13/23 2009 05/14/23  0808 05/14/23  1107   POCGLU 149* 194* 168*       Blood Sugar Average: Last 72 hrs:  (P) 707 [id number]     · Seems controlled with hemoglobin A1c 6 2  · Sliding-scale insulin coverage with Accu-Cheks  · Basal insulin home regimen on hold until patient is tolerating oral intake with resolution of lethargy  · Hypoglycemia protocol  Patient daughter and family member at the bedside, discussed about goals of care-family want to proceed with hospice care  Referral placed  At this time, we will continue current management without any change    * Altered mental status  Assessment & Plan  · POA with lethargy suspected CO2 narcosis  · Telephone discussion with daughter patient has been lethargic all day, did not take any of her opioid medications on 5/13  · In the ED VBG with CO2 73, pH 7 291, similar presentation to previous admission on 5/2  · D/W daughter patient's POLST DNR/DNI and is currently following with palliative care medicine at [facility] utilizing liquid morphine, lorazepam and oxycodone tablets as needed for air hunger  · We will utilize BiPAP overnight, daughter questioning if patient could have BiPAP at home to prevent CO2 narcosis and recurrent hospitalizations while keeping her comfortable  We also discussed hospice intervention to attempt to keep patient out of the hospital   Case management consultation placed  · Hold narcotic medications until mentation improved  · Huy Alcaraz, patient's daughter stated she will be in to talk to the patient in the daytime about her future wishes  I really think this patient would benefit from goals of care discussion and hospice  · This morning, patient is alert and answering questions appropriately  Off of BiPAP-discontinue one-to-one observation for  Patient daughter and family member at the bedside, discussed about goals of care-family want to proceed with hospice care  Referral placed  At this time, we will continue current management without any change             VTE Pharmacologic Prophylaxis:   Moderate Risk (Score 3-4) - Pharmacological DVT Prophylaxis Ordered: apixaban (Eliquis)  Patient Centered Rounds: I performed bedside rounds with nursing staff today    Discussions with Specialists or Other Care Team Provider: None    Education and Discussions with Family / Patient: Updated  (daughter and Other family member at the bedside) at bedside  Total Time Spent on Date of Encounter in care of patient: 10 minutes This time was spent on one or more of the following: performing physical exam; counseling and coordination of care; obtaining or reviewing history; documenting in the medical record; reviewing/ordering tests, medications or procedures; communicating with other healthcare professionals and discussing with patient's family/caregivers  Current Length of Stay: 1 day(s)  Current Patient Status: Inpatient   Certification Statement: The patient will continue to require additional inpatient hospital stay due to To monitor above condition  Discharge Plan: Anticipate discharge in 24-48 hrs to To be due to mind, under hospice care    Code Status: Level 3 - DNAR and DNI    Subjective:   Seen and evaluated during time  Patient is alert and follows simple commands  Denies any significant complaint  Objective:     Vitals:   Temp (24hrs), Av 4 °F (36 3 °C), Min:97 °F (36 1 °C), Max:97 7 °F (36 5 °C)    Temp:  [97 °F (36 1 °C)-97 7 °F (36 5 °C)] 97 °F (36 1 °C)  HR:  [62-84] 84  Resp:  [11-20] 16  BP: (137-193)/(57-86) 172/57  SpO2:  [96 %-100 %] 99 %  Body mass index is 27 11 kg/m²  Input and Output Summary (last 24 hours): Intake/Output Summary (Last 24 hours) at 2023 1412  Last data filed at 2023 1053  Gross per 24 hour   Intake 110 ml   Output 800 ml   Net -690 ml       Physical Exam:   Physical Exam  Vitals and nursing note reviewed  Constitutional:       Appearance: She is not ill-appearing or diaphoretic  HENT:      Head: Normocephalic  Mouth/Throat:      Pharynx: Oropharynx is clear  No oropharyngeal exudate  Eyes:      General: No scleral icterus  Left eye: No discharge  Extraocular Movements: Extraocular movements intact        Conjunctiva/sclera: Conjunctivae normal       Pupils: Pupils are equal, round, and reactive to light  Cardiovascular:      Rate and Rhythm: Normal rate  Heart sounds: No murmur heard  No friction rub  No gallop  Pulmonary:      Effort: Pulmonary effort is normal  No respiratory distress  Breath sounds: No stridor  No wheezing or rhonchi  Abdominal:      General: Abdomen is flat  Bowel sounds are normal  There is no distension  Palpations: There is no mass  Tenderness: There is no abdominal tenderness  Hernia: No hernia is present  Musculoskeletal:      Cervical back: Normal range of motion  Neurological:      Mental Status: She is alert  Mental status is at baseline  Cranial Nerves: No cranial nerve deficit  Sensory: No sensory deficit  Motor: No weakness        Coordination: Coordination normal    Psychiatric:         Mood and Affect: Mood normal          Additional Data:     Labs:  Results from last 7 days   Lab Units 05/14/23  0437   WBC Thousand/uL 9 43   HEMOGLOBIN g/dL 7 7*   HEMATOCRIT % 26 3*   PLATELETS Thousands/uL 317   NEUTROS PCT % 76*   LYMPHS PCT % 15   MONOS PCT % 5   EOS PCT % 3     Results from last 7 days   Lab Units 05/14/23  0437   SODIUM mmol/L 135   POTASSIUM mmol/L 5 3   CHLORIDE mmol/L 99   CO2 mmol/L 29   BUN mg/dL 24   CREATININE mg/dL 1 14   ANION GAP mmol/L 7   CALCIUM mg/dL 7 8*   ALBUMIN g/dL 3 2*   TOTAL BILIRUBIN mg/dL 0 27   ALK PHOS U/L 56   ALT U/L 8   AST U/L 17   GLUCOSE RANDOM mg/dL 145*     Results from last 7 days   Lab Units 05/13/23  1556   INR  1 45*     Results from last 7 days   Lab Units 05/14/23  1107 05/14/23  0808 05/13/23  2009 05/09/23  0721 05/08/23  2127 05/08/23  1611 05/08/23  1134 05/08/23  0708 05/07/23  2035 05/07/23  1605   POC GLUCOSE mg/dl 168* 194* 149* 113 225* 187* 122 101 176* 229*     Results from last 7 days   Lab Units 05/08/23  0529   HEMOGLOBIN A1C % 6 2*     Results from last 7 days   Lab Units 05/14/23  0437 05/13/23  1555   LACTIC ACID mmol/L  --  1 4 PROCALCITONIN ng/ml 0 22  --        Lines/Drains:  Invasive Devices     Peripheral Intravenous Line  Duration           Peripheral IV 05/13/23 Right Antecubital <1 day          Drain  Duration           External Urinary Catheter <1 day                      Imaging: No pertinent imaging reviewed      Recent Cultures (last 7 days):         Last 24 Hours Medication List:   Current Facility-Administered Medications   Medication Dose Route Frequency Provider Last Rate   • acetaminophen  650 mg Oral Q6H PRN Caryl S Linda, CRNP     • albuterol  0 63 mg Nebulization 4x Daily PRN Caryl S Linda, CRNP     • allopurinol  50 mg Oral Daily Caryl S Linda, CRNP     • amLODIPine  10 mg Oral Daily Caryl S Linda, CRNP     • apixaban  5 mg Oral BID Caryl S Linda, CRNP     • aspirin  81 mg Oral Daily Caryl S Linda, CRNP     • atorvastatin  10 mg Oral Daily With Dinner Caryl S Linda, CRNP     • baclofen  5 mg Oral Q8H PRN Caryl S Linda, CRNP     • clopidogrel  75 mg Oral Daily Caryl S Linda, CRNP     • cyanocobalamin  1,000 mcg Oral Daily Caryl S Linda, CRNP     • docusate sodium  200 mg Oral BID Caryl S Linda, CRNP     • Fluticasone Furoate-Vilanterol  1 puff Inhalation Daily Caryl S Linda, CRNP     • furosemide  40 mg Oral Daily Caryl S Linda, CRNP     • gabapentin  300 mg Oral TID Caryl S Linda, CRNP     • insulin lispro  1-6 Units Subcutaneous TID AC Caryl S Linda, CRNP     • insulin lispro  1-6 Units Subcutaneous HS Caryl S Linda, CRNP     • ipratropium-albuterol  3 mL Nebulization TID Robby Fountain MD     • metoprolol tartrate  25 mg Oral Q12H [facility] Caryl S Linda, CRNP     • mirtazapine  30 mg Oral HS Caryl S Linda, CRNP     • naloxone  0 4 mg Intravenous Once Sathya Ramos PA-C     • nicotine  1 patch Transdermal Daily Caryl S Linda, CRNP     • polyethylene glycol  17 g Oral Daily PRN Crayl S Linda, CRNP     • predniSONE  2 5 mg Oral Daily Caryl S Linda, CRNP     • rOPINIRole  0 5 mg Oral HS Caryl S Linda, CRNP     • sertraline  50 mg Oral HS Caryl S Linda, CRNP     • sucralfate  1 g Oral BID AC Caryl S Linda, CRNP     • traZODone  50 mg Oral HS Caryl S Linda, CRNP          Today, Patient Was Seen By: Gregor Leblanc MD    **Please Note: This note may have been constructed using a voice recognition system  **

## 2023-05-14 NOTE — PLAN OF CARE
Problem: RESPIRATORY - ADULT  Goal: Achieves optimal ventilation and oxygenation  Description: INTERVENTIONS:  - Assess for changes in respiratory status  - Assess for changes in mentation and behavior  - Position to facilitate oxygenation and minimize respiratory effort  - Oxygen administered by appropriate delivery if ordered  - Initiate smoking cessation education as indicated  - Encourage broncho-pulmonary hygiene including cough, deep breathe, Incentive Spirometry  - Assess the need for suctioning and aspirate as needed  - Assess and instruct to report SOB or any respiratory difficulty  - Respiratory Therapy support as indicated  Outcome: Progressing     Problem: METABOLIC, FLUID AND ELECTROLYTES - ADULT  Goal: Electrolytes maintained within normal limits  Description: INTERVENTIONS:  - Monitor labs and assess patient for signs and symptoms of electrolyte imbalances  - Administer electrolyte replacement as ordered  - Monitor response to electrolyte replacements, including repeat lab results as appropriate  - Instruct patient on fluid and nutrition as appropriate  Outcome: Progressing  Goal: Fluid balance maintained  Description: INTERVENTIONS:  - Monitor labs   - Monitor I/O and WT  - Instruct patient on fluid and nutrition as appropriate  - Assess for signs & symptoms of volume excess or deficit  Outcome: Progressing  Goal: Glucose maintained within target range  Description: INTERVENTIONS:  - Monitor Blood Glucose as ordered  - Assess for signs and symptoms of hyperglycemia and hypoglycemia  - Administer ordered medications to maintain glucose within target range  - Assess nutritional intake and initiate nutrition service referral as needed  Outcome: Progressing     Problem: SKIN/TISSUE INTEGRITY - ADULT  Goal: Skin Integrity remains intact(Skin Breakdown Prevention)  Description: Assess:  -Perform Elmer assessment every   -Clean and moisturize skin every   -Inspect skin when repositioning, toileting, and assisting with ADLS  -Assess under medical devices such as  every   -Assess extremities for adequate circulation and sensation     Bed Management:  -Have minimal linens on bed & keep smooth, unwrinkled  -Change linens as needed when moist or perspiring  -Avoid sitting or lying in one position for more than  hours while in bed  -Keep HOB at degrees     Toileting:  -Offer bedside commode  -Assess for incontinence every   -Use incontinent care products after each incontinent episode such as     Activity:  -Mobilize patient  times a day  -Encourage activity and walks on unit  -Encourage or provide ROM exercises   -Turn and reposition patient every  Hours  -Use appropriate equipment to lift or move patient in bed  -Instruct/ Assist with weight shifting every  when out of bed in chair  -Consider limitation of chair time  hour intervals    Skin Care:  -Avoid use of baby powder, tape, friction and shearing, hot water or constrictive clothing  -Relieve pressure over bony prominences using   -Do not massage red bony areas    Next Steps:  -Teach patient strategies to minimize risks such as    -Consider consults to  interdisciplinary teams such as   Outcome: Progressing  Goal: Incision(s), wounds(s) or drain site(s) healing without S/S of infection  Description: INTERVENTIONS  - Assess and document dressing, incision, wound bed, drain sites and surrounding tissue  - Provide patient and family education  - Perform skin care/dressing changes every   Outcome: Progressing  Goal: Pressure injury heals and does not worsen  Description: Interventions:  - Implement low air loss mattress or specialty surface (Criteria met)  - Apply silicone foam dressing  - Instruct/assist with weight shifting every  minutes when in chair   - Limit chair time to  hour intervals  - Use special pressure reducing interventions such as  when in chair   - Apply fecal or urinary incontinence containment device   - Perform passive or active ROM every   - Turn and reposition patient & offload bony prominences every  hours   - Utilize friction reducing device or surface for transfers   - Consider consults to  interdisciplinary teams such as   - Use incontinent care products after each incontinent episode such as  - Consider nutrition services referral as needed  Outcome: Progressing

## 2023-05-14 NOTE — RAPID RESPONSE
Rapid Response Note  Delfino Gaspar [de-identified] y o  female MRN: [id number]  Unit/Bed#: -01 Encounter: [id number]    Rapid Response Notification(s):   Response called date/time:  5/13/2023 8:09 PM  Response team arrival date/time:  5/13/2023 8:10 PM  Response end date/time:  5/13/2023 8:26 PM  [facility]  Rapid response [facility]  Primary reason for rapid response call:  Acute change in neuro status    Rapid Response Intervention(s):   Airway:  None  Breathing:  Oxygen  Circulation:  None  Fluids administered:  None  Medications administered: Other (comment) (narcan 0 2)       Assessment:   · AMS  · PCO2 elevated most likely related to opoid use   · Back pain  · DM  · COPD  · CHF  · DVT hx  · HTN   · ckd     Plan:   · Place on BIPAP with 1 : 1 ( after narcan pt ripped of bipap )   · NC   · Monitor UOP   · ABG at 0930     ·      Rapid Response Outcome:   Transfer:  Remain on floor  Primary service notified of transfer: Yes    Code Status: Level 3 (DNAR and DNI)      Family notified of transfer: no  Family member contacted: not transferred      Background/Situation:   Delfino Gaspar is a [de-identified] y o  female with a medical Hx of chf, opoid dependence, COPD  DM, HTN , HLD  DVT brought by family because she is more fatigue and sleeping more  Was recently here for CHF exacerbation  Pt has no complains   bipap and lasix in the ER      Review of Systems   Unable to perform ROS: Mental status change       Objective:   Vitals:    05/13/23 2009 05/13/23 2012 05/13/23 2014 05/13/23 2014   BP:       BP Location:       Pulse: 63 65 64    Resp:       Temp:       SpO2: 98% 97% 100% 100%   Weight:       Height:         Physical Exam  Vitals and nursing note reviewed  Constitutional:       General: She is not in acute distress  Appearance: She is well-developed and normal weight  She is ill-appearing  She is not diaphoretic  HENT:      Head: Normocephalic and atraumatic        Mouth/Throat: " Mouth: Mucous membranes are dry  Pharynx: No oropharyngeal exudate  Eyes:      Conjunctiva/sclera: Conjunctivae normal       Pupils: Pupils are equal, round, and reactive to light  Neck:      Vascular: No JVD  Trachea: No tracheal deviation  Cardiovascular:      Rate and Rhythm: Normal rate and regular rhythm  Heart sounds: Normal heart sounds  No murmur heard  No friction rub  No gallop  Pulmonary:      Effort: Pulmonary effort is normal  No respiratory distress  Breath sounds: Normal breath sounds  No wheezing or rales  Chest:      Chest wall: No tenderness  Abdominal:      General: Bowel sounds are normal  There is distension  Palpations: Abdomen is soft  Tenderness: There is no abdominal tenderness  There is no guarding  Musculoskeletal:         General: No tenderness or deformity  Normal range of motion  Cervical back: Normal range of motion and neck supple  Right lower leg: Edema present  Left lower leg: Edema present  Skin:     General: Skin is warm and dry  Capillary Refill: Capillary refill takes less than 2 seconds  Coloration: Skin is pale  Findings: No erythema  Neurological:      Mental Status: She is alert  She is disoriented  Portions of the record may have been created with voice recognition software  Occasional wrong word or \"sound a like\" substitutions may have occurred due to the inherent limitations of voice recognition software  Read the chart carefully and recognize, using context, where substitutions have occurred      Sathya Ramos PA-C    "

## 2023-05-14 NOTE — ASSESSMENT & PLAN NOTE
· Chronically maintained on home oxygen 3 L for severe COPD  · VBG with acute hypercarbia, coupled with altered mental status placed on BiPAP  · Discussed with daughter Jessie Dodd on telephone recurrent hypercarbia similar to presentation last admission this is progression of her pulmonary disease  · Already follows with palliative care  · Patient daughter and family member at the bedside, discussed about goals of care-family want to proceed with hospice care  Referral placed    At this time, we will continue current management without any change

## 2023-05-14 NOTE — CODE DOCUMENTATION
Pt received from ED  Not very responsive, somber  RRT called  Sugar 149  EKG performed  Bipap placed on pt

## 2023-05-14 NOTE — ASSESSMENT & PLAN NOTE
· Maintained on Eliquis for DVT history  · Hemoglobin 8 1 dropped to 7 7  · Check occult blood stools  · Add iron panel  · Received Venofer infusion in February for hemoglobin 7  Patient daughter and family member at the bedside, discussed about goals of care-family want to proceed with hospice care  Referral placed    At this time, we will continue current management without any change

## 2023-05-14 NOTE — H&P
[address]  H&P  Name: Mary Ann Garcia [de-identified] y o  female I MRN: [id number]  Unit/Bed#: -[facility] I Date of Admission: 5/13/2023   Date of Service: 5/14/2023 I Hospital Day: 1      Assessment/Plan   Altered mental status  Assessment & Plan  · POA with lethargy suspected CO2 narcosis  · Telephone discussion with daughter patient has been lethargic all day, did not take any of her opioid medications on 5/13  · In the ED VBG with CO2 73, pH 7 291, similar presentation to previous admission on 5/2  · D/W daughter patient's POLST DNR/DNI and is currently following with palliative care medicine at [facility] utilizing liquid morphine, lorazepam and oxycodone tablets as needed for air hunger  · We will utilize BiPAP overnight, daughter questioning if patient could have BiPAP at home to prevent CO2 narcosis and recurrent hospitalizations while keeping her comfortable  We also discussed hospice intervention to attempt to keep patient out of the hospital   Case management consultation placed  · Hold narcotic medications until mentation improved  · Wayne Graf, patient's daughter stated she will be in to talk to the patient in the daytime about her future wishes  I really think this patient would benefit from goals of care discussion and hospice  Acute on chronic congestive heart failure (HCC)  Assessment & Plan  Wt Readings from Last 3 Encounters:   05/13/23 78 kg (171 lb 15 3 oz)   05/09/23 70 1 kg (154 lb 8 7 oz)   02/04/23 82 9 kg (182 lb 12 2 oz)     · Hypervolemic on initial exam  · Improved with IV diuresis  · Resume oral furosemide in a m    · Continue metoprolol  · Monitor volume status  · Cardiac diet    Stage 3b chronic kidney disease [facility])  Assessment & Plan  Lab Results   Component Value Date    EGFR 44 05/13/2023    EGFR 34 05/09/2023    EGFR 30 05/08/2023    CREATININE 1 17 05/13/2023    CREATININE 1 44 (H) 05/09/2023    CREATININE 1 59 (H) 05/08/2023   · Creatinine baseline  · Avoid nephrotoxic agents  · Renally dose medications  · Urinalysis unremarkable    Chronic, continuous use of opioids  Assessment & Plan  · Followed by palliative care  · Prescribed liquid morphine, oxycodone and lorazepam and for air hunger  · Daughter  did not receive any of these medications today due to lethargy  · PDMP reviewed, no red flags    Acute on chronic respiratory failure with hypoxia and hypercapnia (HCC)  Assessment & Plan  · Chronically maintained on home oxygen 3 L for severe COPD  · VBG with acute hypercarbia, coupled with altered mental status placed on BiPAP  · Discussed with daughter Jean Claude Goodwin on telephone recurrent hypercarbia similar to presentation last admission this is progression of her pulmonary disease  · Already follows with palliative care  · Patient would benefit from goals of care discussion and hospice referral    COPD (chronic obstructive pulmonary disease) ([facility] )  Assessment & Plan  · Severe end-stage maintained on 3 L nasal cannula  · Continue Prednisone 2 5 mg daily, DuoNebs, as needed albuterol      Type 2 diabetes mellitus with skin complication, with long-term current use of insulin ([facility] )  Assessment & Plan  Lab Results   Component Value Date    HGBA1C 6 2 (H) 05/08/2023       Recent Labs     05/13/23 2009   POCGLU 149*       Blood Sugar Average: Last 72 hrs:  (P) 149     · Seems controlled with hemoglobin A1c 6 2  · Sliding-scale insulin coverage with Accu-Cheks  · Basal insulin home regimen on hold until patient is tolerating oral intake with resolution of lethargy  · Hypoglycemia protocol       VTE Pharmacologic Prophylaxis:   High Risk (Score >/= 5) - Pharmacological DVT Prophylaxis Ordered: apixaban (Eliquis)  Sequential Compression Devices Ordered  Code Status: Level 3 - DNAR and DNI   Discussion with family: Updated  (daughter) via phone      Anticipated Length of Stay: Patient will be admitted on an inpatient basis with an anticipated length of stay of greater than 2 midnights secondary to Altered mental status, hypercarbia  Total Time Spent on Date of Encounter in care of patient: 65 minutes This time was spent on one or more of the following: performing physical exam; counseling and coordination of care; obtaining or reviewing history; documenting in the medical record; reviewing/ordering tests, medications or procedures; communicating with other healthcare professionals and discussing with patient's family/caregivers  Chief Complaint: Altered mental status    History of Present Illness:  Darrion Olmstead is a [de-identified] y o  female with a PMH of severe COPD chronically on 3 L nasal cannula following with palliative care prescribed liquid morphine, lorazepam, oxycodone for air hunger  DNR/DNI as discussed with daughter Huy lAcaraz on telephone  Daughter states patient weekend this morning and has been lethargic throughout the day, she did not take any food/drink or any of her medications throughout the day  Daughter noticed swelling around patient's eyes and was worried for CHF exacerbation and called the home care nurse who recommended ED evaluation  In the emergency department patient is lethargic but nonfocal neurological exam, hypercarbia on VBG and presented to the medical service for further evaluation and treatment  Had a very pleasant conversation with daughter Huy Alcaraz who is extremely supportive of her mother's wishes DNR/DNI and that her mother does not want to  in a hospital setting  Will ask case management for hospice evaluation to support Huy Alcaraz in carrying out her mother's wishes of keeping her comfortable and at home      Review of Systems:  Review of Systems   Unable to perform ROS: Mental status change       Past Medical and Surgical History:   Past Medical History:   Diagnosis Date   • Asthma    • [facility]congestive [facility] failure[facility] ([facility] )    • COPD (chronic obstructive pulmonary disease) ([facility] )    • Diabetes mellitus ([facility] )    • DVT of lower limb, acute ([facility] )    • Hypertension    • Renal disorder        Past Surgical History:   Procedure Laterality Date   • APPENDECTOMY     •  SECTION     • HYSTERECTOMY     • IR LOWER EXTREMITY ANGIOGRAM  2023   • PA AMPUTATION METATARSAL W/TOE SINGLE Left 2023    Procedure: RAY RESECTION FOOT Left 1st;  Surgeon: Dylon Subramanian DPM;  Location: [facility];  Service: Podiatry   • PA Kayla Carpen 3RD+ ORD SLCTV ABDL PEL/LXTR [facility] N/A 2023    Procedure: R CFA access w/ 6F sheath and Mynx closure Aortogram LLE angiogram R EIA PTA w/ 6x60mm  L EIA PTA w/ 6x60mm  L SFA PTA w/ [id number] Kofi POBA and [id number] [place] DCB L SFA stents: 5x27mm Express LD (prox) and 6x60mm Innova (dist); Surgeon: Eda Guzman MD;  Location: [facility];  Service: Vascular       Meds/Allergies:  Prior to Admission medications    Medication Sig Start Date End Date Taking? Authorizing Provider   allopurinol (ZYLOPRIM) 100 mg tablet Take 50 mg by mouth daily   Yes Historical Provider, MD   amLODIPine (NORVASC) 10 mg tablet Take 10 mg by mouth daily   Yes Historical Provider, MD   apixaban (ELIQUIS) 5 mg Take 5 mg by mouth 2 (two) times a day   Yes Historical Provider, MD   aspirin 81 mg chewable tablet Chew 81 mg daily   Yes Historical Provider, MD   atorvastatin (LIPITOR) 10 mg tablet Take 10 mg by mouth daily   Yes Historical Provider, MD   clopidogrel (PLAVIX) 75 mg tablet Take 1 tablet (75 mg total) by mouth daily Do not start before 23  Yes Ramu Lilly MD   Cyanocobalamin ER 1000 MCG TBCR Take 1,000 mcg by mouth daily 23  Yes Historical Provider, MD   docusate sodium (COLACE) 100 mg capsule Take 200 mg by mouth 2 (two) times a day   Yes Historical Provider, MD   fluticasone-salmeterol (ADVAIR, WIXELA) 500-50 mcg/dose inhaler Inhale 1 puff 2 (two) times a day Rinse mouth after use     Yes Historical Provider, MD   furosemide (LASIX) 20 mg tablet Take 2 tablets (40 mg total) by mouth daily 5/8/23  Yes Monse Silva PA-C   gabapentin (NEURONTIN) 300 mg capsule Take 300 mg by mouth 3 (three) times a day   Yes Historical Provider, MD   guaiFENesin (MUCINEX) 600 mg 12 hr tablet Take 600 mg by mouth daily 11/28/22  Yes Historical Provider, MD   insulin aspart protamine-insulin aspart (NovoLOG 70/30) 100 units/mL injection Inject 5 Units under the skin daily With supper   Yes Historical Provider, MD   insulin glargine (LANTUS) 100 units/mL subcutaneous injection Inject 12 Units under the skin daily at bedtime   Yes Historical Provider, MD   Ipratropium-Albuterol (DUONEB IN) Inhale 2 5 mg every 4 (four) hours   Yes Historical Provider, MD   lactobacillus acidophilus Take 1 capsule by mouth daily   Yes Historical Provider, MD   Magnesium 500 MG CAPS Take 500 mg by mouth daily   Yes Historical Provider, MD   metoprolol tartrate (LOPRESSOR) 50 mg tablet Take 0 5 tablets (25 mg total) by mouth every 12 (twelve) hours 5/9/23  Yes Monse Silva PA-C   Mirabegron (MYRBETRIQ PO) Take 25 mg by mouth daily   Yes Historical Provider, MD   mirtazapine (REMERON) 30 mg tablet Take 30 mg by mouth daily at bedtime   Yes Historical Provider, MD   morphine 20 mg/mL concentrated solution Take 10 mg by mouth every 6 (six) hours as needed (air hunger) Takes twice a day 4/21/23  Yes Historical Provider, MD   oxyCODONE (OxyCONTIN) 10 mg 12 hr tablet Take 10 mg by mouth every 6 (six) hours as needed   Yes Historical Provider, MD   potassium chloride (MICRO-K) 10 MEQ CR capsule Take 10 mEq by mouth daily   Yes Historical Provider, MD   predniSONE 2 5 mg tablet Take 2 5 mg by mouth daily   Yes Historical Provider, MD   rOPINIRole (REQUIP) 0 25 mg tablet Take 0 5 mg by mouth daily at bedtime   Yes Historical Provider, MD   sertraline (ZOLOFT) 50 mg tablet Take 50 mg by mouth daily at bedtime   Yes Historical Provider, MD   sucralfate (CARAFATE) 1 g tablet Take 1 g by mouth 2 (two) times a day before meals   Yes Historical Provider, MD   traZODone (DESYREL) 100 mg tablet Take 0 5 tablets (50 mg total) by mouth daily at bedtime 5/8/23  Yes Antwan Low PA-C   acetaminophen (TYLENOL) 325 mg tablet Take 2 tablets (650 mg total) by mouth every 6 (six) hours as needed for mild pain 2/4/23   Ga Chavez MD   albuterol (ACCUNEB) 0 63 MG/3ML nebulizer solution Take 1 ampule by nebulization 4 (four) times a day as needed for wheezing    Historical Provider, MD   Baclofen 5 MG TABS Take 1 tablet by mouth every 8 (eight) hours as needed 4/25/23   Historical Provider, MD   benzonatate (TESSALON PERLES) 100 mg capsule Take 100 mg by mouth every 8 (eight) hours as needed  Patient not taking: Reported on 5/13/2023 4/14/23   Historical Provider, MD   ferrous sulfate 325 (65 Fe) mg tablet Take 1 tablet (325 mg total) by mouth every other day Do not start before May 10, 2023  Patient not taking: Reported on 5/13/2023 5/10/23   Antwan Low PA-C   LORazepam (ATIVAN) 2 mg/mL concentrated solution Take 0 5 mg by mouth every 8 (eight) hours as needed 3/30/23   Historical Provider, MD   metolazone (ZAROXOLYN) 2 5 mg tablet Take one tab my mouth weekly as needed for edema 2/21/23   Historical Provider, MD   nicotine (NICODERM CQ) 7 mg/24hr TD 24 hr patch Place 1 patch on the skin over 24 hours daily Do not start before February 5, 2023  Patient not taking: Reported on 5/13/2023 2/5/23   Ga Chavez MD   nitroglycerin (NITROSTAT) 0 4 mg SL tablet Place 0 4 mg under the tongue every 5 (five) minutes as needed for chest pain    Historical Provider, MD   polyethylene glycol (MIRALAX) 17 g packet Take 17 g by mouth daily as needed (constipation) 5/8/23   Antwan Low PA-C     I have reviewed home medications with patient personally  Allergies:    Allergies   Allergen Reactions   • Doxycycline GI Intolerance     Refer to office visit 4/27/15   • Codeine      Occasional nausea      • Ferric Derisomaltose Other (See Comments)   • "Penicillins Hives     Last dose about 10years ago          Social History:  Marital Status:    Occupation: retired  Patient Pre-hospital Living Situation: With other family member: daughter Aldo Haji  Patient Pre-hospital Level of Mobility: unable to be assessed at time of evaluation  Patient Pre-hospital Diet Restrictions: none  Substance Use History:   Social History     Substance and Sexual Activity   Alcohol Use Not Currently     Social History     Tobacco Use   Smoking Status Every Day   • Packs/day: 0 50   • Types: Cigarettes   • Passive exposure: Past   Smokeless Tobacco Never   Tobacco Comments    Pt currently wearing nicotine patch      Social History     Substance and Sexual Activity   Drug Use Never       Family History:  Family History   Problem Relation Age of Onset   • Arthritis Father        Physical Exam:     Vitals:   Blood Pressure: 154/86 (05/13/23 2014)  Pulse: 66 (05/13/23 2014)  Temperature: 97 7 °F (36 5 °C) (05/13/23 2014)  Temp Source: Temporal (05/13/23 2014)  Respirations: 16 (05/13/23 2014)  Height: 5' 6\" (167 6 cm) (05/13/23 1544)  Weight - Scale: 78 kg (171 lb 15 3 oz) (05/13/23 2019)  SpO2: 100 % (05/13/23 2014)    Physical Exam  Vitals and nursing note reviewed  Constitutional:       General: She is in acute distress  Appearance: She is well-developed  HENT:      Head: Normocephalic and atraumatic  Mouth/Throat:      Mouth: Mucous membranes are dry  Eyes:      Conjunctiva/sclera: Conjunctivae normal    Cardiovascular:      Rate and Rhythm: Normal rate and regular rhythm  Heart sounds: No murmur heard  Pulmonary:      Effort: Pulmonary effort is normal  No respiratory distress  Breath sounds: Wheezing and rales present  Abdominal:      Palpations: Abdomen is soft  Tenderness: There is no abdominal tenderness  Musculoskeletal:         General: No swelling  Cervical back: Neck supple  Right lower leg: Edema present        Left lower leg: " Edema present  Skin:     General: Skin is warm and dry  Capillary Refill: Capillary refill takes less than 2 seconds  Neurological:      General: No focal deficit present        Comments: Very lethargic but answering questions appropriately   Psychiatric:      Comments: Unable to evaluate        Additional Data:     Lab Results:  Results from last 7 days   Lab Units 05/13/23  1555   WBC Thousand/uL 16 49*   HEMOGLOBIN g/dL 8 1*   HEMATOCRIT % 27 5*   PLATELETS Thousands/uL 344   NEUTROS PCT % 87*   LYMPHS PCT % 7*   MONOS PCT % 5   EOS PCT % 0     Results from last 7 days   Lab Units 05/13/23  1703 05/13/23  1555   SODIUM mmol/L  --  133*   POTASSIUM mmol/L 5 8* 6 2*   CHLORIDE mmol/L  --  98   CO2 mmol/L  --  31   BUN mg/dL  --  24   CREATININE mg/dL  --  1 17   ANION GAP mmol/L  --  4   CALCIUM mg/dL  --  8 2*   ALBUMIN g/dL  --  3 5   TOTAL BILIRUBIN mg/dL  --  0 27   ALK PHOS U/L  --  57   ALT U/L  --  11   AST U/L  --  19   GLUCOSE RANDOM mg/dL  --  151*     Results from last 7 days   Lab Units 05/13/23  1556   INR  1 45*     Results from last 7 days   Lab Units 05/13/23  2009 05/09/23  0721 05/08/23  2127 05/08/23  1611 05/08/23  1134 05/08/23  0708 05/07/23  2035 05/07/23  1605 05/07/23  1102 05/07/23  0706   POC GLUCOSE mg/dl 149* 113 225* 187* 122 101 176* 229* 167* 133     Results from last 7 days   Lab Units 05/08/23  0529   HEMOGLOBIN A1C % 6 2*     Results from last 7 days   Lab Units 05/13/23  1555   LACTIC ACID mmol/L 1 4       Lines/Drains:  Invasive Devices     Peripheral Intravenous Line  Duration           Peripheral IV 05/13/23 Left Antecubital <1 day    Peripheral IV 05/13/23 Right Antecubital <1 day          Drain  Duration           External Urinary Catheter <1 day                    Imaging: Reviewed radiology reports from this admission including: abdominal/pelvic CT  CT chest abdomen pelvis wo contrast   Final Result by Nelson Garza MD (05/13 1829)      [facility] "opacity in the left upper lobe consistent with pneumonia  Patchy consolidative opacities in the posterior lower lung zones which could represent atelectasis or pneumonia  Trace costophrenic angle effusions  Cardiomegaly and extensive coronary artery calcification  Uniform thickening of the wall of the rectum with perirectal inflammatory edema consistent with either infectious or inflammatory proctitis  No perirectal abscess  Cholelithiasis  This examination was marked \"immediate notification\" in Epic in order to begin the standard process by which the radiology reading room liaison alerts the referring practitioner  Workstation performed: [id number]         XR chest 1 view portable    (Results Pending)       Admission:   E** Please Note: This note has been constructed using a voice recognition system   **  "

## 2023-05-14 NOTE — RESPIRATORY THERAPY NOTE
Resp Care  Rapid Response called on pt  Pt sleepy not easily aroused  Pt placed on bipap due to PCO2 of 74  PT also given narcan  Pt slowly waking up  Will cont with PRN bipap until pt is able to remain awake   ABG to be drawn at 2130      05/13/23 2015   Non-Invasive Information   O2 Interface Device Face mask   Non-Invasive Ventilation Mode BiPAP   $ Intermittent NIV Yes   $ Pulse Oximetry Spot Check Charge Completed   Non-Invasive Settings   IPAP (cm) 14 cm   EPAP (cm) 6 cm   Rate (Set) 18   FiO2 (%) 50   Rise Time 3   Inspiratory Time (Set) 1   Non-Invasive Readings   Skin Intervention Skin intact   Total Rate 19   MV (Mech) 8 7   Peak Pressure (Obs) 14   Spontaneous Vt (mL) 452   Leak (lpm) 22   Non-Invasive Alarms   Insp Pressure High (cm H20) 25   Insp Pressure Low (cm H20) 5   Low Insp Pressure Time (sec) 20 sec   MV Low (L/min) 3   Vt High (mL) 1000   Vt Low (mL) 150   High Resp Rate (BPM) 40 BPM   Low Resp Rate (BPM) 10 BPM   Apnea Interval (sec) 20

## 2023-05-14 NOTE — ASSESSMENT & PLAN NOTE
Wt Readings from Last 3 Encounters:   05/14/23 76 2 kg (167 lb 15 9 oz)   05/09/23 70 1 kg (154 lb 8 7 oz)   02/04/23 82 9 kg (182 lb 12 2 oz)     · Hypervolemic on initial exam  · Improved with IV diuresis  · Resume oral furosemide in a m  · Continue metoprolol  · Monitor volume status  · Cardiac diet  Patient daughter and family member at the bedside, discussed about goals of care-family want to proceed with hospice care  Referral placed    At this time, we will continue current management without any change

## 2023-05-14 NOTE — ASSESSMENT & PLAN NOTE
· Chronically maintained on home oxygen 3 L for severe COPD  · VBG with acute hypercarbia, coupled with altered mental status placed on BiPAP  · Discussed with daughter Gal Huston on telephone recurrent hypercarbia similar to presentation last admission this is progression of her pulmonary disease  · Already follows with palliative care  · Patient would benefit from goals of care discussion and hospice referral

## 2023-05-14 NOTE — ASSESSMENT & PLAN NOTE
Lab Results   Component Value Date    EGFR 45 05/14/2023    EGFR 44 05/13/2023    EGFR 34 05/09/2023    CREATININE 1 14 05/14/2023    CREATININE 1 17 05/13/2023    CREATININE 1 44 (H) 05/09/2023   · Creatinine baseline  · Avoid nephrotoxic agents  · Renally dose medications  · Urinalysis unremarkable  Patient daughter and family member at the bedside, discussed about goals of care-family want to proceed with hospice care  Referral placed    At this time, we will continue current management without any change

## 2023-05-14 NOTE — ASSESSMENT & PLAN NOTE
Lab Results   Component Value Date    EGFR 44 05/13/2023    EGFR 34 05/09/2023    EGFR 30 05/08/2023    CREATININE 1 17 05/13/2023    CREATININE 1 44 (H) 05/09/2023    CREATININE 1 59 (H) 05/08/2023   · Creatinine baseline  · Avoid nephrotoxic agents  · Renally dose medications  · Urinalysis unremarkable

## 2023-05-14 NOTE — ASSESSMENT & PLAN NOTE
Lab Results   Component Value Date    HGBA1C 6 2 (H) 05/08/2023       Recent Labs     05/13/23 2009 05/14/23  0808 05/14/23  1107   POCGLU 149* 194* 168*       Blood Sugar Average: Last 72 hrs:  (P) 917 [id number]     · Seems controlled with hemoglobin A1c 6 2  · Sliding-scale insulin coverage with Accu-Cheks  · Basal insulin home regimen on hold until patient is tolerating oral intake with resolution of lethargy  · Hypoglycemia protocol  Patient daughter and family member at the bedside, discussed about goals of care-family want to proceed with hospice care  Referral placed    At this time, we will continue current management without any change

## 2023-05-14 NOTE — ASSESSMENT & PLAN NOTE
· Severe end-stage maintained on 3 L nasal cannula  · Continue Prednisone 2 5 mg daily, DuoNebs, as needed albuterol

## 2023-05-14 NOTE — ASSESSMENT & PLAN NOTE
· POA with lethargy suspected CO2 narcosis  · Telephone discussion with daughter patient has been lethargic all day, did not take any of her opioid medications on 5/13  · In the ED VBG with CO2 73, pH 7 291, similar presentation to previous admission on 5/2  · D/W daughter patient's POLST DNR/DNI and is currently following with palliative care medicine at [facility] utilizing liquid morphine, lorazepam and oxycodone tablets as needed for air hunger  · We will utilize BiPAP overnight, daughter questioning if patient could have BiPAP at home to prevent CO2 narcosis and recurrent hospitalizations while keeping her comfortable  We also discussed hospice intervention to attempt to keep patient out of the hospital   Case management consultation placed  · Hold narcotic medications until mentation improved  · Millicent Abrams, patient's daughter stated she will be in to talk to the patient in the daytime about her future wishes  I really think this patient would benefit from goals of care discussion and hospice  · This morning, patient is alert and answering questions appropriately  Off of BiPAP-discontinue one-to-one observation for  Patient daughter and family member at the bedside, discussed about goals of care-family want to proceed with hospice care  Referral placed    At this time, we will continue current management without any change

## 2023-05-14 NOTE — ASSESSMENT & PLAN NOTE
Lab Results   Component Value Date    HGBA1C 6 2 (H) 05/08/2023       Recent Labs     05/13/23 2009   POCGLU 149*       Blood Sugar Average: Last 72 hrs:  (P) 149     · Seems controlled with hemoglobin A1c 6 2  · Sliding-scale insulin coverage with Accu-Cheks  · Basal insulin home regimen on hold until patient is tolerating oral intake with resolution of lethargy  · Hypoglycemia protocol

## 2023-05-14 NOTE — PLAN OF CARE
Problem: Potential for Falls  Goal: Patient will remain free of falls  Description: INTERVENTIONS:  - Educate patient/family on patient safety including physical limitations  - Instruct patient to call for assistance with activity   - Consult OT/PT to assist with strengthening/mobility   - Keep Call bell within reach  - Keep bed low and locked with side rails adjusted as appropriate  - Keep care items and personal belongings within reach  - Initiate and maintain comfort rounds  - Make Fall Risk Sign visible to staff  - Offer Toileting every 2 Hours, in advance of need  - Initiate/Maintain bed/chair alarm  - Obtain necessary fall risk management equipment: bed/chair alarm  - Apply yellow socks and bracelet for high fall risk patients  - Consider moving patient to room near nurses station  Outcome: Progressing     Problem: MOBILITY - ADULT  Goal: Maintain or return to baseline ADL function  Description: INTERVENTIONS:  -  Assess patient's ability to carry out ADLs; assess patient's baseline for ADL function and identify physical deficits which impact ability to perform ADLs (bathing, care of mouth/teeth, toileting, grooming, dressing, etc )  - Assess/evaluate cause of self-care deficits   - Assess range of motion  - Assess patient's mobility; develop plan if impaired  - Assess patient's need for assistive devices and provide as appropriate  - Encourage maximum independence but intervene and supervise when necessary  - Involve family in performance of ADLs  - Assess for home care needs following discharge   - Consider OT consult to assist with ADL evaluation and planning for discharge  - Provide patient education as appropriate  Outcome: Progressing  Goal: Maintains/Returns to pre admission functional level  Description: INTERVENTIONS:  - Perform BMAT or MOVE assessment daily    - Set and communicate daily mobility goal to care team and patient/family/caregiver     - Collaborate with rehabilitation services on mobility goals if consulted  - Perform Range of Motion 3 times a day  - Reposition patient every 2 hours    - Dangle patient 3 times a day  - Stand patient 3 times a day  - Ambulate patient 3 times a day  - Out of bed to chair 3 times a day   - Out of bed for meals 3 times a day  - Out of bed for toileting  - Record patient progress and toleration of activity level   Outcome: Progressing     Problem: Prexisting or High Potential for Compromised Skin Integrity  Goal: Skin integrity is maintained or improved  Description: INTERVENTIONS:  - Identify patients at risk for skin breakdown  - Assess and monitor skin integrity  - Assess and monitor nutrition and hydration status  - Monitor labs   - Assess for incontinence   - Turn and reposition patient  - Assist with mobility/ambulation  - Relieve pressure over bony prominences  - Avoid friction and shearing  - Provide appropriate hygiene as needed including keeping skin clean and dry  - Evaluate need for skin moisturizer/barrier cream  - Collaborate with interdisciplinary team   - Patient/family teaching  - Consider wound care consult   Outcome: Progressing     Problem: PAIN - ADULT  Goal: Verbalizes/displays adequate comfort level or baseline comfort level  Description: Interventions:  - Encourage patient to monitor pain and request assistance  - Assess pain using appropriate pain scale  - Administer analgesics based on type and severity of pain and evaluate response  - Implement non-pharmacological measures as appropriate and evaluate response  - Consider cultural and social influences on pain and pain management  - Notify physician/advanced practitioner if interventions unsuccessful or patient reports new pain  Outcome: Progressing     Problem: INFECTION - ADULT  Goal: Absence or prevention of progression during hospitalization  Description: INTERVENTIONS:  - Assess and monitor for signs and symptoms of infection  - Monitor lab/diagnostic results  - Monitor all insertion sites, i e  indwelling lines, tubes, and drains  - Monitor endotracheal if appropriate and nasal secretions for changes in amount and color  - Louise appropriate cooling/warming therapies per order  - Administer medications as ordered  - Instruct and encourage patient and family to use good hand hygiene technique  - Identify and instruct in appropriate isolation precautions for identified infection/condition  Outcome: Progressing  Goal: Absence of fever/infection during neutropenic period  Description: INTERVENTIONS:  - Monitor WBC    Outcome: Progressing     Problem: SAFETY ADULT  Goal: Patient will remain free of falls  Description: INTERVENTIONS:  - Educate patient/family on patient safety including physical limitations  - Instruct patient to call for assistance with activity   - Consult OT/PT to assist with strengthening/mobility   - Keep Call bell within reach  - Keep bed low and locked with side rails adjusted as appropriate  - Keep care items and personal belongings within reach  - Initiate and maintain comfort rounds  - Make Fall Risk Sign visible to staff  - Offer Toileting every 2 Hours, in advance of need  - Initiate/Maintain bed/chair alarm  - Obtain necessary fall risk management equipment: bed/chair alarm  - Apply yellow socks and bracelet for high fall risk patients  - Consider moving patient to room near nurses station  Outcome: Progressing  Goal: Maintain or return to baseline ADL function  Description: INTERVENTIONS:  -  Assess patient's ability to carry out ADLs; assess patient's baseline for ADL function and identify physical deficits which impact ability to perform ADLs (bathing, care of mouth/teeth, toileting, grooming, dressing, etc )  - Assess/evaluate cause of self-care deficits   - Assess range of motion  - Assess patient's mobility; develop plan if impaired  - Assess patient's need for assistive devices and provide as appropriate  - Encourage maximum independence but intervene and supervise when necessary  - Involve family in performance of ADLs  - Assess for home care needs following discharge   - Consider OT consult to assist with ADL evaluation and planning for discharge  - Provide patient education as appropriate  Outcome: Progressing  Goal: Maintains/Returns to pre admission functional level  Description: INTERVENTIONS:  - Perform BMAT or MOVE assessment daily    - Set and communicate daily mobility goal to care team and patient/family/caregiver  - Collaborate with rehabilitation services on mobility goals if consulted  - Perform Range of Motion 3 times a day  - Reposition patient every 2 hours  - Dangle patient 3 times a day  - Stand patient 3 times a day  - Ambulate patient 3 times a day  - Out of bed to chair 3 times a day   - Out of bed for meals 3 times a day  - Out of bed for toileting  - Record patient progress and toleration of activity level   Outcome: Progressing     Problem: DISCHARGE PLANNING  Goal: Discharge to home or other facility with appropriate resources  Description: INTERVENTIONS:  - Identify barriers to discharge w/patient and caregiver  - Arrange for needed discharge resources and transportation as appropriate  - Identify discharge learning needs (meds, wound care, etc )  - Arrange for interpretive services to assist at discharge as needed  - Refer to Case Management Department for coordinating discharge planning if the patient needs post-hospital services based on physician/advanced practitioner order or complex needs related to functional status, cognitive ability, or social support system  Outcome: Progressing     Problem: Knowledge Deficit  Goal: Patient/family/caregiver demonstrates understanding of disease process, treatment plan, medications, and discharge instructions  Description: Complete learning assessment and assess knowledge base    Interventions:  - Provide teaching at level of understanding  - Provide teaching via preferred learning methods  Outcome: Progressing     Problem: RESPIRATORY - ADULT  Goal: Achieves optimal ventilation and oxygenation  Description: INTERVENTIONS:  - Assess for changes in respiratory status  - Assess for changes in mentation and behavior  - Position to facilitate oxygenation and minimize respiratory effort  - Oxygen administered by appropriate delivery if ordered  - Initiate smoking cessation education as indicated  - Encourage broncho-pulmonary hygiene including cough, deep breathe, Incentive Spirometry  - Assess the need for suctioning and aspirate as needed  - Assess and instruct to report SOB or any respiratory difficulty  - Respiratory Therapy support as indicated  Outcome: Progressing     Problem: METABOLIC, FLUID AND ELECTROLYTES - ADULT  Goal: Electrolytes maintained within normal limits  Description: INTERVENTIONS:  - Monitor labs and assess patient for signs and symptoms of electrolyte imbalances  - Administer electrolyte replacement as ordered  - Monitor response to electrolyte replacements, including repeat lab results as appropriate  - Instruct patient on fluid and nutrition as appropriate  Outcome: Progressing  Goal: Fluid balance maintained  Description: INTERVENTIONS:  - Monitor labs   - Monitor I/O and WT  - Instruct patient on fluid and nutrition as appropriate  - Assess for signs & symptoms of volume excess or deficit  Outcome: Progressing  Goal: Glucose maintained within target range  Description: INTERVENTIONS:  - Monitor Blood Glucose as ordered  - Assess for signs and symptoms of hyperglycemia and hypoglycemia  - Administer ordered medications to maintain glucose within target range  - Assess nutritional intake and initiate nutrition service referral as needed  Outcome: Progressing     Problem: SKIN/TISSUE INTEGRITY - ADULT  Goal: Skin Integrity remains intact(Skin Breakdown Prevention)  Description: Assess:  -Perform Elmer assessment every shift and PRN  -Clean and moisturize skin every shift and PRN  -Inspect skin when repositioning, toileting, and assisting with ADLS  -Assess extremities for adequate circulation and sensation     Bed Management:  -Have minimal linens on bed & keep smooth, unwrinkled  -Change linens as needed when moist or perspiring    Toileting:  -Offer bedside commode  -Assess for incontinence every 2 hours    Activity:  -Mobilize patient 3 times a day  -Encourage activity and walks on unit  -Encourage or provide ROM exercises   -Turn and reposition patient every 2 Hours  -Use appropriate equipment to lift or move patient in bed    Skin Care:  -Avoid use of baby powder, tape, friction and shearing, hot water or constrictive clothing  -Relieve pressure over bony prominences using foam wedges, pillows  -Do not massage red bony areas    Outcome: Progressing  Goal: Incision(s), wounds(s) or drain site(s) healing without S/S of infection  Description: INTERVENTIONS  - Assess and document dressing, incision, wound bed, drain sites and surrounding tissue  - Provide patient and family education  Outcome: Progressing  Goal: Pressure injury heals and does not worsen  Description: Interventions:  - Implement low air loss mattress or specialty surface (Criteria met)  - Apply silicone foam dressing  - Apply fecal or urinary incontinence containment device   - Turn and reposition patient & offload bony prominences every 2 hours   - Utilize friction reducing device or surface for transfers   Outcome: Progressing     Problem: HEMATOLOGIC - ADULT  Goal: Maintains hematologic stability  Description: INTERVENTIONS  - Assess for signs and symptoms of bleeding or hemorrhage  - Monitor labs  - Administer supportive blood products/factors as ordered and appropriate  Outcome: Progressing

## 2023-05-14 NOTE — ASSESSMENT & PLAN NOTE
Wt Readings from Last 3 Encounters:   05/13/23 78 kg (171 lb 15 3 oz)   05/09/23 70 1 kg (154 lb 8 7 oz)   02/04/23 82 9 kg (182 lb 12 2 oz)     · Hypervolemic on initial exam  · Improved with IV diuresis  · Resume oral furosemide in a m    · Continue metoprolol  · Monitor volume status  · Cardiac diet

## 2023-05-14 NOTE — ASSESSMENT & PLAN NOTE
· Followed by palliative care  · Prescribed liquid morphine, oxycodone and lorazepam and for air hunger  · Daughter states did not receive any of these medications today due to lethargy  · PDMP reviewed, no red flags

## 2023-05-14 NOTE — RESPIRATORY THERAPY NOTE
"Attempted ABG on patient per physician order   (R Radial pos modifie brennan's test)  Once punctured patient began to yell and move her arm saying\" stop! I don't want you to do that! \"  Patient was asked where she was (\"[facility]\") Her name (\"Leif\") and her birthday (\"(/2/1940\")   refusal witnessed by PCA (kennedy) who agreed it sunded like a valid refusal   "

## 2023-05-14 NOTE — ASSESSMENT & PLAN NOTE
· POA with lethargy suspected CO2 narcosis  · Telephone discussion with daughter patient has been lethargic all day, did not take any of her opioid medications on 5/13  · In the ED VBG with CO2 73, pH 7 291, similar presentation to previous admission on 5/2  · D/W daughter patient's POLST DNR/DNI and is currently following with palliative care medicine at [facility] utilizing liquid morphine, lorazepam and oxycodone tablets as needed for air hunger  · We will utilize BiPAP overnight, daughter questioning if patient could have BiPAP at home to prevent CO2 narcosis and recurrent hospitalizations while keeping her comfortable  We also discussed hospice intervention to attempt to keep patient out of the hospital   Case management consultation placed  · Hold narcotic medications until mentation improved  · Lashonda Somers, patient's daughter stated she will be in to talk to the patient in the daytime about her future wishes  I really think this patient would benefit from goals of care discussion and hospice

## 2023-05-14 NOTE — UTILIZATION REVIEW
Initial Clinical Review    Admission: Date/Time/Statement:   Admission Orders (From admission, onward)     Ordered        23 1902  INPATIENT ADMISSION  Once                      Orders Placed This Encounter   Procedures   • INPATIENT ADMISSION     Standing Status:   Standing     Number of Occurrences:   1     Order Specific Question:   Level of Care     Answer:   Med Surg [16]     Order Specific Question:   Estimated length of stay     Answer:   More than 2 Midnights     Order Specific Question:   Certification     Answer:   I certify that inpatient services are medically necessary for this patient for a duration of greater than two midnights  See H&P and MD Progress Notes for additional information about the patient's course of treatment  ED Arrival Information     Expected   -    Arrival   2023 15:41    Acuity   Urgent            Means of arrival   Ambulance    Escorted by   [employer]    Admission type   Emergency            Arrival complaint   -           Chief Complaint   Patient presents with   • Weakness - Generalized     Pts daughter called ems due to her mom not acting like herself, more sleepy then normal, and not wanting to eat  Initial Presentation: [de-identified] y o  female to ED presents for Altered mental status  Daughter states patient weekend this morning and has been lethargic throughout the day, she did not take any food/drink or any of her medications throughout the day  She noticed swelling around pt's eyes, concern for CHF exacerbation  In ED, pt lethargic and hypercarbia on VBG  Pt wants DNR/ DNI and her mother does not want to  in a hospital setting  PMH for severe COPD chronically on 3 L nasal cannula following with palliative care prescribed liquid morphine, lorazepam, oxycodone for air hunger  CKD stage 3b and T2DM     Admit Inpatient level of care for Altered mental status, Acute on chronic CHF, Acute on chronic respiratory failure with hypoxia and hypercapnia  Suspected CO2 narcosis  In ED VBG with CO2 73, pH 7 291, similar to last hospitalization  BiPAP overnight  Hod narcotic meds until mentation improves  Bld culture  Hypervolemic on initial exam, improved with Iv diuresis  Continue metoprolol  On exam; Wheezing and rales  Edema  Wt Readings from Last 3 Encounters:   05/13/23 78 kg (171 lb 15 3 oz)   05/09/23 70 1 kg (154 lb 8 7 oz)   02/04/23 82 9 kg (182 lb 12 2 oz)      Date: 5/14   Day 2:   Progress notes; Hospice referral per family request  Continue current management without any change  Hgb 8 1 dropped to 7 7  Check occult bld stools  Add iron panel         Wt Readings from Last 3 Encounters:   05/14/23 76 2 kg (167 lb 15 9 oz)   05/09/23 70 1 kg (154 lb 8 7 oz)   02/04/23 82 9 kg (182 lb 12 2 oz)       ED Triage Vitals   Temperature Pulse Respirations Blood Pressure SpO2   05/13/23 1544 05/13/23 1544 05/13/23 1544 05/13/23 1547 05/13/23 1544   97 5 °F (36 4 °C) 76 18 (!) 178/74 96 %      Temp Source Heart Rate Source Patient Position - Orthostatic VS BP Location FiO2 (%)   05/13/23 2014 05/13/23 1544 05/13/23 1600 05/13/23 1600 --   Temporal Monitor Lying Right arm       Pain Score       05/13/23 1544       No Pain          Wt Readings from Last 1 Encounters:   05/14/23 76 2 kg (167 lb 15 9 oz)     Additional Vital Signs:   05/14/23 15:41:08 -- 64 -- 111/45   Abnormal  67 98 % -- -- -- -- --   05/14/23 15:26:17 97 7 °F (36 5 °C) 70 22 109/42   Abnormal  64   Abnormal  98 % -- -- -- -- --   05/14/23 0815 -- -- -- -- -- -- -- -- None (Room air) -- --   05/14/23 07:33:46 97 °F (36 1 °C)   Abnormal  84 16 172/57   Abnormal  95 99 % -- -- -- -- --   05/14/23 0328 -- -- -- -- -- -- -- -- -- Face mask      /13/23 1830 -- 66 16 138/63 90 100 % 32 3 L/min Nasal cannula -- Lying   05/13/23 1800 -- 72 13 142/63 91 100 % 32 3 L/min Nasal cannula -- Lying   05/13/23 1730 -- 75 20 163/67 96 97 % 32 3 L/min Nasal cannula -- Lying   05/13/23 "1700 -- 75 19 193/79   bnormal  -- 97 % 32 3 L/min Nasal cannula       Pertinent Labs/Diagnostic Test Results:   CT chest abdomen pelvis wo contrast   Final Result by Liz Cuevas MD (05/13 1829)      Groundglass airspace opacity in the left upper lobe consistent with pneumonia  Patchy consolidative opacities in the posterior lower lung zones which could represent atelectasis or pneumonia  Trace costophrenic angle effusions  Cardiomegaly and extensive coronary artery calcification  Uniform thickening of the wall of the rectum with perirectal inflammatory edema consistent with either infectious or inflammatory proctitis  No perirectal abscess  Cholelithiasis  This examination was marked \"immediate notification\" in Epic in order to begin the standard process by which the radiology reading room liaison alerts the referring practitioner           Workstation performed: [id number]         XR chest 1 view portable   Final Result by Bhavani Rice MD (05/14 1016)      Cardiomegaly      Vascular congestion      Pleural effusions                  Workstation performed: [id number]               Lab Units 05/14/23  0437 05/13/23  1555   WBC Thousand/uL 9 43 16 49*   HEMOGLOBIN g/dL 7 7* 8 1*   HEMATOCRIT % 26 3* 27 5*   PLATELETS Thousands/uL 317 344   NEUTROS ABS Thousands/µL 7 24 14 37*         Lab Units 05/14/23  0437 05/13/23  1703 05/13/23  1555   SODIUM mmol/L 135  --  133*   POTASSIUM mmol/L 5 3 5 8* 6 2*   CHLORIDE mmol/L 99  --  98   CO2 mmol/L 29  --  31   ANION GAP mmol/L 7  --  4   BUN mg/dL 24  --  24   CREATININE mg/dL 1 14  --  1 17   EGFR ml/min/1 73sq m 45  --  44   CALCIUM mg/dL 7 8*  --  8 2*   MAGNESIUM mg/dL 3 1*  --   --    PHOSPHORUS mg/dL 3 0  --   --      Results from last 7 days   Lab Units 05/14/23  0437 05/13/23  1555   AST U/L 17 19   ALT U/L 8 11   ALK PHOS U/L 56 57   TOTAL PROTEIN g/dL 5 7* 6 0*   ALBUMIN g/dL 3 2* 3 5   TOTAL BILIRUBIN mg/dL 0 27 0 27     Lab Units " 05/14/23  1540 05/14/23  1107 05/14/23  0808 05/13/23 2009 05/09/23  0721   POC GLUCOSE mg/dl 174* 168* 194* 149* 113     Lab Units 05/14/23  0437 05/13/23  1555   GLUCOSE RANDOM mg/dL 145* 151*         Results from last 7 days   Lab Units 05/13/23  1636   PH JOE  7 291*   PCO2 JOE mm Hg 73 0*   PO2 JOE mm Hg 28 1*   HCO3 JOE mmol/L 34 4*   BASE EXC JOE mmol/L 6 3   O2 CONTENT JOE ml/dL 7 4   O2 HGB, VENOUS % 54 6*             Lab Units 05/13/23  2123 05/13/23  1808 05/13/23  1555   HS TNI 0HR ng/L  --   --  42   HS TNI 2HR ng/L  --  60*  --    HSTNI D2 ng/L  --  18  --    HS TNI 4HR ng/L 90*  --   --    HSTNI D4 ng/L 48*  --   --          Results from last 7 days   Lab Units 05/13/23  1556   PROTIME seconds 17 8*   INR  1 45*   PTT seconds 40*       Results from last 7 days   Lab Units 05/14/23  0437   PROCALCITONIN ng/ml 0 22     Results from last 7 days   Lab Units 05/13/23  1555   LACTIC ACID mmol/L 1 4             Results from last 7 days   Lab Units 05/13/23  1555   BNP pg/mL 638*     Results from last 7 days   Lab Units 05/14/23  0437   FERRITIN ng/mL 177   IRON SATURATION % 10*   IRON ug/dL 35*   TIBC ug/dL 336         Results from last 7 days   Lab Units 05/13/23  1554   CLARITY UA  Clear   COLOR UA  Yellow   SPEC GRAV UA  1 015   PH UA  5 5   GLUCOSE UA mg/dl Negative   KETONES UA mg/dl Negative   BLOOD UA  Negative   PROTEIN UA mg/dl Trace*   NITRITE UA  Negative   BILIRUBIN UA  Negative   UROBILINOGEN UA E U /dl 0 2   LEUKOCYTES UA  Negative   WBC UA /hpf None Seen   RBC UA /hpf None Seen   BACTERIA UA /hpf None Seen   EPITHELIAL CELLS WET PREP /hpf Occasional             Results from last 7 days   Lab Units 05/13/23  1556   AMPH/METH  Negative   BARBITURATE UR  Negative   BENZODIAZEPINE UR  Negative   COCAINE UR  Negative   METHADONE URINE  Negative   OPIATE UR  Positive*   PCP UR  Negative   THC UR  Negative       Results from last 7 days   Lab Units 05/13/23  1855 05/13/23  1854   BLOOD CULTURE Received in Microbiology Lab  Culture in Progress  Received in Microbiology Lab  Culture in Progress           ED Treatment:   Medication Administration from 05/13/2023 1541 to 05/13/2023 2005       Date/Time Order Dose Route Action     05/13/2023 1706 EDT furosemide (LASIX) injection 40 mg 40 mg Intravenous Given     05/13/2023 1858 EDT cefTRIAXone (ROCEPHIN) IVPB (premix in dextrose) 1,000 mg 50 mL 1,000 mg Intravenous New Bag     05/13/2023 1910 EDT furosemide (LASIX) injection 40 mg 40 mg Intravenous Given        Past Medical History:   Diagnosis Date   • Asthma    • CHF (congestive heart failure) ([facility])    • COPD (chronic obstructive pulmonary disease) ([place] [address] )    • Diabetes mellitus ([place] [address] )    • DVT of lower limb, acute ([place] [address] )    • Hypertension    • Renal disorder      Present on Admission:  • Acute on chronic respiratory failure with hypoxia and hypercapnia ([facility])  • Chronic, continuous use of opioids  • Altered mental status  • Stage 3b chronic kidney disease ([place] [address] )  • Acute on chronic congestive heart failure ([facility])  • COPD (chronic obstructive pulmonary disease) ([facility])  • Chronic anemia      Admitting Diagnosis: Melena [K92 1]  Hyperkalemia [E87 5]  CHF (congestive heart failure) ([facility]) [I50 9]  Proctitis [K62 89]  Pneumonia [J18 9]  Weakness [R53 1]  Anemia [D64 9]  Age/Sex: [de-identified] y o  female     Admission Orders:  Scheduled Medications:  allopurinol, 50 mg, Oral, Daily  amLODIPine, 10 mg, Oral, Daily  apixaban, 5 mg, Oral, BID  aspirin, 81 mg, Oral, Daily  atorvastatin, 10 mg, Oral, Daily With Dinner  clopidogrel, 75 mg, Oral, Daily  cyanocobalamin, 1,000 mcg, Oral, Daily  docusate sodium, 200 mg, Oral, BID  Fluticasone Furoate-Vilanterol, 1 puff, Inhalation, Daily  furosemide, 40 mg, Oral, Daily  gabapentin, 300 mg, Oral, TID  insulin lispro, 1-6 Units, Subcutaneous, TID AC  insulin lispro, 1-6 Units, Subcutaneous, HS  ipratropium-albuterol, 3 mL, Nebulization, TID  metoprolol tartrate, 25 mg, Oral, Q12H [facility]  mirtazapine, 30 mg, Oral, HS  naloxone, 0 4 mg, Intravenous, Once  nicotine, 1 patch, Transdermal, Daily  predniSONE, 2 5 mg, Oral, Daily  rOPINIRole, 0 5 mg, Oral, HS  sertraline, 50 mg, Oral, HS  sucralfate, 1 g, Oral, BID AC  traZODone, 50 mg, Oral, HS      Continuous IV Infusions: None     PRN Meds:  acetaminophen, 650 mg, Oral, Q6H PRN 5/14 x1  albuterol, 0 63 mg, Nebulization, 4x Daily PRN  baclofen, 5 mg, Oral, Q8H PRN  polyethylene glycol, 17 g, Oral, Daily PRN      Fluid Restriction 1800 ml  IP CONSULT TO CASE MANAGEMENT  IP CONSULT TO HOSPICE    Network Utilization Review Department  ATTENTION: Please call with any questions or concerns to [phone] and carefully listen to the prompts so that you are directed to the right person  All voicemails are confidential   Ruth Sarmiento all requests for admission clinical reviews, approved or denied determinations and any other requests to dedicated fax number below belonging to the campus where the patient is receiving treatment   List of dedicated fax numbers for the Facilities:  [address] [place] [address] DENIALS (Administrative/Medical Necessity) [phone]   [address] (Maternity/NICU/Pediatrics) [phone]   [address] Rosy Johnson [phone]   [facility] [address] [phone]   [address] [place] [address] Tricia Andrew Diggs 28 [phone]   [address] [facility] Osiris Mon [facility] 134 815 [facility] [phone]

## 2023-05-15 ENCOUNTER — HOME CARE VISIT (OUTPATIENT)
Dept: HOME HEALTH SERVICES | Facility: HOME HEALTHCARE | Age: 83
End: 2023-05-15

## 2023-05-15 ENCOUNTER — HOSPICE ADMISSION (OUTPATIENT)
Dept: HOME HOSPICE | Facility: HOSPICE | Age: 83
End: 2023-05-15

## 2023-05-15 PROBLEM — I50.33 ACUTE ON CHRONIC DIASTOLIC CONGESTIVE HEART FAILURE (HCC): Status: ACTIVE | Noted: 2023-01-29

## 2023-05-15 PROBLEM — G92.8 TOXIC METABOLIC ENCEPHALOPATHY: Status: ACTIVE | Noted: 2023-05-13

## 2023-05-15 PROBLEM — G93.40 ACUTE ENCEPHALOPATHY: Status: RESOLVED | Noted: 2023-05-02 | Resolved: 2023-05-15

## 2023-05-15 LAB
ATRIAL RATE: 64 BPM
ATRIAL RATE: 66 BPM
ATRIAL RATE: 77 BPM
GLUCOSE SERPL-MCNC: 151 MG/DL (ref 65–140)
GLUCOSE SERPL-MCNC: 180 MG/DL (ref 65–140)
GLUCOSE SERPL-MCNC: 183 MG/DL (ref 65–140)
GLUCOSE SERPL-MCNC: 289 MG/DL (ref 65–140)
P AXIS: -15 DEGREES
P AXIS: 27 DEGREES
P AXIS: 28 DEGREES
PR INTERVAL: 184 MS
PR INTERVAL: 186 MS
PR INTERVAL: 186 MS
QRS AXIS: 25 DEGREES
QRS AXIS: 26 DEGREES
QRS AXIS: 29 DEGREES
QRSD INTERVAL: 80 MS
QRSD INTERVAL: 90 MS
QRSD INTERVAL: 94 MS
QT INTERVAL: 408 MS
QT INTERVAL: 438 MS
QT INTERVAL: 444 MS
QTC INTERVAL: 458 MS
QTC INTERVAL: 459 MS
QTC INTERVAL: 461 MS
T WAVE AXIS: 19 DEGREES
T WAVE AXIS: 20 DEGREES
T WAVE AXIS: 30 DEGREES
VENTRICULAR RATE: 64 BPM
VENTRICULAR RATE: 66 BPM
VENTRICULAR RATE: 77 BPM

## 2023-05-15 RX ORDER — OXYCODONE HYDROCHLORIDE 5 MG/1
5 TABLET ORAL EVERY 4 HOURS PRN
Status: DISCONTINUED | OUTPATIENT
Start: 2023-05-15 | End: 2023-05-16 | Stop reason: HOSPADM

## 2023-05-15 RX ADMIN — INSULIN LISPRO 1 UNITS: 100 INJECTION, SOLUTION INTRAVENOUS; SUBCUTANEOUS at 21:26

## 2023-05-15 RX ADMIN — OXYCODONE HYDROCHLORIDE 5 MG: 5 TABLET ORAL at 21:26

## 2023-05-15 RX ADMIN — IPRATROPIUM BROMIDE AND ALBUTEROL SULFATE 3 ML: 2.5; .5 SOLUTION RESPIRATORY (INHALATION) at 08:42

## 2023-05-15 RX ADMIN — ALLOPURINOL 50 MG: 100 TABLET ORAL at 08:02

## 2023-05-15 RX ADMIN — INSULIN LISPRO 4 UNITS: 100 INJECTION, SOLUTION INTRAVENOUS; SUBCUTANEOUS at 11:32

## 2023-05-15 RX ADMIN — IPRATROPIUM BROMIDE AND ALBUTEROL SULFATE 3 ML: 2.5; .5 SOLUTION RESPIRATORY (INHALATION) at 19:24

## 2023-05-15 RX ADMIN — SUCRALFATE 1 G: 1 TABLET ORAL at 08:01

## 2023-05-15 RX ADMIN — OXYCODONE HYDROCHLORIDE 5 MG: 5 TABLET ORAL at 15:35

## 2023-05-15 RX ADMIN — METOPROLOL TARTRATE 25 MG: 25 TABLET, FILM COATED ORAL at 08:02

## 2023-05-15 RX ADMIN — METOPROLOL TARTRATE 25 MG: 25 TABLET, FILM COATED ORAL at 21:25

## 2023-05-15 RX ADMIN — MIRTAZAPINE 30 MG: 15 TABLET, FILM COATED ORAL at 21:25

## 2023-05-15 RX ADMIN — AMLODIPINE BESYLATE 10 MG: 10 TABLET ORAL at 08:02

## 2023-05-15 RX ADMIN — INSULIN LISPRO 1 UNITS: 100 INJECTION, SOLUTION INTRAVENOUS; SUBCUTANEOUS at 08:03

## 2023-05-15 RX ADMIN — ASPIRIN 81 MG 81 MG: 81 TABLET ORAL at 08:02

## 2023-05-15 RX ADMIN — APIXABAN 5 MG: 5 TABLET, FILM COATED ORAL at 16:58

## 2023-05-15 RX ADMIN — GABAPENTIN 300 MG: 300 CAPSULE ORAL at 16:58

## 2023-05-15 RX ADMIN — TRAZODONE HYDROCHLORIDE 50 MG: 50 TABLET ORAL at 21:25

## 2023-05-15 RX ADMIN — SERTRALINE HYDROCHLORIDE 50 MG: 50 TABLET ORAL at 21:26

## 2023-05-15 RX ADMIN — GABAPENTIN 300 MG: 300 CAPSULE ORAL at 21:25

## 2023-05-15 RX ADMIN — CYANOCOBALAMIN TAB 500 MCG 1000 MCG: 500 TAB at 08:01

## 2023-05-15 RX ADMIN — CLOPIDOGREL BISULFATE 75 MG: 75 TABLET ORAL at 08:01

## 2023-05-15 RX ADMIN — DOCUSATE SODIUM 200 MG: 100 CAPSULE, LIQUID FILLED ORAL at 16:57

## 2023-05-15 RX ADMIN — ATORVASTATIN CALCIUM 10 MG: 10 TABLET, FILM COATED ORAL at 16:58

## 2023-05-15 RX ADMIN — NICOTINE 1 PATCH: 21 PATCH, EXTENDED RELEASE TRANSDERMAL at 08:03

## 2023-05-15 RX ADMIN — ROPINIROLE 0.5 MG: 0.25 TABLET, FILM COATED ORAL at 21:25

## 2023-05-15 RX ADMIN — GABAPENTIN 300 MG: 300 CAPSULE ORAL at 08:01

## 2023-05-15 RX ADMIN — FUROSEMIDE 40 MG: 40 TABLET ORAL at 08:01

## 2023-05-15 RX ADMIN — INSULIN LISPRO 1 UNITS: 100 INJECTION, SOLUTION INTRAVENOUS; SUBCUTANEOUS at 17:03

## 2023-05-15 RX ADMIN — IPRATROPIUM BROMIDE AND ALBUTEROL SULFATE 3 ML: 2.5; .5 SOLUTION RESPIRATORY (INHALATION) at 15:33

## 2023-05-15 RX ADMIN — DOCUSATE SODIUM 200 MG: 100 CAPSULE, LIQUID FILLED ORAL at 08:01

## 2023-05-15 RX ADMIN — SUCRALFATE 1 G: 1 TABLET ORAL at 16:58

## 2023-05-15 RX ADMIN — APIXABAN 5 MG: 5 TABLET, FILM COATED ORAL at 08:02

## 2023-05-15 RX ADMIN — FLUTICASONE FUROATE AND VILANTEROL TRIFENATATE 1 PUFF: 100; 25 POWDER RESPIRATORY (INHALATION) at 08:03

## 2023-05-15 RX ADMIN — PREDNISONE 2.5 MG: 1 TABLET ORAL at 08:01

## 2023-05-15 NOTE — ASSESSMENT & PLAN NOTE
· POA with lethargy suspected CO2 narcosis  · Telephone discussion with daughter patient has been lethargic all day, did not take any of her opioid medications on 5/13  · In the ED VBG with CO2 73, pH 7 291, similar presentation to previous admission on 5/2  · D/W daughter patient's POLST DNR/DNI and is currently following with palliative care medicine at [facility] utilizing liquid morphine, lorazepam and oxycodone tablets as needed for air hunger  · We will utilize BiPAP overnight, daughter questioning if patient could have BiPAP at home to prevent CO2 narcosis and recurrent hospitalizations while keeping her comfortable  We also discussed hospice intervention to attempt to keep patient out of the hospital   Case management consultation placed  · Hold narcotic medications until mentation improved  · [facility], patient's daughter stated she will be in to talk to the patient in the daytime about her future wishes  I really think this patient would benefit from goals of care discussion and hospice  · This morning, patient is alert and answering questions appropriately  Off of BiPAP-discontinue one-to-one observation for  Patient daughter and family member at the bedside, discussed about goals of care-family want to proceed with hospice care  Referral placed  At this time, we will continue current management without any change    Plan is to discharge patient with home hospice care  But while patient remained in the hospital, continue current medical management without any aggressive treatment, avoid any kind of blood work or imaging at this time

## 2023-05-15 NOTE — PROGRESS NOTES
[address]  Progress Note  Name: Chris Jimenez  MRN: [id number]  Unit/Bed#: -58 I Date of Admission: 5/13/2023   Date of Service: 5/15/2023 I Hospital Day: 2    Assessment/Plan   Acute on chronic diastolic congestive heart failure [facility])  Assessment & Plan  Wt Readings from Last 3 Encounters:   05/15/23 74 kg (163 lb 2 3 oz)   05/09/23 70 1 kg (154 lb 8 7 oz)   02/04/23 82 9 kg (182 lb 12 2 oz)     · Hypervolemic on initial exam  · Improved with IV diuresis  · Resume oral furosemide in a m  · Continue metoprolol  · Monitor volume status  · Cardiac diet  Patient daughter and family member at the bedside, discussed about goals of care-family want to proceed with hospice care  Referral placed  At this time, we will continue current management without any change  Plan is to discharge patient with home hospice care  But while patient remained in the hospital, continue current medical management without any aggressive treatment, avoid any kind of blood work or imaging at this time  Acute on chronic respiratory failure with hypoxia and hypercapnia (HCC)  Assessment & Plan  · Chronically maintained on home oxygen 3 L for severe COPD  · VBG with acute hypercarbia, coupled with altered mental status placed on BiPAP  · Discussed with daughter Ludy Ba on telephone recurrent hypercarbia similar to presentation last admission this is progression of her pulmonary disease  · Already follows with palliative care  · Patient daughter and family member at the bedside, discussed about goals of care-family want to proceed with hospice care  Referral placed  At this time, we will continue current management without any change  · Plan is to discharge patient with home hospice care  But while patient remained in the hospital, continue current medical management without any aggressive treatment, avoid any kind of blood work or imaging at this time      * Toxic metabolic encephalopathy  Assessment & Plan  · POA with lethargy suspected CO2 narcosis  · Telephone discussion with daughter patient has been lethargic all day, did not take any of her opioid medications on 5/13  · In the ED VBG with CO2 73, pH 7 291, similar presentation to previous admission on 5/2  · D/W daughter patient's POLST DNR/DNI and is currently following with palliative care medicine at [facility] utilizing liquid morphine, lorazepam and oxycodone tablets as needed for air hunger  · We will utilize BiPAP overnight, daughter questioning if patient could have BiPAP at home to prevent CO2 narcosis and recurrent hospitalizations while keeping her comfortable  We also discussed hospice intervention to attempt to keep patient out of the hospital   Case management consultation placed  · Hold narcotic medications until mentation improved  · Huy Alcaraz, patient's daughter stated she will be in to talk to the patient in the daytime about her future wishes  I really think this patient would benefit from goals of care discussion and hospice  · This morning, patient is alert and answering questions appropriately  Off of BiPAP-discontinue one-to-one observation for  Patient daughter and family member at the bedside, discussed about goals of care-family want to proceed with hospice care  Referral placed  At this time, we will continue current management without any change    Plan is to discharge patient with home hospice care  But while patient remained in the hospital, continue current medical management without any aggressive treatment, avoid any kind of blood work or imaging at this time  Chronic anemia  Assessment & Plan  · Maintained on Eliquis for DVT history  · Hemoglobin 8 1 dropped to 7 7  · Check occult blood stools  · Add iron panel  · Received Venofer infusion in February for hemoglobin 7  Patient daughter and family member at the bedside, discussed about goals of care-family want to proceed with hospice care  Referral placed  At this time, we will continue current management without any change    Plan is to discharge patient with home hospice care  But while patient remained in the hospital, continue current medical management without any aggressive treatment, avoid any kind of blood work or imaging at this time  Stage 3b chronic kidney disease [facility])  Assessment & Plan  Lab Results   Component Value Date    EGFR 45 05/14/2023    EGFR 44 05/13/2023    EGFR 34 05/09/2023    CREATININE 1 14 05/14/2023    CREATININE 1 17 05/13/2023    CREATININE 1 44 (H) 05/09/2023   · Creatinine baseline  · Avoid nephrotoxic agents  · Renally dose medications  · Urinalysis unremarkable  Patient daughter and family member at the bedside, discussed about goals of care-family want to proceed with hospice care  Referral placed  At this time, we will continue current management without any change  Plan is to discharge patient with home hospice care  But while patient remained in the hospital, continue current medical management without any aggressive treatment, avoid any kind of blood work or imaging at this time  Chronic, continuous use of opioids  Assessment & Plan  · Followed by palliative care  · Prescribed liquid morphine, oxycodone and lorazepam and for air hunger  · Daughter states did not receive any of these medications today due to lethargy  · PDMP reviewed, no red flags  Patient daughter and family member at the bedside, discussed about goals of care-family want to proceed with hospice care  Referral placed  At this time, we will continue current management without any change  Plan is to discharge patient with home hospice care  But while patient remained in the hospital, continue current medical management without any aggressive treatment, avoid any kind of blood work or imaging at this time      COPD (chronic obstructive pulmonary disease) (HCC)  Assessment & Plan  · Severe end-stage maintained on 3 L nasal cannula  · Continue Prednisone 2 5 mg daily, DuoNebs, as needed albuterol  · Patient daughter and family member at the bedside, discussed about goals of care-family want to proceed with hospice care  Referral placed  At this time, we will continue current management without any change  · Plan is to discharge patient with home hospice care  But while patient remained in the hospital, continue current medical management without any aggressive treatment, avoid any kind of blood work or imaging at this time  Type 2 diabetes mellitus with skin complication, with long-term current use of insulin [facility])  Assessment & Plan  Lab Results   Component Value Date    HGBA1C 6 2 (H) 05/08/2023       Recent Labs     05/14/23  2123 05/15/23  0741 05/15/23  1102 05/15/23  1548   POCGLU 181* 183* 289* 151*       Blood Sugar Average: Last 72 hrs:  (P) 186 125     · Seems controlled with hemoglobin A1c 6 2  · Sliding-scale insulin coverage with Accu-Cheks  · Basal insulin home regimen on hold until patient is tolerating oral intake with resolution of lethargy  · Hypoglycemia protocol  Patient daughter and family member at the bedside, discussed about goals of care-family want to proceed with hospice care  Referral placed  At this time, we will continue current management without any change  Plan is to discharge patient with home hospice care  But while patient remained in the hospital, continue current medical management without any aggressive treatment, avoid any kind of blood work or imaging at this time  VTE Pharmacologic Prophylaxis:   Moderate Risk (Score 3-4) - Pharmacological DVT Prophylaxis Ordered: apixaban (Eliquis)  Patient Centered Rounds: I performed bedside rounds with nursing staff today  Discussions with Specialists or Other Care Team Provider: None    Education and Discussions with Family / Patient: Updated  (daughter) at bedside      Total Time Spent on Date of Encounter in care "of patient: 10 minutes This time was spent on one or more of the following: performing physical exam; counseling and coordination of care; obtaining or reviewing history; documenting in the medical record; reviewing/ordering tests, medications or procedures; communicating with other healthcare professionals and discussing with patient's family/caregivers  Current Length of Stay: 2 day(s)  Current Patient Status: Inpatient   Certification Statement: The patient will continue to require additional inpatient hospital stay due to To monitor \"  Discharge Plan: Anticipate discharge in 24-48 hrs to Home with home hospice    Code Status: Level 3 - DNAR and DNI    Subjective:   Seen and evaluated during the rounds  Resting comfortably  Denies any significant complaint  Objective:     Vitals:   Temp (24hrs), Av 6 °F (36 4 °C), Min:97 °F (36 1 °C), Max:98 2 °F (36 8 °C)    Temp:  [97 °F (36 1 °C)-98 2 °F (36 8 °C)] 97 7 °F (36 5 °C)  HR:  [71-81] 81  Resp:  [18] 18  BP: (109-138)/(41-51) 136/48  SpO2:  [96 %-100 %] 96 %  Body mass index is 26 33 kg/m²  Input and Output Summary (last 24 hours): Intake/Output Summary (Last 24 hours) at 5/15/2023 1844  Last data filed at 5/15/2023 1814  Gross per 24 hour   Intake 480 ml   Output 858 ml   Net -378 ml       Physical Exam:   Physical Exam  Vitals and nursing note reviewed  Constitutional:       Appearance: Normal appearance  She is not ill-appearing or diaphoretic  HENT:      Head: Normocephalic  Nose: Nose normal  No congestion  Eyes:      General: No scleral icterus  Left eye: No discharge  Extraocular Movements: Extraocular movements intact  Conjunctiva/sclera: Conjunctivae normal       Pupils: Pupils are equal, round, and reactive to light  Cardiovascular:      Rate and Rhythm: Normal rate  Heart sounds: Normal heart sounds  No murmur heard  No friction rub  No gallop     Pulmonary:      Effort: Pulmonary effort is normal  " No respiratory distress  Breath sounds: No stridor  Rales present  No wheezing or rhonchi  Abdominal:      General: Abdomen is flat  Bowel sounds are normal  There is no distension  Palpations: There is no mass  Tenderness: There is no abdominal tenderness  Hernia: No hernia is present  Musculoskeletal:      Cervical back: Normal range of motion  Right lower leg: No edema  Left lower leg: No edema  Skin:     Coloration: Skin is not jaundiced or pale  Findings: No bruising or erythema  Neurological:      Mental Status: She is alert and oriented to person, place, and time  Mental status is at baseline  Cranial Nerves: No cranial nerve deficit  Sensory: No sensory deficit  Motor: No weakness        Coordination: Coordination normal          Additional Data:     Labs:  Results from last 7 days   Lab Units 05/14/23  0437   WBC Thousand/uL 9 43   HEMOGLOBIN g/dL 7 7*   HEMATOCRIT % 26 3*   PLATELETS Thousands/uL 317   NEUTROS PCT % 76*   LYMPHS PCT % 15   MONOS PCT % 5   EOS PCT % 3     Results from last 7 days   Lab Units 05/14/23  0437   SODIUM mmol/L 135   POTASSIUM mmol/L 5 3   CHLORIDE mmol/L 99   CO2 mmol/L 29   BUN mg/dL 24   CREATININE mg/dL 1 14   ANION GAP mmol/L 7   CALCIUM mg/dL 7 8*   ALBUMIN g/dL 3 2*   TOTAL BILIRUBIN mg/dL 0 27   ALK PHOS U/L 56   ALT U/L 8   AST U/L 17   GLUCOSE RANDOM mg/dL 145*     Results from last 7 days   Lab Units 05/13/23  1556   INR  1 45*     Results from last 7 days   Lab Units 05/15/23  1548 05/15/23  1102 05/15/23  0741 05/14/23  2123 05/14/23  1540 05/14/23  1107 05/14/23  0808 05/13/23  2009 05/09/23  0721 05/08/23  2127   POC GLUCOSE mg/dl 151* 289* 183* 181* 174* 168* 194* 149* 113 225*         Results from last 7 days   Lab Units 05/14/23  0437 05/13/23  1555   LACTIC ACID mmol/L  --  1 4   PROCALCITONIN ng/ml 0 22  --        Lines/Drains:  Invasive Devices     Peripheral Intravenous Line  Duration Peripheral IV 05/13/23 Right Antecubital 2 days          Drain  Duration           External Urinary Catheter 2 days                      Imaging: No pertinent imaging reviewed  Recent Cultures (last 7 days):   Results from last 7 days   Lab Units 05/13/23  1855 05/13/23 1854   BLOOD CULTURE  No Growth at 24 hrs  No Growth at 24 hrs         Last 24 Hours Medication List:   Current Facility-Administered Medications   Medication Dose Route Frequency Provider Last Rate   • acetaminophen  650 mg Oral Q6H PRN Caryl S Linda, CRNP     • albuterol  0 63 mg Nebulization 4x Daily PRN Caryl S Linda, CRNP     • allopurinol  50 mg Oral Daily Caryl S Linda, CRNP     • amLODIPine  10 mg Oral Daily Caryl S Linda, CRNP     • apixaban  5 mg Oral BID Caryl S Linda, CRNP     • aspirin  81 mg Oral Daily Caryl S Linda, CRNP     • atorvastatin  10 mg Oral Daily With Dinner Caryl S Linda, CRNP     • baclofen  5 mg Oral Q8H PRN Caryl S Linda, CRNP     • clopidogrel  75 mg Oral Daily Caryl S Linda, CRNP     • cyanocobalamin  1,000 mcg Oral Daily Caryl S Linda, CRNP     • docusate sodium  200 mg Oral BID Caryl S Linda, CRNP     • Fluticasone Furoate-Vilanterol  1 puff Inhalation Daily Caryl S Linda, CRNP     • furosemide  40 mg Oral Daily Caryl S Linda, CRNP     • gabapentin  300 mg Oral TID Caryl S Linda, CRNP     • insulin lispro  1-6 Units Subcutaneous TID AC Caryl S Linda, CRNP     • insulin lispro  1-6 Units Subcutaneous HS Caryl S Linda, CRNP     • ipratropium-albuterol  3 mL Nebulization TID Herb Bello MD     • metoprolol tartrate  25 mg Oral Q12H [address] Caryl S Linda, CRNP     • mirtazapine  30 mg Oral HS Caryl S Linda, CRNP     • nicotine  1 patch Transdermal Daily Caryl S Linda, CRNP     • oxyCODONE  5 mg Oral Q4H PRN Evelyn Ruelas MD     • polyethylene glycol  17 g Oral Daily PRN Caryl S Linda, CRNP     • predniSONE  2 5 mg Oral Daily Caryl S Linda, CRNP     • rOPINIRole  0 5 mg Oral HS Caryl S Linda, AIYANA     • sertraline  50 mg Oral HS Caryl S Linda, CRNP     • sucralfate  1 g Oral BID AC Caryl S Linda, CRNP     • traZODone  50 mg Oral HS Caryl S Linda, CRNP          Today, Patient Was Seen By: Zara Munguia MD    **Please Note: This note may have been constructed using a voice recognition system  **

## 2023-05-15 NOTE — HOSPICE NOTE
Received referral, patient was approved for [facility] hospice (at home or SNF)  I met with family at bedside and reviewed services and they were agreeable, so was patient  Equipment ordered  Consents signed  Requested a 12 pm transport tomorrow  Ching Oliveira CM updated

## 2023-05-15 NOTE — ASSESSMENT & PLAN NOTE
Wt Readings from Last 3 Encounters:   05/15/23 74 kg (163 lb 2 3 oz)   05/09/23 70 1 kg (154 lb 8 7 oz)   02/04/23 82 9 kg (182 lb 12 2 oz)     · Hypervolemic on initial exam  · Improved with IV diuresis  · Resume oral furosemide in a m  · Continue metoprolol  · Monitor volume status  · Cardiac diet  Patient daughter and family member at the bedside, discussed about goals of care-family want to proceed with hospice care  Referral placed  At this time, we will continue current management without any change  Plan is to discharge patient with home hospice care  But while patient remained in the hospital, continue current medical management without any aggressive treatment, avoid any kind of blood work or imaging at this time

## 2023-05-15 NOTE — PROGRESS NOTES
-- Patient:  -- MRN: [id number]  -- Aidin Request ID: [id number]  -- Level of care reserved: Hospice  -- Partner Reserved: [place], [address] [facility] [phone]  -- Clinical needs requested: service to private residence  -- Geography searched: [phone]  -- Start of Service:  -- Request sent: 9:52am EDT on 5/15/2023 by Fabian Smoker  -- Partner reserved: 11:21am EDT on 5/15/2023 by Fabian Smoker  -- Choice list shared: 10:51am EDT on 5/15/2023 by Fabian Smoker

## 2023-05-15 NOTE — ASSESSMENT & PLAN NOTE
Lab Results   Component Value Date    HGBA1C 6 2 (H) 05/08/2023       Recent Labs     05/14/23  2123 05/15/23  0741 05/15/23  1102 05/15/23  1548   POCGLU 181* 183* 289* 151*       Blood Sugar Average: Last 72 hrs:  (P) 186 125     · Seems controlled with hemoglobin A1c 6 2  · Sliding-scale insulin coverage with Accu-Cheks  · Basal insulin home regimen on hold until patient is tolerating oral intake with resolution of lethargy  · Hypoglycemia protocol  Patient daughter and family member at the bedside, discussed about goals of care-family want to proceed with hospice care  Referral placed  At this time, we will continue current management without any change  Plan is to discharge patient with home hospice care  But while patient remained in the hospital, continue current medical management without any aggressive treatment, avoid any kind of blood work or imaging at this time

## 2023-05-15 NOTE — PLAN OF CARE
Problem: Potential for Falls  Goal: Patient will remain free of falls  Description: INTERVENTIONS:  - Educate patient/family on patient safety including physical limitations  - Instruct patient to call for assistance with activity   - Consult OT/PT to assist with strengthening/mobility   - Keep Call bell within reach  - Keep bed low and locked with side rails adjusted as appropriate  - Keep care items and personal belongings within reach  - Initiate and maintain comfort rounds  - Make Fall Risk Sign visible to staff  - Offer Toileting every  Hours, in advance of need  - Initiate/Maintain alarm  - Obtain necessary fall risk management equipment:   - Apply yellow socks and bracelet for high fall risk patients  - Consider moving patient to room near nurses station  Outcome: Progressing     Problem: MOBILITY - ADULT  Goal: Maintain or return to baseline ADL function  Description: INTERVENTIONS:  -  Assess patient's ability to carry out ADLs; assess patient's baseline for ADL function and identify physical deficits which impact ability to perform ADLs (bathing, care of mouth/teeth, toileting, grooming, dressing, etc )  - Assess/evaluate cause of self-care deficits   - Assess range of motion  - Assess patient's mobility; develop plan if impaired  - Assess patient's need for assistive devices and provide as appropriate  - Encourage maximum independence but intervene and supervise when necessary  - Involve family in performance of ADLs  - Assess for home care needs following discharge   - Consider OT consult to assist with ADL evaluation and planning for discharge  - Provide patient education as appropriate  Outcome: Progressing  Goal: Maintains/Returns to pre admission functional level  Description: INTERVENTIONS:  - Perform BMAT or MOVE assessment daily    - Set and communicate daily mobility goal to care team and patient/family/caregiver     - Collaborate with rehabilitation services on mobility goals if consulted  - Perform Range of Motion  times a day  - Reposition patient every  hours    - Dangle patient  times a day  - Stand patient  times a day  - Ambulate patient  times a day  - Out of bed to chair  times a day   - Out of bed for meals  times a day  - Out of bed for toileting  - Record patient progress and toleration of activity level   Outcome: Progressing     Problem: Prexisting or High Potential for Compromised Skin Integrity  Goal: Skin integrity is maintained or improved  Description: INTERVENTIONS:  - Identify patients at risk for skin breakdown  - Assess and monitor skin integrity  - Assess and monitor nutrition and hydration status  - Monitor labs   - Assess for incontinence   - Turn and reposition patient  - Assist with mobility/ambulation  - Relieve pressure over bony prominences  - Avoid friction and shearing  - Provide appropriate hygiene as needed including keeping skin clean and dry  - Evaluate need for skin moisturizer/barrier cream  - Collaborate with interdisciplinary team   - Patient/family teaching  - Consider wound care consult   Outcome: Progressing     Problem: PAIN - ADULT  Goal: Verbalizes/displays adequate comfort level or baseline comfort level  Description: Interventions:  - Encourage patient to monitor pain and request assistance  - Assess pain using appropriate pain scale  - Administer analgesics based on type and severity of pain and evaluate response  - Implement non-pharmacological measures as appropriate and evaluate response  - Consider cultural and social influences on pain and pain management  - Notify physician/advanced practitioner if interventions unsuccessful or patient reports new pain  Outcome: Progressing     Problem: INFECTION - ADULT  Goal: Absence or prevention of progression during hospitalization  Description: INTERVENTIONS:  - Assess and monitor for signs and symptoms of infection  - Monitor lab/diagnostic results  - Monitor all insertion sites, i e  indwelling lines, tubes, and drains  - Monitor endotracheal if appropriate and nasal secretions for changes in amount and color  - [place] appropriate cooling/warming therapies per order  - Administer medications as ordered  - Instruct and encourage patient and family to use good hand hygiene technique  - Identify and instruct in appropriate isolation precautions for identified infection/condition  Outcome: Progressing  Goal: Absence of fever/infection during neutropenic period  Description: INTERVENTIONS:  - Monitor WBC    Outcome: Progressing     Problem: SAFETY ADULT  Goal: Patient will remain free of falls  Description: INTERVENTIONS:  - Educate patient/family on patient safety including physical limitations  - Instruct patient to call for assistance with activity   - Consult OT/PT to assist with strengthening/mobility   - Keep Call bell within reach  - Keep bed low and locked with side rails adjusted as appropriate  - Keep care items and personal belongings within reach  - Initiate and maintain comfort rounds  - Make Fall Risk Sign visible to staff  - Offer Toileting every  Hours, in advance of need  - Initiate/Maintain alarm  - Obtain necessary fall risk management equipment:   - Apply yellow socks and bracelet for high fall risk patients  - Consider moving patient to room near nurses station  Outcome: Progressing  Goal: Maintain or return to baseline ADL function  Description: INTERVENTIONS:  -  Assess patient's ability to carry out ADLs; assess patient's baseline for ADL function and identify physical deficits which impact ability to perform ADLs (bathing, care of mouth/teeth, toileting, grooming, dressing, etc )  - Assess/evaluate cause of self-care deficits   - Assess range of motion  - Assess patient's mobility; develop plan if impaired  - Assess patient's need for assistive devices and provide as appropriate  - Encourage maximum independence but intervene and supervise when necessary  - Involve family in performance of ADLs  - Assess for home care needs following discharge   - Consider OT consult to assist with ADL evaluation and planning for discharge  - Provide patient education as appropriate  Outcome: Progressing  Goal: Maintains/Returns to pre admission functional level  Description: INTERVENTIONS:  - Perform BMAT or MOVE assessment daily    - Set and communicate daily mobility goal to care team and patient/family/caregiver  - Collaborate with rehabilitation services on mobility goals if consulted  - Perform Range of Motion  times a day  - Reposition patient every  hours  - Dangle patient  times a day  - Stand patient  times a day  - Ambulate patient  times a day  - Out of bed to chair  times a day   - Out of bed for meals  times a day  - Out of bed for toileting  - Record patient progress and toleration of activity level   Outcome: Progressing     Problem: DISCHARGE PLANNING  Goal: Discharge to home or other facility with appropriate resources  Description: INTERVENTIONS:  - Identify barriers to discharge w/patient and caregiver  - Arrange for needed discharge resources and transportation as appropriate  - Identify discharge learning needs (meds, wound care, etc )  - Arrange for interpretive services to assist at discharge as needed  - Refer to Case Management Department for coordinating discharge planning if the patient needs post-hospital services based on physician/advanced practitioner order or complex needs related to functional status, cognitive ability, or social support system  Outcome: Progressing     Problem: Knowledge Deficit  Goal: Patient/family/caregiver demonstrates understanding of disease process, treatment plan, medications, and discharge instructions  Description: Complete learning assessment and assess knowledge base    Interventions:  - Provide teaching at level of understanding  - Provide teaching via preferred learning methods  Outcome: Progressing     Problem: RESPIRATORY - ADULT  Goal: Achieves optimal ventilation and oxygenation  Description: INTERVENTIONS:  - Assess for changes in respiratory status  - Assess for changes in mentation and behavior  - Position to facilitate oxygenation and minimize respiratory effort  - Oxygen administered by appropriate delivery if ordered  - Initiate smoking cessation education as indicated  - Encourage broncho-pulmonary hygiene including cough, deep breathe, Incentive Spirometry  - Assess the need for suctioning and aspirate as needed  - Assess and instruct to report SOB or any respiratory difficulty  - Respiratory Therapy support as indicated  Outcome: Progressing     Problem: METABOLIC, FLUID AND ELECTROLYTES - ADULT  Goal: Electrolytes maintained within normal limits  Description: INTERVENTIONS:  - Monitor labs and assess patient for signs and symptoms of electrolyte imbalances  - Administer electrolyte replacement as ordered  - Monitor response to electrolyte replacements, including repeat lab results as appropriate  - Instruct patient on fluid and nutrition as appropriate  Outcome: Progressing  Goal: Fluid balance maintained  Description: INTERVENTIONS:  - Monitor labs   - Monitor I/O and WT  - Instruct patient on fluid and nutrition as appropriate  - Assess for signs & symptoms of volume excess or deficit  Outcome: Progressing  Goal: Glucose maintained within target range  Description: INTERVENTIONS:  - Monitor Blood Glucose as ordered  - Assess for signs and symptoms of hyperglycemia and hypoglycemia  - Administer ordered medications to maintain glucose within target range  - Assess nutritional intake and initiate nutrition service referral as needed  Outcome: Progressing     Problem: SKIN/TISSUE INTEGRITY - ADULT  Goal: Skin Integrity remains intact(Skin Breakdown Prevention)  Description: Assess:  -Perform [place] assessment every   -Clean and moisturize skin every   -Inspect skin when repositioning, toileting, and assisting with ADLS  -Assess under medical devices such as  every   -Assess extremities for adequate circulation and sensation     Bed Management:  -Have minimal linens on bed & keep smooth, unwrinkled  -Change linens as needed when moist or perspiring  -Avoid sitting or lying in one position for more than  hours while in bed  -Keep HOB at degrees     Toileting:  -Offer bedside commode  -Assess for incontinence every   -Use incontinent care products after each incontinent episode such as     Activity:  -Mobilize patient  times a day  -Encourage activity and walks on unit  -Encourage or provide ROM exercises   -Turn and reposition patient every  Hours  -Use appropriate equipment to lift or move patient in bed  -Instruct/ Assist with weight shifting every  when out of bed in chair  -Consider limitation of chair time hour intervals    Skin Care:  -Avoid use of baby powder, tape, friction and shearing, hot water or constrictive clothing  -Relieve pressure over bony prominences using   -Do not massage red bony areas    Next Steps:  -Teach patient strategies to minimize risks such as    -Consider consults to  interdisciplinary teams such as   Outcome: Progressing  Goal: Incision(s), wounds(s) or drain site(s) healing without S/S of infection  Description: INTERVENTIONS  - Assess and document dressing, incision, wound bed, drain sites and surrounding tissue  - Provide patient and family education  - Perform skin care/dressing changes every  Outcome: Progressing  Goal: Pressure injury heals and does not worsen  Description: Interventions:  - Implement low air loss mattress or specialty surface (Criteria met)  - Apply silicone foam dressing  - Instruct/assist with weight shifting every  minutes when in chair   - Limit chair time to  hour intervals  - Use special pressure reducing interventions such as  when in chair   - Apply fecal or urinary incontinence containment device   - Perform passive or active ROM every   - Turn and reposition patient & offload bony prominences every hours   - Utilize friction reducing device or surface for transfers   - Consider consults to  interdisciplinary teams such as   - Use incontinent care products after each incontinent episode such as   - Consider nutrition services referral as needed  Outcome: Progressing     Problem: HEMATOLOGIC - ADULT  Goal: Maintains hematologic stability  Description: INTERVENTIONS  - Assess for signs and symptoms of bleeding or hemorrhage  - Monitor labs  - Administer supportive blood products/factors as ordered and appropriate  Outcome: Progressing

## 2023-05-15 NOTE — ASSESSMENT & PLAN NOTE
Lab Results   Component Value Date    EGFR 45 05/14/2023    EGFR 44 05/13/2023    EGFR 34 05/09/2023    CREATININE 1 14 05/14/2023    CREATININE 1 17 05/13/2023    CREATININE 1 44 (H) 05/09/2023   · Creatinine baseline  · Avoid nephrotoxic agents  · Renally dose medications  · Urinalysis unremarkable  Patient daughter and family member at the bedside, discussed about goals of care-family want to proceed with hospice care  Referral placed  At this time, we will continue current management without any change  Plan is to discharge patient with home hospice care  But while patient remained in the hospital, continue current medical management without any aggressive treatment, avoid any kind of blood work or imaging at this time

## 2023-05-15 NOTE — UTILIZATION REVIEW
NOTIFICATION OF INPATIENT ADMISSION   AUTHORIZATION REQUEST   SERVICING FACILITY:   [address] [place], [place] [address]ira Aguilar [address] [place] [address], [address] Anika Johnson  Tax ID: [id number]  NPI: [id number] ATTENDING PROVIDER:  Attending Name and NPI#: Amira Olvear Md [[id number]]  Address: [facility]  Mela Aguilar [address] [place] [address], Dinora Johnson  Phone: [phone]   ADMISSION INFORMATION:  Place of Service: Inpatient [facility]  [address]  Place of Service Code: 21  Inpatient Admission Date/Time: 5/13/23  7:02 PM  Discharge Date/Time: No discharge date for patient encounter  Admitting Diagnosis Code/Description:  Melena [K92 1]  Hyperkalemia [E87 5]  CH[facility]congestive heart failure) [facility] ) [I50 9]  Proctitis [K62 89]  Pneumonia [J18 9]  Weakness [R53 1]  Anemia [D64 9]     UTILIZATION REVIEW CONTACT:  Dora Dow Utilization   Network Utilization Review Department  Phone: [phone]  Fax [phone]  Email: [email or web address]  Contact for approvals/pending authorizations, clinical reviews, and discharge  PHYSICIAN ADVISORY SERVICES:  Medical Necessity Denial & Hnma-kf-Knvv Review  Phone: [phone]  Fax: [phone]  Email: [email or web address]

## 2023-05-15 NOTE — ASSESSMENT & PLAN NOTE
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

## 2023-05-15 NOTE — CASE MANAGEMENT
Case Management Discharge Planning Note    Patient name Lovely Almaguer  Location /-65 MRN [id number]  : 1940 Date 5/15/2023       Current Admission Date: 2023  Current Admission Diagnosis:Altered mental status   Patient Active Problem List    Diagnosis Date Noted   • Altered mental status 2023   • Bacteremia 2023   • Acute encephalopathy 2023   • Acute kidney injury superimposed on chronic kidney disease ([facility] ) 2023   • Constipation 2023   • History of DVT (deep vein thrombosis) 2023   • Coronary artery disease involving native coronary artery 2023   • Chronic anemia 2023   • Stage 3b chronic kidney disease ([facility] ) 2023   • Acute on chronic congestive heart failure ([facility] ) 2023   • Smoking 2023   • Oxygen dependent 2023   • PAD (peripheral artery disease) ([facility] ) 2023   • Acute hematogenous osteomyelitis of left foot ([facility] ) 2023   • Type 2 diabetes mellitus with skin complication, with long-term current use of insulin ([facility] ) 2023   • COPD (chronic obstructive pulmonary disease) ([facility] ) 2023   • Acute on chronic respiratory failure with hypoxia and hypercapnia ([facility] ) 2023   • Chronic, continuous use of opioids 2023      LOS (days): 2  Geometric Mean LOS (GMLOS) (days): 3 90  Days to GMLOS:2     OBJECTIVE:  Risk of Unplanned Readmission Score: 51 25         Current admission status: Inpatient   Preferred Pharmacy:   [address] [place] [address] [id number] Manuel Saucedo/ Noe ARZOLA/ Noe Ramos   [facility] [id number]  Phone: [phone] Fax: [phone]    Primary Care Provider: Karol Davis MD    Primary Insurance: [facility]  Secondary Insurance:     DISCHARGE DETAILS:      Jael Barker visited this afternoon with family in patient room  Family accepted in agreement with Hospice at home   DME to be delivered to patient  Home approx 1100 23     Family in agreement with transport via BLS due to o2 and  Weakness with self pay  request for transport via Round trip for BLS approx 1200 5/16/23   Pending confirmation of round trip  time  Copy of POLST in patient chart on unit    -  Cm to follow

## 2023-05-15 NOTE — ASSESSMENT & PLAN NOTE
· Chronically maintained on home oxygen 3 L for severe COPD  · VBG with acute hypercarbia, coupled with altered mental status placed on BiPAP  · Discussed with daughter Millicent Abrams on telephone recurrent hypercarbia similar to presentation last admission this is progression of her pulmonary disease  · Already follows with palliative care  · Patient daughter and family member at the bedside, discussed about goals of care-family want to proceed with hospice care  Referral placed  At this time, we will continue current management without any change  · Plan is to discharge patient with home hospice care  But while patient remained in the hospital, continue current medical management without any aggressive treatment, avoid any kind of blood work or imaging at this time

## 2023-05-15 NOTE — CASE MANAGEMENT
Case Management Assessment & Discharge Planning Note    Patient name Leia Og  Location /-55 MRN [id number]  : 1940 Date 5/15/2023       Current Admission Date: 2023  Current Admission Diagnosis:Altered mental status   Patient Active Problem List    Diagnosis Date Noted   • Altered mental status 2023   • Bacteremia 2023   • Acute encephalopathy 2023   • Acute kidney injury superimposed on chronic kidney disease ([facility] [address] ) 2023   • Constipation 2023   • History of DVT (deep vein thrombosis) 2023   • Coronary artery disease involving native coronary artery 2023   • Chronic anemia 2023   • Stage 3b chronic kidney disease ([facility] [address] ) 2023   • Acute on chronic congestive heart failure ([facility] [address] ) 2023   • Smoking 2023   • Oxygen dependent 2023   • PAD (peripheral artery disease) ([facility] [address] ) 2023   • Acute hematogenous osteomyelitis of left foot ([facility] [address] ) 2023   • Type 2 diabetes mellitus with skin complication, with long-term current use of insulin ([facility] [address] ) 2023   • COPD (chronic obstructive pulmonary disease) ([facility] [address] ) 2023   • Acute on chronic respiratory failure with hypoxia and hypercapnia ([facility] [address] ) 2023   • Chronic, continuous use of opioids 2023      LOS (days): 2  Geometric Mean LOS (GMLOS) (days):   Days to GMLOS:     OBJECTIVE:  PATIENT READMITTED TO HOSPITAL  Risk of Unplanned Readmission Score: 51 66         Current admission status: Inpatient  Referral Reason:  (discharge planning for hospice care at home)    Preferred Pharmacy:   [facility] [id number] [id number] [place]/ [address]  / [place]e [place]ena  [address] [place] [id number]  Phone: [phone] Fax: [phone]    Primary Care Provider: Sharlene Puentes MD    Primary Insurance: [facility]  Secondary Insurance:     ASSESSMENT:    CM met with patient at the bedside,baseline information  was obtained   CM discussed the role of CM in helping the patient develop a discharge plan and assist the patient in carry out their plan  Patient lives with her daughter Wayne Graf  Is currently active with [facility]  SN/PT    First floor living     Patient wishes are to return home with hospice she no longer wants any medical treatment  Family is in agreement with patient wishes  Active Health Care Proxies    There are no active Health Care Proxies on file  Advance Directives  Does patient have a Health Care POA?: Yes  Does patient have Advance Directives?: Yes  Advance Directives: Living will  Primary Contact: Gregory Roxannodalys  daughter         Readmission Root Cause  30 Day Readmission: Yes  Who directed you to return to the hospital?: Family  Did you understand whom to contact if you had questions or problems?: Yes  Did you get your prescriptions before you left the hospital?: Yes  Were you able to get your prescriptions filled when you left the hospital?: Yes  Did you take your medications as prescribed?: Yes  Were you able to get to your follow-up appointments?: Yes  During previous admission, was a post-acute recommendation made?: Yes  What post-[facility] resources were [facility]?: [facility]   Patient was readmitted due to: patient declined STR per family and was active with [facility] [address] [facility] SN-PT  Action Plan: discharging with [address] care at home    Patient Information  Admitted from[de-identified] Home  Mental Status: Alert  During Assessment patient was accompanied by: Daughter  Assessment information provided by[de-identified] Daughter Kelsi Campa)  Primary Caregiver: Child  Caregiver's Name[de-identified] Gregory Cespedes  daughter  Caregiver's Relationship to Patient[de-identified] Family Member  Support Systems: Daughter (daughter Joyce Briseno lives with patient first floor -4 steps into home -has o2 3liters baseline at home)  [place] of Residence: [facility]  do you live in?: [address] entry access options   Select all that apply : Stairs  Number of steps to enter home : 4  Do the steps have railings?: Yes  Type of Current Residence: 2 story home  Upon entering residence, is there a bedroom on the main floor (no further steps)?: Yes (first floor living)  Upon entering residence, is there a bathroom on the main floor (no further steps)?: Yes  In the last 12 months, was there a time when you were not able to pay the mortgage or rent on time?: No  In the last 12 months, how many places have you lived?: 1  In the last 12 months, was there a time when you did not have a steady place to sleep or slept in a shelter (including now)?: No  Homeless/housing insecurity resource given?: N/A  Living Arrangements: Lives w/ Daughter Marielae Floor)  Is patient a ?: No    Activities of Daily Living Prior to Admission  Functional Status: Assistance  Completes ADLs independently?: No  Level of ADL dependence: Assistance  Ambulates independently?: No  Level of ambulatory dependence: Assistance  Does patient use assisted devices?: Yes  Assisted Devices (DME) used: [employer]  Does patient currently own DME?: Yes  What DME does the patient currently own?: Home Oxygen concentrator, Hospital Bed, [employer]  Does patient have a history of Outpatient Therapy (PT/OT)?: No  Does the patient have a history of Short-Term Rehab?: No  Does patient have a history of HHC?: Yes (was active with [facility] prior to admission)  [facility] Yes    Current Home Health Care  Type of Current Home Care Services: Home PT, Nurse visit (active with [facility])    Patient Information Continued  Food insecurity resource given?: N/A  Does patient receive dialysis treatments?: No  Does patient have a history of substance abuse?: No  Does patient have a history of Mental Health Diagnosis?: No    PHQ 2/9 Screening   Reviewed PHQ 2/9 Depression Screening Score?: No    Means of Transportation  Means of Transport to Appts[de-identified] Family transport  In the past 12 months, has lack of transportation kept you from medical appointments or from getting medications?: No  In the past 12 months, has lack of transportation kept you from meetings, work, or from getting things needed for daily living?: No  Was application for public transport provided?: N/A        DISCHARGE DETAILS:    Discharge planning discussed with[de-identified] patient, daughter at bedside  Freedom of Choice: Yes  Comments - Freedom of Choice: CM discussed FOC with patient and family for Hospice care at home  Family in agreement with hospice at home dakota Yanes Overall lives with patient and will provide  the 24/7 care with assist from other family members  patient is awake and able to state her wishes  Family have no preferences for hospice services and in agreement with blanket referrals in aidin  CM contacted family/caregiver?: Yes Ashley Code)  Were Treatment Team discharge recommendations reviewed with patient/caregiver?: Yes  Did patient/caregiver verbalize understanding of patient care needs?: Yes  Were patient/caregiver advised of the risks associated with not following Treatment Team discharge recommendations?: Yes    Contacts  Patient Contacts: Roberto duncan  Relationship to Patient[de-identified] Family  Contact Method: Phone, In Person  Phone Number: [phone]  Reason/Outcome: Discharge [address]         [facility]?:  (home hospice care)    DME Referral Provided  Referral made for DME?: No         Would you like to participate in our [address] service program?  : No - Declined    Treatment Team Recommendation: Hospice ([facility] at home)  Discharge Destination Plan[de-identified] Hospice ([address] at home)       CM discussed  [facility] with patient and caretaker at bedside&phone  Patient and caretaker agreeable with blanket referrals in Aidin for local Hospice agencies to determine bed availability according to your medical needs and insurance coverage   Patient and caretaker has no preference Referrals placed in aidin for Hospice care at home  Family provided with choices and there preference is  [facility]  [address] liaison  is to meet with patient and family at bedside this afternoon 1430 to discuss hospice care at home  Patient has Hospital bed and O2 chronic at 3 liters from [address] from [facility]  4 steps into patient home with first floor living        CM to follow

## 2023-05-15 NOTE — ASSESSMENT & PLAN NOTE
· Followed by palliative care  · Prescribed liquid morphine, oxycodone and lorazepam and for air hunger  · Daughter states did not receive any of these medications today due to lethargy  · PDMP reviewed, no red flags  Patient daughter and family member at the bedside, discussed about goals of care-family want to proceed with hospice care  Referral placed  At this time, we will continue current management without any change  Plan is to discharge patient with home hospice care  But while patient remained in the hospital, continue current medical management without any aggressive treatment, avoid any kind of blood work or imaging at this time

## 2023-05-15 NOTE — PLAN OF CARE
Problem: METABOLIC, FLUID AND ELECTROLYTES - ADULT  Goal: Electrolytes maintained within normal limits  Description: INTERVENTIONS:  - Monitor labs and assess patient for signs and symptoms of electrolyte imbalances  - Administer electrolyte replacement as ordered  - Monitor response to electrolyte replacements, including repeat lab results as appropriate  - Instruct patient on fluid and nutrition as appropriate  Outcome: Progressing  Goal: Fluid balance maintained  Description: INTERVENTIONS:  - Monitor labs   - Monitor I/O and WT  - Instruct patient on fluid and nutrition as appropriate  - Assess for signs & symptoms of volume excess or deficit  Outcome: Progressing  Goal: Glucose maintained within target range  Description: INTERVENTIONS:  - Monitor Blood Glucose as ordered  - Assess for signs and symptoms of hyperglycemia and hypoglycemia  - Administer ordered medications to maintain glucose within target range  - Assess nutritional intake and initiate nutrition service referral as needed  Outcome: Progressing     Problem: RESPIRATORY - ADULT  Goal: Achieves optimal ventilation and oxygenation  Description: INTERVENTIONS:  - Assess for changes in respiratory status  - Assess for changes in mentation and behavior  - Position to facilitate oxygenation and minimize respiratory effort  - Oxygen administered by appropriate delivery if ordered  - Initiate smoking cessation education as indicated  - Encourage broncho-pulmonary hygiene including cough, deep breathe, Incentive Spirometry  - Assess the need for suctioning and aspirate as needed  - Assess and instruct to report SOB or any respiratory difficulty  - Respiratory Therapy support as indicated  Outcome: Progressing

## 2023-05-15 NOTE — ASSESSMENT & PLAN NOTE
· Maintained on Eliquis for DVT history  · Hemoglobin 8 1 dropped to 7 7  · Check occult blood stools  · Add iron panel  · Received Venofer infusion in February for hemoglobin 7  Patient daughter and family member at the bedside, discussed about goals of care-family want to proceed with hospice care  Referral placed  At this time, we will continue current management without any change    Plan is to discharge patient with home hospice care  But while patient remained in the hospital, continue current medical management without any aggressive treatment, avoid any kind of blood work or imaging at this time

## 2023-05-15 NOTE — Clinical Note
Long Tran 83yo female  9 1940 currently at [facility]  Hx COPD on 3l oxygen and CHF  On admission she required BIPAP and her PCO2 was 73  She is followed by Palliative and was prescribed liquid morphine, Oxycodone, and Ativan  Daughter opted for hospice and no further testing  She has been hospitalized 3x this month for CHF and COPD  ECHO showed EF 97% grade 1 diastolic function  PPS 40  Approve, RLOC?

## 2023-05-16 ENCOUNTER — HOME CARE VISIT (OUTPATIENT)
Dept: HOME HEALTH SERVICES | Facility: HOME HEALTHCARE | Age: 83
End: 2023-05-16

## 2023-05-16 VITALS
TEMPERATURE: 97.7 F | RESPIRATION RATE: 18 BRPM | HEIGHT: 66 IN | WEIGHT: 166.23 LBS | OXYGEN SATURATION: 95 % | SYSTOLIC BLOOD PRESSURE: 152 MMHG | HEART RATE: 78 BPM | DIASTOLIC BLOOD PRESSURE: 52 MMHG | BODY MASS INDEX: 26.71 KG/M2

## 2023-05-16 LAB
GLUCOSE SERPL-MCNC: 170 MG/DL (ref 65–140)
GLUCOSE SERPL-MCNC: 178 MG/DL (ref 65–140)

## 2023-05-16 RX ORDER — OXYCODONE HYDROCHLORIDE 5 MG/1
5 TABLET ORAL EVERY 4 HOURS PRN
Qty: 30 TABLET | Refills: 0 | Status: SHIPPED | OUTPATIENT
Start: 2023-05-16 | End: 2023-05-26

## 2023-05-16 RX ADMIN — OXYCODONE HYDROCHLORIDE 5 MG: 5 TABLET ORAL at 06:24

## 2023-05-16 RX ADMIN — ASPIRIN 81 MG 81 MG: 81 TABLET ORAL at 08:18

## 2023-05-16 RX ADMIN — IPRATROPIUM BROMIDE AND ALBUTEROL SULFATE 3 ML: 2.5; .5 SOLUTION RESPIRATORY (INHALATION) at 07:25

## 2023-05-16 RX ADMIN — FUROSEMIDE 40 MG: 40 TABLET ORAL at 08:18

## 2023-05-16 RX ADMIN — CYANOCOBALAMIN TAB 500 MCG 1000 MCG: 500 TAB at 08:18

## 2023-05-16 RX ADMIN — FLUTICASONE FUROATE AND VILANTEROL TRIFENATATE 1 PUFF: 100; 25 POWDER RESPIRATORY (INHALATION) at 08:23

## 2023-05-16 RX ADMIN — DOCUSATE SODIUM 200 MG: 100 CAPSULE, LIQUID FILLED ORAL at 08:17

## 2023-05-16 RX ADMIN — APIXABAN 5 MG: 5 TABLET, FILM COATED ORAL at 08:18

## 2023-05-16 RX ADMIN — GABAPENTIN 300 MG: 300 CAPSULE ORAL at 08:18

## 2023-05-16 RX ADMIN — SUCRALFATE 1 G: 1 TABLET ORAL at 06:24

## 2023-05-16 RX ADMIN — METOPROLOL TARTRATE 25 MG: 25 TABLET, FILM COATED ORAL at 08:18

## 2023-05-16 RX ADMIN — PREDNISONE 2.5 MG: 1 TABLET ORAL at 08:22

## 2023-05-16 RX ADMIN — CLOPIDOGREL BISULFATE 75 MG: 75 TABLET ORAL at 08:18

## 2023-05-16 RX ADMIN — INSULIN LISPRO 1 UNITS: 100 INJECTION, SOLUTION INTRAVENOUS; SUBCUTANEOUS at 08:21

## 2023-05-16 RX ADMIN — AMLODIPINE BESYLATE 10 MG: 10 TABLET ORAL at 08:18

## 2023-05-16 RX ADMIN — ALLOPURINOL 50 MG: 100 TABLET ORAL at 08:18

## 2023-05-16 RX ADMIN — BACLOFEN 5 MG: 10 TABLET ORAL at 08:22

## 2023-05-16 RX ADMIN — INSULIN LISPRO 1 UNITS: 100 INJECTION, SOLUTION INTRAVENOUS; SUBCUTANEOUS at 11:40

## 2023-05-16 NOTE — ASSESSMENT & PLAN NOTE
Wt Readings from Last 3 Encounters:   05/16/23 75 4 kg (166 lb 3 6 oz)   05/09/23 70 1 kg (154 lb 8 7 oz)   02/04/23 82 9 kg (182 lb 12 2 oz)     · Hypervolemic on initial exam  · Improved with IV diuresis  · Resume oral furosemide   · Continue metoprolol

## 2023-05-16 NOTE — DISCHARGE SUMMARY
[address]  Discharge- Alan Rumpf 1940, [de-identified] y o  female MRN: [id number]  Unit/Bed#: -01 Encounter: [id number]  Primary Care Provider: Yesi Fuentes MD   Date and time admitted to hospital: 5/13/2023  3:41 PM    * Toxic metabolic encephalopathy  Assessment & Plan  · POA with lethargy suspected CO2 narcosis  · Telephone discussion with daughter patient has been lethargic all day, did not take any of her opioid medications on 5/13  · In the ED VBG with CO2 73, pH 7 291, similar presentation to previous admission on 5/2  · D/W daughter patient's POLST DNR/DNI and is currently following with palliative care medicine at [facility] utilizing liquid morphine, lorazepam and oxycodone tablets as needed for air hunger      Acute on chronic respiratory failure with hypoxia and hypercapnia (HCC)  Assessment & Plan  · Chronically maintained on home oxygen 3 L for severe COPD  · VBG with acute hypercarbia, coupled with altered mental status placed on BiPAP  · Discussed with daughter Burma Aase on telephone recurrent hypercarbia similar to presentation last admission this is progression of her pulmonary disease  · Already follows with palliative care  · Patient daughter and family member at the bedside, discussed about goals of care-family want to proceed with hospice care  COPD (chronic obstructive pulmonary disease) (HCC)  Assessment & Plan  · Severe copd maintained on 3 L nasal cannula  · Continue Prednisone 2 5 mg daily, DuoNebs, as needed albuterol  · Patient daughter and family member at the bedside, discussed about goals of care-family want to proceed with hospice care         Acute on chronic diastolic congestive heart failure [facility])  Assessment & Plan  Wt Readings from Last 3 Encounters:   05/16/23 75 4 kg (166 lb 3 6 oz)   05/09/23 70 1 kg (154 lb 8 7 oz)   02/04/23 82 9 kg (182 lb 12 2 oz)     · Hypervolemic on initial exam  · Improved with IV diuresis  · Resume oral furosemide   · Continue metoprolol      Stage 3b chronic kidney disease [facility])  Assessment & Plan  Lab Results   Component Value Date    EGFR 45 05/14/2023    EGFR 44 05/13/2023    EGFR 34 05/09/2023    CREATININE 1 14 05/14/2023    CREATININE 1 17 05/13/2023    CREATININE 1 44 (H) 05/09/2023   · Creatinine baseline  · Avoid nephrotoxic agents      Chronic, continuous use of opioids  Assessment & Plan  · Now on hospice care and complaining of pain in b/l feet  resume narcotic pain meds as she is now on hospice  · PDMP reviewed, no red flags      Type 2 diabetes mellitus with skin complication, with long-term current use of insulin [facility])  Assessment & Plan  Lab Results   Component Value Date    HGBA1C 6 2 (H) 05/08/2023       Recent Labs     05/15/23  1102 05/15/23  1548 05/15/23  2118 05/16/23  0800   POCGLU 289* 151* 180* 170*       Blood Sugar Average: Last 72 hrs:  (P) 183 9     · Seems controlled with hemoglobin A1c 6 2  · Sliding-scale insulin coverage with Accu-Cheks  · Basal insulin home regimen on hold until patient is tolerating oral intake with resolution of lethargy  · Hypoglycemia protocol  Patient daughter and family member at the bedside, discussed about goals of care-family want to proceed with hospice care  Plan is to discharge patient with home hospice care  Discharging Physician / Practitioner: Casey Bautista MD  PCP: Anthony Thomson MD  Admission Date:   Admission Orders (From admission, onward)     Ordered        05/13/23 1902  INPATIENT ADMISSION  Once                      Discharge Date: 05/16/23    Medical Problems     Resolved Problems  Date Reviewed: 5/16/2023   None         Consultations During Hospital Stay:  · none    Procedures Performed:   none    Significant Findings / Test Results:   CT chest abdomen pelvis wo contrast    Result Date: 5/13/2023  Impression: Groundglass airspace opacity in the left upper lobe consistent with pneumonia   Patchy consolidative opacities in the "posterior lower lung zones which could represent atelectasis or pneumonia  Trace costophrenic angle effusions  Cardiomegaly and extensive coronary artery calcification  Uniform thickening of the wall of the rectum with perirectal inflammatory edema consistent with either infectious or inflammatory proctitis  No perirectal abscess  Cholelithiasis  This examination was marked \"immediate notification\" in Epic in order to begin the standard process by which the radiology reading room liaison alerts the referring practitioner  Workstation performed: [id number]     XR chest 1 view portable    Result Date: 5/14/2023  Impression: Cardiomegaly Vascular congestion Pleural effusions Workstation performed: [id number]     Incidental Findings:   · none    Test Results Pending at Discharge (will require follow up):   · none     Outpatient Tests Requested:  · none    Complications:  none    Reason for Admission: Toxic metabolic encephalopathy    Hospital Course:     Anatoliy Montesinos is a [de-identified] y o  female patient who originally presented to the hospital on 5/13/2023 due to toxic metabolic encephalopathy acute on chronic respite failure with hypoxia and hypercapnia and acute on chronic diastolic CHF  During the course of her hospital stay detailed discussion was held with the patient and daughter and they opted to proceed with home hospice  We will discharge her home with hospice services at home   As patient progresses please try to gradually titrate down on chronic medications further and advised daughter to make sure that she gives her something for pain as patient is complaining of a lot of pain in her feet        Please see above list of diagnoses and related plan for additional information       Condition at Discharge: poor     Discharge Day Visit / Exam:     Subjective: Complaining of pain in her feet otherwise denies any other problems and is happy to be going home with hospice care  Vitals: Blood Pressure: 152/52 " "(05/16/23 0802)  Pulse: 78 (05/16/23 0802)  Temperature: 97 7 °F (36 5 °C) (05/16/23 0802)  Temp Source: Temporal (05/14/23 2202)  Respirations: 18 (05/16/23 0802)  Height: 5' 6\" (167 6 cm) (05/13/23 1544)  Weight - Scale: 75 4 kg (166 lb 3 6 oz) (05/16/23 0600)  SpO2: 95 % (05/16/23 0818)  Exam:   Physical Exam  Vitals and nursing note reviewed  Constitutional:       Appearance: She is ill-appearing  HENT:      Head: Normocephalic and atraumatic  Right Ear: External ear normal       Left Ear: External ear normal       Nose: Nose normal       Mouth/Throat:      Pharynx: Oropharynx is clear  Eyes:      Pupils: Pupils are equal, round, and reactive to light  Cardiovascular:      Rate and Rhythm: Normal rate and regular rhythm  Heart sounds: Normal heart sounds  Pulmonary:      Effort: Pulmonary effort is normal       Comments: mod air entry bilaterally with faint bibasilar crackles noted  Abdominal:      General: Bowel sounds are normal       Palpations: Abdomen is soft  Tenderness: There is no abdominal tenderness  Musculoskeletal:      Cervical back: Normal range of motion and neck supple  Comments: Discoloration noted of bilateral feet with weak pulses noted   Skin:     General: Skin is warm and dry  Capillary Refill: Capillary refill takes less than 2 seconds  Neurological:      General: No focal deficit present  Mental Status: She is alert  Comments: Oriented to person and place   Psychiatric:         Mood and Affect: Mood normal          Discussion with Family: discussed with daughter    Discharge instructions/Information to patient and family:   See after visit summary for information provided to patient and family  Provisions for Follow-Up Care:  See after visit summary for information related to follow-up care and any pertinent home health orders        Disposition:     Home with VNA Services (Reminder: Complete face to face encounter)  Home hospice  For " Discharges to [facility] SNF:   · Not Applicable to this Patient - Not Applicable to this Patient    Planned Readmission: none     Discharge Statement:  I spent 35 minutes discharging the patient  This time was spent on the day of discharge  I had direct contact with the patient on the day of discharge  Greater than 50% of the total time was spent examining patient, answering all patient questions, arranging and discussing plan of care with patient as well as directly providing post-discharge instructions  Additional time then spent on discharge activities  Discharge Medications:  See after visit summary for reconciled discharge medications provided to patient and family        ** Please Note: This note has been constructed using a voice recognition system **

## 2023-05-16 NOTE — PLAN OF CARE
Problem: Potential for Falls  Goal: Patient will remain free of falls  Description: INTERVENTIONS:  - Educate patient/family on patient safety including physical limitations  - Instruct patient to call for assistance with activity   - Consult OT/PT to assist with strengthening/mobility   - Keep Call bell within reach  - Keep bed low and locked with side rails adjusted as appropriate  - Keep care items and personal belongings within reach  - Initiate and maintain comfort rounds  - Make Fall Risk Sign visible to staff  - Offer Toileting every  Hours, in advance of need  - Initiate/Maintain alarm  - Obtain necessary fall risk management equipment:   - Apply yellow socks and bracelet for high fall risk patients  - Consider moving patient to room near nurses station  Outcome: Progressing     Problem: MOBILITY - ADULT  Goal: Maintain or return to baseline ADL function  Description: INTERVENTIONS:  -  Assess patient's ability to carry out ADLs; assess patient's baseline for ADL function and identify physical deficits which impact ability to perform ADLs (bathing, care of mouth/teeth, toileting, grooming, dressing, etc )  - Assess/evaluate cause of self-care deficits   - Assess range of motion  - Assess patient's mobility; develop plan if impaired  - Assess patient's need for assistive devices and provide as appropriate  - Encourage maximum independence but intervene and supervise when necessary  - Involve family in performance of ADLs  - Assess for home care needs following discharge   - Consider OT consult to assist with ADL evaluation and planning for discharge  - Provide patient education as appropriate  Outcome: Progressing  Goal: Maintains/Returns to pre admission functional level  Description: INTERVENTIONS:  - Perform BMAT or MOVE assessment daily    - Set and communicate daily mobility goal to care team and patient/family/caregiver     - Collaborate with rehabilitation services on mobility goals if consulted  - Perform Range of Motion  times a day  - Reposition patient every  hours    - Dangle patient  times a day  - Stand patient  times a day  - Ambulate patient  times a day  - Out of bed to chair  times a day   - Out of bed for meals  times a day  - Out of bed for toileting  - Record patient progress and toleration of activity level   Outcome: Progressing     Problem: Prexisting or High Potential for Compromised Skin Integrity  Goal: Skin integrity is maintained or improved  Description: INTERVENTIONS:  - Identify patients at risk for skin breakdown  - Assess and monitor skin integrity  - Assess and monitor nutrition and hydration status  - Monitor labs   - Assess for incontinence   - Turn and reposition patient  - Assist with mobility/ambulation  - Relieve pressure over bony prominences  - Avoid friction and shearing  - Provide appropriate hygiene as needed including keeping skin clean and dry  - Evaluate need for skin moisturizer/barrier cream  - Collaborate with interdisciplinary team   - Patient/family teaching  - Consider wound care consult   Outcome: Progressing     Problem: PAIN - ADULT  Goal: Verbalizes/displays adequate comfort level or baseline comfort level  Description: Interventions:  - Encourage patient to monitor pain and request assistance  - Assess pain using appropriate pain scale  - Administer analgesics based on type and severity of pain and evaluate response  - Implement non-pharmacological measures as appropriate and evaluate response  - Consider cultural and social influences on pain and pain management  - Notify physician/advanced practitioner if interventions unsuccessful or patient reports new pain  Outcome: Progressing     Problem: INFECTION - ADULT  Goal: Absence or prevention of progression during hospitalization  Description: INTERVENTIONS:  - Assess and monitor for signs and symptoms of infection  - Monitor lab/diagnostic results  - Monitor all insertion sites, i e  indwelling lines, tubes, and drains  - Monitor endotracheal if appropriate and nasal secretions for changes in amount and color  - [place] appropriate cooling/warming therapies per order  - Administer medications as ordered  - Instruct and encourage patient and family to use good hand hygiene technique  - Identify and instruct in appropriate isolation precautions for identified infection/condition  Outcome: Progressing  Goal: Absence of fever/infection during neutropenic period  Description: INTERVENTIONS:  - Monitor WBC    Outcome: Progressing     Problem: SAFETY ADULT  Goal: Patient will remain free of falls  Description: INTERVENTIONS:  - Educate patient/family on patient safety including physical limitations  - Instruct patient to call for assistance with activity   - Consult OT/PT to assist with strengthening/mobility   - Keep Call bell within reach  - Keep bed low and locked with side rails adjusted as appropriate  - Keep care items and personal belongings within reach  - Initiate and maintain comfort rounds  - Make Fall Risk Sign visible to staff  - Offer Toileting every  Hours, in advance of need  - Initiate/Maintain alarm  - Obtain necessary fall risk management equipment:   - Apply yellow socks and bracelet for high fall risk patients  - Consider moving patient to room near nurses station  Outcome: Progressing  Goal: Maintain or return to baseline ADL function  Description: INTERVENTIONS:  -  Assess patient's ability to carry out ADLs; assess patient's baseline for ADL function and identify physical deficits which impact ability to perform ADLs (bathing, care of mouth/teeth, toileting, grooming, dressing, etc )  - Assess/evaluate cause of self-care deficits   - Assess range of motion  - Assess patient's mobility; develop plan if impaired  - Assess patient's need for assistive devices and provide as appropriate  - Encourage maximum independence but intervene and supervise when necessary  - Involve family in performance of ADLs  - Assess for home care needs following discharge   - Consider OT consult to assist with ADL evaluation and planning for discharge  - Provide patient education as appropriate  Outcome: Progressing  Goal: Maintains/Returns to pre admission functional level  Description: INTERVENTIONS:  - Perform BMAT or MOVE assessment daily    - Set and communicate daily mobility goal to care team and patient/family/caregiver  - Collaborate with rehabilitation services on mobility goals if consulted  - Perform Range of Motion  times a day  - Reposition patient every  hours  - Dangle patient  times a day  - Stand patient  times a day  - Ambulate patient  times a day  - Out of bed to chair  times a day   - Out of bed for meals  times a day  - Out of bed for toileting  - Record patient progress and toleration of activity level   Outcome: Progressing     Problem: DISCHARGE PLANNING  Goal: Discharge to home or other facility with appropriate resources  Description: INTERVENTIONS:  - Identify barriers to discharge w/patient and caregiver  - Arrange for needed discharge resources and transportation as appropriate  - Identify discharge learning needs (meds, wound care, etc )  - Arrange for interpretive services to assist at discharge as needed  - Refer to Case Management Department for coordinating discharge planning if the patient needs post-hospital services based on physician/advanced practitioner order or complex needs related to functional status, cognitive ability, or social support system  Outcome: Progressing     Problem: Knowledge Deficit  Goal: Patient/family/caregiver demonstrates understanding of disease process, treatment plan, medications, and discharge instructions  Description: Complete learning assessment and assess knowledge base    Interventions:  - Provide teaching at level of understanding  - Provide teaching via preferred learning methods  Outcome: Progressing     Problem: RESPIRATORY - ADULT  Goal: Achieves optimal ventilation and oxygenation  Description: INTERVENTIONS:  - Assess for changes in respiratory status  - Assess for changes in mentation and behavior  - Position to facilitate oxygenation and minimize respiratory effort  - Oxygen administered by appropriate delivery if ordered  - Initiate smoking cessation education as indicated  - Encourage broncho-pulmonary hygiene including cough, deep breathe, Incentive Spirometry  - Assess the need for suctioning and aspirate as needed  - Assess and instruct to report SOB or any respiratory difficulty  - Respiratory Therapy support as indicated  Outcome: Progressing     Problem: METABOLIC, FLUID AND ELECTROLYTES - ADULT  Goal: Electrolytes maintained within normal limits  Description: INTERVENTIONS:  - Monitor labs and assess patient for signs and symptoms of electrolyte imbalances  - Administer electrolyte replacement as ordered  - Monitor response to electrolyte replacements, including repeat lab results as appropriate  - Instruct patient on fluid and nutrition as appropriate  Outcome: Progressing  Goal: Fluid balance maintained  Description: INTERVENTIONS:  - Monitor labs   - Monitor I/O and WT  - Instruct patient on fluid and nutrition as appropriate  - Assess for signs & symptoms of volume excess or deficit  Outcome: Progressing  Goal: Glucose maintained within target range  Description: INTERVENTIONS:  - Monitor Blood Glucose as ordered  - Assess for signs and symptoms of hyperglycemia and hypoglycemia  - Administer ordered medications to maintain glucose within target range  - Assess nutritional intake and initiate nutrition service referral as needed  Outcome: Progressing     Problem: SKIN/TISSUE INTEGRITY - ADULT  Goal: Skin Integrity remains intact(Skin Breakdown Prevention)  Description: Assess:  -Perform [place] assessment every   -Clean and moisturize skin every   -Inspect skin when repositioning, toileting, and assisting with ADLS  -Assess under medical devices such as  every   -Assess extremities for adequate circulation and sensation     Bed Management:  -Have minimal linens on bed & keep smooth, unwrinkled  -Change linens as needed when moist or perspiring  -Avoid sitting or lying in one position for more than  hours while in bed  -Keep HOB at degrees     Toileting:  -Offer bedside commode  -Assess for incontinence every   -Use incontinent care products after each incontinent episode such as     Activity:  -Mobilize patient  times a day  -Encourage activity and walks on unit  -Encourage or provide ROM exercises   -Turn and reposition patient every  Hours  -Use appropriate equipment to lift or move patient in bed  -Instruct/ Assist with weight shifting every  when out of bed in chair  -Consider limitation of chair time  hour intervals    Skin Care:  -Avoid use of baby powder, tape, friction and shearing, hot water or constrictive clothing  -Relieve pressure over bony prominences using   -Do not massage red bony areas    Next Steps:  -Teach patient strategies to minimize risks such as    -Consider consults to  interdisciplinary teams such as   Outcome: Progressing  Goal: Incision(s), wounds(s) or drain site(s) healing without S/S of infection  Description: INTERVENTIONS  - Assess and document dressing, incision, wound bed, drain sites and surrounding tissue  - Provide patient and family education  - Perform skin care/dressing changes every   Outcome: Progressing  Goal: Pressure injury heals and does not worsen  Description: Interventions:  - Implement low air loss mattress or specialty surface (Criteria met)  - Apply silicone foam dressing  - Instruct/assist with weight shifting every  minutes when in chair   - Limit chair time to  hour intervals  - Use special pressure reducing interventions such as  when in chair   - Apply fecal or urinary incontinence containment device   - Perform passive or active ROM every   - Turn and reposition patient & offload bony prominences every hours   - Utilize friction reducing device or surface for transfers   - Consider consults to  interdisciplinary teams such as   - Use incontinent care products after each incontinent episode such as  - Consider nutrition services referral as needed  Outcome: Progressing     Problem: HEMATOLOGIC - ADULT  Goal: Maintains hematologic stability  Description: INTERVENTIONS  - Assess for signs and symptoms of bleeding or hemorrhage  - Monitor labs  - Administer supportive blood products/factors as ordered and appropriate  Outcome: Progressing

## 2023-05-16 NOTE — ASSESSMENT & PLAN NOTE
Lab Results   Component Value Date    HGBA1C 6 2 (H) 05/08/2023       Recent Labs     05/15/23  1102 05/15/23  1548 05/15/23  2118 05/16/23  0800   POCGLU 289* 151* 180* 170*       Blood Sugar Average: Last 72 hrs:  (P) 183 9     · Seems controlled with hemoglobin A1c 6 2  · Sliding-scale insulin coverage with Accu-Cheks  · Basal insulin home regimen on hold until patient is tolerating oral intake with resolution of lethargy  · Hypoglycemia protocol  Patient daughter and family member at the bedside, discussed about goals of care-family want to proceed with hospice care  Plan is to discharge patient with home hospice care

## 2023-05-16 NOTE — PLAN OF CARE
Problem: Potential for Falls  Goal: Patient will remain free of falls  Description: INTERVENTIONS:  - Educate patient/family on patient safety including physical limitations  - Instruct patient to call for assistance with activity   - Consult OT/PT to assist with strengthening/mobility   - Keep Call bell within reach  - Keep bed low and locked with side rails adjusted as appropriate  - Keep care items and personal belongings within reach  - Initiate and maintain comfort rounds  - Make Fall Risk Sign visible to staff  - Offer Toileting every 2 Hours, in advance of need  - Initiate/Maintain alarm  - Obtain necessary fall risk management equipment  - Apply yellow socks and bracelet for high fall risk patients  - Consider moving patient to room near nurses station  Outcome: Progressing     Problem: MOBILITY - ADULT  Goal: Maintain or return to baseline ADL function  Description: INTERVENTIONS:  -  Assess patient's ability to carry out ADLs; assess patient's baseline for ADL function and identify physical deficits which impact ability to perform ADLs (bathing, care of mouth/teeth, toileting, grooming, dressing, etc )  - Assess/evaluate cause of self-care deficits   - Assess range of motion  - Assess patient's mobility; develop plan if impaired  - Assess patient's need for assistive devices and provide as appropriate  - Encourage maximum independence but intervene and supervise when necessary  - Involve family in performance of ADLs  - Assess for home care needs following discharge   - Consider OT consult to assist with ADL evaluation and planning for discharge  - Provide patient education as appropriate  Outcome: Progressing  Goal: Maintains/Returns to pre admission functional level  Description: INTERVENTIONS:  - Perform BMAT or MOVE assessment daily    - Set and communicate daily mobility goal to care team and patient/family/caregiver     - Collaborate with rehabilitation services on mobility goals if consulted  - Perform Range of Motion - times a day  - Reposition patient every - hours    - Dangle patient - times a day  - Stand patient - times a day  - Ambulate patient - times a day  - Out of bed to chair - times a day   - Out of bed for meals - times a day  - Out of bed for toileting  - Record patient progress and toleration of activity level   Outcome: Progressing     Problem: Prexisting or High Potential for Compromised Skin Integrity  Goal: Skin integrity is maintained or improved  Description: INTERVENTIONS:  - Identify patients at risk for skin breakdown  - Assess and monitor skin integrity  - Assess and monitor nutrition and hydration status  - Monitor labs   - Assess for incontinence   - Turn and reposition patient  - Assist with mobility/ambulation  - Relieve pressure over bony prominences  - Avoid friction and shearing  - Provide appropriate hygiene as needed including keeping skin clean and dry  - Evaluate need for skin moisturizer/barrier cream  - Collaborate with interdisciplinary team   - Patient/family teaching  - Consider wound care consult   Outcome: Progressing     Problem: PAIN - ADULT  Goal: Verbalizes/displays adequate comfort level or baseline comfort level  Description: Interventions:  - Encourage patient to monitor pain and request assistance  - Assess pain using appropriate pain scale  - Administer analgesics based on type and severity of pain and evaluate response  - Implement non-pharmacological measures as appropriate and evaluate response  - Consider cultural and social influences on pain and pain management  - Notify physician/advanced practitioner if interventions unsuccessful or patient reports new pain  Outcome: Progressing     Problem: INFECTION - ADULT  Goal: Absence or prevention of progression during hospitalization  Description: INTERVENTIONS:  - Assess and monitor for signs and symptoms of infection  - Monitor lab/diagnostic results  - Monitor all insertion sites, i e  indwelling lines, tubes, and drains  - Monitor endotracheal if appropriate and nasal secretions for changes in amount and color  - [place] appropriate cooling/warming therapies per order  - Administer medications as ordered  - Instruct and encourage patient and family to use good hand hygiene technique  - Identify and instruct in appropriate isolation precautions for identified infection/condition  Outcome: Progressing  Goal: Absence of fever/infection during neutropenic period  Description: INTERVENTIONS:  - Monitor WBC    Outcome: Progressing     Problem: SAFETY ADULT  Goal: Patient will remain free of falls  Description: INTERVENTIONS:  - Educate patient/family on patient safety including physical limitations  - Instruct patient to call for assistance with activity   - Consult OT/PT to assist with strengthening/mobility   - Keep Call bell within reach  - Keep bed low and locked with side rails adjusted as appropriate  - Keep care items and personal belongings within reach  - Initiate and maintain comfort rounds  - Make Fall Risk Sign visible to staff  - Offer Toileting every 2 Hours, in advance of need  - Initiate/Maintain alarm  - Obtain necessary fall risk management equipment  - Apply yellow socks and bracelet for high fall risk patients  - Consider moving patient to room near nurses station  Outcome: Progressing  Goal: Maintain or return to baseline ADL function  Description: INTERVENTIONS:  -  Assess patient's ability to carry out ADLs; assess patient's baseline for ADL function and identify physical deficits which impact ability to perform ADLs (bathing, care of mouth/teeth, toileting, grooming, dressing, etc )  - Assess/evaluate cause of self-care deficits   - Assess range of motion  - Assess patient's mobility; develop plan if impaired  - Assess patient's need for assistive devices and provide as appropriate  - Encourage maximum independence but intervene and supervise when necessary  - Involve family in performance of ADLs  - Assess for home care needs following discharge   - Consider OT consult to assist with ADL evaluation and planning for discharge  - Provide patient education as appropriate  Outcome: Progressing  Goal: Maintains/Returns to pre admission functional level  Description: INTERVENTIONS:  - Perform BMAT or MOVE assessment daily    - Set and communicate daily mobility goal to care team and patient/family/caregiver  - Collaborate with rehabilitation services on mobility goals if consulted  - Perform Range of Motion - times a day  - Reposition patient every - hours  - Dangle patient - times a day  - Stand patient - times a day  - Ambulate patient - times a day  - Out of bed to chair - times a day   - Out of bed for meals - times a day  - Out of bed for toileting  - Record patient progress and toleration of activity level   Outcome: Progressing     Problem: DISCHARGE PLANNING  Goal: Discharge to home or other facility with appropriate resources  Description: INTERVENTIONS:  - Identify barriers to discharge w/patient and caregiver  - Arrange for needed discharge resources and transportation as appropriate  - Identify discharge learning needs (meds, wound care, etc )  - Arrange for interpretive services to assist at discharge as needed  - Refer to Case Management Department for coordinating discharge planning if the patient needs post-hospital services based on physician/advanced practitioner order or complex needs related to functional status, cognitive ability, or social support system  Outcome: Progressing     Problem: Knowledge Deficit  Goal: Patient/family/caregiver demonstrates understanding of disease process, treatment plan, medications, and discharge instructions  Description: Complete learning assessment and assess knowledge base    Interventions:  - Provide teaching at level of understanding  - Provide teaching via preferred learning methods  Outcome: Progressing     Problem: RESPIRATORY - ADULT  Goal: Achieves optimal ventilation and oxygenation  Description: INTERVENTIONS:  - Assess for changes in respiratory status  - Assess for changes in mentation and behavior  - Position to facilitate oxygenation and minimize respiratory effort  - Oxygen administered by appropriate delivery if ordered  - Initiate smoking cessation education as indicated  - Encourage broncho-pulmonary hygiene including cough, deep breathe, Incentive Spirometry  - Assess the need for suctioning and aspirate as needed  - Assess and instruct to report SOB or any respiratory difficulty  - Respiratory Therapy support as indicated  Outcome: Progressing     Problem: METABOLIC, FLUID AND ELECTROLYTES - ADULT  Goal: Electrolytes maintained within normal limits  Description: INTERVENTIONS:  - Monitor labs and assess patient for signs and symptoms of electrolyte imbalances  - Administer electrolyte replacement as ordered  - Monitor response to electrolyte replacements, including repeat lab results as appropriate  - Instruct patient on fluid and nutrition as appropriate  Outcome: Progressing  Goal: Fluid balance maintained  Description: INTERVENTIONS:  - Monitor labs   - Monitor I/O and WT  - Instruct patient on fluid and nutrition as appropriate  - Assess for signs & symptoms of volume excess or deficit  Outcome: Progressing  Goal: Glucose maintained within target range  Description: INTERVENTIONS:  - Monitor Blood Glucose as ordered  - Assess for signs and symptoms of hyperglycemia and hypoglycemia  - Administer ordered medications to maintain glucose within target range  - Assess nutritional intake and initiate nutrition service referral as needed  Outcome: Progressing     Problem: SKIN/TISSUE INTEGRITY - ADULT  Goal: Skin Integrity remains intact(Skin Breakdown Prevention)  Description: Assess:  -Perform [place] assessment every -  -Clean and moisturize skin every -  -Inspect skin when repositioning, toileting, and assisting with ADLS  -Assess under medical devices such as - every -  -Assess extremities for adequate circulation and sensation     Bed Management:  -Have minimal linens on bed & keep smooth, unwrinkled  -Change linens as needed when moist or perspiring  -Avoid sitting or lying in one position for more than - hours while in bed  -Keep HOB at -degrees     Toileting:  -Offer bedside commode  -Assess for incontinence every -  -Use incontinent care products after each incontinent episode such as -    Activity:  -Mobilize patient - times a day  -Encourage activity and walks on unit  -Encourage or provide ROM exercises   -Turn and reposition patient every - Hours  -Use appropriate equipment to lift or move patient in bed  -Instruct/ Assist with weight shifting every - when out of bed in chair  -Consider limitation of chair time - hour intervals    Skin Care:  -Avoid use of baby powder, tape, friction and shearing, hot water or constrictive clothing  -Relieve pressure over bony prominences using -  -Do not massage red bony areas    Next Steps:  -Teach patient strategies to minimize risks such as -   -Consider consults to  interdisciplinary teams such as -  Outcome: Progressing  Goal: Incision(s), wounds(s) or drain site(s) healing without S/S of infection  Description: INTERVENTIONS  - Assess and document dressing, incision, wound bed, drain sites and surrounding tissue  - Provide patient and family education  - Perform skin care/dressing changes every -  Outcome: Progressing  Goal: Pressure injury heals and does not worsen  Description: Interventions:  - Implement low air loss mattress or specialty surface (Criteria met)  - Apply silicone foam dressing  - Instruct/assist with weight shifting every - minutes when in chair   - Limit chair time to - hour intervals  - Use special pressure reducing interventions such as - when in chair   - Apply fecal or urinary incontinence containment device   - Perform passive or active ROM every -  - Turn and reposition patient & offload bony prominences every - hours   - Utilize friction reducing device or surface for transfers   - Consider consults to  interdisciplinary teams such as -  - Use incontinent care products after each incontinent episode such as -  - Consider nutrition services referral as needed  Outcome: Progressing     Problem: HEMATOLOGIC - ADULT  Goal: Maintains hematologic stability  Description: INTERVENTIONS  - Assess for signs and symptoms of bleeding or hemorrhage  - Monitor labs  - Administer supportive blood products/factors as ordered and appropriate  Outcome: Progressing

## 2023-05-16 NOTE — ASSESSMENT & PLAN NOTE
· Severe copd maintained on 3 L nasal cannula  · Continue Prednisone 2 5 mg daily, DuoNebs, as needed albuterol  · Patient daughter and family member at the bedside, discussed about goals of care-family want to proceed with hospice care

## 2023-05-16 NOTE — ASSESSMENT & PLAN NOTE
· Chronically maintained on home oxygen 3 L for severe COPD  · VBG with acute hypercarbia, coupled with altered mental status placed on BiPAP  · Discussed with daughter Alice Gómez on telephone recurrent hypercarbia similar to presentation last admission this is progression of her pulmonary disease  · Already follows with palliative care  · Patient daughter and family member at the bedside, discussed about goals of care-family want to proceed with hospice care

## 2023-05-16 NOTE — ASSESSMENT & PLAN NOTE
Lab Results   Component Value Date    EGFR 45 05/14/2023    EGFR 44 05/13/2023    EGFR 34 05/09/2023    CREATININE 1 14 05/14/2023    CREATININE 1 17 05/13/2023    CREATININE 1 44 (H) 05/09/2023   · Creatinine baseline  · Avoid nephrotoxic agents

## 2023-05-16 NOTE — ASSESSMENT & PLAN NOTE
· Now on hospice care and complaining of pain in b/l feet  resume narcotic pain meds as she is now on hospice  · PDMP reviewed, no red flags

## 2023-05-16 NOTE — ASSESSMENT & PLAN NOTE
· POA with lethargy suspected CO2 narcosis  · Telephone discussion with daughter patient has been lethargic all day, did not take any of her opioid medications on 5/13  · In the ED VBG with CO2 73, pH 7 291, similar presentation to previous admission on 5/2  · D/W daughter patient's POLST DNR/DNI and is currently following with palliative care medicine at [facility] utilizing liquid morphine, lorazepam and oxycodone tablets as needed for air hunger

## 2023-05-16 NOTE — ACP (ADVANCE CARE PLANNING)
Advanced Care Planning Progress Note    Serious Illness Conversation    1  What is your understanding now of where you are with your illness? Prognostic Understanding: appropriate understanding of prognosis     2  How much information about what is likely to be ahead with your illness would you like to have? Information: patient wants to be fully informed     3  What did you (clinician) communicate to the patient? Prognostic Communication: Uncertain - It can be difficult to predict what will happen with your illness  I hope you will continue to live well for a long time but I’m worried that you could get sick quickly, and I think it is important to prepare for that possibility  4  If your health situation worsens, what are your most important goals? Goals: be at home     5  What are the biggest fears and worries about the future and your health? Fears/Worries: pain     6  What abilities are so critical to your life that you cannot imagine living without them? Unacceptable Function: being chronically confused or not being myself     7  What gives you strength as you think about the future with your illness? N/a     8  If you become sicker, how much are you willing to go through for the possibility of gaining more time? Be in the hospital: No Have a feeding tube: No   Be in the ICU: No Live in a nursing home: No   Be on a ventilator: No Be uncomfortable: No   Be on dialysis: No Undergo aggressive test and/or procedures: No   9  How much does your proxy and family know about your priorities and wishes? Discussion Discussion: extensive discussion with family about goals and wishes          How does this plan sound to you? I will do everything I can to help you through this    Patient verbalized understanding of the plan     I have spent >45minutes speaking with my patient on advanced care planning today or during this visit     Advanced directives  Five Wishes: Patient does not have Five Wishes- would not like information         Ericsonal Reeves MD

## 2023-05-16 NOTE — PLAN OF CARE
Problem: Potential for Falls  Goal: Patient will remain free of falls  Description: INTERVENTIONS:  - Educate patient/family on patient safety including physical limitations  - Instruct patient to call for assistance with activity   - Consult OT/PT to assist with strengthening/mobility   - Keep Call bell within reach  - Keep bed low and locked with side rails adjusted as appropriate  - Keep care items and personal belongings within reach  - Initiate and maintain comfort rounds  - Make Fall Risk Sign visible to staff  - Offer Toileting every  Hours, in advance of need  - Initiate/Maintain alarm  - Obtain necessary fall risk management equipment:   - Apply yellow socks and bracelet for high fall risk patients  - Consider moving patient to room near nurses station  5/16/2023 1115 by Symone Tapia RN  Outcome: Adequate for Discharge  5/16/2023 1020 by Symone Tapia RN  Outcome: Progressing     Problem: MOBILITY - ADULT  Goal: Maintain or return to baseline ADL function  Description: INTERVENTIONS:  -  Assess patient's ability to carry out ADLs; assess patient's baseline for ADL function and identify physical deficits which impact ability to perform ADLs (bathing, care of mouth/teeth, toileting, grooming, dressing, etc )  - Assess/evaluate cause of self-care deficits   - Assess range of motion  - Assess patient's mobility; develop plan if impaired  - Assess patient's need for assistive devices and provide as appropriate  - Encourage maximum independence but intervene and supervise when necessary  - Involve family in performance of ADLs  - Assess for home care needs following discharge   - Consider OT consult to assist with ADL evaluation and planning for discharge  - Provide patient education as appropriate  5/16/2023 1115 by Symone Tapia RN  Outcome: Adequate for Discharge  5/16/2023 1020 by Symone Tapia RN  Outcome: Progressing  Goal: Maintains/Returns to pre admission functional level  Description: INTERVENTIONS:  - Perform BMAT or MOVE assessment daily    - Set and communicate daily mobility goal to care team and patient/family/caregiver  - Collaborate with rehabilitation services on mobility goals if consulted  - Perform Range of Motion  times a day  - Reposition patient every  hours    - Dangle patient  times a day  - Stand patient  times a day  - Ambulate patient  times a day  - Out of bed to chair  times a day   - Out of bed for meals  times a day  - Out of bed for toileting  - Record patient progress and toleration of activity level   5/16/2023 1115 by Misty Carmona RN  Outcome: Adequate for Discharge  5/16/2023 1020 by Misty Carmona RN  Outcome: Progressing     Problem: Prexisting or High Potential for Compromised Skin Integrity  Goal: Skin integrity is maintained or improved  Description: INTERVENTIONS:  - Identify patients at risk for skin breakdown  - Assess and monitor skin integrity  - Assess and monitor nutrition and hydration status  - Monitor labs   - Assess for incontinence   - Turn and reposition patient  - Assist with mobility/ambulation  - Relieve pressure over bony prominences  - Avoid friction and shearing  - Provide appropriate hygiene as needed including keeping skin clean and dry  - Evaluate need for skin moisturizer/barrier cream  - Collaborate with interdisciplinary team   - Patient/family teaching  - Consider wound care consult   5/16/2023 1115 by Misty Carmona RN  Outcome: Adequate for Discharge  5/16/2023 1020 by Misty Carmona RN  Outcome: Progressing     Problem: PAIN - ADULT  Goal: Verbalizes/displays adequate comfort level or baseline comfort level  Description: Interventions:  - Encourage patient to monitor pain and request assistance  - Assess pain using appropriate pain scale  - Administer analgesics based on type and severity of pain and evaluate response  - Implement non-pharmacological measures as appropriate and evaluate response  - Consider cultural and social influences on pain and pain management  - Notify physician/advanced practitioner if interventions unsuccessful or patient reports new pain  5/16/2023 1115 by Ish Romo RN  Outcome: Adequate for Discharge  5/16/2023 1020 by Ish Romo RN  Outcome: Progressing     Problem: INFECTION - ADULT  Goal: Absence or prevention of progression during hospitalization  Description: INTERVENTIONS:  - Assess and monitor for signs and symptoms of infection  - Monitor lab/diagnostic results  - Monitor all insertion sites, i e  indwelling lines, tubes, and drains  - Monitor endotracheal if appropriate and nasal secretions for changes in amount and color  - [place] appropriate cooling/warming therapies per order  - Administer medications as ordered  - Instruct and encourage patient and family to use good hand hygiene technique  - Identify and instruct in appropriate isolation precautions for identified infection/condition  5/16/2023 1115 by Ish Romo RN  Outcome: Adequate for Discharge  5/16/2023 1020 by Ish Romo RN  Outcome: Progressing  Goal: Absence of fever/infection during neutropenic period  Description: INTERVENTIONS:  - Monitor WBC    5/16/2023 1115 by Ish Romo RN  Outcome: Adequate for Discharge  5/16/2023 1020 by Ish Romo RN  Outcome: Progressing     Problem: SAFETY ADULT  Goal: Patient will remain free of falls  Description: INTERVENTIONS:  - Educate patient/family on patient safety including physical limitations  - Instruct patient to call for assistance with activity   - Consult OT/PT to assist with strengthening/mobility   - Keep Call bell within reach  - Keep bed low and locked with side rails adjusted as appropriate  - Keep care items and personal belongings within reach  - Initiate and maintain comfort rounds  - Make Fall Risk Sign visible to staff  - Offer Toileting every  Hours, in advance of need  - Initiate/Maintain alarm  - Obtain necessary fall risk management equipment:   - Apply yellow socks and bracelet for high fall risk patients  - Consider moving patient to room near nurses station  5/16/2023 1115 by Mingo Saez RN  Outcome: Adequate for Discharge  5/16/2023 1020 by Mingo Saez RN  Outcome: Progressing  Goal: Maintain or return to baseline ADL function  Description: INTERVENTIONS:  -  Assess patient's ability to carry out ADLs; assess patient's baseline for ADL function and identify physical deficits which impact ability to perform ADLs (bathing, care of mouth/teeth, toileting, grooming, dressing, etc )  - Assess/evaluate cause of self-care deficits   - Assess range of motion  - Assess patient's mobility; develop plan if impaired  - Assess patient's need for assistive devices and provide as appropriate  - Encourage maximum independence but intervene and supervise when necessary  - Involve family in performance of ADLs  - Assess for home care needs following discharge   - Consider OT consult to assist with ADL evaluation and planning for discharge  - Provide patient education as appropriate  5/16/2023 1115 by Mingo Saez RN  Outcome: Adequate for Discharge  5/16/2023 1020 by Mingo Saez RN  Outcome: Progressing  Goal: Maintains/Returns to pre admission functional level  Description: INTERVENTIONS:  - Perform BMAT or MOVE assessment daily    - Set and communicate daily mobility goal to care team and patient/family/caregiver  - Collaborate with rehabilitation services on mobility goals if consulted  - Perform Range of Motion  times a day  - Reposition patient every  hours    - Dangle patient  times a day  - Stand patient  times a day  - Ambulate patient  times a day  - Out of bed to chair  times a day   - Out of bed for meals  times a day  - Out of bed for toileting  - Record patient progress and toleration of activity level   5/16/2023 1115 by Minog Saez RN  Outcome: Adequate for Discharge  5/16/2023 1020 by Mingo Saez RN  Outcome: Progressing     Problem: DISCHARGE PLANNING  Goal: Discharge to home or other facility with appropriate resources  Description: INTERVENTIONS:  - Identify barriers to discharge w/patient and caregiver  - Arrange for needed discharge resources and transportation as appropriate  - Identify discharge learning needs (meds, wound care, etc )  - Arrange for interpretive services to assist at discharge as needed  - Refer to Case Management Department for coordinating discharge planning if the patient needs post-hospital services based on physician/advanced practitioner order or complex needs related to functional status, cognitive ability, or social support system  5/16/2023 1115 by Gavin Lynch RN  Outcome: Adequate for Discharge  5/16/2023 1020 by Gavin Lynch RN  Outcome: Progressing     Problem: Knowledge Deficit  Goal: Patient/family/caregiver demonstrates understanding of disease process, treatment plan, medications, and discharge instructions  Description: Complete learning assessment and assess knowledge base    Interventions:  - Provide teaching at level of understanding  - Provide teaching via preferred learning methods  5/16/2023 1115 by Gavin Lynch RN  Outcome: Adequate for Discharge  5/16/2023 1020 by Gavin Lynch RN  Outcome: Progressing     Problem: RESPIRATORY - ADULT  Goal: Achieves optimal ventilation and oxygenation  Description: INTERVENTIONS:  - Assess for changes in respiratory status  - Assess for changes in mentation and behavior  - Position to facilitate oxygenation and minimize respiratory effort  - Oxygen administered by appropriate delivery if ordered  - Initiate smoking cessation education as indicated  - Encourage broncho-pulmonary hygiene including cough, deep breathe, Incentive Spirometry  - Assess the need for suctioning and aspirate as needed  - Assess and instruct to report SOB or any respiratory difficulty  - Respiratory Therapy support as indicated  5/16/2023 1115 by Gavin Lynch RN  Outcome: Adequate for Discharge  5/16/2023 1020 by Gavin Lynch RN  Outcome: Progressing     Problem: METABOLIC, FLUID AND ELECTROLYTES - ADULT  Goal: Electrolytes maintained within normal limits  Description: INTERVENTIONS:  - Monitor labs and assess patient for signs and symptoms of electrolyte imbalances  - Administer electrolyte replacement as ordered  - Monitor response to electrolyte replacements, including repeat lab results as appropriate  - Instruct patient on fluid and nutrition as appropriate  5/16/2023 1115 by Reyna Luong RN  Outcome: Adequate for Discharge  5/16/2023 1020 by Reyna Luong RN  Outcome: Progressing  Goal: Fluid balance maintained  Description: INTERVENTIONS:  - Monitor labs   - Monitor I/O and WT  - Instruct patient on fluid and nutrition as appropriate  - Assess for signs & symptoms of volume excess or deficit  5/16/2023 1115 by Reyna Luong RN  Outcome: Adequate for Discharge  5/16/2023 1020 by Reyna Luong RN  Outcome: Progressing  Goal: Glucose maintained within target range  Description: INTERVENTIONS:  - Monitor Blood Glucose as ordered  - Assess for signs and symptoms of hyperglycemia and hypoglycemia  - Administer ordered medications to maintain glucose within target range  - Assess nutritional intake and initiate nutrition service referral as needed  5/16/2023 1115 by Reyna Luong RN  Outcome: Adequate for Discharge  5/16/2023 1020 by Reyna Luong RN  Outcome: Progressing     Problem: SKIN/TISSUE INTEGRITY - ADULT  Goal: Skin Integrity remains intact(Skin Breakdown Prevention)  Description: Assess:  -Perform [place] assessment every   -Clean and moisturize skin every   -Inspect skin when repositioning, toileting, and assisting with ADLS  -Assess under medical devices such as  every   -Assess extremities for adequate circulation and sensation     Bed Management:  -Have minimal linens on bed & keep smooth, unwrinkled  -Change linens as needed when moist or perspiring  -Avoid sitting or lying in one position for more than  hours while in bed  -Keep HOB at degrees     Toileting:  -Offer bedside commode  -Assess for incontinence every   -Use incontinent care products after each incontinent episode such as     Activity:  -Mobilize patient  times a day  -Encourage activity and walks on unit  -Encourage or provide ROM exercises   -Turn and reposition patient every  Hours  -Use appropriate equipment to lift or move patient in bed  -Instruct/ Assist with weight shifting every  when out of bed in chair  -Consider limitation of chair time  hour intervals    Skin Care:  -Avoid use of baby powder, tape, friction and shearing, hot water or constrictive clothing  -Relieve pressure over bony prominences using   -Do not massage red bony areas    Next Steps:  -Teach patient strategies to minimize risks such as    -Consider consults to  interdisciplinary teams such as   5/16/2023 1115 by Melanie Marsh RN  Outcome: Adequate for Discharge  5/16/2023 1020 by Melanie Marsh RN  Outcome: Progressing  Goal: Incision(s), wounds(s) or drain site(s) healing without S/S of infection  Description: INTERVENTIONS  - Assess and document dressing, incision, wound bed, drain sites and surrounding tissue  - Provide patient and family education  - Perform skin care/dressing changes every   5/16/2023 1115 by Melanie Marsh RN  Outcome: Adequate for Discharge  5/16/2023 1020 by Melanie Marsh RN  Outcome: Progressing  Goal: Pressure injury heals and does not worsen  Description: Interventions:  - Implement low air loss mattress or specialty surface (Criteria met)  - Apply silicone foam dressing  - Instruct/assist with weight shifting every  minutes when in chair   - Limit chair time to  hour intervals  - Use special pressure reducing interventions such as  when in chair   - Apply fecal or urinary incontinence containment device   - Perform passive or active ROM every   - Turn and reposition patient & offload bony prominences every  hours   - Utilize friction reducing device or surface for transfers   - Consider consults to  interdisciplinary teams such as   - Use incontinent care products after each incontinent episode such as   - Consider nutrition services referral as needed  5/16/2023 1115 by Ambika Ruano RN  Outcome: Adequate for Discharge  5/16/2023 1020 by Ambika Ruano RN  Outcome: Progressing     Problem: HEMATOLOGIC - ADULT  Goal: Maintains hematologic stability  Description: INTERVENTIONS  - Assess for signs and symptoms of bleeding or hemorrhage  - Monitor labs  - Administer supportive blood products/factors as ordered and appropriate  5/16/2023 1115 by Ambika Ruano RN  Outcome: Adequate for Discharge  5/16/2023 1020 by Ambika Ruano RN  Outcome: Progressing

## 2023-05-16 NOTE — CASE MANAGEMENT
Case Management Discharge Planning Note    Patient name Anatoliy Montesinos  Location /-91 MRN [id number]  : 1940 Date 2023       Current Admission Date: 2023  Current Admission Diagnosis:Toxic metabolic encephalopathy   Patient Active Problem List    Diagnosis Date Noted   • Toxic metabolic encephalopathy    • Bacteremia 2023   • Acute kidney injury superimposed on chronic kidney disease ([facility] ) 2023   • Constipation 2023   • History of DVT (deep vein thrombosis) 2023   • Coronary artery disease involving native coronary artery 2023   • Chronic anemia 2023   • Stage 3b chronic kidney disease ([facility] ) 2023   • Acute on chronic diastolic congestive heart failure ([facility] ) 2023   • Smoking 2023   • Oxygen dependent 2023   • PAD (peripheral artery disease) ([facility] ) 2023   • Acute hematogenous osteomyelitis of left foot ([facility] ) 2023   • Type 2 diabetes mellitus with skin complication, with long-term current use of insulin ([facility] ) 2023   • COPD (chronic obstructive pulmonary disease) ([facility] ) 2023   • Acute on chronic respiratory failure with hypoxia and hypercapnia ([facility] ) 2023   • Chronic, continuous use of opioids 2023      LOS (days): 3  Geometric Mean LOS (GMLOS) (days): 3 90  Days to GMLOS:1 3     OBJECTIVE:  Risk of Unplanned Readmission Score: 51 54         Current admission status: Inpatient   Preferred Pharmacy:   [facility] [address] [id number] - [address]  [address]  [address] [facility] [address]  Phone: [phone] Fax: [phone]    Primary Care Provider: Parul Morris MD    [facility]  Secondary Insurance:     DISCHARGE DETAILS:        [facility]   Provider, nursing and [facility] hospice liaison aware of same     Medical necessity face sheet in [patient chart

## 2023-05-17 ENCOUNTER — HOME CARE VISIT (OUTPATIENT)
Dept: HOME HEALTH SERVICES | Facility: HOME HEALTHCARE | Age: 83
End: 2023-05-17

## 2023-05-17 VITALS
RESPIRATION RATE: 24 BRPM | WEIGHT: 163 LBS | TEMPERATURE: 97.1 F | SYSTOLIC BLOOD PRESSURE: 126 MMHG | DIASTOLIC BLOOD PRESSURE: 64 MMHG | BODY MASS INDEX: 26.31 KG/M2 | HEART RATE: 87 BPM

## 2023-05-17 NOTE — UTILIZATION REVIEW
NOTIFICATION OF ADMISSION DISCHARGE   This is a Notification of Discharge from [facility]  Please be advised that this patient has been discharge from our facility  Below you will find the admission and discharge date and time including the patient’s disposition  UTILIZATION REVIEW CONTACT:  [address] [place]  Utilization   Network Utilization Review Department  Phone: [phone] x carefully listen to the prompts  All voicemails are confidential   Email: [email or web address]     ADMISSION INFORMATION  PRESENTATION DATE: 5/13/2023  3:41 PM  OBERVATION ADMISSION DATE:   INPATIENT ADMISSION DATE: 5/13/23  7:02 PM   DISCHARGE DATE: 5/16/2023 12:28 PM   DISPOSITION:Home with [address] INFORMATION:  Send all requests for admission clinical reviews, approved or denied determinations and any other requests to dedicated fax number below belonging to the campus where the patient is receiving treatment   List of dedicated fax numbers:  [address] DENIALS (Administrative/Medical Necessity) [phone]   [address] (Maternity/NICU/Pediatrics) [phone]   [facility] [phone]   [place] [address] [phone]   [address] [phone]   [address] [facility] [phone]   [facility] [phone]   [address] [place] [address] 119 [phone]   [facility]  [phone]   [address] [phone]   [address] [facility] [address] [facility] [phone]

## 2023-05-18 ENCOUNTER — HOME CARE VISIT (OUTPATIENT)
Dept: HOME HOSPICE | Facility: HOSPICE | Age: 83
End: 2023-05-18

## 2023-05-18 ENCOUNTER — HOME CARE VISIT (OUTPATIENT)
Dept: HOME HEALTH SERVICES | Facility: HOME HEALTHCARE | Age: 83
End: 2023-05-18

## 2023-05-19 ENCOUNTER — HOME CARE VISIT (OUTPATIENT)
Dept: HOME HOSPICE | Facility: HOSPICE | Age: 83
End: 2023-05-19

## 2023-05-19 ENCOUNTER — HOME CARE VISIT (OUTPATIENT)
Dept: HOME HEALTH SERVICES | Facility: HOME HEALTHCARE | Age: 83
End: 2023-05-19

## 2023-05-19 LAB
BACTERIA BLD CULT: NORMAL
BACTERIA BLD CULT: NORMAL

## 2023-05-22 ENCOUNTER — HOME CARE VISIT (OUTPATIENT)
Dept: HOME HOSPICE | Facility: HOSPICE | Age: 83
End: 2023-05-22

## 2023-05-23 ENCOUNTER — HOME CARE VISIT (OUTPATIENT)
Dept: HOME HEALTH SERVICES | Facility: HOME HEALTHCARE | Age: 83
End: 2023-05-23

## 2023-05-25 ENCOUNTER — HOME CARE VISIT (OUTPATIENT)
Dept: HOME HEALTH SERVICES | Facility: HOME HEALTHCARE | Age: 83
End: 2023-05-25

## 2023-05-26 VITALS
DIASTOLIC BLOOD PRESSURE: 56 MMHG | HEART RATE: 78 BPM | RESPIRATION RATE: 18 BRPM | SYSTOLIC BLOOD PRESSURE: 120 MMHG | TEMPERATURE: 98 F

## 2023-05-30 ENCOUNTER — HOME CARE VISIT (OUTPATIENT)
Dept: HOME HEALTH SERVICES | Facility: HOME HEALTHCARE | Age: 83
End: 2023-05-30

## 2023-06-01 ENCOUNTER — HOME CARE VISIT (OUTPATIENT)
Dept: HOME HEALTH SERVICES | Facility: HOME HEALTHCARE | Age: 83
End: 2023-06-01

## 2023-06-01 ENCOUNTER — HOME CARE VISIT (OUTPATIENT)
Dept: HOME HEALTH SERVICES | Facility: HOME HEALTHCARE | Age: 83
End: 2023-06-01
Payer: MEDICARE

## 2023-06-01 VITALS — HEART RATE: 80 BPM | TEMPERATURE: 98 F | SYSTOLIC BLOOD PRESSURE: 130 MMHG | DIASTOLIC BLOOD PRESSURE: 80 MMHG

## 2023-06-01 VITALS — SYSTOLIC BLOOD PRESSURE: 130 MMHG | DIASTOLIC BLOOD PRESSURE: 78 MMHG | HEART RATE: 88 BPM | TEMPERATURE: 97.9 F

## 2023-06-01 PROCEDURE — G0299 HHS/HOSPICE OF RN EA 15 MIN: HCPCS

## 2023-06-05 ENCOUNTER — HOME CARE VISIT (OUTPATIENT)
Dept: HOME HEALTH SERVICES | Facility: HOME HEALTHCARE | Age: 83
End: 2023-06-05
Payer: MEDICARE

## 2023-06-05 PROCEDURE — G0156 HHCP-SVS OF AIDE,EA 15 MIN: HCPCS

## 2023-06-06 ENCOUNTER — HOME CARE VISIT (OUTPATIENT)
Dept: HOME HEALTH SERVICES | Facility: HOME HEALTHCARE | Age: 83
End: 2023-06-06
Payer: MEDICARE

## 2023-06-06 PROCEDURE — G0299 HHS/HOSPICE OF RN EA 15 MIN: HCPCS

## 2023-06-09 ENCOUNTER — HOME CARE VISIT (OUTPATIENT)
Dept: HOME HEALTH SERVICES | Facility: HOME HEALTHCARE | Age: 83
End: 2023-06-09
Payer: MEDICARE

## 2023-06-09 VITALS — DIASTOLIC BLOOD PRESSURE: 70 MMHG | SYSTOLIC BLOOD PRESSURE: 140 MMHG | HEART RATE: 76 BPM

## 2023-06-09 PROCEDURE — G0299 HHS/HOSPICE OF RN EA 15 MIN: HCPCS

## 2023-06-09 PROCEDURE — G0156 HHCP-SVS OF AIDE,EA 15 MIN: HCPCS

## 2023-06-12 ENCOUNTER — HOME CARE VISIT (OUTPATIENT)
Dept: HOME HEALTH SERVICES | Facility: HOME HEALTHCARE | Age: 83
End: 2023-06-12
Payer: MEDICARE

## 2023-06-12 PROCEDURE — G0156 HHCP-SVS OF AIDE,EA 15 MIN: HCPCS

## 2023-06-13 ENCOUNTER — HOME CARE VISIT (OUTPATIENT)
Dept: HOME HEALTH SERVICES | Facility: HOME HEALTHCARE | Age: 83
End: 2023-06-13
Payer: MEDICARE

## 2023-06-13 ENCOUNTER — HOME CARE VISIT (OUTPATIENT)
Dept: HOME HOSPICE | Facility: HOSPICE | Age: 83
End: 2023-06-13
Payer: MEDICARE

## 2023-06-13 VITALS — SYSTOLIC BLOOD PRESSURE: 120 MMHG | TEMPERATURE: 98.2 F | DIASTOLIC BLOOD PRESSURE: 62 MMHG | HEART RATE: 76 BPM

## 2023-06-13 PROCEDURE — G0299 HHS/HOSPICE OF RN EA 15 MIN: HCPCS

## 2023-06-14 ENCOUNTER — HOME CARE VISIT (OUTPATIENT)
Dept: HOME HEALTH SERVICES | Facility: HOME HEALTHCARE | Age: 83
End: 2023-06-14
Payer: MEDICARE

## 2023-06-14 PROCEDURE — G0299 HHS/HOSPICE OF RN EA 15 MIN: HCPCS

## 2023-06-16 ENCOUNTER — HOME CARE VISIT (OUTPATIENT)
Dept: HOME HEALTH SERVICES | Facility: HOME HEALTHCARE | Age: 83
End: 2023-06-16
Payer: MEDICARE

## 2023-06-16 PROCEDURE — G0156 HHCP-SVS OF AIDE,EA 15 MIN: HCPCS

## 2023-06-16 PROCEDURE — G0299 HHS/HOSPICE OF RN EA 15 MIN: HCPCS

## 2023-06-18 DIAGNOSIS — Z51.5 HOSPICE CARE PATIENT: Primary | ICD-10-CM

## 2023-06-18 RX ORDER — OXYCODONE HYDROCHLORIDE 15 MG/1
15 TABLET ORAL EVERY 4 HOURS PRN
Qty: 24 TABLET | Refills: 0 | Status: SHIPPED | OUTPATIENT
Start: 2023-06-18

## 2023-06-19 ENCOUNTER — HOME CARE VISIT (OUTPATIENT)
Dept: HOME HEALTH SERVICES | Facility: HOME HEALTHCARE | Age: 83
End: 2023-06-19
Payer: MEDICARE

## 2023-06-19 PROCEDURE — G0156 HHCP-SVS OF AIDE,EA 15 MIN: HCPCS

## 2023-06-20 ENCOUNTER — HOME CARE VISIT (OUTPATIENT)
Dept: HOME HEALTH SERVICES | Facility: HOME HEALTHCARE | Age: 83
End: 2023-06-20
Payer: MEDICARE

## 2023-06-20 PROCEDURE — G0299 HHS/HOSPICE OF RN EA 15 MIN: HCPCS

## 2023-06-21 ENCOUNTER — HOME CARE VISIT (OUTPATIENT)
Dept: HOME HEALTH SERVICES | Facility: HOME HEALTHCARE | Age: 83
End: 2023-06-21
Payer: MEDICARE

## 2023-06-21 PROCEDURE — G0156 HHCP-SVS OF AIDE,EA 15 MIN: HCPCS

## 2023-06-22 ENCOUNTER — HOME CARE VISIT (OUTPATIENT)
Dept: HOME HEALTH SERVICES | Facility: HOME HEALTHCARE | Age: 83
End: 2023-06-22
Payer: MEDICARE

## 2023-06-22 PROCEDURE — G0299 HHS/HOSPICE OF RN EA 15 MIN: HCPCS

## 2023-06-23 ENCOUNTER — HOME CARE VISIT (OUTPATIENT)
Dept: HOME HEALTH SERVICES | Facility: HOME HEALTHCARE | Age: 83
End: 2023-06-23
Payer: MEDICARE

## 2023-06-23 PROCEDURE — G0156 HHCP-SVS OF AIDE,EA 15 MIN: HCPCS

## 2023-06-26 ENCOUNTER — HOME CARE VISIT (OUTPATIENT)
Dept: HOME HEALTH SERVICES | Facility: HOME HEALTHCARE | Age: 83
End: 2023-06-26
Payer: MEDICARE

## 2023-06-26 PROCEDURE — G0299 HHS/HOSPICE OF RN EA 15 MIN: HCPCS

## 2023-06-26 PROCEDURE — G0156 HHCP-SVS OF AIDE,EA 15 MIN: HCPCS

## 2023-06-28 VITALS
DIASTOLIC BLOOD PRESSURE: 76 MMHG | HEART RATE: 80 BPM | SYSTOLIC BLOOD PRESSURE: 130 MMHG | RESPIRATION RATE: 20 BRPM | TEMPERATURE: 97.8 F

## 2023-06-29 ENCOUNTER — HOME CARE VISIT (OUTPATIENT)
Dept: HOME HEALTH SERVICES | Facility: HOME HEALTHCARE | Age: 83
End: 2023-06-29
Payer: MEDICARE

## 2023-06-29 PROCEDURE — G0299 HHS/HOSPICE OF RN EA 15 MIN: HCPCS

## 2023-06-29 PROCEDURE — G0156 HHCP-SVS OF AIDE,EA 15 MIN: HCPCS

## 2023-07-03 ENCOUNTER — HOME CARE VISIT (OUTPATIENT)
Dept: HOME HEALTH SERVICES | Facility: HOME HEALTHCARE | Age: 83
End: 2023-07-03
Payer: MEDICARE

## 2023-07-03 PROCEDURE — G0156 HHCP-SVS OF AIDE,EA 15 MIN: HCPCS

## 2023-07-03 PROCEDURE — G0299 HHS/HOSPICE OF RN EA 15 MIN: HCPCS

## 2023-07-05 VITALS — SYSTOLIC BLOOD PRESSURE: 128 MMHG | DIASTOLIC BLOOD PRESSURE: 72 MMHG | HEART RATE: 78 BPM | TEMPERATURE: 97.9 F

## 2023-07-05 VITALS
SYSTOLIC BLOOD PRESSURE: 134 MMHG | HEART RATE: 80 BPM | DIASTOLIC BLOOD PRESSURE: 78 MMHG | RESPIRATION RATE: 20 BRPM | TEMPERATURE: 98 F

## 2023-07-06 ENCOUNTER — HOME CARE VISIT (OUTPATIENT)
Dept: HOME HEALTH SERVICES | Facility: HOME HEALTHCARE | Age: 83
End: 2023-07-06
Payer: MEDICARE

## 2023-07-06 PROCEDURE — G0299 HHS/HOSPICE OF RN EA 15 MIN: HCPCS

## 2023-07-06 PROCEDURE — G0156 HHCP-SVS OF AIDE,EA 15 MIN: HCPCS

## 2023-07-07 ENCOUNTER — HOME CARE VISIT (OUTPATIENT)
Dept: HOME HOSPICE | Facility: HOSPICE | Age: 83
End: 2023-07-07
Payer: MEDICARE

## 2023-07-10 ENCOUNTER — HOME CARE VISIT (OUTPATIENT)
Dept: HOME HEALTH SERVICES | Facility: HOME HEALTHCARE | Age: 83
End: 2023-07-10
Payer: MEDICARE

## 2023-07-10 PROCEDURE — G0299 HHS/HOSPICE OF RN EA 15 MIN: HCPCS

## 2023-07-10 PROCEDURE — G0156 HHCP-SVS OF AIDE,EA 15 MIN: HCPCS

## 2023-07-11 ENCOUNTER — HOME CARE VISIT (OUTPATIENT)
Dept: HOME HOSPICE | Facility: HOSPICE | Age: 83
End: 2023-07-11
Payer: MEDICARE

## 2023-07-13 ENCOUNTER — HOME CARE VISIT (OUTPATIENT)
Dept: HOME HEALTH SERVICES | Facility: HOME HEALTHCARE | Age: 83
End: 2023-07-13
Payer: MEDICARE

## 2023-07-13 VITALS
HEART RATE: 72 BPM | RESPIRATION RATE: 20 BRPM | DIASTOLIC BLOOD PRESSURE: 76 MMHG | TEMPERATURE: 45.7 F | SYSTOLIC BLOOD PRESSURE: 130 MMHG

## 2023-07-13 PROCEDURE — G0299 HHS/HOSPICE OF RN EA 15 MIN: HCPCS

## 2023-07-13 PROCEDURE — G0156 HHCP-SVS OF AIDE,EA 15 MIN: HCPCS

## 2023-07-14 ENCOUNTER — HOME CARE VISIT (OUTPATIENT)
Dept: HOME HOSPICE | Facility: HOSPICE | Age: 83
End: 2023-07-14
Payer: MEDICARE

## 2023-07-17 ENCOUNTER — HOME CARE VISIT (OUTPATIENT)
Dept: HOME HEALTH SERVICES | Facility: HOME HEALTHCARE | Age: 83
End: 2023-07-17
Payer: MEDICARE

## 2023-07-17 PROCEDURE — G0299 HHS/HOSPICE OF RN EA 15 MIN: HCPCS

## 2023-07-17 PROCEDURE — G0156 HHCP-SVS OF AIDE,EA 15 MIN: HCPCS

## 2023-07-18 ENCOUNTER — HOME CARE VISIT (OUTPATIENT)
Dept: HOME HOSPICE | Facility: HOSPICE | Age: 83
End: 2023-07-18
Payer: MEDICARE

## 2023-07-21 ENCOUNTER — HOME CARE VISIT (OUTPATIENT)
Dept: HOME HEALTH SERVICES | Facility: HOME HEALTHCARE | Age: 83
End: 2023-07-21
Payer: MEDICARE

## 2023-07-21 PROCEDURE — G0299 HHS/HOSPICE OF RN EA 15 MIN: HCPCS

## 2023-07-24 ENCOUNTER — HOME CARE VISIT (OUTPATIENT)
Dept: HOME HEALTH SERVICES | Facility: HOME HEALTHCARE | Age: 83
End: 2023-07-24
Payer: MEDICARE

## 2023-07-24 PROCEDURE — G0156 HHCP-SVS OF AIDE,EA 15 MIN: HCPCS

## 2023-07-24 PROCEDURE — G0299 HHS/HOSPICE OF RN EA 15 MIN: HCPCS

## 2023-07-25 ENCOUNTER — HOME CARE VISIT (OUTPATIENT)
Dept: HOME HOSPICE | Facility: HOSPICE | Age: 83
End: 2023-07-25
Payer: MEDICARE

## 2023-07-26 ENCOUNTER — HOME CARE VISIT (OUTPATIENT)
Dept: HOME HOSPICE | Facility: HOSPICE | Age: 83
End: 2023-07-26
Payer: MEDICARE

## 2023-07-27 ENCOUNTER — HOME CARE VISIT (OUTPATIENT)
Dept: HOME HEALTH SERVICES | Facility: HOME HEALTHCARE | Age: 83
End: 2023-07-27
Payer: MEDICARE

## 2023-07-27 PROCEDURE — G0299 HHS/HOSPICE OF RN EA 15 MIN: HCPCS

## 2023-07-27 PROCEDURE — G0156 HHCP-SVS OF AIDE,EA 15 MIN: HCPCS

## 2023-07-31 ENCOUNTER — HOME CARE VISIT (OUTPATIENT)
Dept: HOME HEALTH SERVICES | Facility: HOME HEALTHCARE | Age: 83
End: 2023-07-31
Payer: MEDICARE

## 2023-07-31 VITALS — TEMPERATURE: 97.8 F | DIASTOLIC BLOOD PRESSURE: 78 MMHG | SYSTOLIC BLOOD PRESSURE: 132 MMHG | HEART RATE: 80 BPM

## 2023-07-31 PROCEDURE — G0156 HHCP-SVS OF AIDE,EA 15 MIN: HCPCS

## 2023-07-31 PROCEDURE — G0299 HHS/HOSPICE OF RN EA 15 MIN: HCPCS

## 2023-08-02 ENCOUNTER — HOME CARE VISIT (OUTPATIENT)
Dept: HOME HOSPICE | Facility: HOSPICE | Age: 83
End: 2023-08-02
Payer: MEDICARE

## 2023-08-03 ENCOUNTER — HOME CARE VISIT (OUTPATIENT)
Dept: HOME HEALTH SERVICES | Facility: HOME HEALTHCARE | Age: 83
End: 2023-08-03
Payer: MEDICARE

## 2023-08-03 PROCEDURE — G0299 HHS/HOSPICE OF RN EA 15 MIN: HCPCS

## 2023-08-03 PROCEDURE — G0156 HHCP-SVS OF AIDE,EA 15 MIN: HCPCS

## 2023-08-04 ENCOUNTER — HOME CARE VISIT (OUTPATIENT)
Dept: HOME HEALTH SERVICES | Facility: HOME HEALTHCARE | Age: 83
End: 2023-08-04
Payer: MEDICARE

## 2023-08-04 PROCEDURE — G0156 HHCP-SVS OF AIDE,EA 15 MIN: HCPCS

## 2023-08-04 PROCEDURE — G0299 HHS/HOSPICE OF RN EA 15 MIN: HCPCS

## 2023-08-05 ENCOUNTER — HOME CARE VISIT (OUTPATIENT)
Dept: HOME HOSPICE | Facility: HOSPICE | Age: 83
End: 2023-08-05
Payer: MEDICARE

## 2023-08-08 VITALS
SYSTOLIC BLOOD PRESSURE: 130 MMHG | DIASTOLIC BLOOD PRESSURE: 80 MMHG | TEMPERATURE: 98.1 F | RESPIRATION RATE: 20 BRPM | HEART RATE: 78 BPM

## (undated) DEVICE — SUT ETHILON 3-0 PS-1 18 IN 1663G

## (undated) DEVICE — GLOVE SRG BIOGEL 7.5

## (undated) DEVICE — ACE WRAP 4 IN UNSTERILE

## (undated) DEVICE — Device

## (undated) DEVICE — SYRINGE 10ML LL

## (undated) DEVICE — RADIFOCUS GLIDEWIRE: Brand: GLIDEWIRE

## (undated) DEVICE — KERLIX BANDAGE ROLL: Brand: KERLIX

## (undated) DEVICE — CATH BAL CHARGER 6 X 60MM X 75CM

## (undated) DEVICE — PLUMEPEN PRO 10FT

## (undated) DEVICE — FLUID MANAGEMENT KIT - IR

## (undated) DEVICE — ASTOUND STANDARD SURGICAL GOWN, XL: Brand: CONVERTORS

## (undated) DEVICE — GLOVE INDICATOR PI UNDERGLOVE SZ 7.5 BLUE

## (undated) DEVICE — SNAP KOVER: Brand: UNBRANDED

## (undated) DEVICE — CULTURE TUBE ANAEROBIC

## (undated) DEVICE — PRESTO™ INFLATION DEVICE: Brand: PRESTO

## (undated) DEVICE — PACLITAXEL-COATED PTA BALLOON CATHETER: Brand: RANGER™

## (undated) DEVICE — GLOVE SRG BIOGEL ECLIPSE 6

## (undated) DEVICE — TOWEL SURG XR DETECT GREEN STRL RFD

## (undated) DEVICE — PINNACLE R/O II INTRODUCER SHEATH WITH RADIOPAQUE MARKER: Brand: PINNACLE

## (undated) DEVICE — COVER PROBE INTRAOPERATIVE 6 X 96 IN

## (undated) DEVICE — PERCUTANEOUS ENTRY THINWALL NEEDLE  ONE-PART: Brand: COOK

## (undated) DEVICE — OCCLUSIVE GAUZE STRIP,3% BISMUTH TRIBROMOPHENATE IN PETROLATUM BLEND: Brand: XEROFORM

## (undated) DEVICE — SYRINGE KIT,PACKAGED,,150FT,MK 7(ANGIO-ARTERION, 150ML SYR KIT W/QFT,MC)(60729385): Brand: MEDRAD® MARK 7 ARTERION DISPOSABLE SYRINGE 150 ML WITH QUICK FILL TUBE

## (undated) DEVICE — SPONGE LAP 18 X 18 IN STRL RFD

## (undated) DEVICE — BETHLEHEM UNIVERSAL  MIONR EXT: Brand: CARDINAL HEALTH

## (undated) DEVICE — 10FR FRAZIER SUCTION HANDLE: Brand: CARDINAL HEALTH

## (undated) DEVICE — FLEXCIL HIGH PRESSURE CONTRAST INJECTION LINE: Brand: NAMIC

## (undated) DEVICE — ALL PURPOSE SPONGES,NON-WOVEN, 4 PLY: Brand: CURITY

## (undated) DEVICE — NEEDLE 25G X 1 1/2

## (undated) DEVICE — CATH DIAG 5FR .035 65CM 6S OMMI-FLUSH

## (undated) DEVICE — BLADE SAGITTAL 25.6 X 9.5MM

## (undated) DEVICE — Y-ADAPTER: Brand: GATEWAY™ PLUS

## (undated) DEVICE — CATH BAL STERLING OTW 4 X 220MM X 150CM

## (undated) DEVICE — SCD SEQUENTIAL COMPRESSION COMFORT SLEEVE MEDIUM KNEE LENGTH: Brand: KENDALL SCD

## (undated) DEVICE — DESTINATION PERIPHERAL GUIDING SHEATH: Brand: DESTINATION

## (undated) DEVICE — PREP PAD BNS: Brand: CONVERTORS

## (undated) DEVICE — STERILE ICS CARDIOVASCULAR PK: Brand: CARDINAL HEALTH

## (undated) DEVICE — BAG DECANTER

## (undated) DEVICE — RADIFOCUS TORQUE DEVICE MULTI-TORQUE VISE: Brand: RADIFOCUS TORQUE DEVICE

## (undated) DEVICE — PADDING CAST 4 IN  COTTON STRL

## (undated) DEVICE — ABDOMINAL PAD: Brand: DERMACEA

## (undated) DEVICE — INTENDED FOR TISSUE SEPARATION, AND OTHER PROCEDURES THAT REQUIRE A SHARP SURGICAL BLADE TO PUNCTURE OR CUT.: Brand: BARD-PARKER ® CARBON RIB-BACK BLADES

## (undated) DEVICE — CATH TEMPO AQUA 4FVER135Â°100CM: Brand: TEMPO AQUA

## (undated) DEVICE — IMMOBILIZER KNEE UNIVERSAL 19 IN

## (undated) DEVICE — CULTURE TUBE AEROBIC